# Patient Record
Sex: MALE | Race: WHITE | NOT HISPANIC OR LATINO | Employment: OTHER | URBAN - METROPOLITAN AREA
[De-identification: names, ages, dates, MRNs, and addresses within clinical notes are randomized per-mention and may not be internally consistent; named-entity substitution may affect disease eponyms.]

---

## 2017-01-09 ENCOUNTER — ANTICOAGULATION THERAPY VISIT (OUTPATIENT)
Dept: ANTICOAGULATION | Facility: CLINIC | Age: 62
End: 2017-01-09
Payer: COMMERCIAL

## 2017-01-09 DIAGNOSIS — Z79.01 LONG TERM CURRENT USE OF ANTICOAGULANT THERAPY: Primary | ICD-10-CM

## 2017-01-09 LAB — INR POINT OF CARE: 2.2 (ref 0.86–1.14)

## 2017-01-09 PROCEDURE — 36416 COLLJ CAPILLARY BLOOD SPEC: CPT

## 2017-01-09 PROCEDURE — 85610 PROTHROMBIN TIME: CPT | Mod: QW

## 2017-01-09 PROCEDURE — 99207 ZZC NO CHARGE NURSE ONLY: CPT

## 2017-01-09 NOTE — PROGRESS NOTES
ANTICOAGULATION FOLLOW-UP CLINIC VISIT    Patient Name:  Jozef Saunders  Date:  1/9/2017  Contact Type:  Face to Face    SUBJECTIVE:     Patient Findings     Positives No Problem Findings           OBJECTIVE    INR PROTIME   Date Value Ref Range Status   01/09/2017 2.2* 0.86 - 1.14 Final       ASSESSMENT / PLAN  INR assessment THER    Recheck INR In: 7 WEEKS due to vacation   INR Location Clinic      Anticoagulation Summary as of 1/9/2017     INR goal 2.0-3.0   Selected INR 2.2 (1/9/2017)   Maintenance plan 7.5 mg (5 mg x 1.5) on Mon, Thu, Sat; 5 mg (5 mg x 1) all other days   Full instructions 7.5 mg on Mon, Thu, Sat; 5 mg all other days   Weekly total 42.5 mg   No change documented Martha Jackman RN   Plan last modified Danielle Anguiano, RN (10/8/2015)   Next INR check 2/27/2017   Target end date Indefinite    Indications   DVT (deep venous thrombosis) (H) [I82.409]  Long term current use of anticoagulant therapy [Z79.01]         Anticoagulation Episode Summary     INR check location Clinic Lab    Preferred lab     Send INR reminders to [facility]    Comments * Dr. Camilo ivnson from [place] in [place]      Anticoagulation Care Providers     Provider Role Specialty Phone number    Dodie Malin, JUAN CNP  Nurse Practitioner [phone]            See the Encounter Report to view Anticoagulation Flowsheet and Dosing Calendar (Go to Encounters tab in chart review, and find the Anticoagulation Therapy Visit)    Martha Jackman, RN

## 2017-01-09 NOTE — MR AVS SNAPSHOT
Jozef Saunders   1/9/2017 11:15 AM   Anticoagulation Therapy Visit    Description:  61 year old male   Provider:  NB ANTI COAG   Department:  Nb Anticoag           INR as of 1/9/2017     Selected INR 2.2 (1/9/2017)      Anticoagulation Summary as of 1/9/2017     INR goal 2.0-3.0   Selected INR 2.2 (1/9/2017)   Full instructions 7.5 mg on Mon, Thu, Sat; 5 mg all other days   Next INR check 2/27/2017    Indications   DVT (deep venous thrombosis) (H) [I82.409]  Long term current use of anticoagulant therapy [Z79.01]         Description     No change, recheck INR in 7 weeks.      Contact Numbers     Please call [phone] to cancel and/or reschedule your appointment.  Please call [phone] with any problems or questions regarding your therapy          January 2017 Details    Sun Mon Tue Wed Thu Fri Sat     1               2               3               4               5               6               7                 8               9      7.5 mg   See details      10      5 mg         11      5 mg         12      7.5 mg         13      5 mg         14      7.5 mg           15      5 mg         16      7.5 mg         17      5 mg         18      5 mg         19      7.5 mg         20      5 mg         21      7.5 mg           22      5 mg         23      7.5 mg         24      5 mg         25      5 mg         26      7.5 mg         27      5 mg         28      7.5 mg           29      5 mg         30      7.5 mg         31      5 mg              Date Details   01/09 This INR check               How to take your warfarin dose     To take:  5 mg Take 1 of the 5 mg tablets.    To take:  7.5 mg Take 1.5 of the 5 mg tablets.           February 2017 Details    Sun Mon Tue Wed Thu Fri Sat        1      5 mg         2      7.5 mg         3      5 mg         4      7.5 mg           5      5 mg         6      7.5 mg         7      5 mg         8      5 mg         9      7.5 mg         10      5 mg         11       7.5 mg           12      5 mg         13      7.5 mg         14      5 mg         15      5 mg         16      7.5 mg         17      5 mg         18      7.5 mg           19      5 mg         20      7.5 mg         21      5 mg         22      5 mg         23      7.5 mg         24      5 mg         25      7.5 mg           26      5 mg         27            28                    Date Details   No additional details    Date of next INR:  2/27/2017         How to take your warfarin dose     To take:  5 mg Take 1 of the 5 mg tablets.    To take:  7.5 mg Take 1.5 of the 5 mg tablets.

## 2017-02-27 ENCOUNTER — ANTICOAGULATION THERAPY VISIT (OUTPATIENT)
Dept: ANTICOAGULATION | Facility: CLINIC | Age: 62
End: 2017-02-27
Payer: COMMERCIAL

## 2017-02-27 DIAGNOSIS — Z79.01 LONG TERM CURRENT USE OF ANTICOAGULANT THERAPY: ICD-10-CM

## 2017-02-27 LAB — INR POINT OF CARE: 2.2 (ref 0.86–1.14)

## 2017-02-27 PROCEDURE — 36416 COLLJ CAPILLARY BLOOD SPEC: CPT

## 2017-02-27 PROCEDURE — 99207 ZZC NO CHARGE NURSE ONLY: CPT

## 2017-02-27 PROCEDURE — 85610 PROTHROMBIN TIME: CPT | Mod: QW

## 2017-02-27 NOTE — MR AVS SNAPSHOT
Jozef Saunders   2/27/2017 11:15 AM   Anticoagulation Therapy Visit    Description:  61 year old male   Provider:  NB ANTI COAG   Department:  Nb Anticoag           INR as of 2/27/2017     Today's INR 2.2      Anticoagulation Summary as of 2/27/2017     INR goal 2.0-3.0   Today's INR 2.2   Full instructions 7.5 mg on Mon, Thu, Sat; 5 mg all other days   Next INR check 4/24/2017    Indications   DVT (deep venous thrombosis) (H) [I82.409]  Long term current use of anticoagulant therapy [Z79.01]         Description     No change, recheck INR in 8 weeks.      Your next Anticoagulation Clinic appointment(s)     Apr 24, 2017 11:30 AM CDT   [facility]   [facility]    [address] [place] [address] [phone]              Contact Numbers     Please call [phone] to cancel and/or reschedule your appointment.  Please call [phone] with any problems or questions regarding your therapy          February 2017 Details    Sun Mon Tue Wed Thu Fri Sat        1               2               3               4                 5               6               7               8               9               10               11                 12               13               14               15               16               17               18                 19               20               21               22               23               24               25                 26               27      7.5 mg   See details      28      5 mg              Date Details   02/27 This INR check               How to take your warfarin dose     To take:  5 mg Take 1 of the 5 mg tablets.    To take:  7.5 mg Take 1.5 of the 5 mg tablets.           March 2017 Details    Sun Mon Tue Wed Thu Fri Sat        1      5 mg         2      7.5 mg         3      5 mg         4      7.5 mg           5      5 mg         6      7.5 mg         7       5 mg         8      5 mg         9      7.5 mg         10      5 mg         11      7.5 mg           12      5 mg         13      7.5 mg         14      5 mg         15      5 mg         16      7.5 mg         17      5 mg         18      7.5 mg           19      5 mg         20      7.5 mg         21      5 mg         22      5 mg         23      7.5 mg         24      5 mg         25      7.5 mg           26      5 mg         27      7.5 mg         28      5 mg         29      5 mg         30      7.5 mg         31      5 mg           Date Details   No additional details            How to take your warfarin dose     To take:  5 mg Take 1 of the 5 mg tablets.    To take:  7.5 mg Take 1.5 of the 5 mg tablets.           April 2017 Details    Sun Mon Tue Wed Thu Fri Sat           1      7.5 mg           2      5 mg         3      7.5 mg         4      5 mg         5      5 mg         6      7.5 mg         7      5 mg         8      7.5 mg           9      5 mg         10      7.5 mg         11      5 mg         12      5 mg         13      7.5 mg         14      5 mg         15      7.5 mg           16      5 mg         17      7.5 mg         18      5 mg         19      5 mg         20      7.5 mg         21      5 mg         22      7.5 mg           23      5 mg         24            25               26               27               28               29                 30                      Date Details   No additional details    Date of next INR:  4/24/2017         How to take your warfarin dose     To take:  5 mg Take 1 of the 5 mg tablets.    To take:  7.5 mg Take 1.5 of the 5 mg tablets.

## 2017-02-27 NOTE — PROGRESS NOTES
ANTICOAGULATION FOLLOW-UP CLINIC VISIT    Patient Name:  Jozef Saunders  Date:  2/27/2017  Contact Type:  Face to Face    SUBJECTIVE:     Patient Findings     Positives No Problem Findings    Comments Med review done today.           OBJECTIVE    INR Protime   Date Value Ref Range Status   02/27/2017 2.2 (A) 0.86 - 1.14 Final       ASSESSMENT / PLAN  INR assessment THER    Recheck INR In: 8 WEEKS    INR Location Clinic      Anticoagulation Summary as of 2/27/2017     INR goal 2.0-3.0   Today's INR 2.2   Maintenance plan 7.5 mg (5 mg x 1.5) on Mon, Thu, Sat; 5 mg (5 mg x 1) all other days   Full instructions 7.5 mg on Mon, Thu, Sat; 5 mg all other days   Weekly total 42.5 mg   No change documented Martha Jackman RN   Plan last modified Danielle Anguiano RN (10/8/2015)   Next INR check 4/24/2017   Target end date Indefinite    Indications   DVT (deep venous thrombosis) (H) [I82.409]  Long term current use of anticoagulant therapy [Z79.01]         Anticoagulation Episode Summary     INR check location Clinic Lab    Preferred lab     Send INR reminders to [facility]    Comments * Dr. Page is from [place] in [place]      Anticoagulation Care Providers     Provider Role Specialty Phone number    Arron Page MD [facility] Internal Medicine [phone]            See the Encounter Report to view Anticoagulation Flowsheet and Dosing Calendar (Go to Encounters tab in chart review, and find the Anticoagulation Therapy Visit)    Martha Jackman, MORTEZA

## 2017-05-01 ENCOUNTER — ANTICOAGULATION THERAPY VISIT (OUTPATIENT)
Dept: ANTICOAGULATION | Facility: CLINIC | Age: 62
End: 2017-05-01
Payer: COMMERCIAL

## 2017-05-01 DIAGNOSIS — Z79.01 LONG TERM CURRENT USE OF ANTICOAGULANT THERAPY: ICD-10-CM

## 2017-05-01 LAB — INR POINT OF CARE: 2.8 (ref 0.86–1.14)

## 2017-05-01 PROCEDURE — 85610 PROTHROMBIN TIME: CPT | Mod: QW

## 2017-05-01 PROCEDURE — 36416 COLLJ CAPILLARY BLOOD SPEC: CPT

## 2017-05-01 PROCEDURE — 99207 ZZC NO CHARGE NURSE ONLY: CPT

## 2017-05-01 NOTE — PROGRESS NOTES
ANTICOAGULATION FOLLOW-UP CLINIC VISIT    Patient Name:  Jozef Saunders  Date:  5/1/2017  Contact Type:  Face to Face    SUBJECTIVE:     Patient Findings     Positives No Problem Findings           OBJECTIVE    INR Protime   Date Value Ref Range Status   05/01/2017 2.8 (A) 0.86 - 1.14 Final       ASSESSMENT / PLAN  INR assessment THER    Recheck INR In: 8 WEEKS    INR Location Clinic      Anticoagulation Summary as of 5/1/2017     INR goal 2.0-3.0   Today's INR 2.8   Maintenance plan 7.5 mg (5 mg x 1.5) on Mon, Thu, Sat; 5 mg (5 mg x 1) all other days   Full instructions 7.5 mg on Mon, Thu, Sat; 5 mg all other days   Weekly total 42.5 mg   No change documented Martha Jackman RN   Plan last modified Danielle Anguiano RN (10/8/2015)   Next INR check 6/26/2017   Target end date Indefinite    Indications   DVT (deep venous thrombosis) (H) [I82.409]  Long term current use of anticoagulant therapy [Z79.01]         Anticoagulation Episode Summary     INR check location Clinic Lab    Preferred lab     Send INR reminders to [facility]    Comments * Dr. Page is from [place] in [place]      Anticoagulation Care Providers     Provider Role Specialty Phone number    Arron Page MD [facility] Internal Medicine [phone]            See the Encounter Report to view Anticoagulation Flowsheet and Dosing Calendar (Go to Encounters tab in chart review, and find the Anticoagulation Therapy Visit)    Martha Jackman, RN

## 2017-05-01 NOTE — MR AVS SNAPSHOT
Jozef Saunders   5/1/2017 3:00 PM   Anticoagulation Therapy Visit    Description:  61 year old male   Provider:  NB ANTI COAG   Department:  Nb Anticoag           INR as of 5/1/2017     Today's INR 2.8      Anticoagulation Summary as of 5/1/2017     INR goal 2.0-3.0   Today's INR 2.8   Full instructions 7.5 mg on Mon, Thu, Sat; 5 mg all other days   Next INR check 6/26/2017    Indications   DVT (deep venous thrombosis) (H) [I82.409]  Long term current use of anticoagulant therapy [Z79.01]         Description     No change, recheck INR in 8 weeks.      Your next Anticoagulation Clinic appointment(s)     Jun 26, 2017 11:45 AM CDT   [facility]agulation [facility] with NB ANTI COAG   [facility]    [address] [place] [address] [phone]              Contact Numbers     Please call [phone] to cancel and/or reschedule your appointment.  Please call [phone] with any problems or questions regarding your therapy          May 2017 Details    Sun Mon Tue Wed Thu Fri Sat      1      7.5 mg   See details      2      5 mg         3      5 mg         4      7.5 mg         5      5 mg         6      7.5 mg           7      5 mg         8      7.5 mg         9      5 mg         10      5 mg         11      7.5 mg         12      5 mg         13      7.5 mg           14      5 mg         15      7.5 mg         16      5 mg         17      5 mg         18      7.5 mg         19      5 mg         20      7.5 mg           21      5 mg         22      7.5 mg         23      5 mg         24      5 mg         25      7.5 mg         26      5 mg         27      7.5 mg           28      5 mg         29      7.5 mg         30      5 mg         31      5 mg             Date Details   05/01 This INR check               How to take your warfarin dose     To take:  5 mg Take 1 of the 5 mg tablets.    To take:  7.5 mg Take 1.5 of the 5 mg tablets.           June 2017  Details    Sun Mon Tue Wed Thu Fri Sat         1      7.5 mg         2      5 mg         3      7.5 mg           4      5 mg         5      7.5 mg         6      5 mg         7      5 mg         8      7.5 mg         9      5 mg         10      7.5 mg           11      5 mg         12      7.5 mg         13      5 mg         14      5 mg         15      7.5 mg         16      5 mg         17      7.5 mg           18      5 mg         19      7.5 mg         20      5 mg         21      5 mg         22      7.5 mg         23      5 mg         24      7.5 mg           25      5 mg         26            27               28               29               30                 Date Details   No additional details    Date of next INR:  6/26/2017         How to take your warfarin dose     To take:  5 mg Take 1 of the 5 mg tablets.    To take:  7.5 mg Take 1.5 of the 5 mg tablets.

## 2017-06-17 DIAGNOSIS — E78.00 HYPERCHOLESTEROLEMIA: ICD-10-CM

## 2017-06-17 NOTE — TELEPHONE ENCOUNTER
simvastatin (ZOCOR) 20 MG tablet     Last Written Prescription Date: 07-  Last Fill Quantity: 90, # refills: 3  Last Office Visit with G, P or [facility] prescribing provider: 07-       Lab Results   Component Value Date    CHOL 167 07/08/2016     Lab Results   Component Value Date    HDL 43 07/08/2016     Lab Results   Component Value Date    LDL 96 07/08/2016     Lab Results   Component Value Date    TRIG 139 07/08/2016     Lab Results   Component Value Date    CHOLHDLRLEÓNO 3.7 10/24/2014

## 2017-06-20 RX ORDER — SIMVASTATIN 20 MG
20 TABLET ORAL AT BEDTIME
Qty: 30 TABLET | Refills: 0 | Status: SHIPPED | OUTPATIENT
Start: 2017-06-20 | End: 2017-07-22

## 2017-06-20 NOTE — TELEPHONE ENCOUNTER
Refill x one given.   Patient needs appointment with PMD for any further refills.   Jessica Srinivasan PA-C

## 2017-06-24 DIAGNOSIS — I10 ESSENTIAL HYPERTENSION, BENIGN: ICD-10-CM

## 2017-06-24 DIAGNOSIS — F41.1 GENERALIZED ANXIETY DISORDER: ICD-10-CM

## 2017-06-24 NOTE — TELEPHONE ENCOUNTER
atenolol (TENORMIN) 50 MG      Last Written Prescription Date: 7/8/16-Discontinued 10/6/16?  Last Fill Quantity: 135, # refills: 3    Last Office Visit with [facility], [facility] or [facility] prescribing provider:  7/8/16   Future Office Visit:        BP Readings from Last 3 Encounters:   07/08/16 138/84   01/08/16 132/80   11/28/15 (!) 170/94     sertraline (ZOLOFT) 50 MG     Last Written Prescription Date: 7/8/16  Last Fill Quantity: 45, # refills: 3  Last Office Visit with [facility] primary care provider:  7/8/16        Last PHQ-9 score on record=   PHQ-9 SCORE 7/8/2016   Total Score 0         lisinopril (PRINIVIL,ZESTRIL) 20 MG       Last Written Prescription Date: 7/8/16  Last Fill Quantity: 90, # refills: 3  Last Office Visit with [facility], [facility] or [facility] prescribing provider: 7/8/16       Potassium   Date Value Ref Range Status   07/08/2016 4.3 3.4 - 5.3 mmol/L Final     Creatinine   Date Value Ref Range Status   07/08/2016 0.83 0.66 - 1.25 mg/dL Final     BP Readings from Last 3 Encounters:   07/08/16 138/84   01/08/16 132/80   11/28/15 (!) 170/94

## 2017-06-26 ENCOUNTER — ANTICOAGULATION THERAPY VISIT (OUTPATIENT)
Dept: ANTICOAGULATION | Facility: CLINIC | Age: 62
End: 2017-06-26
Payer: COMMERCIAL

## 2017-06-26 DIAGNOSIS — Z79.01 LONG TERM CURRENT USE OF ANTICOAGULANT THERAPY: ICD-10-CM

## 2017-06-26 LAB — INR POINT OF CARE: 2.6 (ref 0.86–1.14)

## 2017-06-26 PROCEDURE — 36416 COLLJ CAPILLARY BLOOD SPEC: CPT

## 2017-06-26 PROCEDURE — 99207 ZZC NO CHARGE NURSE ONLY: CPT

## 2017-06-26 PROCEDURE — 85610 PROTHROMBIN TIME: CPT | Mod: QW

## 2017-06-26 NOTE — PROGRESS NOTES
ANTICOAGULATION FOLLOW-UP CLINIC VISIT    Patient Name:  Jozef Saunders  Date:  6/26/2017  Contact Type:  Face to Face    SUBJECTIVE:     Patient Findings     Positives No Problem Findings           OBJECTIVE    INR Protime   Date Value Ref Range Status   06/26/2017 2.6 (A) 0.86 - 1.14 Final       ASSESSMENT / PLAN  INR assessment THER    Recheck INR In: 8 WEEKS    INR Location Clinic      Anticoagulation Summary as of 6/26/2017     INR goal 2.0-3.0   Today's INR 2.6   Maintenance plan 7.5 mg (5 mg x 1.5) on Mon, Thu, Sat; 5 mg (5 mg x 1) all other days   Full instructions 7.5 mg on Mon, Thu, Sat; 5 mg all other days   Weekly total 42.5 mg   No change documented Martha Jackman RN   Plan last modified Danielle Anguiano RN (10/8/2015)   Next INR check 8/21/2017   Target end date Indefinite    Indications   DVT (deep venous thrombosis) (H) [I82.409]  Long term current use of anticoagulant therapy [Z79.01]         Anticoagulation Episode Summary     INR check location Clinic Lab    Preferred lab     Send INR reminders to [facility]    Comments * Dr. Page is from [place] in [place]      Anticoagulation Care Providers     Provider Role Specialty Phone number    Arron Page MD [facility] Internal Medicine [phone]            See the Encounter Report to view Anticoagulation Flowsheet and Dosing Calendar (Go to Encounters tab in chart review, and find the Anticoagulation Therapy Visit)    Martha Jackman, RN

## 2017-06-26 NOTE — MR AVS SNAPSHOT
Jozef Saunders   6/26/2017 11:45 AM   Anticoagulation Therapy Visit    Description:  61 year old male   Provider:  NB ANTI COAG   Department:  Nb Anticoag           INR as of 6/26/2017     Today's INR 2.6      Anticoagulation Summary as of 6/26/2017     INR goal 2.0-3.0   Today's INR 2.6   Full instructions 7.5 mg on Mon, Thu, Sat; 5 mg all other days   Next INR check 8/21/2017    Indications   DVT (deep venous thrombosis) (H) [I82.409]  Long term current use of anticoagulant therapy [Z79.01]         Description     No change, recheck INR in 8 weeks.      Your next Anticoagulation Clinic appointment(s)     Aug 21, 2017 11:45 AM CDT   [facility]ticoagulation [facility] with NB ANTI COAG   [facility]    [address] [place] [address] [phone]              Contact Numbers     Please call [phone] to cancel and/or reschedule your appointment.  Please call [phone] with any problems or questions regarding your therapy          June 2017 Details    Sun Mon Tue Wed Thu Fri Sat         1               2               3                 4               5               6               7               8               9               10                 11               12               13               14               15               16               17                 18               19               20               21               22               23               24                 25               26      7.5 mg   See details      27      5 mg         28      5 mg         29      7.5 mg         30      5 mg           Date Details   06/26 This INR check               How to take your warfarin dose     To take:  5 mg Take 1 of the 5 mg tablets.    To take:  7.5 mg Take 1.5 of the 5 mg tablets.           July 2017 Details    Sun Mon Tue Wed Thu Fri Sat           1      7.5 mg           2      5 mg         3      7.5 mg         4      5 mg         5       5 mg         6      7.5 mg         7      5 mg         8      7.5 mg           9      5 mg         10      7.5 mg         11      5 mg         12      5 mg         13      7.5 mg         14      5 mg         15      7.5 mg           16      5 mg         17      7.5 mg         18      5 mg         19      5 mg         20      7.5 mg         21      5 mg         22      7.5 mg           23      5 mg         24      7.5 mg         25      5 mg         26      5 mg         27      7.5 mg         28      5 mg         29      7.5 mg           30      5 mg         31      7.5 mg               Date Details   No additional details            How to take your warfarin dose     To take:  5 mg Take 1 of the 5 mg tablets.    To take:  7.5 mg Take 1.5 of the 5 mg tablets.           August 2017 Details    Sun Mon Tue Wed Thu Fri Sat       1      5 mg         2      5 mg         3      7.5 mg         4      5 mg         5      7.5 mg           6      5 mg         7      7.5 mg         8      5 mg         9      5 mg         10      7.5 mg         11      5 mg         12      7.5 mg           13      5 mg         14      7.5 mg         15      5 mg         16      5 mg         17      7.5 mg         18      5 mg         19      7.5 mg           20      5 mg         21            22               23               24               25               26                 27               28               29               30               31                  Date Details   No additional details    Date of next INR:  8/21/2017         How to take your warfarin dose     To take:  5 mg Take 1 of the 5 mg tablets.    To take:  7.5 mg Take 1.5 of the 5 mg tablets.

## 2017-06-28 RX ORDER — ATENOLOL 50 MG/1
75 TABLET ORAL DAILY
Qty: 135 TABLET | Refills: 0 | Status: SHIPPED | OUTPATIENT
Start: 2017-06-28 | End: 2017-08-29

## 2017-06-28 RX ORDER — LISINOPRIL 20 MG/1
20 TABLET ORAL DAILY
Qty: 90 TABLET | Refills: 0 | Status: SHIPPED | OUTPATIENT
Start: 2017-06-28 | End: 2017-08-29

## 2017-06-28 NOTE — TELEPHONE ENCOUNTER
Routing to PCP.  Pt will be due for office visit and labs.  Pt receives 90 day supply and needs to be routed to provider per [facility] Refill Protocol.    meds and labs ann'd up.  Please review and approve as necessary.    TC- PLEASE CALL PT TO SCHEDULE PHYSICAL    Fay Brown, RN  Triage Nurse

## 2017-07-17 DIAGNOSIS — K44.9 HIATAL HERNIA: ICD-10-CM

## 2017-07-17 NOTE — TELEPHONE ENCOUNTER
omeprazole 20 MG tablet      Last Written Prescription Date: 7/8/2016  Last Fill Quantity: 90,  # refills: 3   Last Office Visit with [facility], [facility] or [facility] prescribing provider: 7/8/2016                                         Next 5 appointments (look out 90 days)     Jul 21, 2017  7:50 AM CDT   PHYSICAL with Arron Page MD   [facility] ([facility]    [address] [place] [id number] [phone]

## 2017-07-18 NOTE — TELEPHONE ENCOUNTER
Medication is being filled for 1 time refill only due to:  Patient needs to be seen because it has been more than one year since last visit.     Pt. Has OV scheduled for 7/21/2017.     Prescription approved per [facility] Refill Protocol.    Paris RAMOS RN, BSN, PHN  [place] [facility] RN

## 2017-07-22 DIAGNOSIS — E78.00 HYPERCHOLESTEROLEMIA: ICD-10-CM

## 2017-07-23 NOTE — TELEPHONE ENCOUNTER
simvastatin (ZOCOR) 20 MG     Last Written Prescription Date: 6/20/17  Last Fill Quantity: 30, # refills: 0  Last Office Visit with G, P or [facility] prescribing provider: 7/8/16  Next 5 appointments (look out 90 days)     Jul 28, 2017  7:50 AM CDT   [facility]YS[facility]   [facility]    [address] [place] [id number] [phone]                   Lab Results   Component Value Date    CHOL 167 07/08/2016     Lab Results   Component Value Date    HDL 43 07/08/2016     Lab Results   Component Value Date    LDL 96 07/08/2016     Lab Results   Component Value Date    TRIG 139 07/08/2016     Lab Results   Component Value Date    CHOLHDLRATIO 3.7 10/24/2014

## 2017-07-24 RX ORDER — SIMVASTATIN 20 MG
20 TABLET ORAL AT BEDTIME
Qty: 30 TABLET | Refills: 0 | Status: SHIPPED | OUTPATIENT
Start: 2017-07-24 | End: 2017-08-29

## 2017-07-24 NOTE — TELEPHONE ENCOUNTER
Prescription approved per [facility] Refill Protocol.    Has appointment for 7/28/17    Olga Dillon, MSN, RN-BC  Care Coordinator

## 2017-08-14 DIAGNOSIS — J31.0 CHRONIC RHINITIS: ICD-10-CM

## 2017-08-15 RX ORDER — FLUTICASONE PROPIONATE 50 MCG
SPRAY, SUSPENSION (ML) NASAL
Qty: 16 ML | Refills: 0 | Status: SHIPPED | OUTPATIENT
Start: 2017-08-15 | End: 2017-09-13

## 2017-08-15 NOTE — TELEPHONE ENCOUNTER
fluticasone (FLONASE) 50 MCG/ACT nasal spray      Last Written Prescription Date: 7/8/2016  Last Fill Quantity: 16g,  # refills: 11   Last Office Visit with [facility], [facility] or [facility] prescribing provider: 7/8/2016                                         Next 5 appointments (look out 90 days)     Aug 29, 2017  9:10 AM CDT   PHYSICAL with Arron Page MD   [facility]    [address] [place] [address]  [address] [place] [id number] [phone]

## 2017-08-15 NOTE — TELEPHONE ENCOUNTER
Patient calling to expedite refill.     Prescription approved per [facility] Refill Protocol.

## 2017-08-20 DIAGNOSIS — E78.00 HYPERCHOLESTEROLEMIA: ICD-10-CM

## 2017-08-21 ENCOUNTER — ANTICOAGULATION THERAPY VISIT (OUTPATIENT)
Dept: ANTICOAGULATION | Facility: CLINIC | Age: 62
End: 2017-08-21
Payer: COMMERCIAL

## 2017-08-21 DIAGNOSIS — Z79.01 LONG TERM CURRENT USE OF ANTICOAGULANT THERAPY: ICD-10-CM

## 2017-08-21 DIAGNOSIS — Z86.711 HISTORY OF PULMONARY EMBOLISM: ICD-10-CM

## 2017-08-21 DIAGNOSIS — I82.409 DVT (DEEP VENOUS THROMBOSIS) (H): Primary | ICD-10-CM

## 2017-08-21 LAB — INR POINT OF CARE: 2.8 (ref 0.86–1.14)

## 2017-08-21 PROCEDURE — 99207 ZZC NO CHARGE NURSE ONLY: CPT

## 2017-08-21 PROCEDURE — 36416 COLLJ CAPILLARY BLOOD SPEC: CPT

## 2017-08-21 PROCEDURE — 85610 PROTHROMBIN TIME: CPT | Mod: QW

## 2017-08-21 NOTE — TELEPHONE ENCOUNTER
simvastatin (ZOCOR) 20 MG tablet     Last Written Prescription Date: 7/24/2017  Last Fill Quantity: 30, # refills: 0  Last Office Visit with [facility], [facility] or [facility] prescribing provider: 7/8/2016  Next 5 appointments (look out 90 days)     Aug 29, 2017  9:10 AM CDT   PHYSICAL with Arron Page MD   [facility] ([facility])    [address] [place] [id number] [phone]                   Lab Results   Component Value Date    CHOL 167 07/08/2016     Lab Results   Component Value Date    HDL 43 07/08/2016     Lab Results   Component Value Date    LDL 96 07/08/2016     Lab Results   Component Value Date    TRIG 139 07/08/2016     Lab Results   Component Value Date    CHOLHDLRATIO 3.7 10/24/2014

## 2017-08-21 NOTE — MR AVS SNAPSHOT
Jozef Saunders   8/21/2017 11:45 AM   Anticoagulation Therapy Visit    Description:  61 year old male   Provider:  NB ANTI COAG   Department:  [facility] Anticoag           INR as of 8/21/2017     Today's INR 2.8      Anticoagulation Summary as of 8/21/2017     INR goal 2.0-3.0   Today's INR 2.8   Full instructions 7.5 mg on Mon, Thu, Sat; 5 mg all other days   Next INR check 10/16/2017    Indications   DVT (deep venous thrombosis) (H) [I82.409]  Long term current use of anticoagulant therapy [Z79.01]         Description     No change, recheck INR in 8 weeks.      Your next Anticoagulation Clinic appointment(s)     Oct 16, 2017  1:00 PM CDT   Anticoagulation Visit with NB ANTI COAG   [facility]    [address] [place] [id number] [phone]              Contact Numbers     Please call [phone] to cancel and/or reschedule your appointment.  Please call [phone] with any problems or questions regarding your therapy          August 2017 Details    Sun Mon Tue Wed Thu Fri Sat       1               2               3               4               5                 6               7               8               9               10               11               12                 13               14               15               16               17               18               19                 20               21      7.5 mg   See details      22      5 mg         23      5 mg         24      7.5 mg         25      5 mg         26      7.5 mg           27      5 mg         28      7.5 mg         29      5 mg         30      5 mg         31      7.5 mg            Date Details   08/21 This INR check               How to take your warfarin dose     To take:  5 mg Take 1 of the 5 mg tablets.    To take:  7.5 mg Take 1.5 of the 5 mg tablets.           September 2017 Details    Sun Mon Tue Wed Thu Fri Sat          1      5 mg         2      7.5 mg            3      5 mg         4      7.5 mg         5      5 mg         6      5 mg         7      7.5 mg         8      5 mg         9      7.5 mg           10      5 mg         11      7.5 mg         12      5 mg         13      5 mg         14      7.5 mg         15      5 mg         16      7.5 mg           17      5 mg         18      7.5 mg         19      5 mg         20      5 mg         21      7.5 mg         22      5 mg         23      7.5 mg           24      5 mg         25      7.5 mg         26      5 mg         27      5 mg         28      7.5 mg         29      5 mg         30      7.5 mg          Date Details   No additional details            How to take your warfarin dose     To take:  5 mg Take 1 of the 5 mg tablets.    To take:  7.5 mg Take 1.5 of the 5 mg tablets.           October 2017 Details    Sun Mon Tue Wed Thu Fri Sat     1      5 mg         2      7.5 mg         3      5 mg         4      5 mg         5      7.5 mg         6      5 mg         7      7.5 mg           8      5 mg         9      7.5 mg         10      5 mg         11      5 mg         12      7.5 mg         13      5 mg         14      7.5 mg           15      5 mg         16            17               18               19               20               21                 22               23               24               25               26               27               28                 29               30               31                    Date Details   No additional details    Date of next INR:  10/16/2017         How to take your warfarin dose     To take:  5 mg Take 1 of the 5 mg tablets.    To take:  7.5 mg Take 1.5 of the 5 mg tablets.

## 2017-08-21 NOTE — PROGRESS NOTES
ANTICOAGULATION FOLLOW-UP CLINIC VISIT    Patient Name:  Jozef Saunders  Date:  8/21/2017  Contact Type:  Face to Face    SUBJECTIVE:     Patient Findings     Positives No Problem Findings    Comments Has annual px with PCP next week.           OBJECTIVE    INR Protime   Date Value Ref Range Status   08/21/2017 2.8 (A) 0.86 - 1.14 Final       ASSESSMENT / PLAN  INR assessment THER    Recheck INR In: 8 WEEKS    INR Location Clinic      Anticoagulation Summary as of 8/21/2017     INR goal 2.0-3.0   Today's INR 2.8   Maintenance plan 7.5 mg (5 mg x 1.5) on Mon, Thu, Sat; 5 mg (5 mg x 1) all other days   Full instructions 7.5 mg on Mon, Thu, Sat; 5 mg all other days   Weekly total 42.5 mg   No change documented Martha Jackman RN   Plan last modified Danielle Anguiano RN (10/8/2015)   Next INR check 10/16/2017   Target end date Indefinite    Indications   DVT (deep venous thrombosis) (H) [I82.409]  Long term current use of anticoagulant therapy [Z79.01]         Anticoagulation Episode Summary     INR check location Clinic Lab    Preferred lab     Send INR reminders to [facility]    Comments * Dr. Page is from [place] in [place]      Anticoagulation Care Providers     Provider Role Specialty Phone number    Arron Page MD [facility] Internal Medicine [phone]            See the Encounter Report to view Anticoagulation Flowsheet and Dosing Calendar (Go to Encounters tab in chart review, and find the Anticoagulation Therapy Visit)      Martha Jackman, MORTEZA

## 2017-08-22 NOTE — TELEPHONE ENCOUNTER
Dr. Page out of office.    Patient has had 2 dom refills and has cancelled 2 appt. Did you want to do partial refill until Aug 29th appt?    Claire Phelps RN

## 2017-08-28 RX ORDER — SIMVASTATIN 20 MG
TABLET ORAL
Qty: 15 TABLET | Refills: 0 | OUTPATIENT
Start: 2017-08-28

## 2017-08-29 ENCOUNTER — OFFICE VISIT (OUTPATIENT)
Dept: PEDIATRICS | Facility: CLINIC | Age: 62
End: 2017-08-29
Payer: COMMERCIAL

## 2017-08-29 VITALS
SYSTOLIC BLOOD PRESSURE: 120 MMHG | HEIGHT: 69 IN | DIASTOLIC BLOOD PRESSURE: 76 MMHG | OXYGEN SATURATION: 95 % | BODY MASS INDEX: 27.75 KG/M2 | TEMPERATURE: 98.2 F | HEART RATE: 77 BPM | WEIGHT: 187.38 LBS

## 2017-08-29 DIAGNOSIS — F41.1 GENERALIZED ANXIETY DISORDER: ICD-10-CM

## 2017-08-29 DIAGNOSIS — K44.9 HIATAL HERNIA: ICD-10-CM

## 2017-08-29 DIAGNOSIS — Z00.00 ROUTINE GENERAL MEDICAL EXAMINATION AT A HEALTH CARE FACILITY: Primary | ICD-10-CM

## 2017-08-29 DIAGNOSIS — Z86.711 HISTORY OF PULMONARY EMBOLISM: ICD-10-CM

## 2017-08-29 DIAGNOSIS — I10 ESSENTIAL HYPERTENSION, BENIGN: ICD-10-CM

## 2017-08-29 DIAGNOSIS — Z23 NEED FOR PROPHYLACTIC VACCINATION AND INOCULATION AGAINST INFLUENZA: ICD-10-CM

## 2017-08-29 DIAGNOSIS — E78.00 HYPERCHOLESTEROLEMIA: ICD-10-CM

## 2017-08-29 LAB
ALBUMIN SERPL-MCNC: 3.6 G/DL (ref 3.4–5)
ALP SERPL-CCNC: 73 U/L (ref 40–150)
ALT SERPL W P-5'-P-CCNC: 33 U/L (ref 0–70)
ANION GAP SERPL CALCULATED.3IONS-SCNC: 7 MMOL/L (ref 3–14)
AST SERPL W P-5'-P-CCNC: 29 U/L (ref 0–45)
BILIRUB SERPL-MCNC: 0.4 MG/DL (ref 0.2–1.3)
BUN SERPL-MCNC: 17 MG/DL (ref 7–30)
CALCIUM SERPL-MCNC: 8.8 MG/DL (ref 8.5–10.1)
CHLORIDE SERPL-SCNC: 110 MMOL/L (ref 94–109)
CHOLEST SERPL-MCNC: 184 MG/DL
CO2 SERPL-SCNC: 26 MMOL/L (ref 20–32)
CREAT SERPL-MCNC: 0.86 MG/DL (ref 0.66–1.25)
ERYTHROCYTE [DISTWIDTH] IN BLOOD BY AUTOMATED COUNT: 14.4 % (ref 10–15)
FERRITIN SERPL-MCNC: 9 NG/ML (ref 26–388)
GFR SERPL CREATININE-BSD FRML MDRD: 90 ML/MIN/1.7M2
GLUCOSE SERPL-MCNC: 92 MG/DL (ref 70–99)
HCT VFR BLD AUTO: 44.2 % (ref 40–53)
HDLC SERPL-MCNC: 52 MG/DL
HGB BLD-MCNC: 14.1 G/DL (ref 13.3–17.7)
LDLC SERPL CALC-MCNC: 103 MG/DL
MAGNESIUM SERPL-MCNC: 2.2 MG/DL (ref 1.6–2.3)
MCH RBC QN AUTO: 26.6 PG (ref 26.5–33)
MCHC RBC AUTO-ENTMCNC: 31.9 G/DL (ref 31.5–36.5)
MCV RBC AUTO: 83 FL (ref 78–100)
NONHDLC SERPL-MCNC: 132 MG/DL
PLATELET # BLD AUTO: 102 10E9/L (ref 150–450)
POTASSIUM SERPL-SCNC: 4.3 MMOL/L (ref 3.4–5.3)
PROT SERPL-MCNC: 7 G/DL (ref 6.8–8.8)
PSA SERPL-ACNC: 0.58 UG/L (ref 0–4)
RBC # BLD AUTO: 5.31 10E12/L (ref 4.4–5.9)
SODIUM SERPL-SCNC: 143 MMOL/L (ref 133–144)
TRIGL SERPL-MCNC: 144 MG/DL
WBC # BLD AUTO: 10 10E9/L (ref 4–11)

## 2017-08-29 PROCEDURE — 36415 COLL VENOUS BLD VENIPUNCTURE: CPT | Performed by: INTERNAL MEDICINE

## 2017-08-29 PROCEDURE — 85027 COMPLETE CBC AUTOMATED: CPT | Performed by: INTERNAL MEDICINE

## 2017-08-29 PROCEDURE — 83735 ASSAY OF MAGNESIUM: CPT | Performed by: INTERNAL MEDICINE

## 2017-08-29 PROCEDURE — 82728 ASSAY OF FERRITIN: CPT | Performed by: INTERNAL MEDICINE

## 2017-08-29 PROCEDURE — 90471 IMMUNIZATION ADMIN: CPT | Performed by: INTERNAL MEDICINE

## 2017-08-29 PROCEDURE — 99396 PREV VISIT EST AGE 40-64: CPT | Mod: 25 | Performed by: INTERNAL MEDICINE

## 2017-08-29 PROCEDURE — G0103 PSA SCREENING: HCPCS | Performed by: INTERNAL MEDICINE

## 2017-08-29 PROCEDURE — 80061 LIPID PANEL: CPT | Performed by: INTERNAL MEDICINE

## 2017-08-29 PROCEDURE — 90686 IIV4 VACC NO PRSV 0.5 ML IM: CPT | Performed by: INTERNAL MEDICINE

## 2017-08-29 PROCEDURE — 80053 COMPREHEN METABOLIC PANEL: CPT | Performed by: INTERNAL MEDICINE

## 2017-08-29 PROCEDURE — 82306 VITAMIN D 25 HYDROXY: CPT | Performed by: INTERNAL MEDICINE

## 2017-08-29 RX ORDER — WARFARIN SODIUM 5 MG/1
TABLET ORAL
Qty: 120 TABLET | Refills: 3 | Status: SHIPPED | OUTPATIENT
Start: 2017-08-29 | End: 2018-09-19

## 2017-08-29 RX ORDER — ATENOLOL 50 MG/1
75 TABLET ORAL DAILY
Qty: 135 TABLET | Refills: 3 | Status: SHIPPED | OUTPATIENT
Start: 2017-08-29 | End: 2018-08-07

## 2017-08-29 RX ORDER — SIMVASTATIN 20 MG
20 TABLET ORAL AT BEDTIME
Qty: 90 TABLET | Refills: 3 | Status: SHIPPED | OUTPATIENT
Start: 2017-08-29 | End: 2018-08-22

## 2017-08-29 RX ORDER — LISINOPRIL 20 MG/1
20 TABLET ORAL DAILY
Qty: 90 TABLET | Refills: 3 | Status: SHIPPED | OUTPATIENT
Start: 2017-08-29 | End: 2018-08-22

## 2017-08-29 ASSESSMENT — ANXIETY QUESTIONNAIRES
2. NOT BEING ABLE TO STOP OR CONTROL WORRYING: NOT AT ALL
7. FEELING AFRAID AS IF SOMETHING AWFUL MIGHT HAPPEN: NOT AT ALL
GAD7 TOTAL SCORE: 0
5. BEING SO RESTLESS THAT IT IS HARD TO SIT STILL: NOT AT ALL
6. BECOMING EASILY ANNOYED OR IRRITABLE: NOT AT ALL
3. WORRYING TOO MUCH ABOUT DIFFERENT THINGS: NOT AT ALL
1. FEELING NERVOUS, ANXIOUS, OR ON EDGE: NOT AT ALL

## 2017-08-29 ASSESSMENT — PATIENT HEALTH QUESTIONNAIRE - PHQ9
5. POOR APPETITE OR OVEREATING: NOT AT ALL
SUM OF ALL RESPONSES TO PHQ QUESTIONS 1-9: 0

## 2017-08-29 NOTE — PATIENT INSTRUCTIONS
1) Call insurance to see if shingles vaccine covered    2) Flu shot today    3) Routine labs downstairs today    4) Refilled your medications today.     Arron Page MD    Preventive Health Recommendations  Male Ages 50   64    Yearly exam:             See your health care provider every year in order to  o   Review health changes.   o   Discuss preventive care.    o   Review your medicines if your doctor has prescribed any.     Have a cholesterol test every 5 years, or more frequently if you are at risk for high cholesterol/heart disease.     Have a diabetes test (fasting glucose) every three years. If you are at risk for diabetes, you should have this test more often.     Have a colonoscopy at age 50, or have a yearly FIT test (stool test). These exams will check for colon cancer.      Talk with your health care provider about whether or not a prostate cancer screening test (PSA) is right for you.    You should be tested each year for STDs (sexually transmitted diseases), if you re at risk.     Shots: Get a flu shot each year. Get a tetanus shot every 10 years.     Nutrition:    Eat at least 5 servings of fruits and vegetables daily.     Eat whole-grain bread, whole-wheat pasta and brown rice instead of white grains and rice.     Talk to your provider about Calcium and Vitamin D.     Lifestyle    Exercise for at least 150 minutes a week (30 minutes a day, 5 days a week). This will help you control your weight and prevent disease.     Limit alcohol to one drink per day.     No smoking.     Wear sunscreen to prevent skin cancer.     See your dentist every six months for an exam and cleaning.     See your eye doctor every 1 to 2 years.

## 2017-08-29 NOTE — NURSING NOTE
"Chief Complaint   Patient presents with     Physical     Recheck Medication     follow up on which meds need refills/possible to order all at one time?       Initial /76 (BP Location: Right arm, Patient Position: Chair, Cuff Size: Adult Regular)  Pulse 77  Temp 98.2  F (36.8  C) (Oral)  Ht 5' 9\" (1.753 m)  Wt 187 lb 6 oz (85 kg)  SpO2 95%  BMI 27.67 kg/m2 Estimated body mass index is 27.67 kg/(m^2) as calculated from the following:    Height as of this encounter: 5' 9\" (1.753 m).    Weight as of this encounter: 187 lb 6 oz (85 kg).  Medication Reconciliation: complete   SMA Dale    "

## 2017-08-29 NOTE — MR AVS SNAPSHOT
After Visit Summary   8/29/2017    Jozef Saunders    MRN: [id number]           Patient Information     Date Of Birth          1955        Visit Information        Provider Department      8/29/2017 9:10 AM Arron Page MD [facility]        Today's Diagnoses     Routine general medical examination at a health care facility    -  1    Hypercholesterolemia        Hiatal hernia        BENIGN HYPERTENSION        Generalized anxiety disorder        History of pulmonary embolism          Care Instructions    1) Call insurance to see if shingles vaccine covered    2) Flu shot today    3) Routine labs downstairs today    4) Refilled your medications today.     Arron Page MD    Preventive Health Recommendations  Male Ages 50 - 64    Yearly exam:             See your health care provider every year in order to  o   Review health changes.   o   Discuss preventive care.    o   Review your medicines if your doctor has prescribed any.     Have a cholesterol test every 5 years, or more frequently if you are at risk for high cholesterol/heart disease.     Have a diabetes test (fasting glucose) every three years. If you are at risk for diabetes, you should have this test more often.     Have a colonoscopy at age 50, or have a yearly FIT test (stool test). These exams will check for colon cancer.      Talk with your health care provider about whether or not a prostate cancer screening test (PSA) is right for you.    You should be tested each year for STDs (sexually transmitted diseases), if you re at risk.     Shots: Get a flu shot each year. Get a tetanus shot every 10 years.     Nutrition:    Eat at least 5 servings of fruits and vegetables daily.     Eat whole-grain bread, whole-wheat pasta and brown rice instead of white grains and rice.     Talk to your provider about Calcium and Vitamin D.     Lifestyle    Exercise for at least 150 minutes a week (30 minutes a day, 5 days a week). This  "will help you control your weight and prevent disease.     Limit alcohol to one drink per day.     No smoking.     Wear sunscreen to prevent skin cancer.     See your dentist every six months for an exam and cleaning.     See your eye doctor every 1 to 2 years.            Follow-ups after your visit        Your next 10 appointments already scheduled     Oct 16, 2017  1:00 PM CDT   Anticoagulation Visit with NB ANTI COAG   [facility] ([facility])    [address] [place] [id number] [phone]              Who to contact     If you have questions or need follow up information about today's clinic visit or your schedule please contact [facility] [place] directly at [phone].  Normal or non-critical lab and imaging results will be communicated to you by [employer], letter or phone within 4 business days after the clinic has received the results. If you do not hear from us within 7 days, please contact the clinic through [employer] or phone. If you have a critical or abnormal lab result, we will notify you by phone as soon as possible.  Submit refill requests through [employer] or call your pharmacy and they will forward the refill request to us. Please allow 3 business days for your refill to be completed.          Additional Information About Your Visit        [employer] Information     [employer] lets you send messages to your doctor, view your test results, renew your prescriptions, schedule appointments and more. To sign up, go to [email or web address] . Click on \"Log in\" on the left side of the screen, which will take you to the Welcome page. Then click on \"Sign up Now\" on the right side of the page.     You will be asked to enter the access code listed below, as well as some personal information. Please follow the directions to create your username and password.     Your access code is: [email or web address]  Expires: 2017  9:47 AM     Your access code will  in " "90 days. If you need help or a new code, please call your [facility] or [phone].        Care EveryWhere ID     This is your Care EveryWhere ID. This could be used by other organizations to access your [facility] medical records  [id number]        Your Vitals Were     Pulse Temperature Height Pulse Oximetry BMI (Body Mass Index)       77 98.2  F (36.8  C) (Oral) 5' 9\" (1.753 m) 95% 27.67 kg/m2        Blood Pressure from Last 3 Encounters:   08/29/17 120/76   07/08/16 138/84   01/08/16 132/80    Weight from Last 3 Encounters:   08/29/17 187 lb 6 oz (85 kg)   07/08/16 148 lb (67.1 kg)   01/08/16 185 lb 8 oz (84.1 kg)              We Performed the Following     CBC with platelets     Comprehensive metabolic panel (BMP + Alb, Alk Phos, ALT, AST, Total. Bili, TP)     Ferritin     Lipid Profile with reflex to direct LDL     Magnesium     PSA, screen     Vitamin D Deficiency          Today's Medication Changes          These changes are accurate as of: 8/29/17  9:47 AM.  If you have any questions, ask your nurse or doctor.               These medicines have changed or have updated prescriptions.        Dose/Directions    omeprazole 20 MG CR capsule   Commonly known as:  priLOSEC   This may have changed:  See the new instructions.   Used for:  Hiatal hernia   Changed by:  Arron Page MD        TAKE 1 TABLET (20 MG) BY MOUTH DAILY TAKE 30-60 MINUTES BEFORE A MEAL.   Quantity:  90 capsule   Refills:  3            Where to get your medicines      These medications were sent to [facility]/pharmacy #[id number] - [place], [place] - [address] AT [place] [address], [place] [address]     Phone:  [phone]     atenolol 50 MG tablet    lisinopril 20 MG tablet    omeprazole 20 MG CR capsule    sertraline 50 MG tablet    simvastatin 20 MG tablet    warfarin 5 MG tablet                Primary Care Provider Office Phone # Fax #    Arron Page [phone] [place] [address]        Equal Access to Services     [facility] Center[facility]ILIAN[facility] : Hadii shawna kay kattylakisha Somagdaleno, waaxda luqadaha, qaybta saundrazhenbenji jain, melany browntomekajaylyn diaz. So [facility] [phone].    ATENCIÓN: Si habla español, tiene a long disposición servicios gratuitos de asistencia lingüística. [facility] [phone].    We comply with applicable federal civil rights laws and [place] laws. We do not discriminate on the basis of race, color, national origin, age, disability sex, sexual orientation or gender identity.            Thank you!     Thank you for choosing [facility] [place]  for your care. Our goal is always to provide you with excellent care. Hearing back from our patients is one way we can continue to improve our services. Please take a few minutes to complete the written survey that you may receive in the mail after your visit with us. Thank you!             Your Updated Medication List - Protect others around you: Learn how to safely use, store and throw away your medicines at www.disposemymeds.org.          This list is accurate as of: 8/29/17  9:47 AM.  Always use your most recent med list.                   Brand Name Dispense Instructions for use Diagnosis    atenolol 50 MG tablet    TENORMIN    135 tablet    Take 1.5 tablets (75 mg) by mouth daily    Essential hypertension, benign       fluticasone 50 MCG/ACT spray    FLONASE    16 mL    SPRAY 2 SPRAYS INTO BOTH NOSTRILS DAILY    Chronic rhinitis       lisinopril 20 MG tablet    PRINIVIL/ZESTRIL    90 tablet    Take 1 tablet (20 mg) by mouth daily    Essential hypertension, benign       loratadine 10 MG tablet    CLARITIN     Take 10 mg by mouth daily        omeprazole 20 MG CR capsule    priLOSEC    90 capsule    TAKE 1 TABLET (20 MG) BY MOUTH DAILY TAKE 30-60 MINUTES BEFORE A MEAL.    Hiatal hernia       sertraline 50 MG tablet    ZOLOFT    45 tablet    Take 0.5 tablets (25 mg) by mouth daily     Generalized anxiety disorder       simvastatin 20 MG tablet    ZOCOR    90 tablet    Take 1 tablet (20 mg) by mouth At Bedtime    Hypercholesterolemia       warfarin 5 MG tablet    COUMADIN    120 tablet    As directed by Anticoagulation Clinic, current dose 7.5mg M/Th/Sat and 5mg the rest of the days    History of pulmonary embolism       XYZAL 5 MG tablet   Generic drug:  levocetirizine      Take 1 tablet by mouth every evening.    Recurrent periodic urticaria

## 2017-08-29 NOTE — PROGRESS NOTES
SUBJECTIVE:   CC: Jozef Saunders is an 62 year old male who presents for preventative health visit.     Physical   Annual:     Getting at least 3 servings of Calcium per day::  Yes    Bi-annual eye exam::  Yes    Dental care twice a year::  Yes    Sleep apnea or symptoms of sleep apnea::  None    Diet::  Regular (no restrictions)    Frequency of exercise::  4-5 days/week    Duration of exercise::  30-45 minutes    Taking medications regularly::  Yes    Medication side effects::  None    Additional concerns today::  No    Reports that he has been feeling well without acute concerns. Medications have been stable without acute changes. No chest pain, shortness of breath or lower extremity edema.    DVT in the past and stable on warfarin, continuing on therapy.In range recently, following with INR nurse.      Today's PHQ-2 Score: PHQ-2 ( 1999 Pfizer) 7/8/2016   Q1: Little interest or pleasure in doing things 0   Q2: Feeling down, depressed or hopeless 0   PHQ-2 Score 0       Abuse: Current or Past(Physical, Sexual or Emotional)- No  Do you feel safe in your environment - Yes    Social History   Substance Use Topics     Smoking status: Former Smoker     Quit date: 11/11/1989     Smokeless tobacco: Never Used     Alcohol use No      Comment: sober x26y     The patient does not drink >3 drinks per day nor >7 drinks per week.    Last PSA:   PSA   Date Value Ref Range Status   07/08/2016 0.49 0 - 4 ug/L Final       Reviewed orders with patient. Reviewed health maintenance and updated orders accordingly - No  Labs reviewed in EPIC    Reviewed and updated as needed this visit by clinical staff         Reviewed and updated as needed this visit by Provider              ROS:  C: NEGATIVE for fever, chills, change in weight  I: NEGATIVE for worrisome rashes, moles or lesions  E: NEGATIVE for vision changes or irritation  ENT: NEGATIVE for ear, mouth and throat problems  R: NEGATIVE for significant cough or SOB  CV: NEGATIVE for  "chest pain, palpitations or peripheral edema  GI: NEGATIVE for nausea, abdominal pain, heartburn, or change in bowel habits   male: negative for dysuria, hematuria, decreased urinary stream, erectile dysfunction, urethral discharge  M: NEGATIVE for significant arthralgias or myalgia  N: NEGATIVE for weakness, dizziness or paresthesias  P: NEGATIVE for changes in mood or affect    OBJECTIVE:   /76 (BP Location: Right arm, Patient Position: Chair, Cuff Size: Adult Regular)  Pulse 77  Temp 98.2  F (36.8  C) (Oral)  Ht 5' 9\" (1.753 m)  Wt 187 lb 6 oz (85 kg)  SpO2 95%  BMI 27.67 kg/m2   Wt Readings from Last 4 Encounters:   08/29/17 187 lb 6 oz (85 kg)   07/08/16 148 lb (67.1 kg)   01/08/16 185 lb 8 oz (84.1 kg)   11/28/15 191 lb 4.8 oz (86.8 kg)         EXAM:  GENERAL: healthy, alert and no distress  EYES: Eyes grossly normal to inspection, PERRL and conjunctivae and sclerae normal  HENT: ear canals and TM's normal, nose and mouth without ulcers or lesions  NECK: no adenopathy, no asymmetry, masses, or scars and thyroid normal to palpation  RESP: lungs clear to auscultation - no rales, rhonchi or wheezes  CV: regular rate and rhythm, normal S1 S2, no S3 or S4, no murmur, click or rub, no peripheral edema and peripheral pulses strong  ABDOMEN: soft, nontender, no hepatosplenomegaly, no masses and bowel sounds normal  MS: no gross musculoskeletal defects noted, no edema  SKIN: no suspicious lesions or rashes  NEURO: Normal strength and tone, mentation intact and speech normal  BACK: no CVA tenderness, no paralumbar tenderness  PSYCH: mentation appears normal, affect normal/bright    ASSESSMENT/PLAN:   1. Routine general medical examination at a health care facility  Discussed routine health screening, would like to check PSA today  - PSA, screen  - Magnesium  - Vitamin D Deficiency  - Ferritin    2. Hypercholesterolemia  Will continue current therapy  - simvastatin (ZOCOR) 20 MG tablet; Take 1 tablet (20 " "mg) by mouth At Bedtime  Dispense: 90 tablet; Refill: 3    3. Hiatal hernia  Will continue on PPI, discussed with patient long term use of this  - omeprazole (PRILOSEC) 20 MG CR capsule; TAKE 1 TABLET (20 MG) BY MOUTH DAILY TAKE 30-60 MINUTES BEFORE A MEAL.  Dispense: 90 capsule; Refill: 3  - Magnesium  - Vitamin D Deficiency  - Ferritin    4. BENIGN HYPERTENSION  Well controlled today, will change to metoprolol if atenolol on shortage  - CBC with platelets  - Comprehensive metabolic panel (BMP + Alb, Alk Phos, ALT, AST, Total. Bili, TP)  - Lipid Profile with reflex to direct LDL  - atenolol (TENORMIN) 50 MG tablet; Take 1.5 tablets (75 mg) by mouth daily  Dispense: 135 tablet; Refill: 3  - lisinopril (PRINIVIL/ZESTRIL) 20 MG tablet; Take 1 tablet (20 mg) by mouth daily  Dispense: 90 tablet; Refill: 3    5. Generalized anxiety disorder  Working well, continue current dosing  - sertraline (ZOLOFT) 50 MG tablet; Take 0.5 tablets (25 mg) by mouth daily  Dispense: 45 tablet; Refill: 3    6. History of pulmonary embolism  Will continue on warfarin  - warfarin (COUMADIN) 5 MG tablet; As directed by Anticoagulation Clinic, current dose 7.5mg M/Th/Sat and 5mg the rest of the days  Dispense: 120 tablet; Refill: 3  - INR CLINIC REFERRAL    7. Need for prophylactic vaccination and inoculation against influenza  - FLU VAC, SPLIT VIRUS IM > 3 YO (QUADRIVALENT) [09018]  - Vaccine Administration, Initial [72239]    COUNSELING:   Reviewed preventive health counseling, as reflected in patient instructions           reports that he quit smoking about 27 years ago. He has never used smokeless tobacco.      Estimated body mass index is 21.86 kg/(m^2) as calculated from the following:    Height as of 7/8/16: 5' 9\" (1.753 m).    Weight as of 7/8/16: 148 lb (67.1 kg).         Counseling Resources:  ATP IV Guidelines  Pooled Cohorts Equation Calculator  FRAX Risk Assessment  ICSI Preventive Guidelines  Dietary Guidelines for Americans, " 2010  [employer]'s MyPlate  ASA Prophylaxis  Lung CA Screening    Arron Page MD, MD  [facility] EAGANAnswers for HPI/ROS submitted by the patient on 8/29/2017   PHQ-2 Score: 0

## 2017-08-29 NOTE — LETTER
[facility]  [address] [place] [id number]                  [phone]   August 31, 2017    Jozef Saunders  [address]  [place] [address]      Dear Jozef,    Here are the results from the recent Labs that we did.     Your iron stores are still low with low ferritin. This is likely due to inflammation in the stomach that was seen in the past. It would be reasonable to consider rechecking an EGD looking at the stomach to see what it looks like now as this was 6 years ago. Your colon looked good at your last check but if we repeat it now and things look ok, we are ok for awhile. It helps make sure there are no large ulcerations or other changes in the stomach area. I believe this is related to the hiatal hernia but I would not recommend surgery for that.     Your cholesterol is in an ok range. Continue on the same medication.     Your prostate testing was normal.    Your vitamin D was normal.     Let me know if you have questions or concerns!    Sincerely,      Arron Page MD  Internal Medicine and Pediatrics        Results for orders placed or performed in visit on 08/29/17   CBC with platelets   Result Value Ref Range    WBC 10.0 4.0 - 11.0 10e9/L    RBC Count 5.31 4.4 - 5.9 10e12/L    Hemoglobin 14.1 13.3 - 17.7 g/dL    Hematocrit 44.2 40.0 - 53.0 %    MCV 83 78 - 100 fl    MCH 26.6 26.5 - 33.0 pg    MCHC 31.9 31.5 - 36.5 g/dL    RDW 14.4 10.0 - 15.0 %    Platelet Count 102 (L) 150 - 450 10e9/L   Comprehensive metabolic panel (BMP + Alb, Alk Phos, ALT, AST, Total. Bili, TP)   Result Value Ref Range    Sodium 143 133 - 144 mmol/L    Potassium 4.3 3.4 - 5.3 mmol/L    Chloride 110 (H) 94 - 109 mmol/L    Carbon Dioxide 26 20 - 32 mmol/L    Anion Gap 7 3 - 14 mmol/L    Glucose 92 70 - 99 mg/dL    Urea Nitrogen 17 7 - 30 mg/dL    Creatinine 0.86 0.66 - 1.25 mg/dL    GFR Estimate 90 >60 mL/min/1.7m2    GFR Estimate If Black >90 >60 mL/min/1.7m2     Calcium 8.8 8.5 - 10.1 mg/dL    Bilirubin Total 0.4 0.2 - 1.3 mg/dL    Albumin 3.6 3.4 - 5.0 g/dL    Protein Total 7.0 6.8 - 8.8 g/dL    Alkaline Phosphatase 73 40 - 150 U/L    ALT 33 0 - 70 U/L    AST 29 0 - 45 U/L   Lipid Profile with reflex to direct LDL   Result Value Ref Range    Cholesterol 184 <200 mg/dL    Triglycerides 144 <150 mg/dL    HDL Cholesterol 52 >39 mg/dL    LDL Cholesterol Calculated 103 (H) <100 mg/dL    Non HDL Cholesterol 132 (H) <130 mg/dL   PSA, screen   Result Value Ref Range    PSA 0.58 0 - 4 ug/L   Magnesium   Result Value Ref Range    Magnesium 2.2 1.6 - 2.3 mg/dL   Vitamin D Deficiency   Result Value Ref Range    Vitamin D Deficiency screening 26 20 - 75 ug/L   Ferritin   Result Value Ref Range    Ferritin 9 (L) 26 - 388 ng/mL

## 2017-08-29 NOTE — PROGRESS NOTES
Injectable Influenza Immunization Documentation    1.  Is the person to be vaccinated sick today?  No    2. Does the person to be vaccinated have an allergy to eggs or to a component of the vaccine?  No    3. Has the person to be vaccinated today ever had a serious reaction to influenza vaccine in the past?  No    4. Has the person to be vaccinated ever had Guillain-[place] syndrome?  No     Form completed by SMA Kyler

## 2017-08-30 LAB — DEPRECATED CALCIDIOL+CALCIFEROL SERPL-MC: 26 UG/L (ref 20–75)

## 2017-08-30 ASSESSMENT — ANXIETY QUESTIONNAIRES: GAD7 TOTAL SCORE: 0

## 2017-08-31 ENCOUNTER — TELEPHONE (OUTPATIENT)
Dept: PEDIATRICS | Facility: CLINIC | Age: 62
End: 2017-08-31

## 2017-08-31 DIAGNOSIS — R79.0 LOW FERRITIN LEVEL: ICD-10-CM

## 2017-08-31 DIAGNOSIS — K44.9 HIATAL HERNIA: Primary | ICD-10-CM

## 2017-08-31 NOTE — TELEPHONE ENCOUNTER
Pt states that someone tried reach him from our clinic & LM. He haven't listened the VM yet, would like to know the reason for call.    I do see that  placed notes regarding his recent labs. Went over the lab results as below. Pt agrees to recheck EGD & Colonoscopy. Pt is on coumadin.     Huddled with :   - ok to hold coumadin 5 days before the procedures  - no bridging needed    Placed diagnostic EGD & Colonoscopy orders with the coumadin hold info in the referral.     Lab result notes from today:  Your iron stores are still low with low ferritin. This is likely due to inflammation in the stomach that was seen in the past. It would be reasonable to consider rechecking an EGD looking at the stomach to see what it looks like now as this was 6 years ago. Your colon looked good at your last check. Let me know if you are ok with us having this done. It helps make sure there are no large ulcerations or other changes in the stomach area. I believe this is related to the hiatal hernia but I would not recommend surgery for that.     Your cholesterol is in an ok range. Continue on the same medication. Your prostate testing was normal. Your vitamin D was normal.     Dang, RN  Triage Nurse

## 2017-09-13 DIAGNOSIS — J31.0 CHRONIC RHINITIS: ICD-10-CM

## 2017-09-13 NOTE — TELEPHONE ENCOUNTER
fluticasone (FLONASE) 50 MCG/ACT spray       Last Written Prescription Date: 8/15/2017  Last Fill Quantity: 16 g, # refills: 0    Last Office Visit with G, P or [facility] prescribing provider:  8/29/2017   Future Office Visit:       Date of Last Asthma Action Plan Letter:   There are no preventive care reminders to display for this patient.   Asthma Control Test: No flowsheet data found.    Date of Last Spirometry Test:   No results found for this or any previous visit.

## 2017-09-15 RX ORDER — FLUTICASONE PROPIONATE 50 MCG
SPRAY, SUSPENSION (ML) NASAL
Qty: 16 ML | Refills: 3 | Status: SHIPPED | OUTPATIENT
Start: 2017-09-15 | End: 2018-02-10

## 2017-09-15 NOTE — TELEPHONE ENCOUNTER
Prescription approved per [facility] Refill Protocol.  Marnie Charlton RN  Message handled by Nurse Triage.

## 2017-10-16 ENCOUNTER — TELEPHONE (OUTPATIENT)
Dept: ANTICOAGULATION | Facility: CLINIC | Age: 62
End: 2017-10-16

## 2017-10-16 ENCOUNTER — ANTICOAGULATION THERAPY VISIT (OUTPATIENT)
Dept: ANTICOAGULATION | Facility: CLINIC | Age: 62
End: 2017-10-16
Payer: COMMERCIAL

## 2017-10-16 DIAGNOSIS — Z79.01 LONG TERM CURRENT USE OF ANTICOAGULANT THERAPY: ICD-10-CM

## 2017-10-16 DIAGNOSIS — I82.409 DVT (DEEP VENOUS THROMBOSIS) (H): ICD-10-CM

## 2017-10-16 LAB — INR POINT OF CARE: 2.5 (ref 0.86–1.14)

## 2017-10-16 PROCEDURE — 36416 COLLJ CAPILLARY BLOOD SPEC: CPT

## 2017-10-16 PROCEDURE — 85610 PROTHROMBIN TIME: CPT | Mod: QW

## 2017-10-16 PROCEDURE — 99207 ZZC NO CHARGE NURSE ONLY: CPT

## 2017-10-16 NOTE — MR AVS SNAPSHOT
Jozef Saunders   10/16/2017 1:00 PM   Anticoagulation Therapy Visit    Description:  62 year old male   Provider:  NB ANTI COAG   Department:  Nb Anticoag           INR as of 10/16/2017     Today's INR 2.5      Anticoagulation Summary as of 10/16/2017     INR goal 2.0-3.0   Today's INR 2.5   Full instructions 7.5 mg on Mon, Thu, Sat; 5 mg all other days   Next INR check 12/11/2017    Indications   DVT (deep venous thrombosis) (H) [I82.409]  Long term current use of anticoagulant therapy [Z79.01]         Your next Anticoagulation Clinic appointment(s)     Dec 11, 2017  1:30 PM CST   Anticoagulation Visit with NB ANTI COAG   [facility] ([facility])    [address] [place] [address] [phone]              Contact Numbers     Please call [phone] to cancel and/or reschedule your appointment.  Please call [phone] with any problems or questions regarding your therapy          October 2017 Details    Sun Mon Tue Wed Thu Fri Sat     1               2               3               4               5               6               7                 8               9               10               11               12               13               14                 15               16      7.5 mg   See details      17      5 mg         18      5 mg         19      7.5 mg         20      5 mg         21      7.5 mg           22      5 mg         23      7.5 mg         24      5 mg         25      5 mg         26      7.5 mg         27      5 mg         28      7.5 mg           29      5 mg         30      7.5 mg         31      5 mg              Date Details   10/16 This INR check               How to take your warfarin dose     To take:  5 mg Take 1 of the 5 mg tablets.    To take:  7.5 mg Take 1.5 of the 5 mg tablets.           November 2017 Details    Sun Mon Tue Wed Thu Fri Sat        1      5 mg         2      7.5 mg         3      5 mg         4       7.5 mg           5      5 mg         6      7.5 mg         7      5 mg         8      5 mg         9      7.5 mg         10      5 mg         11      7.5 mg           12      5 mg         13      7.5 mg         14      5 mg         15      5 mg         16      7.5 mg         17      5 mg         18      7.5 mg           19      5 mg         20      7.5 mg         21      5 mg         22      5 mg         23      7.5 mg         24      5 mg         25      7.5 mg           26      5 mg         27      7.5 mg         28      5 mg         29      5 mg         30      7.5 mg            Date Details   No additional details            How to take your warfarin dose     To take:  5 mg Take 1 of the 5 mg tablets.    To take:  7.5 mg Take 1.5 of the 5 mg tablets.           December 2017 Details    Sun Mon Tue Wed Thu Fri Sat          1      5 mg         2      7.5 mg           3      5 mg         4      7.5 mg         5      5 mg         6      5 mg         7      7.5 mg         8      5 mg         9      7.5 mg           10      5 mg         11            12               13               14               15               16                 17               18               19               20               21               22               23                 24               25               26               27               28               29               30                 31                      Date Details   No additional details    Date of next INR:  12/11/2017         How to take your warfarin dose     To take:  5 mg Take 1 of the 5 mg tablets.    To take:  7.5 mg Take 1.5 of the 5 mg tablets.

## 2017-10-16 NOTE — TELEPHONE ENCOUNTER
Issac is scheduled to have an Endoscopy and Colonoscopy completed within the next three- four weeks and will need to hold warfarin for 5 days.   Patient is currently on warfarin for DVT, PE.   Current CHEST guidelines suggest considering bridging for those with high thrombotic risk.   While the patient is off warfarin, would you recommend the patient use enoxaparin injections as a bridge? If yes, would you like the prophylactic dose (40mg daily) or the therapeutic dose (1mg/kg twice daily)? Should the patient use enoxaparin both before and after the procedure or only afterwards?  CURRENT BRIDGING GUIDELINES  *NOTE: This does not take into consideration the bleeding risk of the procedure.   To calculate HASBLED score click HERE  Pre-Procedural bridging is not needed for most patient's except for the following:    VTE within the previous month    Prior history of recurrent VTE during anticoagulation therapy interruption    Underingoing a procedure with high inherent risk for VTE (ie. Joint replacement, major abdominal cancer resection)     Perioperative Thrombotic Risk Stratification    High Thrombotic Risk Moderate Thrombotic Risk Low Thrombotic Risk     Mechanical Heart Valve   Any mitral valve prosthesis    Any caged-ball or tilting disk aortic valve prothesis    Stroke or TIA within 6 months   Bileaflet aortic valve prothesis and one or more of the following risk factors: Afib, prior stroke or TIA, hypertension, diabetes, CHF, age >75 years   Bileaflet aortic valve prothesis without Afib and no other risk factors for stroke     Atrial Fibrillation   CHADS2-VASc score 7 to 9    Stroke or TIA within 3 months    Rheumatic vavlular heart disease     CHADS2-VASc score of 5 to 6   CHADS2 score of 4 or less (assuming no prior stroke or TIA)       VTE   VTE within 3 months    Severe thrombophilia (eg. Deficiency of protein C, protein S, or antithrombin; antiphospholipid antibodies; Homozygous Factor V Leiden or Prothrombin  Gene Mutation; muliple abormalities)   VTE within the past 3-12 months    Non-severe thrombophilia (eg. Heterozygous Factor V Leiden or Prothrombin Gene Mutation)    Recurrent VTE    Active cancer (treated within 6 months or palliative)   VTE >12 months and no other risk factors    For additional Anticoagulation Bridging Guidelines -- Click HERE    Please respond to the Antico pool ([id number]) so all staff are aware of the plan. The Anticoagulation Clinic will order any necessary medications and contact the patient with a written plan regarding the upcoming procedure. Thank you!  Anticoagulation Clinic Staff  Phone: [phone]  Fax: [phone]  Pool # [id number]

## 2017-10-16 NOTE — PROGRESS NOTES
ANTICOAGULATION FOLLOW-UP CLINIC VISIT    Patient Name:  Jozef Saunders  Date:  10/16/2017  Contact Type:  Face to Face    SUBJECTIVE:     Patient Findings     Positives No Problem Findings    Comments Pt reports he needs to schedule endoscopy and colonsscopy within the next month, writer will contact his PCP today for instructions.           OBJECTIVE    INR Protime   Date Value Ref Range Status   10/16/2017 2.5 (A) 0.86 - 1.14 Final       ASSESSMENT / PLAN  INR assessment THER    Recheck INR In: 8 WEEKS    INR Location Clinic      Anticoagulation Summary as of 10/16/2017     INR goal 2.0-3.0   Today's INR 2.5   Maintenance plan 7.5 mg (5 mg x 1.5) on Mon, Thu, Sat; 5 mg (5 mg x 1) all other days   Full instructions 7.5 mg on Mon, Thu, Sat; 5 mg all other days   Weekly total 42.5 mg   Plan last modified Danielle Anguiano RN (10/8/2015)   Next INR check 12/11/2017   Target end date Indefinite    Indications   DVT (deep venous thrombosis) (H) [I82.409]  Long term current use of anticoagulant therapy [Z79.01]         Anticoagulation Episode Summary     INR check location Clinic Lab    Preferred lab     Send INR reminders to [facility]    Comments * Dr. Page is from [place] in [place]      Anticoagulation Care Providers     Provider Role Specialty Phone number    Arron Page MD [facility] [phone]            See the Encounter Report to view Anticoagulation Flowsheet and Dosing Calendar (Go to Encounters tab in chart review, and find the Anticoagulation Therapy Visit)        Danielle Anguiano RN

## 2017-10-16 NOTE — TELEPHONE ENCOUNTER
Writer will called pt to advise him of Dr. Page's decision to use prophylactic Lovenox and advised pt to call the [facility] asap at [phone] when he has the appt set. Pt verbalized understanding and states his wife is very comfortable with giving him the injections.     Danielle Anguiano RN  Anticoagulation Clinic

## 2017-12-11 ENCOUNTER — ANTICOAGULATION THERAPY VISIT (OUTPATIENT)
Dept: ANTICOAGULATION | Facility: CLINIC | Age: 62
End: 2017-12-11
Payer: COMMERCIAL

## 2017-12-11 DIAGNOSIS — I82.409 DVT (DEEP VENOUS THROMBOSIS) (H): ICD-10-CM

## 2017-12-11 DIAGNOSIS — Z79.01 LONG TERM CURRENT USE OF ANTICOAGULANT THERAPY: ICD-10-CM

## 2017-12-11 LAB — INR POINT OF CARE: 3 (ref 0.86–1.14)

## 2017-12-11 PROCEDURE — 85610 PROTHROMBIN TIME: CPT | Mod: QW

## 2017-12-11 PROCEDURE — 99207 ZZC NO CHARGE NURSE ONLY: CPT

## 2017-12-11 PROCEDURE — 36416 COLLJ CAPILLARY BLOOD SPEC: CPT

## 2017-12-11 NOTE — PROGRESS NOTES
ANTICOAGULATION FOLLOW-UP CLINIC VISIT    Patient Name:  Jzoef Saunders  Date:  12/11/2017  Contact Type:  Face to Face    SUBJECTIVE:     Patient Findings     Positives No Problem Findings    Comments Pt states he is suppose to have a colonoscopy but he is not certain about scheduling in and will look into in the new year sometime.           OBJECTIVE    INR Protime   Date Value Ref Range Status   12/11/2017 3.0 (A) 0.86 - 1.14 Final       ASSESSMENT / PLAN  INR assessment THER      Anticoagulation Summary as of 12/11/2017     INR goal 2.0-3.0   Today's INR 3.0   Maintenance plan 7.5 mg (5 mg x 1.5) on Mon, Thu, Sat; 5 mg (5 mg x 1) all other days   Full instructions 7.5 mg on Mon, Thu, Sat; 5 mg all other days   Weekly total 42.5 mg   No change documented Danielle Anguiano RN   Plan last modified Danielle Anguiano RN (10/8/2015)   Next INR check 2/5/2018   Target end date Indefinite    Indications   DVT (deep venous thrombosis) (H) [I82.409]  Long term current use of anticoagulant therapy [Z79.01]         Anticoagulation Episode Summary     INR check location Clinic Lab    Preferred lab     Send INR reminders to [facility]    Comments * Dr. Page is from [place] in [place]      Anticoagulation Care Providers     Provider Role Specialty Phone number    Arron Page MD [facility] Internal Medicine [phone]            See the Encounter Report to view Anticoagulation Flowsheet and Dosing Calendar (Go to Encounters tab in chart review, and find the Anticoagulation Therapy Visit)    Patient to continue the same warfarin maintenance dose, INR therapeutic.      Danielle Anguiano RN

## 2017-12-11 NOTE — MR AVS SNAPSHOT
Jozef Saunders   12/11/2017 1:30 PM   Anticoagulation Therapy Visit    Description:  62 year old male   Provider:  NB ANTI COAG   Department:  Nb Anticoag           INR as of 12/11/2017     Today's INR 3.0      Anticoagulation Summary as of 12/11/2017     INR goal 2.0-3.0   Today's INR 3.0   Full instructions 7.5 mg on Mon, Thu, Sat; 5 mg all other days   Next INR check 2/5/2018    Indications   DVT (deep venous thrombosis) (H) [I82.409]  Long term current use of anticoagulant therapy [Z79.01]         Your next Anticoagulation Clinic appointment(s)     Feb 05, 2018  1:30 PM CST   Anticoagulation Visit with NB ANTI COAG   [facility]    [address] [place] [address] [phone]              Contact Numbers     Please call [phone] to cancel and/or reschedule your appointment.  Please call [phone] with any problems or questions regarding your therapy          December 2017 Details    Sun Mon Tue Wed Thu Fri Sat          1               2                 3               4               5               6               7               8               9                 10               11      7.5 mg   See details      12      5 mg         13      5 mg         14      7.5 mg         15      5 mg         16      7.5 mg           17      5 mg         18      7.5 mg         19      5 mg         20      5 mg         21      7.5 mg         22      5 mg         23      7.5 mg           24      5 mg         25      7.5 mg         26      5 mg         27      5 mg         28      7.5 mg         29      5 mg         30      7.5 mg           31      5 mg                Date Details   12/11 This INR check               How to take your warfarin dose     To take:  5 mg Take 1 of the 5 mg tablets.    To take:  7.5 mg Take 1.5 of the 5 mg tablets.           January 2018 Details    Sun Mon Tue Wed Thu Fri Sat      1      7.5 mg         2      5 mg          3      5 mg         4      7.5 mg         5      5 mg         6      7.5 mg           7      5 mg         8      7.5 mg         9      5 mg         10      5 mg         11      7.5 mg         12      5 mg         13      7.5 mg           14      5 mg         15      7.5 mg         16      5 mg         17      5 mg         18      7.5 mg         19      5 mg         20      7.5 mg           21      5 mg         22      7.5 mg         23      5 mg         24      5 mg         25      7.5 mg         26      5 mg         27      7.5 mg           28      5 mg         29      7.5 mg         30      5 mg         31      5 mg             Date Details   No additional details            How to take your warfarin dose     To take:  5 mg Take 1 of the 5 mg tablets.    To take:  7.5 mg Take 1.5 of the 5 mg tablets.           February 2018 Details    Sun Mon Tue Wed Thu Fri Sat         1      7.5 mg         2      5 mg         3      7.5 mg           4      5 mg         5            6               7               8               9               10                 11               12               13               14               15               16               17                 18               19               20               21               22               23               24                 25               26               27               28                   Date Details   No additional details    Date of next INR:  2/5/2018         How to take your warfarin dose     To take:  5 mg Take 1 of the 5 mg tablets.    To take:  7.5 mg Take 1.5 of the 5 mg tablets.

## 2018-01-03 ENCOUNTER — OFFICE VISIT (OUTPATIENT)
Dept: PEDIATRICS | Facility: CLINIC | Age: 63
End: 2018-01-03
Payer: COMMERCIAL

## 2018-01-03 VITALS
HEART RATE: 61 BPM | SYSTOLIC BLOOD PRESSURE: 114 MMHG | TEMPERATURE: 97.9 F | BODY MASS INDEX: 28.51 KG/M2 | OXYGEN SATURATION: 98 % | WEIGHT: 192.5 LBS | HEIGHT: 69 IN | DIASTOLIC BLOOD PRESSURE: 68 MMHG

## 2018-01-03 DIAGNOSIS — D64.9 ANEMIA, UNSPECIFIED TYPE: ICD-10-CM

## 2018-01-03 DIAGNOSIS — E61.1 LOW IRON: ICD-10-CM

## 2018-01-03 DIAGNOSIS — J01.90 ACUTE SINUSITIS WITH SYMPTOMS > 10 DAYS: Primary | ICD-10-CM

## 2018-01-03 LAB
BASOPHILS # BLD AUTO: 0 10E9/L (ref 0–0.2)
BASOPHILS NFR BLD AUTO: 0.1 %
DIFFERENTIAL METHOD BLD: ABNORMAL
EOSINOPHIL # BLD AUTO: 0.2 10E9/L (ref 0–0.7)
EOSINOPHIL NFR BLD AUTO: 2.7 %
ERYTHROCYTE [DISTWIDTH] IN BLOOD BY AUTOMATED COUNT: 14.8 % (ref 10–15)
HCT VFR BLD AUTO: 42.8 % (ref 40–53)
HGB BLD-MCNC: 13.6 G/DL (ref 13.3–17.7)
LYMPHOCYTES # BLD AUTO: 1.4 10E9/L (ref 0.8–5.3)
LYMPHOCYTES NFR BLD AUTO: 20.2 %
MCH RBC QN AUTO: 26.7 PG (ref 26.5–33)
MCHC RBC AUTO-ENTMCNC: 31.8 G/DL (ref 31.5–36.5)
MCV RBC AUTO: 84 FL (ref 78–100)
MONOCYTES # BLD AUTO: 0.8 10E9/L (ref 0–1.3)
MONOCYTES NFR BLD AUTO: 11.7 %
NEUTROPHILS # BLD AUTO: 4.4 10E9/L (ref 1.6–8.3)
NEUTROPHILS NFR BLD AUTO: 65.3 %
PLATELET # BLD AUTO: 106 10E9/L (ref 150–450)
RBC # BLD AUTO: 5.09 10E12/L (ref 4.4–5.9)
WBC # BLD AUTO: 6.7 10E9/L (ref 4–11)

## 2018-01-03 PROCEDURE — 85025 COMPLETE CBC W/AUTO DIFF WBC: CPT | Performed by: INTERNAL MEDICINE

## 2018-01-03 PROCEDURE — 82728 ASSAY OF FERRITIN: CPT | Performed by: INTERNAL MEDICINE

## 2018-01-03 PROCEDURE — 99214 OFFICE O/P EST MOD 30 MIN: CPT | Performed by: INTERNAL MEDICINE

## 2018-01-03 PROCEDURE — 36415 COLL VENOUS BLD VENIPUNCTURE: CPT | Performed by: INTERNAL MEDICINE

## 2018-01-03 NOTE — MR AVS SNAPSHOT
"              After Visit Summary   1/3/2018    Jozef Saunders    MRN: [id number]           Patient Information     Date Of Birth          1955        Visit Information        Provider Department      1/3/2018 1:10 PM Arron Page MD [facility]        Today's Diagnoses     Acute sinusitis with symptoms > 10 days    -  1      Care Instructions    1) Augmentin twice per day for 10 days    2) Continue to use the flonase    3) Probiotic twice per day to prevent diarrhea.     Let me know if not improving or worsening.    Arron Page MD          Follow-ups after your visit        Your next 10 appointments already scheduled     Feb 05, 2018  1:30 PM CST   Anticoagulation Visit with NB ANTI COAG   [facility] ([facility])    [address] [place] [address] [phone]              Who to contact     If you have questions or need follow up information about today's clinic visit or your schedule please contact [facility] directly at [phone].  Normal or non-critical lab and imaging results will be communicated to you by Ultorahart, letter or phone within 4 business days after the clinic has received the results. If you do not hear from us within 7 days, please contact the clinic through Movlit or phone. If you have a critical or abnormal lab result, we will notify you by phone as soon as possible.  Submit refill requests through Hark or call your pharmacy and they will forward the refill request to us. Please allow 3 business days for your refill to be completed.          Additional Information About Your Visit        Ultorahart Information     Hark lets you send messages to your doctor, view your test results, renew your prescriptions, schedule appointments and more. To sign up, go to [email or web address] . Click on \"Log in\" on the left side of the screen, which will take you to the Welcome page. Then click on \"Sign up Now\" on " "the right side of the page.     You will be asked to enter the access code listed below, as well as some personal information. Please follow the directions to create your username and password.     Your access code is: [email or web address]  Expires: 4/3/2018  1:26 PM     Your access code will  in 90 days. If you need help or a new code, please call your [facility] [place] or [phone].        Care EveryWhere ID     This is your Care EveryWhere ID. This could be used by other organizations to access your [facility] medical records  [id number]        Your Vitals Were     Pulse Temperature Height Pulse Oximetry BMI (Body Mass Index)       61 97.9  F (36.6  C) (Oral) 5' 9\" (1.753 m) 98% 28.43 kg/m2        Blood Pressure from Last 3 Encounters:   18 114/68   17 120/76   16 138/84    Weight from Last 3 Encounters:   18 192 lb 8 oz (87.3 kg)   17 187 lb 6 oz (85 kg)   16 148 lb (67.1 kg)              Today, you had the following     No orders found for display         Today's Medication Changes          These changes are accurate as of: 1/3/18  1:26 PM.  If you have any questions, ask your nurse or doctor.               Start taking these medicines.        Dose/Directions    amoxicillin-clavulanate 875-125 MG per tablet   Commonly known as:  AUGMENTIN   Used for:  Acute sinusitis with symptoms > 10 days   Started by:  Arron Page MD        Dose:  1 tablet   Take 1 tablet by mouth 2 times daily   Quantity:  20 tablet   Refills:  0            Where to get your medicines      These medications were sent to [facility]/pharmacy #[id number] - [place] - [address] AT [place] [address], [place] [address]     Phone:  [phone]     amoxicillin-clavulanate 875-125 MG per tablet                Primary Care Provider Office Phone # Fax #    Arron Page [phone] [address] [place] [address]        Equal Access to " Services     [facility]: Hadii aad ku hadmaryurilakisha Amandamagdaleno, waaxda luqadaha, qaybta kaalmada cristobal, melany fonseca . So [facility] [phone].    ATENCIÓN: Si mikela bhavya, tiene a long disposición servicios gratuitos de asistencia lingüística. Llame al [phone].    We comply with applicable federal civil rights laws and [place] laws. We do not discriminate on the basis of race, color, national origin, age, disability, sex, sexual orientation, or gender identity.            Thank you!     Thank you for choosing [facility] [place]  for your care. Our goal is always to provide you with excellent care. Hearing back from our patients is one way we can continue to improve our services. Please take a few minutes to complete the written survey that you may receive in the mail after your visit with us. Thank you!             Your Updated Medication List - Protect others around you: Learn how to safely use, store and throw away your medicines at www.disposemymeds.org.          This list is accurate as of: 1/3/18  1:26 PM.  Always use your most recent med list.                   Brand Name Dispense Instructions for use Diagnosis    amoxicillin-clavulanate 875-125 MG per tablet    AUGMENTIN    20 tablet    Take 1 tablet by mouth 2 times daily    Acute sinusitis with symptoms > 10 days       atenolol 50 MG tablet    TENORMIN    135 tablet    Take 1.5 tablets (75 mg) by mouth daily    Essential hypertension, benign       fluticasone 50 MCG/ACT spray    FLONASE    16 mL    SPRAY 2 SPRAYS INTO BOTH NOSTRILS DAILY    Chronic rhinitis       lisinopril 20 MG tablet    PRINIVIL/ZESTRIL    90 tablet    Take 1 tablet (20 mg) by mouth daily    Essential hypertension, benign       loratadine 10 MG tablet    CLARITIN     Take 10 mg by mouth daily        omeprazole 20 MG CR capsule    priLOSEC    90 capsule    TAKE 1 TABLET (20 MG) BY MOUTH DAILY TAKE 30-60 MINUTES BEFORE A MEAL.    Hiatal hernia        sertraline 50 MG tablet    ZOLOFT    45 tablet    Take 0.5 tablets (25 mg) by mouth daily    Generalized anxiety disorder       simvastatin 20 MG tablet    ZOCOR    90 tablet    Take 1 tablet (20 mg) by mouth At Bedtime    Hypercholesterolemia       warfarin 5 MG tablet    COUMADIN    120 tablet    As directed by Anticoagulation Clinic, current dose 7.5mg M/Th/Sat and 5mg the rest of the days    History of pulmonary embolism       XYZAL 5 MG tablet   Generic drug:  levocetirizine      Take 1 tablet by mouth every evening.    Recurrent periodic urticaria

## 2018-01-03 NOTE — LETTER
[facility]  [address]  [place] [address]                  [phone]   January 5, 2018    Jozef Saunders  [address]  [place] [address]      Dear Jozef,    Here is a summary of your recent test results:    The ferritin was still low as we discussed. Your hemoglobin is in a good range, which is good. We will continue to follow your platelet count.     I have ordered the endoscopy and colonoscopy for you. Let me know if you do not hear from them.     Your test results are enclosed.      Please contact me if you have any questions.           Thank you very much for choosing [facility]    Best regards,    Arron Page MD        Results for orders placed or performed in visit on 01/03/18   Ferritin   Result Value Ref Range    Ferritin 16 (L) 26 - 388 ng/mL   CBC with platelets differential   Result Value Ref Range    WBC 6.7 4.0 - 11.0 10e9/L    RBC Count 5.09 4.4 - 5.9 10e12/L    Hemoglobin 13.6 13.3 - 17.7 g/dL    Hematocrit 42.8 40.0 - 53.0 %    MCV 84 78 - 100 fl    MCH 26.7 26.5 - 33.0 pg    MCHC 31.8 31.5 - 36.5 g/dL    RDW 14.8 10.0 - 15.0 %    Platelet Count 106 (L) 150 - 450 10e9/L    Diff Method Automated Method     % Neutrophils 65.3 %    % Lymphocytes 20.2 %    % Monocytes 11.7 %    % Eosinophils 2.7 %    % Basophils 0.1 %    Absolute Neutrophil 4.4 1.6 - 8.3 10e9/L    Absolute Lymphocytes 1.4 0.8 - 5.3 10e9/L    Absolute Monocytes 0.8 0.0 - 1.3 10e9/L    Absolute Eosinophils 0.2 0.0 - 0.7 10e9/L    Absolute Basophils 0.0 0.0 - 0.2 10e9/L

## 2018-01-03 NOTE — PATIENT INSTRUCTIONS
1) Augmentin twice per day for 10 days    2) Continue to use the flonase    3) Probiotic twice per day to prevent diarrhea.     Let me know if not improving or worsening.    Arron Page MD

## 2018-01-03 NOTE — NURSING NOTE
"Chief Complaint   Patient presents with     Sinus Problem       Initial /68 (BP Location: Right arm, Patient Position: Chair, Cuff Size: Adult Large)  Pulse 61  Temp 97.9  F (36.6  C) (Oral)  Ht 5' 9\" (1.753 m)  Wt 192 lb 8 oz (87.3 kg)  SpO2 98%  BMI 28.43 kg/m2 Estimated body mass index is 28.43 kg/(m^2) as calculated from the following:    Height as of this encounter: 5' 9\" (1.753 m).    Weight as of this encounter: 192 lb 8 oz (87.3 kg).  Medication Reconciliation: complete     Yvrose Hernandez MA 1/3/2018 1:19 PM    "

## 2018-01-03 NOTE — PROGRESS NOTES
SUBJECTIVE:   Jozef Saunders is a 62 year old male who presents to clinic today for the following health issues:      RESPIRATORY SYMPTOMS      Duration: x2 weeks    Description  nasal congestion, rhinorrhea, facial pain/pressure, cough, headache and fatigue/malaise    Severity: mild - moderate     Accompanying signs and symptoms: None    History (predisposing factors):  none    Precipitating or alleviating factors: None    Therapies tried and outcome:  Mucinex     Has been using mucinex and sinus medication. No fevers. Dull headaches.     Low ferritin: was unable to get in for check with colonoscopy and EGD due to busy holiday season at work ([employer]). He has not noted any abdominal pain, hematochezia or melena.    He continues to be anticoagulated due to history of recurrent DVT/PE.     Problem list and histories reviewed & adjusted, as indicated.  Additional history: as documented    Patient Active Problem List   Diagnosis     Essential hypertension, benign     Generalized anxiety disorder     Hypercholesterolemia     HYPERLIPIDEMIA LDL GOAL <130     Hiatal hernia     Advanced directives, counseling/discussion     Recurrent periodic urticaria     DVT (deep venous thrombosis) (H)     History of pulmonary embolism     Long term current use of anticoagulant therapy     Past Surgical History:   Procedure Laterality Date     HC ARTHROTOMY/EXPLORE/TREAT KNEE JOINT      torn ligaments     HC REPAIR ING HERNIA,5+Y/O,REDUCIBL      age 8 - right     HC REPAIR ING HERNIA,6MO-5YR,REDUC      infancy - left       Social History   Substance Use Topics     Smoking status: Former Smoker     Quit date: 1989     Smokeless tobacco: Never Used     Alcohol use No      Comment: sober x26y     Family History   Problem Relation Age of Onset     CANCER Mother      liver;  at age 47     Hypertension Father      C.A.D. Father      CABG; age of onset over age 60     Hypertension Brother      age of onset under 50     Prostate  "Cancer Brother      diagnosed at age 58     DIABETES No family hx of              Reviewed and updated as needed this visit by clinical staff     Reviewed and updated as needed this visit by Provider         ROS:  Constitutional, HEENT, cardiovascular, pulmonary, GI, , musculoskeletal, neuro, skin, endocrine and psych systems are negative, except as otherwise noted.      OBJECTIVE:   /68 (BP Location: Right arm, Patient Position: Chair, Cuff Size: Adult Large)  Pulse 61  Temp 97.9  F (36.6  C) (Oral)  Ht 5' 9\" (1.753 m)  Wt 192 lb 8 oz (87.3 kg)  SpO2 98%  BMI 28.43 kg/m2  Body mass index is 28.43 kg/(m^2).  GENERAL: healthy, alert and no distress  EYES: Eyes grossly normal to inspection, PERRL and conjunctivae and sclerae normal  HENT: mild sinus tenderness, noted congestion, normal TMs bilaterally  NECK: no adenopathy, no asymmetry, masses, or scars and thyroid normal to palpation  RESP: lungs clear to auscultation - no rales, rhonchi or wheezes  CV: regular rate and rhythm, normal S1 S2, no S3 or S4, no murmur, click or rub, no peripheral edema and peripheral pulses strong  MS: no gross musculoskeletal defects noted, no edema  SKIN: no suspicious lesions or rashes  PSYCH: mentation appears normal, affect normal/bright    Diagnostic Test Results:  none     ASSESSMENT/PLAN:       ICD-10-CM    1. Acute sinusitis with symptoms > 10 days J01.90 amoxicillin-clavulanate (AUGMENTIN) 875-125 MG per tablet   2. Anemia, unspecified type D64.9 Ferritin     CBC with platelets differential     Will cover with antibiotics for sinusitis, will get repeat labs for anemia, if low will get colonoscopy and EGD, would bridge for this if needed. Discussed INR check mid week while on antibiotics.     Arron Page MD, MD  [facility] [place]  "

## 2018-01-04 LAB — FERRITIN SERPL-MCNC: 16 NG/ML (ref 26–388)

## 2018-01-09 ENCOUNTER — TELEPHONE (OUTPATIENT)
Dept: PEDIATRICS | Facility: CLINIC | Age: 63
End: 2018-01-09

## 2018-01-09 DIAGNOSIS — J01.90 ACUTE SINUSITIS WITH SYMPTOMS > 10 DAYS: Primary | ICD-10-CM

## 2018-01-09 RX ORDER — DOXYCYCLINE 100 MG/1
100 CAPSULE ORAL 2 TIMES DAILY
Qty: 20 CAPSULE | Refills: 0 | Status: SHIPPED | OUTPATIENT
Start: 2018-01-09 | End: 2018-10-11

## 2018-01-09 NOTE — TELEPHONE ENCOUNTER
Patient left  saying his symptoms were not improving on Amox. Called patient. Patient states that the medication helped after about 3 days but then starting Sunday it started to worsen again. States it hurts to bend over. Denies fever. Still having lots of discharge. Taking Aleve. Had been doing mucinex last week and some sinus preparations but not this week. Requesting different antibiotic. [phone]  Norah Valdivia RN

## 2018-01-09 NOTE — TELEPHONE ENCOUNTER
Sent in doxycycline. Take twice per day. Take probiotic twice per day. He needs to have INR checked while on this too.    Arron Page MD

## 2018-02-05 ENCOUNTER — ANTICOAGULATION THERAPY VISIT (OUTPATIENT)
Dept: ANTICOAGULATION | Facility: CLINIC | Age: 63
End: 2018-02-05
Payer: COMMERCIAL

## 2018-02-05 DIAGNOSIS — I82.409 DVT (DEEP VENOUS THROMBOSIS) (H): ICD-10-CM

## 2018-02-05 DIAGNOSIS — Z79.01 LONG TERM CURRENT USE OF ANTICOAGULANT THERAPY: ICD-10-CM

## 2018-02-05 LAB — INR POINT OF CARE: 3 (ref 0.86–1.14)

## 2018-02-05 PROCEDURE — 36416 COLLJ CAPILLARY BLOOD SPEC: CPT

## 2018-02-05 PROCEDURE — 85610 PROTHROMBIN TIME: CPT | Mod: QW

## 2018-02-05 PROCEDURE — 99207 ZZC NO CHARGE NURSE ONLY: CPT

## 2018-02-05 NOTE — MR AVS SNAPSHOT
Jozef Saunders   2/5/2018 1:30 PM   Anticoagulation Therapy Visit    Description:  62 year old male   Provider:  NB ANTI COALEVY   Department:  Nb Anticoag           INR as of 2/5/2018     Today's INR 3.0      Anticoagulation Summary as of 2/5/2018     INR goal 2.0-3.0   Today's INR 3.0   Full instructions 7.5 mg on Mon, Thu, Sat; 5 mg all other days   Next INR check 4/2/2018    Indications   DVT (deep venous thrombosis) (H) [I82.409]  Long term current use of anticoagulant therapy [Z79.01]         Your next Anticoagulation Clinic appointment(s)     Apr 02, 2018  1:30 PM CDT   Anticoagulation Visit with NB ANTI COAG   [facility] ([facility]    [address] [place] [address] [phone]              Contact Numbers     Please call [phone] with any problems or questions regarding your therapy.    If you need to cancel and/or reschedule your appointment please call one of the following numbers:  [facility] [phone]  [place] - [phone]  [facility] [phone]  [place] - [phone]  [place] - [phone]            February 2018 Details    Sun Mon Tue Wed Thu Fri Sat         1               2               3                 4               5      7.5 mg   See details      6      5 mg         7      5 mg         8      7.5 mg         9      5 mg         10      7.5 mg           11      5 mg         12      7.5 mg         13      5 mg         14      5 mg         15      7.5 mg         16      5 mg         17      7.5 mg           18      5 mg         19      7.5 mg         20      5 mg         21      5 mg         22      7.5 mg         23      5 mg         24      7.5 mg           25      5 mg         26      7.5 mg         27      5 mg         28      5 mg             Date Details   02/05 This INR check               How to take your warfarin dose     To take:  5 mg Take 1 of the 5 mg tablets.    To take:  7.5 mg Take 1.5 of the 5 mg  tablets.           March 2018 Details    Sun Mon Tue Wed Thu Fri Sat         1      7.5 mg         2      5 mg         3      7.5 mg           4      5 mg         5      7.5 mg         6      5 mg         7      5 mg         8      7.5 mg         9      5 mg         10      7.5 mg           11      5 mg         12      7.5 mg         13      5 mg         14      5 mg         15      7.5 mg         16      5 mg         17      7.5 mg           18      5 mg         19      7.5 mg         20      5 mg         21      5 mg         22      7.5 mg         23      5 mg         24      7.5 mg           25      5 mg         26      7.5 mg         27      5 mg         28      5 mg         29      7.5 mg         30      5 mg         31      7.5 mg          Date Details   No additional details            How to take your warfarin dose     To take:  5 mg Take 1 of the 5 mg tablets.    To take:  7.5 mg Take 1.5 of the 5 mg tablets.           April 2018 Details    Sun Mon Tue Wed Thu Fri Sat     1      5 mg         2            3               4               5               6               7                 8               9               10               11               12               13               14                 15               16               17               18               19               20               21                 22               23               24               25               26               27               28                 29               30                     Date Details   No additional details    Date of next INR:  4/2/2018         How to take your warfarin dose     To take:  5 mg Take 1 of the 5 mg tablets.    To take:  7.5 mg Take 1.5 of the 5 mg tablets.

## 2018-02-05 NOTE — PROGRESS NOTES
ANTICOAGULATION FOLLOW-UP CLINIC VISIT    Patient Name:  Jozef Saunders  Date:  2/5/2018  Contact Type:  Face to Face    SUBJECTIVE:     Patient Findings     Positives No Problem Findings           OBJECTIVE    INR Protime   Date Value Ref Range Status   02/05/2018 3.0 (A) 0.86 - 1.14 Final       ASSESSMENT / PLAN  INR assessment THER    Recheck INR In: 8 WEEKS    INR Location Clinic      Anticoagulation Summary as of 2/5/2018     INR goal 2.0-3.0   Today's INR 3.0   Maintenance plan 7.5 mg (5 mg x 1.5) on Mon, Thu, Sat; 5 mg (5 mg x 1) all other days   Full instructions 7.5 mg on Mon, Thu, Sat; 5 mg all other days   Weekly total 42.5 mg   No change documented Danielle Anguiano RN   Plan last modified Danielle Anguiano RN (10/8/2015)   Next INR check 4/2/2018   Target end date Indefinite    Indications   DVT (deep venous thrombosis) (H) [I82.409]  Long term current use of anticoagulant therapy [Z79.01]         Anticoagulation Episode Summary     INR check location Clinic Lab    Preferred lab     Send INR reminders to [facility]    Comments * Dr. Page is from [place] in [place]      Anticoagulation Care Providers     Provider Role Specialty Phone number    Arron Page MD [facility] [phone]            See the Encounter Report to view Anticoagulation Flowsheet and Dosing Calendar (Go to Encounters tab in chart review, and find the Anticoagulation Therapy Visit)    Patient advised to continue the same warfarin maintenance dose, INR therapeutic.      Danielle Anguiano RN

## 2018-02-10 DIAGNOSIS — J31.0 CHRONIC RHINITIS: ICD-10-CM

## 2018-02-10 NOTE — TELEPHONE ENCOUNTER
"Requested Prescriptions   Pending Prescriptions Disp Refills     fluticasone (FLONASE) 50 MCG/ACT spray [Pharmacy Med Name: FLUTICASONE PROP 50 MCG SPRAY]  Last Written Prescription Date:  09/15/2017  Last Fill Quantity: 16 mL,  # refills: 3   Last office visit: 1/3/2018 with prescribing provider:  01/03/2018   Future Office Visit:     16 mL 3     Sig: SPRAY 2 SPRAYS INTO BOTH NOSTRILS DAILY    Inhaled Steroids Protocol Passed    2/10/2018  1:40 AM       Passed - Patient is age 12 or older       Passed - Recent or future visit with authorizing provider's specialty    Patient had office visit in the last year or has a visit in the next 30 days with authorizing provider.  See \"Patient Info\" tab in inbasket, or \"Choose Columns\" in Meds & Orders section of the refill encounter.               "

## 2018-02-12 RX ORDER — FLUTICASONE PROPIONATE 50 MCG
SPRAY, SUSPENSION (ML) NASAL
Qty: 16 ML | Refills: 10 | Status: SHIPPED | OUTPATIENT
Start: 2018-02-12 | End: 2019-01-11

## 2018-02-12 NOTE — TELEPHONE ENCOUNTER
Prescription approved per [facility] Refill Protocol.  Shawnee Urena, RN  Triage Nurse

## 2018-04-02 ENCOUNTER — ANTICOAGULATION THERAPY VISIT (OUTPATIENT)
Dept: ANTICOAGULATION | Facility: CLINIC | Age: 63
End: 2018-04-02
Payer: COMMERCIAL

## 2018-04-02 DIAGNOSIS — Z79.01 LONG TERM CURRENT USE OF ANTICOAGULANT THERAPY: ICD-10-CM

## 2018-04-02 DIAGNOSIS — I82.409 DVT (DEEP VENOUS THROMBOSIS) (H): ICD-10-CM

## 2018-04-02 LAB — INR POINT OF CARE: 2.8 (ref 0.86–1.14)

## 2018-04-02 PROCEDURE — 36416 COLLJ CAPILLARY BLOOD SPEC: CPT

## 2018-04-02 PROCEDURE — 85610 PROTHROMBIN TIME: CPT | Mod: QW

## 2018-04-02 PROCEDURE — 99207 ZZC NO CHARGE NURSE ONLY: CPT

## 2018-04-02 NOTE — MR AVS SNAPSHOT
Jozef Saunders   4/2/2018 1:30 PM   Anticoagulation Therapy Visit    Description:  62 year old male   Provider:  NB ANTI COAG   Department:  Nb Anticoag           INR as of 4/2/2018     Today's INR 2.8      Anticoagulation Summary as of 4/2/2018     INR goal 2.0-3.0   Today's INR 2.8   Full instructions 7.5 mg on Mon, Thu, Sat; 5 mg all other days   Next INR check 6/4/2018    Indications   DVT (deep venous thrombosis) (H) [I82.409]  Long term current use of anticoagulant therapy [Z79.01]         Your next Anticoagulation Clinic appointment(s)     Jun 04, 2018  1:30 PM CDT   Anticoagulation Visit with NB ANTI COAG   [facility] ([facility]    [address] [place] [address] [phone]              Contact Numbers     Please call [phone] with any problems or questions regarding your therapy.    If you need to cancel and/or reschedule your appointment please call one of the following numbers:  [facility] [phone]  [facility] [phone]  [facility] [phone]  [facility] [phone]  [place] - [phone]            April 2018 Details    Sun Mon Tue Wed Thu Fri Sat     1               2      7.5 mg   See details      3      5 mg         4      5 mg         5      7.5 mg         6      5 mg         7      7.5 mg           8      5 mg         9      7.5 mg         10      5 mg         11      5 mg         12      7.5 mg         13      5 mg         14      7.5 mg           15      5 mg         16      7.5 mg         17      5 mg         18      5 mg         19      7.5 mg         20      5 mg         21      7.5 mg           22      5 mg         23      7.5 mg         24      5 mg         25      5 mg         26      7.5 mg         27      5 mg         28      7.5 mg           29      5 mg         30      7.5 mg               Date Details   04/02 This INR check               How to take your warfarin dose     To take:  5 mg Take 1 of the 5  mg tablets.    To take:  7.5 mg Take 1.5 of the 5 mg tablets.           May 2018 Details    Sun Mon Tue Wed Thu Fri Sat       1      5 mg         2      5 mg         3      7.5 mg         4      5 mg         5      7.5 mg           6      5 mg         7      7.5 mg         8      5 mg         9      5 mg         10      7.5 mg         11      5 mg         12      7.5 mg           13      5 mg         14      7.5 mg         15      5 mg         16      5 mg         17      7.5 mg         18      5 mg         19      7.5 mg           20      5 mg         21      7.5 mg         22      5 mg         23      5 mg         24      7.5 mg         25      5 mg         26      7.5 mg           27      5 mg         28      7.5 mg         29      5 mg         30      5 mg         31      7.5 mg            Date Details   No additional details            How to take your warfarin dose     To take:  5 mg Take 1 of the 5 mg tablets.    To take:  7.5 mg Take 1.5 of the 5 mg tablets.           June 2018 Details    Sun Mon Tue Wed Thu Fri Sat          1      5 mg         2      7.5 mg           3      5 mg         4            5               6               7               8               9                 10               11               12               13               14               15               16                 17               18               19               20               21               22               23                 24               25               26               27               28               29               30                Date Details   No additional details    Date of next INR:  6/4/2018         How to take your warfarin dose     To take:  5 mg Take 1 of the 5 mg tablets.    To take:  7.5 mg Take 1.5 of the 5 mg tablets.

## 2018-04-02 NOTE — PROGRESS NOTES
ANTICOAGULATION FOLLOW-UP CLINIC VISIT    Patient Name:  Jozef Saunders  Date:  4/2/2018  Contact Type:  Face to Face    SUBJECTIVE:     Patient Findings     Positives No Problem Findings    Comments Patient denies any changes to diet, activity or medications.  Patient advised to continue the same warfarin maintenance dose, INR therapeutic.   Patient verbalizes understanding and agrees to plan. No further questions or concerns.             OBJECTIVE    INR Protime   Date Value Ref Range Status   04/02/2018 2.8 (A) 0.86 - 1.14 Final       ASSESSMENT / PLAN  INR assessment THER    Recheck INR In: 6 WEEKS    INR Location Clinic      Anticoagulation Summary as of 4/2/2018     INR goal 2.0-3.0   Today's INR 2.8   Maintenance plan 7.5 mg (5 mg x 1.5) on Mon, Thu, Sat; 5 mg (5 mg x 1) all other days   Full instructions 7.5 mg on Mon, Thu, Sat; 5 mg all other days   Weekly total 42.5 mg   No change documented Danielle Anguiano RN   Plan last modified Danielle Anguiano RN (10/8/2015)   Next INR check 6/4/2018   Target end date Indefinite    Indications   DVT (deep venous thrombosis) (H) [I82.409]  Long term current use of anticoagulant therapy [Z79.01]         Anticoagulation Episode Summary     INR check location Clinic Lab    Preferred lab     Send INR reminders to [facility]    Comments * Dr. Page is from [place] in [place]      Anticoagulation Care Providers     Provider Role Specialty Phone number    Arron Page MD [facility] Internal Medicine [phone]            See the Encounter Report to view Anticoagulation Flowsheet and Dosing Calendar (Go to Encounters tab in chart review, and find the Anticoagulation Therapy Visit)        Danielle Anguiano RN

## 2018-06-04 ENCOUNTER — ANTICOAGULATION THERAPY VISIT (OUTPATIENT)
Dept: ANTICOAGULATION | Facility: CLINIC | Age: 63
End: 2018-06-04
Payer: COMMERCIAL

## 2018-06-04 DIAGNOSIS — I82.409 DVT (DEEP VENOUS THROMBOSIS) (H): ICD-10-CM

## 2018-06-04 DIAGNOSIS — Z79.01 LONG TERM CURRENT USE OF ANTICOAGULANT THERAPY: ICD-10-CM

## 2018-06-04 LAB — INR POINT OF CARE: 3.4 (ref 0.86–1.14)

## 2018-06-04 PROCEDURE — 36416 COLLJ CAPILLARY BLOOD SPEC: CPT

## 2018-06-04 PROCEDURE — 99207 ZZC NO CHARGE NURSE ONLY: CPT

## 2018-06-04 PROCEDURE — 85610 PROTHROMBIN TIME: CPT | Mod: QW

## 2018-06-04 NOTE — MR AVS SNAPSHOT
Jozef Saunders   6/4/2018 1:30 PM   Anticoagulation Therapy Visit    Description:  62 year old male   Provider:  NB ANTI SAM   Department:  Nb Anticoag           INR as of 6/4/2018     Today's INR 3.4!      Anticoagulation Summary as of 6/4/2018     INR goal 2.0-3.0   Today's INR 3.4!   Full warfarin instructions 7.5 mg on Mon, Thu, Sat; 5 mg all other days   Next INR check 6/25/2018    Indications   DVT (deep venous thrombosis) (H) [I82.409]  Long term current use of anticoagulant therapy [Z79.01]         Your next Anticoagulation Clinic appointment(s)     Jun 25, 2018  1:30 PM CDT   Anticoagulation Visit with NB ANTI COAG   [facility] ([facility])    [address] [place] [address] [phone]              Contact Numbers     Please call [phone] with any problems or questions regarding your therapy.    If you need to cancel and/or reschedule your appointment please call one of the following numbers:  [facility] [phone]  [place] - [phone]  [facility] [phone]  [facility] [phone]  [place] - [phone]            June 2018 Details    Sun Mon Tue Wed Thu Fri Sat          1               2                 3               4      7.5 mg   See details      5      5 mg         6      5 mg         7      7.5 mg         8      5 mg         9      7.5 mg           10      5 mg         11      7.5 mg         12      5 mg         13      5 mg         14      7.5 mg         15      5 mg         16      7.5 mg           17      5 mg         18      7.5 mg         19      5 mg         20      5 mg         21      7.5 mg         22      5 mg         23      7.5 mg           24      5 mg         25            26               27               28               29               30                Date Details   06/04 This INR check       Date of next INR:  6/25/2018         How to take your warfarin dose     To take:  5 mg Take 1 of the 5 mg  tablets.    To take:  7.5 mg Take 1.5 of the 5 mg tablets.

## 2018-06-04 NOTE — PROGRESS NOTES
ANTICOAGULATION FOLLOW-UP CLINIC VISIT    Patient Name:  Jozef Saunders  Date:  6/4/2018  Contact Type:  Face to Face    SUBJECTIVE:     Patient Findings     Positives OTC meds, Inflammation    Comments Patient states he has been having sinus headaches and congestion the last few weeks. He has been taking Aleve for the discomfort. I advised him to stop taking the Aleve and to take Tylenol instead. I also instructed him to have a side of greens for dinner tonight to help decrease his INR. Patient will f/u with [facility] in 3 weeks. Patient denies signs or symptoms of bleeding. Writer educated patient regarding increased bleed risk and when to seek immediate medical attention. Patient verbalized understanding.             OBJECTIVE    INR Protime   Date Value Ref Range Status   06/04/2018 3.4 (A) 0.86 - 1.14 Final       ASSESSMENT / PLAN  INR assessment SUPRA    Recheck INR In: 3 WEEKS    INR Location Clinic      Anticoagulation Summary as of 6/4/2018     INR goal 2.0-3.0   Today's INR 3.4!   Warfarin maintenance plan 7.5 mg (5 mg x 1.5) on Mon, Thu, Sat; 5 mg (5 mg x 1) all other days   Full warfarin instructions 7.5 mg on Mon, Thu, Sat; 5 mg all other days   Weekly warfarin total 42.5 mg   No change documented Adela Wiggins, MORTEZA   Plan last modified Danielle Anguiano, RN (10/8/2015)   Next INR check 6/25/2018   Target end date Indefinite    Indications   DVT (deep venous thrombosis) (H) [I82.409]  Long term current use of anticoagulant therapy [Z79.01]         Anticoagulation Episode Summary     INR check location Clinic Lab    Preferred lab     Send INR reminders to [facility]    Comments * Dr. Page is from [place] in [place]      Anticoagulation Care Providers     Provider Role Specialty Phone number    Arron Page MD [facility] Internal Medicine [phone]            See the Encounter Report to view Anticoagulation Flowsheet and Dosing Calendar (Go to Encounters tab in chart review, and  find the Anticoagulation Therapy Visit)        Adela Wiggins RN

## 2018-06-25 ENCOUNTER — ANTICOAGULATION THERAPY VISIT (OUTPATIENT)
Dept: ANTICOAGULATION | Facility: CLINIC | Age: 63
End: 2018-06-25
Payer: COMMERCIAL

## 2018-06-25 DIAGNOSIS — I82.409 DVT (DEEP VENOUS THROMBOSIS) (H): ICD-10-CM

## 2018-06-25 DIAGNOSIS — Z79.01 LONG TERM CURRENT USE OF ANTICOAGULANT THERAPY: ICD-10-CM

## 2018-06-25 LAB — INR POINT OF CARE: 3.1 (ref 0.86–1.14)

## 2018-06-25 PROCEDURE — 36416 COLLJ CAPILLARY BLOOD SPEC: CPT

## 2018-06-25 PROCEDURE — 85610 PROTHROMBIN TIME: CPT | Mod: QW

## 2018-06-25 PROCEDURE — 99207 ZZC NO CHARGE NURSE ONLY: CPT

## 2018-06-25 NOTE — MR AVS SNAPSHOT
Jozef Saunders   6/25/2018 1:30 PM   Anticoagulation Therapy Visit    Description:  62 year old male   Provider:  NB ANTI COAG   Department:  [facility] Anticoag           INR as of 6/25/2018     Today's INR 3.1!      Anticoagulation Summary as of 6/25/2018     INR goal 2.0-3.0   Today's INR 3.1!   Full warfarin instructions 7.5 mg on Mon, Thu, Sat; 5 mg all other days   Next INR check 8/20/2018    Indications   DVT (deep venous thrombosis) (H) [I82.409]  Long term current use of anticoagulant therapy [Z79.01]         Your next Anticoagulation Clinic appointment(s)     Aug 20, 2018  1:30 PM CDT   Anticoagulation Visit with NB ANTI COAG   [facility]    [address] [place] [address] [phone]              Contact Numbers     Please call [phone] with any problems or questions regarding your therapy.    If you need to cancel and/or reschedule your appointment please call one of the following numbers:  [facility] [phone]  [place] - [phone]  [place] - [phone]  [place] - [phone]  [place] - [phone]            June 2018 Details    Sun Mon Tue Wed Thu Fri Sat          1               2                 3               4               5               6               7               8               9                 10               11               12               13               14               15               16                 17               18               19               20               21               22               23                 24               25      7.5 mg   See details      26      5 mg         27      5 mg         28      7.5 mg         29      5 mg         30      7.5 mg          Date Details   06/25 This INR check               How to take your warfarin dose     To take:  5 mg Take 1 of the 5 mg tablets.    To take:  7.5 mg Take 1.5 of the 5 mg tablets.           July 2018 Details    [place]  Mon Tue Wed Thu Fri Sat     1      5 mg         2      7.5 mg         3      5 mg         4      5 mg         5      7.5 mg         6      5 mg         7      7.5 mg           8      5 mg         9      7.5 mg         10      5 mg         11      5 mg         12      7.5 mg         13      5 mg         14      7.5 mg           15      5 mg         16      7.5 mg         17      5 mg         18      5 mg         19      7.5 mg         20      5 mg         21      7.5 mg           22      5 mg         23      7.5 mg         24      5 mg         25      5 mg         26      7.5 mg         27      5 mg         28      7.5 mg           29      5 mg         30      7.5 mg         31      5 mg              Date Details   No additional details            How to take your warfarin dose     To take:  5 mg Take 1 of the 5 mg tablets.    To take:  7.5 mg Take 1.5 of the 5 mg tablets.           August 2018 Details    Sun Mon Tue Wed Thu Fri Sat        1      5 mg         2      7.5 mg         3      5 mg         4      7.5 mg           5      5 mg         6      7.5 mg         7      5 mg         8      5 mg         9      7.5 mg         10      5 mg         11      7.5 mg           12      5 mg         13      7.5 mg         14      5 mg         15      5 mg         16      7.5 mg         17      5 mg         18      7.5 mg           19      5 mg         20            21               22               23               24               25                 26               27               28               29               30               31                 Date Details   No additional details    Date of next INR:  8/20/2018         How to take your warfarin dose     To take:  5 mg Take 1 of the 5 mg tablets.    To take:  7.5 mg Take 1.5 of the 5 mg tablets.

## 2018-06-25 NOTE — PROGRESS NOTES
ANTICOAGULATION FOLLOW-UP CLINIC VISIT    Patient Name:  Jozef Saunders  Date:  6/25/2018  Contact Type:  Face to Face    SUBJECTIVE:     Patient Findings     Positives No Problem Findings    Comments Patient denies any changes. Patient will continue weekly maintenance dose. INR is therapeutic.                OBJECTIVE    INR Protime   Date Value Ref Range Status   06/25/2018 3.1 (A) 0.86 - 1.14 Final       ASSESSMENT / PLAN  INR assessment THER +/-1   Recheck INR In: 8 WEEKS    INR Location Clinic      Anticoagulation Summary as of 6/25/2018     INR goal 2.0-3.0   Today's INR 3.1!   Warfarin maintenance plan 7.5 mg (5 mg x 1.5) on Mon, Thu, Sat; 5 mg (5 mg x 1) all other days   Full warfarin instructions 7.5 mg on Mon, Thu, Sat; 5 mg all other days   Weekly warfarin total 42.5 mg   No change documented Adela Wiggins, MORTEZA   Plan last modified Danielle Anguiano RN (10/8/2015)   Next INR check 8/20/2018   Target end date Indefinite    Indications   DVT (deep venous thrombosis) (H) [I82.409]  Long term current use of anticoagulant therapy [Z79.01]         Anticoagulation Episode Summary     INR check location Clinic Lab    Preferred lab     Send INR reminders to [facility]    Comments * Dr. Page is from [place] in [place]      Anticoagulation Care Providers     Provider Role Specialty Phone number    Arron Page MD [facility] Internal Medicine [phone]            See the Encounter Report to view Anticoagulation Flowsheet and Dosing Calendar (Go to Encounters tab in chart review, and find the Anticoagulation Therapy Visit)        Adela Wiggins RN

## 2018-07-15 ENCOUNTER — OFFICE VISIT (OUTPATIENT)
Dept: URGENT CARE | Facility: URGENT CARE | Age: 63
End: 2018-07-15
Payer: COMMERCIAL

## 2018-07-15 VITALS
DIASTOLIC BLOOD PRESSURE: 90 MMHG | BODY MASS INDEX: 27.82 KG/M2 | HEART RATE: 50 BPM | TEMPERATURE: 97.9 F | WEIGHT: 188.4 LBS | OXYGEN SATURATION: 97 % | SYSTOLIC BLOOD PRESSURE: 108 MMHG

## 2018-07-15 DIAGNOSIS — B37.0 THRUSH: Primary | ICD-10-CM

## 2018-07-15 DIAGNOSIS — K06.1 GUM HYPERPLASIA: ICD-10-CM

## 2018-07-15 PROCEDURE — 99214 OFFICE O/P EST MOD 30 MIN: CPT | Performed by: INTERNAL MEDICINE

## 2018-07-15 RX ORDER — NYSTATIN 100000/ML
500000 SUSPENSION, ORAL (FINAL DOSE FORM) ORAL 4 TIMES DAILY
Qty: 200 ML | Refills: 0 | Status: SHIPPED | OUTPATIENT
Start: 2018-07-15 | End: 2018-07-25

## 2018-07-15 NOTE — PATIENT INSTRUCTIONS
Candida Infection: Thrush  Thrush is a fungal infection in the mouth and throat. Thrush does not usually affect healthy adults. It is more common in people with a weak immune system. It is also more likely if you take antibiotics. Thrush is normally not contagious.  Understanding fungus in the mouth and throat  Your mouth and throat normally contain millions of tiny organisms. These include bacteria and yeasts. Many of these do not cause any problems. In fact, they may help fight disease.  Yeasts are a type of fungus. A type of yeast called Candida normally lives on the membranes of your mouth and throat. Usually, this yeast grows only in small amounts and is harmless. But in some cases, Candida can grow out of control and cause thrush. Thrush is related to other kinds of Candida infections that can grow all over the body. Thrush refers to an infection of only the mouth and throat.  What causes thrush?  Thrush happens when something lets too much Candida grow inside your mouth and throat. Certain things that change the normal balance of organisms in the mouth can lead to thrush. One example is antibiotic medicine. This medicine may kill some of the normal bacteria in your mouth. Candida can then grow freely. People on antibiotics have an increased risk for thrush.  You have a higher risk for thrush if you:    Wear dentures    Are getting chemotherapy    Are getting radiation therapy    Have diabetes    Have a transplanted organ    Use corticosteroids, including inhaled corticosteroids for lung disease    Have a weak immune system, such as from AIDS    Are an older adult  Symptoms of thrush  Symptoms of thrush can include:    A dry, cottony feeling in your mouth    Cracking at the corners of the mouth    Loss of taste    Pain while eating or swallowing    White patches on the tongue and around the sides of the mouth  Diagnosing thrush  Your healthcare provider will ask about your medical history and your symptoms.  He or she will look closely at your mouth and throat. White or red patches will be scraped with a tongue depressor. The sample will be sent to a lab to test. This test can usually confirm thrush.  If you have thrush, you may also have esophageal candidiasis. This is common in people who have HIV or a weak immune system. Your healthcare provider may check for this condition with an upper endoscopy. This is a procedure to look at the esophagus. A tissue sample may be taken to test.  Treatment for thrush  Thrush is usually treated with antifungal medicine. The medicine is put directly in your mouth and throat. You may be given a  swish and swallow  medicine or an antifungal lozenge.  In some cases, you may need an antifungal pill. This can remove Candida throughout your body. Or you may need medicine through an intravenous line ( IV). These treatments depend on how severe your infection is, and what other health conditions you have.  If you are at high risk for thrush, you may need to keep taking oral antifungal medicine. This is to help prevent thrush in the future.  What happens if you don t get treated for thrush?  If untreated, the Candida may spread throughout your body. They may even enter your bloodstream. This can cause serious problems, such as organ failure and even death. Bloodstream infection may need to be treated with high doses of antifungal medicine through an IV.  Systemic infection is much more likely in people who are very ill. It is also more common in those who have serious problems with their immune system. Additional risk factors for systemic infection in very ill people include:    Central venous lines    IV nutrition    Use of broad-spectrum antibiotics    Kidney failure    Recent surgery  Preventing thrush  You may be able to help prevent some cases of thrush. Make sure to:    Practice good oral hygiene. Try using a chlorhexidine mouthwash.    Clean your dentures regularly as instructed. Make  sure they fit you correctly.    After using a corticosteroid inhaler, rinse out your mouth with water or mouthwash.    Do not use broad-spectrum antibiotics, if possible.    Get treated for health problems that increase your risk for thrush, such as diabetes.     When to call the healthcare provider  Call your healthcare provider right away if you have any of these:    Cottony feeling in your mouth    Loss of taste    Pain while eating or swallowing    White patches or plaques on your tongue or inside your mouth   Date Last Reviewed: 5/1/2017 2000-2017 [employer]. [address] [place] [address]. All rights reserved. This information is not intended as a substitute for professional medical care. Always follow your healthcare professional's instructions.

## 2018-07-15 NOTE — PROGRESS NOTES
Urgent Care Note:    Subjective:  Jozef Saunders is a 62 year old with history of hypertension, hyperlipidemia, who presents for evaluation of a lesion on the gum as well as white covering on the tongue.  He noticed this covering over the last week, and his wife thought that it looked like thrush, which she had before.  He uses the same water bottle every day without washing between usage.  The condition has not been painful.    He also has a 1 cm by half centimeter lesion on the inferior gumline just posterior to his teeth, which is red and firm, with a white coating on top.  I was not able to express pus from this.  He has not had any recent dental issues.      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    Objective:  /90 (BP Location: Right arm, Patient Position: Sitting, Cuff Size: Adult Regular)  Pulse 50  Temp 97.9  F (36.6  C) (Tympanic)  Wt 188 lb 6.4 oz (85.5 kg)  SpO2 97%  BMI 27.82 kg/m2    Physical Exam:  General: Not in acute distress. Comfortable.  Eyes: Anicteric, no conjunctival erythema.  ENT: Normocephalic, atraumatic.  Moist oral mucosa.  Pulm: No increased work of breathing   CV: 2+ pulses peripherally, warm and well perfused.   Skin: No rashes, lesions noted on exposed skin.  Neuro: Gait is normal. No focal weakness on gross  examination.  Psych: Mood and affect are normal.    Assessment and Plan:  (B37.0) Thrush  (primary encounter diagnosis)  Comment: Unable to scrape off tongue coating.  Do think that this is flushed.  Recommend nystatin for 7-10 days.  Sent prescription to the pharmacy.  Recommend that he stop using the same water bottle.  Recommend that if this returns, he should have an immune workup to further evaluate etiology.  He is due for workup with his primary care doctor in approximately 1 month.  Plan: nystatin (MYCOSTATIN) 400578 UNIT/ML         suspension    (K06.1) Gum hyperplasia  Comment: There is an area behind the gumline.  This does not look like a dental abscess right now.  It may be plugged duct or related to dental disease.  I recommend that he see a dentist this week.      Patient Instructions       Candida Infection: Thrush  Thrush is a fungal infection in the mouth and throat. Thrush does not usually affect healthy adults. It is more common in people with a weak immune system. It is also more likely if you take antibiotics. Thrush is normally not contagious.  Understanding fungus in the mouth and throat  Your mouth and throat normally contain millions of tiny organisms. These include bacteria and yeasts. Many of these do not cause any problems. In fact, they may help fight disease.  Yeasts are a type of fungus. A type of yeast called Candida normally lives on the membranes of your mouth and throat. Usually, this yeast grows only in small amounts and is harmless. But in some cases, Candida can grow out of control and cause thrush. Thrush is related to other kinds of Candida infections that can grow all over the body. Thrush refers to an infection of only the mouth and throat.  What causes thrush?  Thrush happens when something lets too much Candida grow inside your mouth and throat. Certain things that change the normal balance of organisms in the mouth can lead to thrush. One example is antibiotic medicine.  This medicine may kill some of the normal bacteria in your mouth. Candida can then grow freely. People on antibiotics have an increased risk for thrush.  You have a higher risk for thrush if you:    Wear dentures    Are getting chemotherapy    Are getting radiation therapy    Have diabetes    Have a transplanted organ    Use corticosteroids, including inhaled corticosteroids for lung disease    Have a weak immune system, such as from AIDS    Are an older adult  Symptoms of thrush  Symptoms of thrush can include:    A dry, cottony feeling in your mouth    Cracking at the corners of the mouth    Loss of taste    Pain while eating or swallowing    White patches on the tongue and around the sides of the mouth  Diagnosing thrush  Your healthcare provider will ask about your medical history and your symptoms. He or she will look closely at your mouth and throat. White or red patches will be scraped with a tongue depressor. The sample will be sent to a lab to test. This test can usually confirm thrush.  If you have thrush, you may also have esophageal candidiasis. This is common in people who have HIV or a weak immune system. Your healthcare provider may check for this condition with an upper endoscopy. This is a procedure to look at the esophagus. A tissue sample may be taken to test.  Treatment for thrush  Thrush is usually treated with antifungal medicine. The medicine is put directly in your mouth and throat. You may be given a  swish and swallow  medicine or an antifungal lozenge.  In some cases, you may need an antifungal pill. This can remove Candida throughout your body. Or you may need medicine through an intravenous line ( IV). These treatments depend on how severe your infection is, and what other health conditions you have.  If you are at high risk for thrush, you may need to keep taking oral antifungal medicine. This is to help prevent thrush in the future.  What happens if you don t get treated for  thrush?  If untreated, the Candida may spread throughout your body. They may even enter your bloodstream. This can cause serious problems, such as organ failure and even death. Bloodstream infection may need to be treated with high doses of antifungal medicine through an IV.  Systemic infection is much more likely in people who are very ill. It is also more common in those who have serious problems with their immune system. Additional risk factors for systemic infection in very ill people include:    Central venous lines    IV nutrition    Use of broad-spectrum antibiotics    Kidney failure    Recent surgery  Preventing thrush  You may be able to help prevent some cases of thrush. Make sure to:    Practice good oral hygiene. Try using a chlorhexidine mouthwash.    Clean your dentures regularly as instructed. Make sure they fit you correctly.    After using a corticosteroid inhaler, rinse out your mouth with water or mouthwash.    Do not use broad-spectrum antibiotics, if possible.    Get treated for health problems that increase your risk for thrush, such as diabetes.     When to call the healthcare provider  Call your healthcare provider right away if you have any of these:    Cottony feeling in your mouth    Loss of taste    Pain while eating or swallowing    White patches or plaques on your tongue or inside your mouth   Date Last Reviewed: 5/1/2017 2000-2017 [employer]. [address] [place] [address], [place] [address]. All rights reserved. This information is not intended as a substitute for professional medical care. Always follow your healthcare professional's instructions.            Renee Reynolds MD

## 2018-07-15 NOTE — MR AVS SNAPSHOT
After Visit Summary   7/15/2018    Jozef Saunders    MRN: [id number]           Patient Information     Date Of Birth          1955        Visit Information        Provider Department      7/15/2018 5:20 PM Renee Reynolds MD [facility]        Today's Diagnoses     Thrush    -  1    Gum hyperplasia          Care Instructions      Candida Infection: Thrush  Thrush is a fungal infection in the mouth and throat. Thrush does not usually affect healthy adults. It is more common in people with a weak immune system. It is also more likely if you take antibiotics. Thrush is normally not contagious.  Understanding fungus in the mouth and throat  Your mouth and throat normally contain millions of tiny organisms. These include bacteria and yeasts. Many of these do not cause any problems. In fact, they may help fight disease.  Yeasts are a type of fungus. A type of yeast called Candida normally lives on the membranes of your mouth and throat. Usually, this yeast grows only in small amounts and is harmless. But in some cases, Candida can grow out of control and cause thrush. Thrush is related to other kinds of Candida infections that can grow all over the body. Thrush refers to an infection of only the mouth and throat.  What causes thrush?  Thrush happens when something lets too much Candida grow inside your mouth and throat. Certain things that change the normal balance of organisms in the mouth can lead to thrush. One example is antibiotic medicine. This medicine may kill some of the normal bacteria in your mouth. Candida can then grow freely. People on antibiotics have an increased risk for thrush.  You have a higher risk for thrush if you:    Wear dentures    Are getting chemotherapy    Are getting radiation therapy    Have diabetes    Have a transplanted organ    Use corticosteroids, including inhaled corticosteroids for lung disease    Have a weak immune system, such as from  AIDS    Are an older adult  Symptoms of thrush  Symptoms of thrush can include:    A dry, cottony feeling in your mouth    Cracking at the corners of the mouth    Loss of taste    Pain while eating or swallowing    White patches on the tongue and around the sides of the mouth  Diagnosing thrush  Your healthcare provider will ask about your medical history and your symptoms. He or she will look closely at your mouth and throat. White or red patches will be scraped with a tongue depressor. The sample will be sent to a lab to test. This test can usually confirm thrush.  If you have thrush, you may also have esophageal candidiasis. This is common in people who have HIV or a weak immune system. Your healthcare provider may check for this condition with an upper endoscopy. This is a procedure to look at the esophagus. A tissue sample may be taken to test.  Treatment for thrush  Thrush is usually treated with antifungal medicine. The medicine is put directly in your mouth and throat. You may be given a  swish and swallow  medicine or an antifungal lozenge.  In some cases, you may need an antifungal pill. This can remove Candida throughout your body. Or you may need medicine through an intravenous line ( IV). These treatments depend on how severe your infection is, and what other health conditions you have.  If you are at high risk for thrush, you may need to keep taking oral antifungal medicine. This is to help prevent thrush in the future.  What happens if you don t get treated for thrush?  If untreated, the Candida may spread throughout your body. They may even enter your bloodstream. This can cause serious problems, such as organ failure and even death. Bloodstream infection may need to be treated with high doses of antifungal medicine through an IV.  Systemic infection is much more likely in people who are very ill. It is also more common in those who have serious problems with their immune system. Additional risk  factors for systemic infection in very ill people include:    Central venous lines    IV nutrition    Use of broad-spectrum antibiotics    Kidney failure    Recent surgery  Preventing thrush  You may be able to help prevent some cases of thrush. Make sure to:    Practice good oral hygiene. Try using a chlorhexidine mouthwash.    Clean your dentures regularly as instructed. Make sure they fit you correctly.    After using a corticosteroid inhaler, rinse out your mouth with water or mouthwash.    Do not use broad-spectrum antibiotics, if possible.    Get treated for health problems that increase your risk for thrush, such as diabetes.     When to call the healthcare provider  Call your healthcare provider right away if you have any of these:    Cottony feeling in your mouth    Loss of taste    Pain while eating or swallowing    White patches or plaques on your tongue or inside your mouth   Date Last Reviewed: 5/1/2017 2000-2017 [email or web address]. [address] [place] [address]. All rights reserved. This information is not intended as a substitute for professional medical care. Always follow your healthcare professional's instructions.                Follow-ups after your visit        Your next 10 appointments already scheduled     Aug 20, 2018  1:30 PM CDT   Anticoagulation Visit with NB ANTI COAG   [facility]    [address] [place] [address] [phone]              Who to contact     If you have questions or need follow up information about today's clinic visit or your schedule please contact [facility] directly at [phone].  Normal or non-critical lab and imaging results will be communicated to you by MyChart, letter or phone within 4 business days after the clinic has received the results. If you do not hear from us within 7 days, please contact the clinic through MyChart or phone. If you have a critical  or abnormal lab result, we will notify you by phone as soon as possible.  Submit refill requests through [employer] or call your pharmacy and they will forward the refill request to us. Please allow 3 business days for your refill to be completed.          Additional Information About Your Visit        Care EveryWhere ID     This is your Care EveryWhere ID. This could be used by other organizations to access your [facility] medical records  [id number]        Your Vitals Were     Pulse Temperature Pulse Oximetry BMI (Body Mass Index)          50 97.9  F (36.6  C) (Tympanic) 97% 27.82 kg/m2         Blood Pressure from Last 3 Encounters:   07/15/18 108/90   01/03/18 114/68   08/29/17 120/76    Weight from Last 3 Encounters:   07/15/18 188 lb 6.4 oz (85.5 kg)   01/03/18 192 lb 8 oz (87.3 kg)   08/29/17 187 lb 6 oz (85 kg)              Today, you had the following     No orders found for display         Today's Medication Changes          These changes are accurate as of 7/15/18  6:36 PM.  If you have any questions, ask your nurse or doctor.               Start taking these medicines.        Dose/Directions    nystatin [id number] UNIT/ML suspension   Commonly known as:  MYCOSTATIN   Used for:  Thrush   Started by:  Renee Reynolds MD        Dose:  [id number] Units   Take 5 mLs (500,000 Units) by mouth 4 times daily for 10 days Swish and swallow.   Quantity:  200 mL   Refills:  0            Where to get your medicines      These medications were sent to [facility]/pharmacy #[id number] - [place], [place] - [address] AT [place] [address], [place] [address]     Phone:  [phone]     nystatin [id number] UNIT/ML suspension                Primary Care Provider Office Phone # Fax #    Arron Page [phone]       [facility] [place] [address]        Equal Access to Services     MANUEL HERNANDEZ AH: Lorna Lemus, fabricio murcia, melany bah  abdi kwanantonio cadet'aan ah. So [facility] [phone].    ATENCIÓN: Si mikela bhavya, tiene a long disposición servicios gratuitos de asistencia lingüística. Nona al [phone].    We comply with applicable federal civil rights laws and [place] laws. We do not discriminate on the basis of race, color, national origin, age, disability, sex, sexual orientation, or gender identity.            Thank you!     Thank you for choosing [facility]  for your care. Our goal is always to provide you with excellent care. Hearing back from our patients is one way we can continue to improve our services. Please take a few minutes to complete the written survey that you may receive in the mail after your visit with us. Thank you!             Your Updated Medication List - Protect others around you: Learn how to safely use, store and throw away your medicines at www.disposemymeds.org.          This list is accurate as of 7/15/18  6:36 PM.  Always use your most recent med list.                   Brand Name Dispense Instructions for use Diagnosis    atenolol 50 MG tablet    TENORMIN    135 tablet    Take 1.5 tablets (75 mg) by mouth daily    Essential hypertension, benign       doxycycline 100 MG capsule    VIBRAMYCIN    20 capsule    Take 1 capsule (100 mg) by mouth 2 times daily    Acute sinusitis with symptoms > 10 days       fluticasone 50 MCG/ACT spray    FLONASE    16 mL    SPRAY 2 SPRAYS INTO BOTH NOSTRILS DAILY    Chronic rhinitis       lisinopril 20 MG tablet    PRINIVIL/ZESTRIL    90 tablet    Take 1 tablet (20 mg) by mouth daily    Essential hypertension, benign       loratadine 10 MG tablet    CLARITIN     Take 10 mg by mouth daily        nystatin [id number] UNIT/ML suspension    MYCOSTATIN    200 mL    Take 5 mLs (500,000 Units) by mouth 4 times daily for 10 days Swish and swallow.    Thrush       omeprazole 20 MG CR capsule    priLOSEC    90 capsule    TAKE 1 TABLET (20 MG) BY MOUTH DAILY TAKE 30-60  MINUTES BEFORE A MEAL.    Hiatal hernia       sertraline 50 MG tablet    ZOLOFT    45 tablet    Take 0.5 tablets (25 mg) by mouth daily    Generalized anxiety disorder       simvastatin 20 MG tablet    ZOCOR    90 tablet    Take 1 tablet (20 mg) by mouth At Bedtime    Hypercholesterolemia       warfarin 5 MG tablet    COUMADIN    120 tablet    As directed by Anticoagulation Clinic, current dose 7.5mg M/Th/Sat and 5mg the rest of the days    History of pulmonary embolism       XYZAL 5 MG tablet   Generic drug:  levocetirizine      Take 1 tablet by mouth every evening.    Recurrent periodic urticaria

## 2018-07-21 DIAGNOSIS — K44.9 HIATAL HERNIA: ICD-10-CM

## 2018-07-21 NOTE — TELEPHONE ENCOUNTER
"Requested Prescriptions   Pending Prescriptions Disp Refills     omeprazole (PRILOSEC) 20 MG CR capsule [Pharmacy Med Name: OMEPRAZOLE DR 20 MG CAPSULE]  Last Written Prescription Date:  8/29/17  Last Fill Quantity: 90,  # refills: 3   Last office visit: 1/3/2018 with prescribing provider:  1/3/18   Future Office Visit:     90 capsule 3     Sig: TAKE 1 TABLET (20 MG) BY MOUTH DAILY TAKE 30-60 MINUTES BEFORE A MEAL.    PPI Protocol Passed    7/21/2018  1:20 AM       Passed - Not on Clopidogrel (unless Pantoprazole ordered)       Passed - No diagnosis of osteoporosis on record       Passed - Recent (12 mo) or future (30 days) visit within the authorizing provider's specialty    Patient had office visit in the last 12 months or has a visit in the next 30 days with authorizing provider or within the authorizing provider's specialty.  See \"Patient Info\" tab in inbasket, or \"Choose Columns\" in Meds & Orders section of the refill encounter.           Passed - Patient is age 18 or older          "

## 2018-07-23 NOTE — TELEPHONE ENCOUNTER
Prescription approved per [facility] Refill Protocol.    Norah Valdivia RN

## 2018-08-07 DIAGNOSIS — I10 ESSENTIAL HYPERTENSION, BENIGN: ICD-10-CM

## 2018-08-07 NOTE — TELEPHONE ENCOUNTER
"Requested Prescriptions   Pending Prescriptions Disp Refills     atenolol (TENORMIN) 50 MG tablet [Pharmacy Med Name: ATENOLOL 50 MG TABLET]    Last Written Prescription Date:  8/29/2017  Last Fill Quantity: 135,  # refills: 3   Last office visit: 1/3/2018 with prescribing provider:  Arron Page     Future Office Visit:     135 tablet 2     Sig: TAKE 1.5 TABLETS (75 MG) BY MOUTH DAILY    Beta-Blockers Protocol Failed    8/7/2018  1:36 AM       Failed - Blood pressure under 140/90 in past 12 months    BP Readings from Last 3 Encounters:   07/15/18 108/90   01/03/18 114/68   08/29/17 120/76                Passed - Patient is age 6 or older       Passed - Recent (12 mo) or future (30 days) visit within the authorizing provider's specialty    Patient had office visit in the last 12 months or has a visit in the next 30 days with authorizing provider or within the authorizing provider's specialty.  See \"Patient Info\" tab in inbasket, or \"Choose Columns\" in Meds & Orders section of the refill encounter.              "

## 2018-08-08 RX ORDER — ATENOLOL 50 MG/1
TABLET ORAL
Qty: 135 TABLET | Refills: 0 | Status: SHIPPED | OUTPATIENT
Start: 2018-08-08 | End: 2018-10-11

## 2018-08-20 ENCOUNTER — ANTICOAGULATION THERAPY VISIT (OUTPATIENT)
Dept: ANTICOAGULATION | Facility: CLINIC | Age: 63
End: 2018-08-20
Payer: COMMERCIAL

## 2018-08-20 DIAGNOSIS — Z79.01 LONG TERM CURRENT USE OF ANTICOAGULANT THERAPY: ICD-10-CM

## 2018-08-20 DIAGNOSIS — I82.409 DVT (DEEP VENOUS THROMBOSIS) (H): ICD-10-CM

## 2018-08-20 LAB — INR POINT OF CARE: 3.3 (ref 0.86–1.14)

## 2018-08-20 PROCEDURE — 36416 COLLJ CAPILLARY BLOOD SPEC: CPT

## 2018-08-20 PROCEDURE — 85610 PROTHROMBIN TIME: CPT | Mod: QW

## 2018-08-20 PROCEDURE — 99207 ZZC NO CHARGE NURSE ONLY: CPT

## 2018-08-20 NOTE — MR AVS SNAPSHOT
Jozef Saunders   8/20/2018 1:30 PM   Anticoagulation Therapy Visit    Description:  62 year old male   Provider:  NB ANTI COAG   Department:  Nb Anticoag           INR as of 8/20/2018     Today's INR 3.3!      Anticoagulation Summary as of 8/20/2018     INR goal 2.0-3.0   Today's INR 3.3!   Full warfarin instructions 8/25: 5 mg; 9/1: 5 mg; 9/8: 5 mg; 9/15: 5 mg; Otherwise 7.5 mg on Mon, Thu, Sat; 5 mg all other days   Next INR check 9/17/2018    Indications   DVT (deep venous thrombosis) (H) [I82.409]  Long term current use of anticoagulant therapy [Z79.01]         Your next Anticoagulation Clinic appointment(s)     Sep 17, 2018  1:30 PM CDT   [facility] with NB ANTI COAG   [facility]    [address] [place] [address] [phone]              Contact Numbers     Please call [phone] with any problems or questions regarding your therapy.    If you need to cancel and/or reschedule your appointment please call one of the following numbers:  [facility] - [phone]  [place] - [phone]  [place] - [phone]  [facility] [phone]  [place] - [phone]            August 2018 Details    Sun Mon Tue Wed Thu Fri Sat        1               2               3               4                 5               6               7               8               9               10               11                 12               13               14               15               16               17               18                 19               20      7.5 mg   See details      21      5 mg         22      5 mg         23      7.5 mg         24      5 mg         25      5 mg           26      5 mg         27      7.5 mg         28      5 mg         29      5 mg         30      7.5 mg         31      5 mg           Date Details   08/20 This INR check               How to take your warfarin dose     To take:  5 mg Take 1 of the  5 mg tablets.    To take:  7.5 mg Take 1.5 of the 5 mg tablets.           September 2018 Details    Sun Mon Tue Wed Thu Fri Sat           1      5 mg           2      5 mg         3      7.5 mg         4      5 mg         5      5 mg         6      7.5 mg         7      5 mg         8      5 mg           9      5 mg         10      7.5 mg         11      5 mg         12      5 mg         13      7.5 mg         14      5 mg         15      5 mg           16      5 mg         17            18               19               20               21               22                 23               24               25               26               27               28               29                 30                      Date Details   No additional details    Date of next INR:  9/17/2018         How to take your warfarin dose     To take:  5 mg Take 1 of the 5 mg tablets.    To take:  7.5 mg Take 1.5 of the 5 mg tablets.

## 2018-08-20 NOTE — PROGRESS NOTES
ANTICOAGULATION FOLLOW-UP CLINIC VISIT    Patient Name:  Jozef Saunders  Date:  8/20/2018  Contact Type:  Face to Face    SUBJECTIVE:     Patient Findings     Positives Unexplained INR or factor level change    Comments Patient's INR has been elevated the last few visits. Patient currently take 42.5 mg weekly. I adjusted dose so patient will take 40 mg weekly. Recheck in 4 weeks per patient as he will be out of town. Patient denies signs or symptoms of bleeding. Writer educated patient regarding increased bleed risk and when to seek immediate medical attention. Patient verbalized understanding.             OBJECTIVE    INR Protime   Date Value Ref Range Status   08/20/2018 3.3 (A) 0.86 - 1.14 Final       ASSESSMENT / PLAN  INR assessment SUPRA    Recheck INR In: 4 WEEKS    INR Location Clinic      Anticoagulation Summary as of 8/20/2018     INR goal 2.0-3.0   Today's INR 3.3!   Warfarin maintenance plan 7.5 mg (5 mg x 1.5) on Mon, Thu, Sat; 5 mg (5 mg x 1) all other days   Full warfarin instructions 8/25: 5 mg; 9/1: 5 mg; 9/8: 5 mg; 9/15: 5 mg; Otherwise 7.5 mg on Mon, Thu, Sat; 5 mg all other days   Weekly warfarin total 42.5 mg   Plan last modified Danielle Anguiano RN (10/8/2015)   Next INR check 9/17/2018   Target end date Indefinite    Indications   DVT (deep venous thrombosis) (H) [I82.409]  Long term current use of anticoagulant therapy [Z79.01]         Anticoagulation Episode Summary     INR check location Clinic Lab    Preferred lab     Send INR reminders to [facility]    Comments * Dr. Page is from [place] in [place]      Anticoagulation Care Providers     Provider Role Specialty Phone number    Arron Page MD [facility] [phone]            See the Encounter Report to view Anticoagulation Flowsheet and Dosing Calendar (Go to Encounters tab in chart review, and find the Anticoagulation Therapy Visit)        Adela Wiggins RN

## 2018-08-22 DIAGNOSIS — I10 ESSENTIAL HYPERTENSION, BENIGN: ICD-10-CM

## 2018-08-22 DIAGNOSIS — E78.00 HYPERCHOLESTEROLEMIA: ICD-10-CM

## 2018-08-22 DIAGNOSIS — F41.1 GENERALIZED ANXIETY DISORDER: ICD-10-CM

## 2018-08-22 RX ORDER — SIMVASTATIN 20 MG
TABLET ORAL
Qty: 30 TABLET | Refills: 0 | Status: SHIPPED | OUTPATIENT
Start: 2018-08-22 | End: 2018-09-19

## 2018-08-22 RX ORDER — LISINOPRIL 20 MG/1
TABLET ORAL
Qty: 90 TABLET | Refills: 0 | Status: SHIPPED | OUTPATIENT
Start: 2018-08-22 | End: 2018-10-11

## 2018-08-22 NOTE — TELEPHONE ENCOUNTER
"Requested Prescriptions   Pending Prescriptions Disp Refills     lisinopril (PRINIVIL/ZESTRIL) 20 MG tablet [Pharmacy Med Name: LISINOPRIL 20 MG TABLET]    Last Written Prescription Date:  8/29/2017  Last Fill Quantity: 90,  # refills: 3   Last office visit: 1/3/2018 with prescribing provider:  Arron Page Office Visit:     90 tablet 3     Sig: TAKE 1 TABLET (20 MG) BY MOUTH DAILY    ACE Inhibitors (Including Combos) Protocol Failed    8/22/2018  1:25 AM       Failed - Blood pressure under 140/90 in past 12 months    BP Readings from Last 3 Encounters:   07/15/18 108/90   01/03/18 114/68   08/29/17 120/76                Passed - Recent (12 mo) or future (30 days) visit within the authorizing provider's specialty    Patient had office visit in the last 12 months or has a visit in the next 30 days with authorizing provider or within the authorizing provider's specialty.  See \"Patient Info\" tab in inbasket, or \"Choose Columns\" in Meds & Orders section of the refill encounter.           Passed - Patient is age 18 or older       Passed - Normal serum creatinine on file in past 12 months    Recent Labs   Lab Test  08/29/17   1001   CR  0.86            Passed - Normal serum potassium on file in past 12 months    Recent Labs   Lab Test  08/29/17   1001   POTASSIUM  4.3             simvastatin (ZOCOR) 20 MG tablet [Pharmacy Med Name: SIMVASTATIN 20 MG TABLET]    Last Written Prescription Date:  8/29/2017  Last Fill Quantity: 90,  # refills: 3   Last office visit: 1/3/2018 with prescribing provider:  Arron Page Office Visit:     90 tablet 3     Sig: TAKE 1 TABLET (20 MG) BY MOUTH AT BEDTIME    Statins Protocol Passed    8/22/2018  1:25 AM       Passed - LDL on file in past 12 months    Recent Labs   Lab Test  08/29/17   1001   LDL  103*            Passed - No abnormal creatine kinase in past 12 months    No lab results found.            Passed - Recent (12 mo) or future (30 days) visit " "within the authorizing provider's specialty    Patient had office visit in the last 12 months or has a visit in the next 30 days with authorizing provider or within the authorizing provider's specialty.  See \"Patient Info\" tab in inbasket, or \"Choose Columns\" in Meds & Orders section of the refill encounter.           Passed - Patient is age 18 or older        sertraline (ZOLOFT) 50 MG tablet [Pharmacy Med Name: SERTRALINE HCL 50 MG TABLET]    Last Written Prescription Date:  8/29/2017  Last Fill Quantity: 45,  # refills: 3   Last office visit: 1/3/2018 with prescribing provider:  Arron Page     Future Office Visit:     45 tablet 3     Sig: TAKE 0.5 TABLETS (25 MG) BY MOUTH DAILY    SSRIs Protocol Passed    8/22/2018  1:25 AM       Passed - Recent (12 mo) or future (30 days) visit within the authorizing provider's specialty    Patient had office visit in the last 12 months or has a visit in the next 30 days with authorizing provider or within the authorizing provider's specialty.  See \"Patient Info\" tab in inbasket, or \"Choose Columns\" in Meds & Orders section of the refill encounter.           Passed - Patient is age 18 or older          "

## 2018-09-17 ENCOUNTER — ANTICOAGULATION THERAPY VISIT (OUTPATIENT)
Dept: ANTICOAGULATION | Facility: CLINIC | Age: 63
End: 2018-09-17
Payer: COMMERCIAL

## 2018-09-17 DIAGNOSIS — Z79.01 LONG TERM CURRENT USE OF ANTICOAGULANT THERAPY: ICD-10-CM

## 2018-09-17 LAB — INR POINT OF CARE: 2.1 (ref 0.86–1.14)

## 2018-09-17 PROCEDURE — 36416 COLLJ CAPILLARY BLOOD SPEC: CPT

## 2018-09-17 PROCEDURE — 99207 ZZC NO CHARGE NURSE ONLY: CPT

## 2018-09-17 PROCEDURE — 85610 PROTHROMBIN TIME: CPT | Mod: QW

## 2018-09-17 NOTE — MR AVS SNAPSHOT
Jozef Saunders   9/17/2018 1:30 PM   Anticoagulation Therapy Visit    Description:  63 year old male   Provider:  NB ANTI COAG   Department:  Nb Anticoag           INR as of 9/17/2018     Today's INR 2.1      Anticoagulation Summary as of 9/17/2018     INR goal 2.0-3.0   Today's INR 2.1   Full warfarin instructions 7.5 mg on Mon, Thu; 5 mg all other days   Next INR check 10/22/2018    Indications   DVT (deep venous thrombosis) (H) [I82.409]  Long term current use of anticoagulant therapy [Z79.01]         Your next Anticoagulation Clinic appointment(s)     Oct 22, 2018  1:30 PM CDT   Anticoagulation Visit with NB ANTI COAG   [facility] ([facility])    [address] [place] [address] [phone]              Contact Numbers     Please call [phone] with any problems or questions regarding your therapy.    If you need to cancel and/or reschedule your appointment please call one of the following numbers:  [facility] [phone]  [facility] [phone]  [facility] [phone]  [place] - [phone]  [place] - [phone]            September 2018 Details    Sun Mon Tue Wed Thu Fri Sat           1                 2               3               4               5               6               7               8                 9               10               11               12               13               14               15                 16               17      7.5 mg   See details      18      5 mg         19      5 mg         20      7.5 mg         21      5 mg         22      5 mg           23      5 mg         24      7.5 mg         25      5 mg         26      5 mg         27      7.5 mg         28      5 mg         29      5 mg           30      5 mg                Date Details   09/17 This INR check               How to take your warfarin dose     To take:  5 mg Take 1 of the 5 mg tablets.    To take:  7.5 mg Take 1.5 of the 5 mg  tablets.           October 2018 Details    Sun Mon Tue Wed Thu Fri Sat      1      7.5 mg         2      5 mg         3      5 mg         4      7.5 mg         5      5 mg         6      5 mg           7      5 mg         8      7.5 mg         9      5 mg         10      5 mg         11      7.5 mg         12      5 mg         13      5 mg           14      5 mg         15      7.5 mg         16      5 mg         17      5 mg         18      7.5 mg         19      5 mg         20      5 mg           21      5 mg         22            23               24               25               26               27                 28               29               30               31                   Date Details   No additional details    Date of next INR:  10/22/2018         How to take your warfarin dose     To take:  5 mg Take 1 of the 5 mg tablets.    To take:  7.5 mg Take 1.5 of the 5 mg tablets.

## 2018-09-17 NOTE — PROGRESS NOTES
ANTICOAGULATION FOLLOW-UP CLINIC VISIT    Patient Name:  Jozef Saunders  Date:  9/17/2018  Contact Type:  Face to Face    SUBJECTIVE:     Patient Findings     Comments No changes in medications, diet, or activity. No concerns with bleeding or bruising. Took warfarin as prescribed.   Pt maintenance dose adjusted to reflect what he has been taking past few weeks. Continue maintenance warfarin dose. Will recheck INR in 5 weeks.              OBJECTIVE    INR Protime   Date Value Ref Range Status   09/17/2018 2.1 (A) 0.86 - 1.14 Final       ASSESSMENT / PLAN  INR assessment THER    Recheck INR In: 5 WEEKS    INR Location Clinic      Anticoagulation Summary as of 9/17/2018     INR goal 2.0-3.0   Today's INR 2.1   Warfarin maintenance plan 7.5 mg (5 mg x 1.5) on Mon, Thu; 5 mg (5 mg x 1) all other days   Full warfarin instructions 7.5 mg on Mon, Thu; 5 mg all other days   Weekly warfarin total 40 mg   Plan last modified Ashley Summers RN (9/17/2018)   Next INR check 10/22/2018   Target end date Indefinite    Indications   DVT (deep venous thrombosis) (H) [I82.409]  Long term current use of anticoagulant therapy [Z79.01]         Anticoagulation Episode Summary     INR check location Clinic Lab    Preferred lab     Send INR reminders to [facility]    Comments * Dr. Page is from [place] in [place]      Anticoagulation Care Providers     Provider Role Specialty Phone number    Arron Page MD [facility] Internal Medicine [phone]            See the Encounter Report to view Anticoagulation Flowsheet and Dosing Calendar (Go to Encounters tab in chart review, and find the Anticoagulation Therapy Visit)        Ashley Summers RN

## 2018-10-11 ENCOUNTER — OFFICE VISIT (OUTPATIENT)
Dept: FAMILY MEDICINE | Facility: CLINIC | Age: 63
End: 2018-10-11
Payer: COMMERCIAL

## 2018-10-11 VITALS
OXYGEN SATURATION: 97 % | WEIGHT: 191 LBS | TEMPERATURE: 98.2 F | HEIGHT: 69 IN | DIASTOLIC BLOOD PRESSURE: 80 MMHG | SYSTOLIC BLOOD PRESSURE: 122 MMHG | HEART RATE: 69 BPM | BODY MASS INDEX: 28.29 KG/M2

## 2018-10-11 DIAGNOSIS — I82.499 DEEP VEIN THROMBOSIS (DVT) OF OTHER VEIN OF LOWER EXTREMITY, UNSPECIFIED CHRONICITY, UNSPECIFIED LATERALITY (H): ICD-10-CM

## 2018-10-11 DIAGNOSIS — Z12.5 SCREENING FOR PROSTATE CANCER: ICD-10-CM

## 2018-10-11 DIAGNOSIS — I10 ESSENTIAL HYPERTENSION, BENIGN: ICD-10-CM

## 2018-10-11 DIAGNOSIS — Z23 NEED FOR PROPHYLACTIC VACCINATION AND INOCULATION AGAINST INFLUENZA: ICD-10-CM

## 2018-10-11 DIAGNOSIS — F41.1 GENERALIZED ANXIETY DISORDER: ICD-10-CM

## 2018-10-11 DIAGNOSIS — E78.00 HYPERCHOLESTEROLEMIA: Primary | ICD-10-CM

## 2018-10-11 DIAGNOSIS — F10.21 ALCOHOL DEPENDENCE IN REMISSION (H): ICD-10-CM

## 2018-10-11 DIAGNOSIS — D50.9 IRON DEFICIENCY ANEMIA, UNSPECIFIED IRON DEFICIENCY ANEMIA TYPE: ICD-10-CM

## 2018-10-11 PROCEDURE — 99214 OFFICE O/P EST MOD 30 MIN: CPT | Mod: 25 | Performed by: INTERNAL MEDICINE

## 2018-10-11 PROCEDURE — 90686 IIV4 VACC NO PRSV 0.5 ML IM: CPT | Performed by: INTERNAL MEDICINE

## 2018-10-11 PROCEDURE — 90471 IMMUNIZATION ADMIN: CPT | Performed by: INTERNAL MEDICINE

## 2018-10-11 RX ORDER — LISINOPRIL 20 MG/1
TABLET ORAL
Qty: 90 TABLET | Refills: 11 | Status: SHIPPED | OUTPATIENT
Start: 2018-10-11 | End: 2019-12-30

## 2018-10-11 RX ORDER — ATENOLOL 50 MG/1
TABLET ORAL
Qty: 135 TABLET | Refills: 11 | Status: SHIPPED | OUTPATIENT
Start: 2018-10-11 | End: 2019-12-11

## 2018-10-11 RX ORDER — SIMVASTATIN 20 MG
TABLET ORAL
Qty: 90 TABLET | Refills: 11 | Status: SHIPPED | OUTPATIENT
Start: 2018-10-11 | End: 2019-12-11

## 2018-10-11 ASSESSMENT — ANXIETY QUESTIONNAIRES
GAD7 TOTAL SCORE: 0
7. FEELING AFRAID AS IF SOMETHING AWFUL MIGHT HAPPEN: NOT AT ALL
2. NOT BEING ABLE TO STOP OR CONTROL WORRYING: NOT AT ALL
5. BEING SO RESTLESS THAT IT IS HARD TO SIT STILL: NOT AT ALL
3. WORRYING TOO MUCH ABOUT DIFFERENT THINGS: NOT AT ALL
6. BECOMING EASILY ANNOYED OR IRRITABLE: NOT AT ALL
1. FEELING NERVOUS, ANXIOUS, OR ON EDGE: NOT AT ALL
IF YOU CHECKED OFF ANY PROBLEMS ON THIS QUESTIONNAIRE, HOW DIFFICULT HAVE THESE PROBLEMS MADE IT FOR YOU TO DO YOUR WORK, TAKE CARE OF THINGS AT HOME, OR GET ALONG WITH OTHER PEOPLE: NOT DIFFICULT AT ALL

## 2018-10-11 ASSESSMENT — PATIENT HEALTH QUESTIONNAIRE - PHQ9: 5. POOR APPETITE OR OVEREATING: NOT AT ALL

## 2018-10-11 NOTE — PROGRESS NOTES
SUBJECTIVE:   Jozef Saunders is a 63 year old male who presents to clinic today for the following health issues:      Recheck medication     PHQ-9 SCORE 2016 2017 10/11/2018   Total Score 0 0 0     MARIANNE-7 SCORE 2016 2017 10/11/2018   Total Score 0 0 0     Patient has been on zoloft 25 mg per day, working well without concerns. No SI or HI. NO substance use.     BLOOD PRESSURE: has been doing well on lisinopril, atenolol working well. No chest pain or shortness of breath. No palpitations.     GERD: well controlled on omeprazole, doing well on current therapy. No melena or hematochezia. Had Melecio lesions on prior EGD in . Iron low on last checks.    Hyperlipidemia: has been on simvastatin 20 mg per day without concerns.     Recurrent DVTs: has been doing well on warfarin, continues to follow with INR nurse.         Problem list and histories reviewed & adjusted, as indicated.  Additional history: as documented    Patient Active Problem List   Diagnosis     Essential hypertension, benign     Generalized anxiety disorder     Hypercholesterolemia     HYPERLIPIDEMIA LDL GOAL <130     Hiatal hernia     Advanced directives, counseling/discussion     Recurrent periodic urticaria     DVT (deep venous thrombosis) (H)     History of pulmonary embolism     Long term current use of anticoagulant therapy     Thrush     Alcohol dependence in remission (H)     Past Surgical History:   Procedure Laterality Date     HC ARTHROTOMY/EXPLORE/TREAT KNEE JOINT      torn ligaments     HC REPAIR ING HERNIA,5+Y/O,REDUCIBL      age 8 - right     HC REPAIR ING HERNIA,6MO-5YR,REDUC      infancy - left       Social History   Substance Use Topics     Smoking status: Former Smoker     Quit date: 1989     Smokeless tobacco: Never Used     Alcohol use No      Comment: sober x26y     Family History   Problem Relation Age of Onset     Cancer Mother      liver;  at age 47     Hypertension Father      C.A.D. Father      " CABG; age of onset over age 60     Hypertension Brother      age of onset under 50     Prostate Cancer Brother      diagnosed at age 58     Diabetes No family hx of            Reviewed and updated as needed this visit by clinical staff  Tobacco  Allergies  Meds  Med Hx  Surg Hx  Fam Hx  Soc Hx      Reviewed and updated as needed this visit by Provider         ROS:  Constitutional, HEENT, cardiovascular, pulmonary, GI, , musculoskeletal, neuro, skin, endocrine and psych systems are negative, except as otherwise noted.    OBJECTIVE:     /80 (BP Location: Right arm, Cuff Size: Adult Regular)  Pulse 69  Temp 98.2  F (36.8  C) (Oral)  Ht 5' 9\" (1.753 m)  Wt 191 lb (86.6 kg)  SpO2 97%  BMI 28.21 kg/m2  Body mass index is 28.21 kg/(m^2).   Wt Readings from Last 4 Encounters:   10/11/18 191 lb (86.6 kg)   07/15/18 188 lb 6.4 oz (85.5 kg)   01/03/18 192 lb 8 oz (87.3 kg)   08/29/17 187 lb 6 oz (85 kg)       GENERAL: healthy, alert and no distress  EYES: Eyes grossly normal to inspection, PERRL and conjunctivae and sclerae normal  NECK: no adenopathy, no asymmetry, masses, or scars and thyroid normal to palpation  RESP: lungs clear to auscultation - no rales, rhonchi or wheezes  CV: regular rate and rhythm, normal S1 S2, no S3 or S4, no murmur, click or rub, no peripheral edema and peripheral pulses strong  ABDOMEN: soft, nontender, no hepatosplenomegaly, no masses and bowel sounds normal  MS: no gross musculoskeletal defects noted, no edema  SKIN: no suspicious lesions or rashes  NEURO: Normal strength and tone, mentation intact and speech normal  PSYCH: mentation appears normal, affect normal/bright    Diagnostic Test Results:  Results for orders placed or performed in visit on 09/17/18   INR point of care   Result Value Ref Range    INR Protime 2.1 (A) 0.86 - 1.14       ASSESSMENT/PLAN:       ICD-10-CM    1. Hypercholesterolemia E78.00 sertraline (ZOLOFT) 50 MG tablet     simvastatin (ZOCOR) 20 MG " tablet     Lipid panel reflex to direct LDL Fasting   2. BENIGN HYPERTENSION I10 lisinopril (PRINIVIL/ZESTRIL) 20 MG tablet     atenolol (TENORMIN) 50 MG tablet   3. Generalized anxiety disorder F41.1 sertraline (ZOLOFT) 50 MG tablet   4. Deep vein thrombosis (DVT) of other vein of lower extremity, unspecified chronicity, unspecified laterality (H) I82.499 INR CLINIC REFERRAL     CBC with platelets     Comprehensive metabolic panel   5. Screening for prostate cancer Z12.5 PSA, screen   6. Iron deficiency anemia, unspecified iron deficiency anemia type D50.9 Ferritin   7. Alcohol dependence in remission (H) F10.21    8. Need for prophylactic vaccination and inoculation against influenza Z23 FLU VACCINE, SPLIT VIRUS, IM (QUADRIVALENT) [77836]- >3 YRS     Vaccine Administration, Initial [29692]     Patient Instructions   1) Schedule lab only appointment can be at [facility] as a fasting lab (nothing to eat for 10 hours)    2) Refilled medications for you    3) Flu shot today    4) Check on the shingles vaccine at your pharmacy (shingrix).    5) We will do a colonoscopy if your iron is still low and bridge therapy with lovenox between    MD Arron Hodges MD, MD  [facility]    Injectable Influenza Immunization Documentation    1.  Is the person to be vaccinated sick today?   No    2. Does the person to be vaccinated have an allergy to a component   of the vaccine?   No  Egg Allergy Algorithm Link    3. Has the person to be vaccinated ever had a serious reaction   to influenza vaccine in the past?   No    4. Has the person to be vaccinated ever had Guillain-Barré syndrome?   No    Form completed by Ashley Nguyễn MA

## 2018-10-11 NOTE — MR AVS SNAPSHOT
After Visit Summary   10/11/2018    Jozef Saunders    MRN: [id number]           Patient Information     Date Of Birth          1955        Visit Information        Provider Department      10/11/2018 8:50 AM Ar[facility] Page MD [facility]        Today's Diagnoses     Need for prophylactic vaccination and inoculation against influenza    -  1    BENIGN HYPERTENSION        Generalized anxiety disorder        Hypercholesterolemia        Deep vein thrombosis (DVT) of other vein of lower extremity, unspecified chronicity, unspecified laterality (H)        Screening for prostate cancer        Iron deficiency anemia, unspecified iron deficiency anemia type          Care Instructions    1) Schedule lab only appointment can be at [facility] as a fasting lab (nothing to eat for 10 hours)    2) Refilled medications for you    3) Flu shot today    4) Check on the shingles vaccine at your pharmacy (shingrix).    Arron Page MD          Follow-ups after your visit        Additional Services     INR CLINIC REFERRAL       Your provider has referred you to INR Services.    Please be aware that coverage of these services is subject to the terms and limitations of your health insurance plan.  Call member services at your health plan with any benefit or coverage questions.    Indication for Anticoagulation: DVT (recurrent)  If nonstandard INR is desired, indicate goal range and explanation:   Expected Duration of Therapy: Lifetime                  Follow-up notes from your care team     Return in about 1 year (around 10/11/2019).      Your next 10 appointments already scheduled     Oct 22, 2018  1:30 PM CDT   Anticoagulation Visit with NB ANTI COAG   [facility] ([facility])    [address] [place] [id number]   [phone]              Future tests that were ordered for you today     Open Future Orders        Priority Expected Expires Ordered  "   Comprehensive metabolic panel Routine  10/11/2019 10/11/2018    PSA, screen Routine  10/11/2019 10/11/2018    Ferritin Routine  10/11/2019 10/11/2018    Lipid panel reflex to direct LDL Fasting Routine  10/11/2019 10/11/2018    CBC with platelets Routine  10/11/2019 10/11/2018            Who to contact     If you have questions or need follow up information about today's clinic visit or your schedule please contact [facility] directly at [phone].  Normal or non-critical lab and imaging results will be communicated to you by MyChart, letter or phone within 4 business days after the clinic has received the results. If you do not hear from us within 7 days, please contact the clinic through MyChart or phone. If you have a critical or abnormal lab result, we will notify you by phone as soon as possible.  Submit refill requests through [employer] or call your pharmacy and they will forward the refill request to us. Please allow 3 business days for your refill to be completed.          Additional Information About Your Visit        Care EveryWhere ID     This is your Care EveryWhere ID. This could be used by other organizations to access your [facility] medical records  [id number]        Your Vitals Were     Pulse Temperature Height Pulse Oximetry BMI (Body Mass Index)       69 98.2  F (36.8  C) (Oral) 5' 9\" (1.753 m) 97% 28.21 kg/m2        Blood Pressure from Last 3 Encounters:   10/11/18 122/80   07/15/18 108/90   01/03/18 114/68    Weight from Last 3 Encounters:   10/11/18 191 lb (86.6 kg)   07/15/18 188 lb 6.4 oz (85.5 kg)   01/03/18 192 lb 8 oz (87.3 kg)              We Performed the Following     FLU VACCINE, SPLIT VIRUS, IM (QUADRIVALENT) [37765]- >3 YRS     INR CLINIC REFERRAL     Vaccine Administration, Initial [[id number]]          Today's Medication Changes          These changes are accurate as of 10/11/18  9:09 AM.  If you have any questions, ask your nurse or doctor.               These " medicines have changed or have updated prescriptions.        Dose/Directions    simvastatin 20 MG tablet   Commonly known as:  ZOCOR   This may have changed:  See the new instructions.   Used for:  Hypercholesterolemia   Changed by:  Arron Page MD        TAKE 1 TABLET (20 MG) BY MOUTH AT BEDTIME.   Quantity:  90 tablet   Refills:  11            Where to get your medicines      These medications were sent to [facility]/pharmacy #[id number] - [place], [place] - [address] AT CORNER OF [address] [place], [place] [address]     Phone:  [phone]     lisinopril 20 MG tablet    sertraline 50 MG tablet    simvastatin 20 MG tablet                Primary Care Provider Office Phone # Fax #    Arron Page [phone]       [facility] [address] [place] [address]        Equal Access to Services     [facility]: Hadii aad rahul hadasho Soomaali, waaxda luqadaha, qaybta kaalmada adeegyada, waxay gregorioin hayperico fonseca . So [facility] [phone].    ATENCIÓN: Si habla español, tiene a long disposición servicios gratuitos de asistencia lingüística. [facility] [phone].    We comply with applicable federal civil rights laws and [place] laws. We do not discriminate on the basis of race, color, national origin, age, disability, sex, sexual orientation, or gender identity.            Thank you!     Thank you for choosing [facility]  for your care. Our goal is always to provide you with excellent care. Hearing back from our patients is one way we can continue to improve our services. Please take a few minutes to complete the written survey that you may receive in the mail after your visit with us. Thank you!             Your Updated Medication List - Protect others around you: Learn how to safely use, store and throw away your medicines at www.disposemymeds.org.          This list is accurate as of 10/11/18  9:09 AM.  Always use your most recent med list.                    Brand Name Dispense Instructions for use Diagnosis    atenolol 50 MG tablet    TENORMIN    135 tablet    TAKE 1.5 TABLETS (75 MG) BY MOUTH DAILY    Essential hypertension, benign       fluticasone 50 MCG/ACT spray    FLONASE    16 mL    SPRAY 2 SPRAYS INTO BOTH NOSTRILS DAILY    Chronic rhinitis       lisinopril 20 MG tablet    PRINIVIL/ZESTRIL    90 tablet    TAKE 1 TABLET (20 MG) BY MOUTH DAILY    Essential hypertension, benign       loratadine 10 MG tablet    CLARITIN     Take 10 mg by mouth daily        omeprazole 20 MG CR capsule    priLOSEC    90 capsule    TAKE 1 TABLET (20 MG) BY MOUTH DAILY TAKE 30-60 MINUTES BEFORE A MEAL.    Hiatal hernia       sertraline 50 MG tablet    ZOLOFT    45 tablet    TAKE 0.5 TABLETS (25 MG) BY MOUTH DAILY    Generalized anxiety disorder, Hypercholesterolemia       simvastatin 20 MG tablet    ZOCOR    90 tablet    TAKE 1 TABLET (20 MG) BY MOUTH AT BEDTIME.    Hypercholesterolemia       warfarin 5 MG tablet    COUMADIN    105 tablet    7.5 mg on Mon, Thu; 5 mg all other days or as directed by [facility]    History of pulmonary embolism       XYZAL 5 MG tablet   Generic drug:  levocetirizine      Take 1 tablet by mouth every evening.    Recurrent periodic urticaria

## 2018-10-11 NOTE — PATIENT INSTRUCTIONS
1) Schedule lab only appointment can be at [facility] as a fasting lab (nothing to eat for 10 hours)    2) Refilled medications for you    3) Flu shot today    4) Check on the shingles vaccine at your pharmacy (shingrix).    5) We will do a colonoscopy if your iron is still low and bridge therapy with lovenox between    Arron Page MD

## 2018-10-12 ASSESSMENT — PATIENT HEALTH QUESTIONNAIRE - PHQ9: SUM OF ALL RESPONSES TO PHQ QUESTIONS 1-9: 0

## 2018-10-12 ASSESSMENT — ANXIETY QUESTIONNAIRES: GAD7 TOTAL SCORE: 0

## 2018-10-22 ENCOUNTER — ANTICOAGULATION THERAPY VISIT (OUTPATIENT)
Dept: ANTICOAGULATION | Facility: CLINIC | Age: 63
End: 2018-10-22
Payer: COMMERCIAL

## 2018-10-22 DIAGNOSIS — I82.499 DEEP VEIN THROMBOSIS (DVT) OF OTHER VEIN OF LOWER EXTREMITY, UNSPECIFIED CHRONICITY, UNSPECIFIED LATERALITY (H): ICD-10-CM

## 2018-10-22 DIAGNOSIS — Z79.01 LONG TERM CURRENT USE OF ANTICOAGULANT THERAPY: ICD-10-CM

## 2018-10-22 LAB — INR POINT OF CARE: 2.2 (ref 0.86–1.14)

## 2018-10-22 PROCEDURE — 36416 COLLJ CAPILLARY BLOOD SPEC: CPT

## 2018-10-22 PROCEDURE — 85610 PROTHROMBIN TIME: CPT | Mod: QW

## 2018-10-22 NOTE — PROGRESS NOTES
ANTICOAGULATION FOLLOW-UP CLINIC VISIT    Patient Name:  Jozef Saunders  Date:  10/22/2018  Contact Type:  Face to Face    SUBJECTIVE:     Patient Findings     Positives No Problem Findings    Comments No changes in medications, diet, or activity. No concerns with bleeding or bruising. Took warfarin as prescribed.   Continue maintenance warfarin dose. Will recheck INR in 6 weeks.   Patient verbalizes understanding and agrees with plan. No further questions at this time.              OBJECTIVE    INR Protime   Date Value Ref Range Status   10/22/2018 2.2 (A) 0.86 - 1.14 Final       ASSESSMENT / PLAN  INR assessment THER    Recheck INR In: 6 WEEKS    INR Location Clinic      Anticoagulation Summary as of 10/22/2018     INR goal 2.0-3.0   Today's INR 2.2   Warfarin maintenance plan 7.5 mg (5 mg x 1.5) on Mon, Thu; 5 mg (5 mg x 1) all other days   Full warfarin instructions 7.5 mg on Mon, Thu; 5 mg all other days   Weekly warfarin total 40 mg   No change documented Ashley Summers RN   Plan last modified Ashley Summers RN (9/17/2018)   Next INR check 12/3/2018   Target end date Indefinite    Indications   DVT (deep venous thrombosis) (H) [I82.409]  Long term current use of anticoagulant therapy [Z79.01]         Anticoagulation Episode Summary     INR check location Clinic Lab    Preferred lab     Send INR reminders to [facility]    Comments * Dr. Page is from [place] in [place]      Anticoagulation Care Providers     Provider Role Specialty Phone number    Arron Page MD [facility] Internal Medicine [phone]            See the Encounter Report to view Anticoagulation Flowsheet and Dosing Calendar (Go to Encounters tab in chart review, and find the Anticoagulation Therapy Visit)        Ashley Summers RN

## 2018-10-22 NOTE — MR AVS SNAPSHOT
Jozef Saunders   10/22/2018 1:30 PM   Anticoagulation Therapy Visit    Description:  63 year old male   Provider:  NB ANTI COAG   Department:  [facility] Anticoag           INR as of 10/22/2018     Today's INR 2.2      Anticoagulation Summary as of 10/22/2018     INR goal 2.0-3.0   Today's INR 2.2   Full warfarin instructions 7.5 mg on Mon, Thu; 5 mg all other days   Next INR check 12/3/2018    Indications   DVT (deep venous thrombosis) (H) [I82.409]  Long term current use of anticoagulant therapy [Z79.01]         Your next Anticoagulation Clinic appointment(s)     Dec 03, 2018  1:30 PM CST   Anticoagulation Visit with NB ANTI COAG   [facility]    [address] [place] [address] [phone]              Contact Numbers     Please call [phone] with any problems or questions regarding your therapy.    If you need to cancel and/or reschedule your appointment please call one of the following numbers:  [facility] [phone]  [facility] [phone]  [facility] [phone]  [place] - [phone]  [place] - [phone]            October 2018 Details    Sun Mon Tue Wed Thu Fri Sat      1               2               3               4               5               6                 7               8               9               10               11               12               13                 14               15               16               17               18               19               20                 21               22      7.5 mg   See details      23      5 mg         24      5 mg         25      7.5 mg         26      5 mg         27      5 mg           28      5 mg         29      7.5 mg         30      5 mg         31      5 mg             Date Details   10/22 This INR check               How to take your warfarin dose     To take:  5 mg Take 1 of the 5 mg tablets.    To take:  7.5 mg Take 1.5 of the 5 mg tablets.            November 2018 Details    Sun Mon Tue Wed u Fri Sat         1      7.5 mg         2      5 mg         3      5 mg           4      5 mg         5      7.5 mg         6      5 mg         7      5 mg         8      7.5 mg         9      5 mg         10      5 mg           11      5 mg         12      7.5 mg         13      5 mg         14      5 mg         15      7.5 mg         16      5 mg         17      5 mg           18      5 mg         19      7.5 mg         20      5 mg         21      5 mg         22      7.5 mg         23      5 mg         24      5 mg           25      5 mg         26      7.5 mg         27      5 mg         28      5 mg         29      7.5 mg         30      5 mg           Date Details   No additional details            How to take your warfarin dose     To take:  5 mg Take 1 of the 5 mg tablets.    To take:  7.5 mg Take 1.5 of the 5 mg tablets.           December 2018 Details    Sun Mon Tue Wed u Fri Sat           1      5 mg           2      5 mg         3            4               5               6               7               8                 9               10               11               12               13               14               15                 16               17               18               19               20               21               22                 23               24               25               26               27               28               29                 30               31                     Date Details   No additional details    Date of next INR:  12/3/2018         How to take your warfarin dose     To take:  5 mg Take 1 of the 5 mg tablets.    To take:  7.5 mg Take 1.5 of the 5 mg tablets.

## 2018-10-27 ENCOUNTER — VIRTUAL VISIT (OUTPATIENT)
Dept: FAMILY MEDICINE | Facility: OTHER | Age: 63
End: 2018-10-27

## 2018-10-27 NOTE — PROGRESS NOTES
"Date:   Clinician: Nina Gallagher  Clinician NPI: [id number]  Patient: Jozef Saunders  Patient : 1955  Patient Address: [address] [place] [id number]  Patient Phone: [phone]  Visit Protocol: URI  Patient Summary:  Jozef is a 63 year old ( : 1955 ) male who initiated a Visit for cold, sinus infection, or influenza. When asked the question \"Please sign me up to receive news, health information and promotions. \", Jozef responded \"No\".    Jozef states his symptoms started gradually 3-6 days ago.   His symptoms consist of enlarged lymph nodes, a headache, malaise, facial pain or pressure, myalgia, tooth pain, and nasal congestion. Jozef also feels feverish.   Symptom details     Nasal secretions: The color of his mucus is yellow.    Temperature: His current temperature is 100.2 degrees Fahrenheit. Jozef has had a temperature over 100 degrees Fahrenheit for 1-2 days.     Facial pain or pressure: The facial pain or pressure feels worse when bending over or leaning forward.     Headache: He states the headache is moderate (4-6 on a 10 point pain scale).     Tooth pain: The tooth pain is not caused by a cavity, recent dental work, or other mouth problems.      Jozef denies having sore throat, ear pain, dyspnea, cough, chills, wheezing, and rhinitis. He also denies double sickening (worsening symptoms after initial improvement), taking antibiotic medication for the symptoms, and having recent facial or sinus surgery in the past 60 days.   He has not recently been exposed to someone with influenza. Jozef has not been in close contact with any high risk individuals.   Jozef had 2 sinus infections within the past year.   Weight: 185 lbs   Jozef does not smoke or use smokeless tobacco.   MEDICATIONS: Claritin oral, omeprazole oral, Allergy Relief (fluticasone) nasal, warfarin oral, Zoloft oral, atenolol-chlorthalidone oral, ALLERGIES: NKDA  Clinician Response: "  Deajazmyn Haskins,  Based on the information provided, you have a viral upper respiratory infection, otherwise known as a cold. Symptoms vary from person to person, but can include sneezing, coughing, a runny nose, sore throat, and headache and range from mild to severe.  Unfortunately, there are no medications that can cure a cold, so treatment is focused on controlling symptoms as much as possible. Most people gradually feel better until symptoms are gone in 1-2 weeks.  Medication information  Because you have a viral infection, antibiotics will not help you get better. Treating a viral infection with antibiotics could actually make you feel worse.  For more information on why I am not prescribing antibiotics, please watch this video: Antibiotics Aren't Always the Answer.  Unless you are allergic to the over-the-counter medication(s) below, I recommend using:     An antihistamine such as Benadryl, Claritin, or store brand.    A decongestant such as Sudafed PE or store brand.     Over-the-counter medications do not require a prescription. Ask the pharmacist if you have any questions.  Self care  The following tips will keep you as comfortable as possible while you recover:     Rest    Drink plenty of water and other liquids    Take a hot shower to loosen congestion     When to seek care  Please be seen in a clinic or urgent care if new symptoms develop, or symptoms become worse.  I am providing you with a promo code for a free Visit. This code will  in two weeks and may only be used once. Please redeem your free Visit by entering the following promo code on the payment screen: [email or web address]   Diagnosis: Viral URI  Diagnosis ICD: J06.9  Diagnosis ICD: 462.0

## 2018-11-16 DIAGNOSIS — E78.00 HYPERCHOLESTEROLEMIA: ICD-10-CM

## 2018-11-16 DIAGNOSIS — Z12.11 SPECIAL SCREENING FOR MALIGNANT NEOPLASMS, COLON: Primary | ICD-10-CM

## 2018-11-16 DIAGNOSIS — I82.499 DEEP VEIN THROMBOSIS (DVT) OF OTHER VEIN OF LOWER EXTREMITY, UNSPECIFIED CHRONICITY, UNSPECIFIED LATERALITY (H): ICD-10-CM

## 2018-11-16 DIAGNOSIS — D50.9 IRON DEFICIENCY ANEMIA, UNSPECIFIED IRON DEFICIENCY ANEMIA TYPE: ICD-10-CM

## 2018-11-16 DIAGNOSIS — Z12.5 SCREENING FOR PROSTATE CANCER: ICD-10-CM

## 2018-11-16 DIAGNOSIS — Z12.0 ENCOUNTER FOR SCREENING FOR GASTRIC CANCER: ICD-10-CM

## 2018-11-16 LAB
ALBUMIN SERPL-MCNC: 3.6 G/DL (ref 3.4–5)
ALP SERPL-CCNC: 69 U/L (ref 40–150)
ALT SERPL W P-5'-P-CCNC: 31 U/L (ref 0–70)
ANION GAP SERPL CALCULATED.3IONS-SCNC: 8 MMOL/L (ref 3–14)
AST SERPL W P-5'-P-CCNC: 32 U/L (ref 0–45)
BILIRUB SERPL-MCNC: 0.4 MG/DL (ref 0.2–1.3)
BUN SERPL-MCNC: 19 MG/DL (ref 7–30)
CALCIUM SERPL-MCNC: 9 MG/DL (ref 8.5–10.1)
CHLORIDE SERPL-SCNC: 108 MMOL/L (ref 94–109)
CHOLEST SERPL-MCNC: 133 MG/DL
CO2 SERPL-SCNC: 27 MMOL/L (ref 20–32)
CREAT SERPL-MCNC: 0.91 MG/DL (ref 0.66–1.25)
ERYTHROCYTE [DISTWIDTH] IN BLOOD BY AUTOMATED COUNT: 15.6 % (ref 10–15)
FERRITIN SERPL-MCNC: 8 NG/ML (ref 26–388)
GFR SERPL CREATININE-BSD FRML MDRD: 84 ML/MIN/1.7M2
GLUCOSE SERPL-MCNC: 106 MG/DL (ref 70–99)
HCT VFR BLD AUTO: 39.5 % (ref 40–53)
HDLC SERPL-MCNC: 45 MG/DL
HGB BLD-MCNC: 12 G/DL (ref 13.3–17.7)
LDLC SERPL CALC-MCNC: 69 MG/DL
MCH RBC QN AUTO: 24 PG (ref 26.5–33)
MCHC RBC AUTO-ENTMCNC: 30.4 G/DL (ref 31.5–36.5)
MCV RBC AUTO: 79 FL (ref 78–100)
NONHDLC SERPL-MCNC: 88 MG/DL
PLATELET # BLD AUTO: 92 10E9/L (ref 150–450)
POTASSIUM SERPL-SCNC: 4.3 MMOL/L (ref 3.4–5.3)
PROT SERPL-MCNC: 7.1 G/DL (ref 6.8–8.8)
PSA SERPL-ACNC: 0.64 UG/L (ref 0–4)
RBC # BLD AUTO: 5.01 10E12/L (ref 4.4–5.9)
SODIUM SERPL-SCNC: 143 MMOL/L (ref 133–144)
TRIGL SERPL-MCNC: 94 MG/DL
WBC # BLD AUTO: 4.7 10E9/L (ref 4–11)

## 2018-11-16 PROCEDURE — 36415 COLL VENOUS BLD VENIPUNCTURE: CPT | Performed by: INTERNAL MEDICINE

## 2018-11-16 PROCEDURE — 85027 COMPLETE CBC AUTOMATED: CPT | Performed by: INTERNAL MEDICINE

## 2018-11-16 PROCEDURE — G0103 PSA SCREENING: HCPCS | Performed by: INTERNAL MEDICINE

## 2018-11-16 PROCEDURE — 82728 ASSAY OF FERRITIN: CPT | Performed by: INTERNAL MEDICINE

## 2018-11-16 PROCEDURE — 80061 LIPID PANEL: CPT | Performed by: INTERNAL MEDICINE

## 2018-11-16 PROCEDURE — 80053 COMPREHEN METABOLIC PANEL: CPT | Performed by: INTERNAL MEDICINE

## 2018-11-16 NOTE — LETTER
[facility]  [address] [place] [address]                  [phone]   November 19, 2018    Jozef Saunders  [address]  [place] [address]      Dear Jozef,     Here are the results from the recent Labs that we did.     Your iron studies are still on the low end. I would recommend a colonoscopy and endoscopy to check on the cause. We would bridge therapy through this time. I will put in an order and they will call you to set up.     Let me know if you have questions or concerns!     Sincerely,       Arron Page MD   Internal Medicine and Pediatrics         Results for orders placed or performed in visit on 11/16/18   CBC with platelets   Result Value Ref Range    WBC 4.7 4.0 - 11.0 10e9/L    RBC Count 5.01 4.4 - 5.9 10e12/L    Hemoglobin 12.0 (L) 13.3 - 17.7 g/dL    Hematocrit 39.5 (L) 40.0 - 53.0 %    MCV 79 78 - 100 fl    MCH 24.0 (L) 26.5 - 33.0 pg    MCHC 30.4 (L) 31.5 - 36.5 g/dL    RDW 15.6 (H) 10.0 - 15.0 %    Platelet Count 92 (L) 150 - 450 10e9/L   Comprehensive metabolic panel   Result Value Ref Range    Sodium 143 133 - 144 mmol/L    Potassium 4.3 3.4 - 5.3 mmol/L    Chloride 108 94 - 109 mmol/L    Carbon Dioxide 27 20 - 32 mmol/L    Anion Gap 8 3 - 14 mmol/L    Glucose 106 (H) 70 - 99 mg/dL    Urea Nitrogen 19 7 - 30 mg/dL    Creatinine 0.91 0.66 - 1.25 mg/dL    GFR Estimate 84 >60 mL/min/1.7m2    GFR Estimate If Black >90 >60 mL/min/1.7m2    Calcium 9.0 8.5 - 10.1 mg/dL    Bilirubin Total 0.4 0.2 - 1.3 mg/dL    Albumin 3.6 3.4 - 5.0 g/dL    Protein Total 7.1 6.8 - 8.8 g/dL    Alkaline Phosphatase 69 40 - 150 U/L    ALT 31 0 - 70 U/L    AST 32 0 - 45 U/L   PSA, screen   Result Value Ref Range    PSA 0.64 0 - 4 ug/L   Ferritin   Result Value Ref Range    Ferritin 8 (L) 26 - 388 ng/mL   Lipid panel reflex to direct LDL Fasting   Result Value Ref Range    Cholesterol 133 <200 mg/dL    Triglycerides 94 <150 mg/dL    HDL Cholesterol 45  >39 mg/dL    LDL Cholesterol Calculated 69 <100 mg/dL    Non HDL Cholesterol 88 <130 mg/dL

## 2018-11-21 ENCOUNTER — TELEPHONE (OUTPATIENT)
Dept: PEDIATRICS | Facility: CLINIC | Age: 63
End: 2018-11-21

## 2018-11-21 NOTE — TELEPHONE ENCOUNTER
Call received from [facility] requesting instruction on coumadin hold & bridging instruction for colonoscopy procedure. Their common protocol is to hold coumadin for 4 days prior & bridging as provider's discretion.     Please call them back at [phone](press option 1 twice) with instruction. Thanks.    Dang, RN  Triage Nurse

## 2018-11-21 NOTE — TELEPHONE ENCOUNTER
Dr Page -     Does this patient need Lovenox bridging while off Warfarin (prior to colonoscopy)?   Pt had PE in 2010 - took Warfarin for 6 months and then stopped.   Pt had 2 different DVT events in 2014 and Warfarin was restarted; he has been taking Warfarin since that time.     Lauren Liang RN  [facility]

## 2018-11-21 NOTE — TELEPHONE ENCOUNTER
INR's are done by [facility]; message routed to them.    Routine colonoscopy is not scheduled yet.  Pt states he has a hiatal hernia and will have scope for that at the same time as colonoscopy.     [facility] will need to be contacted when details of Warfarin hold are determined.   Also, please get back to patient about number of days to hold Warfarin and Lovenox bridging instructions.     Wife did injections when patient started on Warfarin, so Lovenox instructions will need to be reviewed either by appt or from the Lovenox DVD in Lovenox at Home Kit.     Lauren Liang RN  [facility]

## 2018-11-23 NOTE — TELEPHONE ENCOUNTER
11/23/18    Writer spoke with [facility] and reviewed hold and bridging details. [facility] will be notified when the patient is scheduled for the procedure.     Adela Wiggins RN, BSN, PHN  Anticoagulation Clinic   [phone]

## 2018-12-03 ENCOUNTER — ANTICOAGULATION THERAPY VISIT (OUTPATIENT)
Dept: ANTICOAGULATION | Facility: CLINIC | Age: 63
End: 2018-12-03
Payer: COMMERCIAL

## 2018-12-03 DIAGNOSIS — Z79.01 LONG TERM CURRENT USE OF ANTICOAGULANT THERAPY: ICD-10-CM

## 2018-12-03 DIAGNOSIS — I82.499 DEEP VEIN THROMBOSIS (DVT) OF OTHER VEIN OF LOWER EXTREMITY, UNSPECIFIED CHRONICITY, UNSPECIFIED LATERALITY (H): ICD-10-CM

## 2018-12-03 LAB — INR POINT OF CARE: 2 (ref 0.86–1.14)

## 2018-12-03 PROCEDURE — 36416 COLLJ CAPILLARY BLOOD SPEC: CPT

## 2018-12-03 PROCEDURE — 85610 PROTHROMBIN TIME: CPT | Mod: QW

## 2018-12-03 PROCEDURE — 99207 ZZC NO CHARGE NURSE ONLY: CPT

## 2018-12-03 NOTE — PROGRESS NOTES
ANTICOAGULATION FOLLOW-UP CLINIC VISIT    Patient Name:  Jozef Saunders  Date:  12/3/2018  Contact Type:  Face to Face    SUBJECTIVE:     Patient Findings     Positives No Problem Findings    Comments No changes in medications, diet, or activity. No concerns with bleeding or bruising. Took warfarin as prescribed.   Continue maintenance warfarin dose. Will recheck INR in 6 weeks.   Patient verbalizes understanding and agrees with plan. No further questions at this time.              OBJECTIVE    INR Protime   Date Value Ref Range Status   12/03/2018 2.0 (A) 0.86 - 1.14 Final       ASSESSMENT / PLAN  INR assessment THER    Recheck INR In: 6 WEEKS    INR Location Clinic      Anticoagulation Summary as of 12/3/2018     INR goal 2.0-3.0   Today's INR 2.0   Warfarin maintenance plan 7.5 mg (5 mg x 1.5) on Mon, Thu; 5 mg (5 mg x 1) all other days   Full warfarin instructions 7.5 mg on Mon, Thu; 5 mg all other days   Weekly warfarin total 40 mg   No change documented Ashley Summers RN   Plan last modified Ashley Summers RN (9/17/2018)   Next INR check 1/14/2019   Target end date Indefinite    Indications   DVT (deep venous thrombosis) (H) [I82.409]  Long term current use of anticoagulant therapy [Z79.01]         Anticoagulation Episode Summary     INR check location Clinic Lab    Preferred lab     Send INR reminders to [facility]    Comments * Dr. Page is from [place] in [place]      Anticoagulation Care Providers     Provider Role Specialty Phone number    Arron Page MD [facility] Internal Medicine [phone]            See the Encounter Report to view Anticoagulation Flowsheet and Dosing Calendar (Go to Encounters tab in chart review, and find the Anticoagulation Therapy Visit)        Ashley Summers RN

## 2018-12-03 NOTE — MR AVS SNAPSHOT
Jozef Saunders   12/3/2018 10:30 AM   Anticoagulation Therapy Visit    Description:  63 year old male   Provider:  [facility]   Department:  [facility]           INR as of 12/3/2018     Today's INR 2.0      Anticoagulation Summary as of 12/3/2018     INR goal 2.0-3.0   Today's INR 2.0   Full warfarin instructions 7.5 mg on Mon, Thu; 5 mg all other days   Next INR check 1/14/2019    Indications   DVT (deep venous thrombosis) (H) [I82.409]  Long term current use of anticoagulant therapy [Z79.01]         Your next Anticoagulation Clinic appointment(s)     Jan 14, 2019  1:00 PM CST   Anticoagulation Visit with [facility]   [facility] ([facility]    [address] [place] [address] [phone]              Contact Numbers     Please call [phone] with any problems or questions regarding your therapy.    If you need to cancel and/or reschedule your appointment please call one of the following numbers:  [facility] [phone]  [facility] [phone]  [facility] [phone]  [facility] [phone]  [place] - [phone]            December 2018 Details    Sun Mon Tue Wed Thu Fri Sat           1                 2               3      7.5 mg   See details      4      5 mg         5      5 mg         6      7.5 mg         7      5 mg         8      5 mg           9      5 mg         10      7.5 mg         11      5 mg         12      5 mg         13      7.5 mg         14      5 mg         15      5 mg           16      5 mg         17      7.5 mg         18      5 mg         19      5 mg         20      7.5 mg         21      5 mg         22      5 mg           23      5 mg         24      7.5 mg         25      5 mg         26      5 mg         27      7.5 mg         28      5 mg         29      5 mg           30      5 mg         31      7.5 mg               Date Details   12/03 This INR check               How to take your warfarin dose      To take:  5 mg Take 1 of the 5 mg tablets.    To take:  7.5 mg Take 1.5 of the 5 mg tablets.           January 2019 Details    Sun Mon Tue Wed Thu Fri Sat       1      5 mg         2      5 mg         3      7.5 mg         4      5 mg         5      5 mg           6      5 mg         7      7.5 mg         8      5 mg         9      5 mg         10      7.5 mg         11      5 mg         12      5 mg           13      5 mg         14            15               16               17               18               19                 20               21               22               23               24               25               26                 27               28               29               30               31                  Date Details   No additional details    Date of next INR:  1/14/2019         How to take your warfarin dose     To take:  5 mg Take 1 of the 5 mg tablets.    To take:  7.5 mg Take 1.5 of the 5 mg tablets.

## 2018-12-11 ENCOUNTER — TELEPHONE (OUTPATIENT)
Dept: ANTICOAGULATION | Facility: CLINIC | Age: 63
End: 2018-12-11

## 2018-12-11 NOTE — TELEPHONE ENCOUNTER
The Pt called back. He is wondering if a prescription for Lovenox will be issued and what the plan is. He is scheduled for a colonoscopy on 12/26/18.     Indu Staples RN -- [facility]

## 2018-12-11 NOTE — TELEPHONE ENCOUNTER
Issac is scheduled to have colonoscopy completed on 12/26/18 and will need to hold warfarin for 5 days.   Patient is currently on warfarin for DVT.   Perioperative Thrombotic Risk Stratification   High risk for clot    (Bridge) Medium risk for clot   (Maybe Bridge) Low risk for clot   (No Bridge)     Artificial Mitral valve    Artificial aortic valve if tilting disc or caged ball    Stroke, TIA within last 6 months    VTE within last 3 months    Severe thrombophilia (protein C or S deficiency, antithrombin, homozygous Factor V Leiden, antiphospholipid antibodies)  AFIB and    [id number] score 7-9    Stroke/TIA within last 3 months    Rheumatic valvular heart disease   Bileaflet aortic valve  Plus  AFib, prior stroke or TIA, HTN, DM, CHF, or over 75 years old    [id number] score 5-6    VTE within past 3 to 12 months    Non-severe thrombophilia (heterozygous factor V Leiden)    Recurrent VTE    Active cancer (or treated within last 6 months)     Bileaflet valve without Afib, no other risk factors    [id number] < 4 (with no history stroke or TIA)    VTE more than 12 months ago   For additional info on Anticoagulation Bridging Guidelines -- click HERE  While the patient is off warfarin, would you recommend the patient use enoxaparin injections as a bridge? If yes, would you like the prophylactic dose (40mg daily) or the therapeutic dose (1mg/kg twice daily)? Should the patient use enoxaparin both before and after the procedure or only afterwards?  Wt Readings from Last 2 Encounters:   10/11/18 86.6 kg (191 lb)   07/15/18 85.5 kg (188 lb 6.4 oz)     Estimated Creatinine Clearance: 89.4 mL/min (based on SCr of 0.91 mg/dL).   Therapeutic Enoxaparin if high risk for clot   CrCl >30ml/min, 1mg/kg/twice daily OR 1.5mg/kg/daily   CrCl >15ml/min, 1mg/kg/daily   CrCl <15mg/min or on dialysis use heparin  Prophylactic if need protection after procedure (i.e. high clot risk procedure)   Anticoagulation Clinic Staff  Phone:  [phone]   Fax: [phone]    [place] # [id number]

## 2018-12-11 NOTE — TELEPHONE ENCOUNTER
The patient also spoke with the INR clinic in [place] today and they routed this conversation as high priority to Dr. Page. Will wait to hear what he has to say.    Radha BENITO RN  Anticoagulation Clinic  [place]

## 2018-12-12 ENCOUNTER — ANTICOAGULATION THERAPY VISIT (OUTPATIENT)
Dept: ANTICOAGULATION | Facility: CLINIC | Age: 63
End: 2018-12-12

## 2018-12-12 ENCOUNTER — TELEPHONE (OUTPATIENT)
Dept: ANTICOAGULATION | Facility: CLINIC | Age: 63
End: 2018-12-12

## 2018-12-12 DIAGNOSIS — Z79.01 LONG TERM CURRENT USE OF ANTICOAGULANT THERAPY: ICD-10-CM

## 2018-12-12 DIAGNOSIS — I82.499 DEEP VEIN THROMBOSIS (DVT) OF OTHER VEIN OF LOWER EXTREMITY, UNSPECIFIED CHRONICITY, UNSPECIFIED LATERALITY (H): ICD-10-CM

## 2018-12-12 NOTE — TELEPHONE ENCOUNTER
Patient has been on lovenox in the past, verify with patient that he tolerated ok. Ok to give if he did.     Arron Page MD

## 2018-12-12 NOTE — PROGRESS NOTES
Ordered Lovenox, PCP okay with Lovenox though patient has heparin allergy due to patient tolerating Lovenox well in the past. Called patient to confirm instructions verbally, also sent copy of instructions in the mail, and ordered Lovenox to [facility] pharmacy requested by patient.  Ashley Summers RN on 12/12/2018 at 2:22 PM

## 2018-12-12 NOTE — TELEPHONE ENCOUNTER
Issac Saunders is scheduled for colonoscopy, warfarin hold, and prophylactic Lovenox injections. Patient is allergic to Heparin. Would you like to continue with Lovenox injections or prescribe an alternative anticoagulant? Please reply to Anticoagulation pool [id number] as I will not be working again until Monday. Ashley Summers RN on 12/12/2018 at 9:02 AM

## 2018-12-12 NOTE — PROGRESS NOTES
Last warfarin dose: 12/20/18 12/21/18, NO warfarin  12/22/18, NO warfarin  12/23/18, NO warfarin, begin enoxaparin injections into the abdomen every 24 hours in the morning.  12/24/18, NO warfarin, enoxaparin injection into the abdomen in the morning.  12/25/18, NO warfarin, enoxaparin injection into the abdomen in the morning (no enoxaparin 24 hours prior to surgery)  12/26/18, DAY OF PROCEDURE, NO enoxaparin. Restart warfarin 7.5mg (1 1/2 tabs) in the evening, unless instructed otherwise by the physician.  12/27/18, Restart enoxaparin injections into the abdomen every 24 hours (in the morning), unless instructed otherwise by the physician, and 7.5mg (1 1/2 tabs) warfarin in the evening.   12/28/18, Enoxaparin injection into the abdomen in the morning and 5mg (1 tabs) warfarin in the evening.  12/29/18, Enoxaparin injection into the abdomen in the morning and 5mg (1 tabs) warfarin in the evening.  12/30/18, Enoxaparin injection into the abdomen in the morning. Recheck INR 12/30/18 at the lab. The Anticoagulationn Clinic will call you Monday with further dosing instructions. [facility] and [facility] in [place] have Sunday appointments available. You will not receive your INR result until the next day, Monday.  If you have any questions please call the Anticoagulation Clinic at [phone].  To schedule your appointment please call [phone].

## 2018-12-12 NOTE — PROGRESS NOTES
Pt requested schedule be mailed to him, writer mailed schedule this morning. Message sent to PCP regarding Lovenox use due to heparin allergy, awaiting return message. Ashley Summers RN on 12/12/2018 at 8:59 AM

## 2018-12-15 ENCOUNTER — OFFICE VISIT (OUTPATIENT)
Dept: URGENT CARE | Facility: URGENT CARE | Age: 63
End: 2018-12-15
Payer: COMMERCIAL

## 2018-12-15 VITALS
SYSTOLIC BLOOD PRESSURE: 122 MMHG | DIASTOLIC BLOOD PRESSURE: 84 MMHG | HEART RATE: 53 BPM | OXYGEN SATURATION: 96 % | TEMPERATURE: 96.6 F

## 2018-12-15 DIAGNOSIS — B37.0 ORAL THRUSH: Primary | ICD-10-CM

## 2018-12-15 PROCEDURE — 99213 OFFICE O/P EST LOW 20 MIN: CPT | Performed by: PHYSICIAN ASSISTANT

## 2018-12-15 RX ORDER — NYSTATIN 100000/ML
500000 SUSPENSION, ORAL (FINAL DOSE FORM) ORAL 4 TIMES DAILY
Qty: 200 ML | Refills: 0 | Status: SHIPPED | OUTPATIENT
Start: 2018-12-15 | End: 2019-01-11

## 2018-12-16 NOTE — PROGRESS NOTES
SUBJECTIVE:   Jozef Saunders is a 63 year old male presenting with a chief complaint of   Chief Complaint   Patient presents with     Urgent Care     Mouth Problem     burning sensation in mouth and lips, started beginning of the week,        He is an established patient of [facility].      Rash  Pain inside the mouth (oral mucosa), tip of the tongue, sides of the tongue and underneath the tongue for 1 week.  He has been noticing white rashes on the tongue and inside his oral cavity.  He has had a rash like this before and was treated with nystatin and his symptoms resolved in 2-3 days.  He denies use of any steroids orally or any steroid inhalers.  However, he uses Flonase nasal spray 2 times daily.  He denies use of any new toothbrush toothpaste or any new products in his mouth.   Onset of rash was 7 week(s) ago.   Course of illness is gradual onset.  Severity mild  Current and Associated symptoms: burning, painful and blistering   Location of the rash: Oral mucosa.  Previous history of a similar rash? Yes  Recent exposure history: none known  Denies exposure to: none known  Associated symptoms include: nothing.  Treatment measures tried include: none        Review of Systems: otherwise negative except as stated above.    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 Father         CABG; age of onset over age 60     Hypertension Brother         age of onset under 50     Prostate Cancer Brother         diagnosed at age 58     Diabetes No family hx of      Current Outpatient Medications   Medication Sig Dispense Refill     atenolol (TENORMIN) 50 MG tablet TAKE 1.5 TABLETS (75 MG) BY MOUTH DAILY 135 tablet 11      enoxaparin (LOVENOX) 40 MG/0.4ML syringe Inject 0.4 mLs (40 mg) Subcutaneous every 24 hours for 5 days 6 Syringe 0     fluticasone (FLONASE) 50 MCG/ACT spray SPRAY 2 SPRAYS INTO BOTH NOSTRILS DAILY 16 mL 10     levocetirizine (XYZAL) 5 MG tablet Take 1 tablet by mouth every evening.       lisinopril (PRINIVIL/ZESTRIL) 20 MG tablet TAKE 1 TABLET (20 MG) BY MOUTH DAILY 90 tablet 11     loratadine (CLARITIN) 10 MG tablet Take 10 mg by mouth daily       nystatin (MYCOSTATIN) 887037 UNIT/ML suspension Take 5 mLs (500,000 Units) by mouth 4 times daily for 10 days Swish and do not swallow 200 mL 0     omeprazole (PRILOSEC) 20 MG CR capsule TAKE 1 TABLET (20 MG) BY MOUTH DAILY TAKE 30-60 MINUTES BEFORE A MEAL. 90 capsule 3     sertraline (ZOLOFT) 50 MG tablet TAKE 0.5 TABLETS (25 MG) BY MOUTH DAILY 45 tablet 11     simvastatin (ZOCOR) 20 MG tablet TAKE 1 TABLET (20 MG) BY MOUTH AT BEDTIME. 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 He appears well-developed and well-nourished.   HENT:   Head: Normocephalic.   Right Ear: External ear normal.   Left Ear: External ear normal.   Nose: Nose normal.   Mouth/Throat: Oropharynx is clear and moist. Oral lesions present. No trismus in the jaw. Normal dentition. No dental abscesses, lacerations or dental caries. No oropharyngeal exudate, posterior oropharyngeal edema or posterior oropharyngeal erythema.   Scattered white patchy  ulceration oral lesion noted/the oral mucosa tip of the tongue underneath the tongue vehicle mucosa.  The base of the ulcer is white with a mildly raised well demarcated border.  Also is tender on palpation.   His tongue is mobile.  No acute dental or gingival lesion noted.   TMJ is nontender mobile.  Floor of the mouth did not show any signs of dryness, tenderness (sialoadenitis), or swelling.      Eyes: EOM are normal. Right eye exhibits no discharge. Left eye exhibits no discharge. No scleral icterus.   Neck: Normal range of motion. No JVD present. No tracheal deviation present. No thyromegaly present.   Cardiovascular: Normal rate and normal heart sounds.   Pulmonary/Chest: Effort normal and breath sounds normal. No stridor. No respiratory distress. He has no wheezes. He has no rales. He exhibits no tenderness.   Musculoskeletal: Normal range of motion.   Lymphadenopathy:     He has no cervical adenopathy.   Neurological: He is alert and oriented to person, place, and time.       Labs:  No results found for this or any previous visit (from the past 24 hour(s)).    X-Ray was not done.    ASSESSMENT:      ICD-10-CM    1. Oral thrush B37.0 nystatin (MYCOSTATIN) 476806 UNIT/ML suspension        Medical Decision Making:    Differential Diagnosis:  Rash: Benign, reassuring rash  Candidiasis  Herpes simplex  Hives  Impetigo  Insect bites  Non-specific rash    Serious Comorbid Conditions: See details in past medical history    PLAN:    Oral thrush: Oral swish with nystatin suspension.                       It is unlikely to get oral thrush from nasal flonase.  Encouraged oral gargle, and oral swish after use of Flonase.   Patient is encouraged to follow-up with primary care for recurrent oral thrush to rule out any underlying immunocompromised state.       Followup:    If not improving or if condition worsens, follow up with your Primary Care Provider.     Patient Instructions       Patient Education        Patient  Education     Candida Infection: Thrush  Thrush is a fungal infection in the mouth and throat. Thrush does not usually affect healthy adults. It is more common in people with a weak immune system. It is also more likely if you take antibiotics. Thrush is normally not contagious.  Understanding fungus in the mouth and throat  Your mouth and throat normally contain millions of tiny organisms. These include bacteria and yeasts. Many of these do not cause any problems. In fact, they may help fight disease.  Yeasts are a type of fungus. A type of yeast called Candida normally lives on the membranes of your mouth and throat. Usually, this yeast grows only in small amounts and is harmless. But in some cases, Candida can grow out of control and cause thrush. Thrush is related to other kinds of Candida infections that can grow all over the body. Thrush refers to an infection of only the mouth and throat.  What causes thrush?  Thrush happens when something lets too much Candida grow inside your mouth and throat. Certain things that change the normal balance of organisms in the mouth can lead to thrush. One example is antibiotic medicine. This medicine may kill some of the normal bacteria in your mouth. Candida can then grow freely. People on antibiotics have an increased risk for thrush.  You have a higher risk for thrush if you:    Wear dentures    Are getting chemotherapy    Are getting radiation therapy    Have diabetes    Have a transplanted organ    Use corticosteroids, including inhaled corticosteroids for lung disease    Have a weak immune system, such as from AIDS    Are an older adult  Symptoms of thrush  Symptoms of thrush can include:    A dry, cottony feeling in your mouth    Cracking at the corners of the mouth    Loss of taste    Pain while eating or swallowing    White patches on the tongue and around the sides of the mouth  Diagnosing thrush  Your healthcare provider will ask about your medical history and  your symptoms. He or she will look closely at your mouth and throat. White or red patches will be scraped with a tongue depressor. The sample will be sent to a lab to test. This test can usually confirm thrush.  If you have thrush, you may also have esophageal candidiasis. This is common in people who have HIV or a weak immune system. Your healthcare provider may check for this condition with an upper endoscopy. This is a procedure to look at the esophagus. A tissue sample may be taken to test.  Treatment for thrush  Thrush is usually treated with antifungal medicine. The medicine is put directly in your mouth and throat. You may be given a  swish and swallow  medicine or an antifungal lozenge.  In some cases, you may need an antifungal pill. This can remove Candida throughout your body. Or you may need medicine through an intravenous line ( IV). These treatments depend on how severe your infection is, and what other health conditions you have.  If you are at high risk for thrush, you may need to keep taking oral antifungal medicine. This is to help prevent thrush in the future.  What happens if you don t get treated for thrush?  If untreated, the Candida may spread throughout your body. They may even enter your bloodstream. This can cause serious problems, such as organ failure and even death. Bloodstream infection may need to be treated with high doses of antifungal medicine through an IV.  Systemic infection is much more likely in people who are very ill. It is also more common in those who have serious problems with their immune system. Additional risk factors for systemic infection in very ill people include:    Central venous lines    IV nutrition    Use of broad-spectrum antibiotics    Kidney failure    Recent surgery  Preventing thrush  You may be able to help prevent some cases of thrush. Make sure to:    Practice good oral hygiene. Try using a chlorhexidine mouthwash.    Clean your dentures regularly as  instructed. Make sure they fit you correctly.    After using a corticosteroid inhaler, rinse out your mouth with water or mouthwash.    Do not use broad-spectrum antibiotics, if possible.    Get treated for health problems that increase your risk for thrush, such as diabetes.     When to call the healthcare provider  Call your healthcare provider right away if you have any of these:    Cottony feeling in your mouth    Loss of taste    Pain while eating or swallowing    White patches or plaques on your tongue or inside your mouth   Date Last Reviewed: 5/1/2017 2000-2018 [employer]. [address] [place] [address]. All rights reserved. This information is not intended as a substitute for professional medical care. Always follow your healthcare professional's instructions.

## 2018-12-16 NOTE — PATIENT INSTRUCTIONS
Patient Education        Patient Education     Candida Infection: Thrush  Thrush is a fungal infection in the mouth and throat. Thrush does not usually affect healthy adults. It is more common in people with a weak immune system. It is also more likely if you take antibiotics. Thrush is normally not contagious.  Understanding fungus in the mouth and throat  Your mouth and throat normally contain millions of tiny organisms. These include bacteria and yeasts. Many of these do not cause any problems. In fact, they may help fight disease.  Yeasts are a type of fungus. A type of yeast called Candida normally lives on the membranes of your mouth and throat. Usually, this yeast grows only in small amounts and is harmless. But in some cases, Candida can grow out of control and cause thrush. Thrush is related to other kinds of Candida infections that can grow all over the body. Thrush refers to an infection of only the mouth and throat.  What causes thrush?  Thrush happens when something lets too much Candida grow inside your mouth and throat. Certain things that change the normal balance of organisms in the mouth can lead to thrush. One example is antibiotic medicine. This medicine may kill some of the normal bacteria in your mouth. Candida can then grow freely. People on antibiotics have an increased risk for thrush.  You have a higher risk for thrush if you:    Wear dentures    Are getting chemotherapy    Are getting radiation therapy    Have diabetes    Have a transplanted organ    Use corticosteroids, including inhaled corticosteroids for lung disease    Have a weak immune system, such as from AIDS    Are an older adult  Symptoms of thrush  Symptoms of thrush can include:    A dry, cottony feeling in your mouth    Cracking at the corners of the mouth    Loss of taste    Pain while eating or swallowing    White patches on the tongue and around the sides of the mouth  Diagnosing thrush  Your healthcare provider will ask  about your medical history and your symptoms. He or she will look closely at your mouth and throat. White or red patches will be scraped with a tongue depressor. The sample will be sent to a lab to test. This test can usually confirm thrush.  If you have thrush, you may also have esophageal candidiasis. This is common in people who have HIV or a weak immune system. Your healthcare provider may check for this condition with an upper endoscopy. This is a procedure to look at the esophagus. A tissue sample may be taken to test.  Treatment for thrush  Thrush is usually treated with antifungal medicine. The medicine is put directly in your mouth and throat. You may be given a  swish and swallow  medicine or an antifungal lozenge.  In some cases, you may need an antifungal pill. This can remove Candida throughout your body. Or you may need medicine through an intravenous line ( IV). These treatments depend on how severe your infection is, and what other health conditions you have.  If you are at high risk for thrush, you may need to keep taking oral antifungal medicine. This is to help prevent thrush in the future.  What happens if you don t get treated for thrush?  If untreated, the Candida may spread throughout your body. They may even enter your bloodstream. This can cause serious problems, such as organ failure and even death. Bloodstream infection may need to be treated with high doses of antifungal medicine through an IV.  Systemic infection is much more likely in people who are very ill. It is also more common in those who have serious problems with their immune system. Additional risk factors for systemic infection in very ill people include:    Central venous lines    IV nutrition    Use of broad-spectrum antibiotics    Kidney failure    Recent surgery  Preventing thrush  You may be able to help prevent some cases of thrush. Make sure to:    Practice good oral hygiene. Try using a chlorhexidine  mouthwash.    Clean your dentures regularly as instructed. Make sure they fit you correctly.    After using a corticosteroid inhaler, rinse out your mouth with water or mouthwash.    Do not use broad-spectrum antibiotics, if possible.    Get treated for health problems that increase your risk for thrush, such as diabetes.     When to call the healthcare provider  Call your healthcare provider right away if you have any of these:    Cottony feeling in your mouth    Loss of taste    Pain while eating or swallowing    White patches or plaques on your tongue or inside your mouth   Date Last Reviewed: 5/1/2017 2000-2018 [employer]. [address] [place] [address]. All rights reserved. This information is not intended as a substitute for professional medical care. Always follow your healthcare professional's instructions.

## 2018-12-24 DIAGNOSIS — Z79.01 LONG TERM CURRENT USE OF ANTICOAGULANT THERAPY: ICD-10-CM

## 2018-12-24 DIAGNOSIS — I82.499 DEEP VEIN THROMBOSIS (DVT) OF OTHER VEIN OF LOWER EXTREMITY, UNSPECIFIED CHRONICITY, UNSPECIFIED LATERALITY (H): ICD-10-CM

## 2018-12-24 NOTE — PROGRESS NOTES
Patient called for additional Lovenox to be sent to the [facility] pharmacy. He only had enough to get him through Friday, writer ordered enough to get him through Tuesday next week. Patient has an INR scheduled for Monday, Dec 31st.      Ayaz RICO RN, CACP 12/24/2018 at 11:19 AM

## 2018-12-26 ENCOUNTER — TRANSFERRED RECORDS (OUTPATIENT)
Dept: HEALTH INFORMATION MANAGEMENT | Facility: CLINIC | Age: 63
End: 2018-12-26

## 2018-12-27 DIAGNOSIS — K44.9 HIATAL HERNIA: Primary | ICD-10-CM

## 2018-12-31 ENCOUNTER — ANTICOAGULATION THERAPY VISIT (OUTPATIENT)
Dept: NURSING | Facility: CLINIC | Age: 63
End: 2018-12-31
Payer: COMMERCIAL

## 2018-12-31 DIAGNOSIS — I82.409 DVT (DEEP VENOUS THROMBOSIS) (H): ICD-10-CM

## 2018-12-31 DIAGNOSIS — I82.499 DEEP VEIN THROMBOSIS (DVT) OF OTHER VEIN OF LOWER EXTREMITY, UNSPECIFIED CHRONICITY, UNSPECIFIED LATERALITY (H): ICD-10-CM

## 2018-12-31 DIAGNOSIS — Z79.01 LONG TERM CURRENT USE OF ANTICOAGULANT THERAPY: Primary | ICD-10-CM

## 2018-12-31 LAB — INR POINT OF CARE: 1.3 (ref 0.86–1.14)

## 2018-12-31 PROCEDURE — 99207 ZZC NO CHARGE NURSE ONLY: CPT

## 2018-12-31 PROCEDURE — 36416 COLLJ CAPILLARY BLOOD SPEC: CPT

## 2018-12-31 PROCEDURE — 85610 PROTHROMBIN TIME: CPT | Mod: QW

## 2018-12-31 NOTE — PROGRESS NOTES
ANTICOAGULATION FOLLOW-UP CLINIC VISIT    Patient Name:  Jozef Saunders  Date:  2018  Contact Type:  Face to Face    SUBJECTIVE:     Patient Findings     Positives:   Dental/Other procedures, Intentional hold of therapy    Comments:   Patient had a colonoscopy on 18.  He was on a 5 day hold and lovenox bridge.  Refill of enoxaparin done today.  He works for [employer] and usually has his INR done   at his lunch hour in [place].  He is not sure if he is working or not on 19 and   requested an INR appointment in [place].             OBJECTIVE    INR Protime   Date Value Ref Range Status   2018 1.3 (A) 0.86 - 1.14 Final       ASSESSMENT / PLAN  INR assessment SUB    Recheck INR In: 4 DAYS    INR Location Clinic      Anticoagulation Summary  As of 2018    INR goal:   2.0-3.0   TTR:   88.7 % (3.7 y)   INR used for dosin.3! (2018)   Warfarin maintenance plan:   7.5 mg (5 mg x 1.5) every Mon, Thu; 5 mg (5 mg x 1) all other days   Full warfarin instructions:   1/1: 7.5 mg; 1/2: 7.5 mg; Otherwise 7.5 mg every Mon, Thu; 5 mg all other days   Weekly warfarin total:   40 mg   Plan last modified:   Zena Ren RN (2018)   Next INR check:   2019   Target end date:   Indefinite    Indications    DVT (deep venous thrombosis) (H) [I82.409]  Long term current use of anticoagulant therapy [Z79.01]             Anticoagulation Episode Summary     INR check location:   Clinic Lab    Preferred lab:       Send INR reminders to:   [facility]    Comments:   * Dr. Page is from [place] in [place] / pt lives in [place], but works by [place]      Anticoagulation Care Providers     Provider Role Specialty Phone number    Arron Page MD [facility] Internal Medicine [phone]            See the Encounter Report to view Anticoagulation Flowsheet and Dosing Calendar (Go to Encounters tab in chart review, and find the Anticoagulation Therapy Visit)    Patient INR is  sub therapeutic today.  Patient will increase his dose for the next 2 days.  Will then continue weekly maintenance dose of 40 mg and follow up in 4 days or sooner if there are any concerns or problems.     Dosage adjustment made based on physician directed care plan.    Zena Ren RN

## 2019-01-03 DIAGNOSIS — Z86.711 HISTORY OF PULMONARY EMBOLISM: ICD-10-CM

## 2019-01-03 NOTE — TELEPHONE ENCOUNTER
"Requested Prescriptions   Pending Prescriptions Disp Refills     warfarin (COUMADIN) 5 MG tablet [Pharmacy Med Name: WARFARIN SODIUM 5 MG TABLET]    Last Written Prescription Date:  9/20/2018  Last Fill Quantity: 105,  # refills: 0   Last office visit: 1/3/2018 with prescribing provider:  Arron Page     Future Office Visit:   Next 5 appointments (look out 90 days)    Jan 04, 2019  8:30 AM CST  Nurse Only with EA RN  [facility] ([facility]) [address] [place] [address]  [address]  [facility] [id number] [phone]          105 tablet 0     Sig: TAKE 7.5 MG ON MON, THU AND 5 MG ALL OTHER DAYS OR AS DIRECTED BY [facility]    Vitamin K Antagonists Failed - 1/3/2019  1:27 AM       Failed - INR is within goal in the past 6 weeks    Confirm INR is within goal in the past 6 weeks.     Recent Labs   Lab Test 12/31/18  0805   INR 1.3*                      Passed - Recent (12 mo) or future (30 days) visit within the authorizing provider's specialty    Patient had office visit in the last 12 months or has a visit in the next 30 days with authorizing provider or within the authorizing provider's specialty.  See \"Patient Info\" tab in inbasket, or \"Choose Columns\" in Meds & Orders section of the refill encounter.             Passed - Patient is 18 years of age or older          "

## 2019-01-04 ENCOUNTER — ANTICOAGULATION THERAPY VISIT (OUTPATIENT)
Dept: PEDIATRICS | Facility: CLINIC | Age: 64
End: 2019-01-04

## 2019-01-04 ENCOUNTER — HOSPITAL ENCOUNTER (OUTPATIENT)
Dept: GENERAL RADIOLOGY | Facility: CLINIC | Age: 64
Discharge: HOME | End: 2019-01-04
Attending: INTERNAL MEDICINE | Admitting: INTERNAL MEDICINE
Payer: COMMERCIAL

## 2019-01-04 DIAGNOSIS — I82.499 DEEP VEIN THROMBOSIS (DVT) OF OTHER VEIN OF LOWER EXTREMITY, UNSPECIFIED CHRONICITY, UNSPECIFIED LATERALITY (H): ICD-10-CM

## 2019-01-04 DIAGNOSIS — I82.409 DVT (DEEP VENOUS THROMBOSIS) (H): ICD-10-CM

## 2019-01-04 DIAGNOSIS — Z79.01 LONG TERM CURRENT USE OF ANTICOAGULANT THERAPY: ICD-10-CM

## 2019-01-04 DIAGNOSIS — K44.9 HIATAL HERNIA: ICD-10-CM

## 2019-01-04 LAB — INR POINT OF CARE: 1.8 (ref 0.86–1.14)

## 2019-01-04 PROCEDURE — 85610 PROTHROMBIN TIME: CPT | Mod: QW | Performed by: INTERNAL MEDICINE

## 2019-01-04 PROCEDURE — 25500045 ZZH RX 255: Performed by: INTERNAL MEDICINE

## 2019-01-04 PROCEDURE — 74240 X-RAY XM UPR GI TRC 1CNTRST: CPT

## 2019-01-04 PROCEDURE — 36416 COLLJ CAPILLARY BLOOD SPEC: CPT | Performed by: INTERNAL MEDICINE

## 2019-01-04 RX ORDER — WARFARIN SODIUM 5 MG/1
TABLET ORAL
Qty: 105 TABLET | Refills: 0 | Status: SHIPPED | OUTPATIENT
Start: 2019-01-04 | End: 2019-02-15

## 2019-01-04 RX ADMIN — ANTACID/ANTIFLATULENT 4 G: 380; 550; 10; 10 GRANULE, EFFERVESCENT ORAL at 11:10

## 2019-01-04 NOTE — TELEPHONE ENCOUNTER
Prescription approved per [facility] Refill Protocol.  Reviewed last INR Clinic Visit. Sig is current.      Radha BENITO RN  Anticoagulation Clinic  [place]

## 2019-01-04 NOTE — PROGRESS NOTES
ANTICOAGULATION FOLLOW-UP CLINIC VISIT    Patient Name:  Jozef Saunders  Date:  2019  Contact Type:  Face to Face    SUBJECTIVE:     Patient Findings     Positives:   Intentional hold of therapy    Comments:   Continues to Bridge with Lovenox daily  Will follow up Monday in [place].           OBJECTIVE    INR Protime   Date Value Ref Range Status   2019 1.8 (A) 0.86 - 1.14 Final       ASSESSMENT / PLAN  INR assessment THER    Recheck INR In: 3 DAYS    INR Location Clinic      Anticoagulation Summary  As of 2019    INR goal:   2.0-3.0   TTR:   88.5 % (3.8 y)   INR used for dosin.8! (2019)   Warfarin maintenance plan:   7.5 mg (5 mg x 1.5) every Mon, Thu; 5 mg (5 mg x 1) all other days   Full warfarin instructions:   7.5 mg every Mon, Thu; 5 mg all other days   Weekly warfarin total:   40 mg   No change documented:   Radha Salcedo RN   Plan last modified:   Zena Ren RN (2018)   Next INR check:   2019   Target end date:   Indefinite    Indications    DVT (deep venous thrombosis) (H) [I82.409]  Long term current use of anticoagulant therapy [Z79.01]             Anticoagulation Episode Summary     INR check location:   Clinic Lab    Preferred lab:       Send INR reminders to:   [facility]    Comments:   * Dr. Page is from [place] in [place] / pt lives in [place], but works by [place]      Anticoagulation Care Providers     Provider Role Specialty Phone number    Arron Page MD [facility] Internal Medicine [phone]            See the Encounter Report to view Anticoagulation Flowsheet and Dosing Calendar (Go to Encounters tab in chart review, and find the Anticoagulation Therapy Visit)    Patient INR is sub therapeutic today.  Patient will continue maintenance dosing and daily lovenox.  Patient will follow up in 3 days in [place] or sooner if there are any concerns or problems.     Discussed signs of clotting with patient and  when to seek care for concerns.  Patient verbalized understanding    Rationale to adjustments:  Dosage adjustment made based on physician directed care plan.      Radha Hernandez RN

## 2019-01-05 DIAGNOSIS — Z79.01 LONG TERM (CURRENT) USE OF ANTICOAGULANTS: Primary | ICD-10-CM

## 2019-01-05 DIAGNOSIS — I82.409 DVT (DEEP VENOUS THROMBOSIS) (H): ICD-10-CM

## 2019-01-05 DIAGNOSIS — O22.30 DEEP PHLEBOTHROMBOSIS, ANTEPARTUM, WITH DELIVERY (H): ICD-10-CM

## 2019-01-05 DIAGNOSIS — I82.409 DEEP PHLEBOTHROMBOSIS, ANTEPARTUM, WITH DELIVERY (H): ICD-10-CM

## 2019-01-05 DIAGNOSIS — I82.459: ICD-10-CM

## 2019-01-07 ENCOUNTER — ANTICOAGULATION THERAPY VISIT (OUTPATIENT)
Dept: ANTICOAGULATION | Facility: CLINIC | Age: 64
End: 2019-01-07
Payer: COMMERCIAL

## 2019-01-07 DIAGNOSIS — I82.409 DVT (DEEP VENOUS THROMBOSIS) (H): ICD-10-CM

## 2019-01-07 DIAGNOSIS — Z79.01 LONG TERM CURRENT USE OF ANTICOAGULANT THERAPY: ICD-10-CM

## 2019-01-07 LAB — INR POINT OF CARE: 2.4 (ref 0.86–1.14)

## 2019-01-07 PROCEDURE — 36416 COLLJ CAPILLARY BLOOD SPEC: CPT

## 2019-01-07 PROCEDURE — 99207 ZZC NO CHARGE NURSE ONLY: CPT

## 2019-01-07 PROCEDURE — 85610 PROTHROMBIN TIME: CPT | Mod: QW

## 2019-01-07 NOTE — PROGRESS NOTES
ANTICOAGULATION FOLLOW-UP CLINIC VISIT    Patient Name:  Jozef Saunders  Date:  2019  Contact Type:  Face to Face    SUBJECTIVE:     Patient Findings     Positives:   No Problem Findings    Comments:   Per clinical pharmacist poli Fajardo to do POCT as patient is here for appt already. Lab draw will be performed tomorrow to confirm therapeutic INR. Continue Lovenox injections until venous INR therapeutic, possibly tomorrow.   No changes in medications, diet, or activity. No concerns with bleeding or bruising. Took warfarin as prescribed.   Continue maintenance warfarin dose. Will recheck INR in 1 day.   Patient verbalizes understanding and agrees with plan. No further questions at this time.              OBJECTIVE    INR Protime   Date Value Ref Range Status   2019 2.4 (A) 0.86 - 1.14 Final       ASSESSMENT / PLAN  INR assessment THER    Recheck INR In: 1 DAY    INR Location Clinic      Anticoagulation Summary  As of 2019    INR goal:   2.0-3.0   TTR:   88.4 % (3.8 y)   INR used for dosin.4 (2019)   Warfarin maintenance plan:   7.5 mg (5 mg x 1.5) every Mon, Thu; 5 mg (5 mg x 1) all other days   Full warfarin instructions:   7.5 mg every Mon, Thu; 5 mg all other days   Weekly warfarin total:   40 mg   No change documented:   Ashley Summers RN   Plan last modified:   Zena Ren, RN (2018)   Next INR check:   2019   Target end date:   Indefinite    Indications    DVT (deep venous thrombosis) (H) [I82.409]  Long term current use of anticoagulant therapy [Z79.01]             Anticoagulation Episode Summary     INR check location:   Clinic Lab    Preferred lab:       Send INR reminders to:   [facility]    Comments:   * Dr. Page is from [place] in [place] / pt lives in [place], but works by [place]      Anticoagulation Care Providers     Provider Role Specialty Phone number    Arron Page MD [facility] Internal Medicine [phone]            See  the Encounter Report to view Anticoagulation Flowsheet and Dosing Calendar (Go to Encounters tab in chart review, and find the Anticoagulation Therapy Visit)        Ashley Summers RN

## 2019-01-08 ENCOUNTER — ANTICOAGULATION THERAPY VISIT (OUTPATIENT)
Dept: ANTICOAGULATION | Facility: CLINIC | Age: 64
End: 2019-01-08

## 2019-01-08 DIAGNOSIS — I82.459: ICD-10-CM

## 2019-01-08 DIAGNOSIS — Z79.01 LONG TERM (CURRENT) USE OF ANTICOAGULANTS: ICD-10-CM

## 2019-01-08 DIAGNOSIS — I82.409 DVT (DEEP VENOUS THROMBOSIS) (H): ICD-10-CM

## 2019-01-08 DIAGNOSIS — Z79.01 LONG TERM CURRENT USE OF ANTICOAGULANT THERAPY: ICD-10-CM

## 2019-01-08 LAB — INR PPP: 1.93 (ref 0.86–1.14)

## 2019-01-08 PROCEDURE — 99207 ZZC NO CHARGE NURSE ONLY: CPT

## 2019-01-08 PROCEDURE — 85610 PROTHROMBIN TIME: CPT | Performed by: INTERNAL MEDICINE

## 2019-01-08 PROCEDURE — 36415 COLL VENOUS BLD VENIPUNCTURE: CPT | Performed by: INTERNAL MEDICINE

## 2019-01-08 NOTE — PROGRESS NOTES
ANTICOAGULATION FOLLOW-UP CLINIC VISIT    Patient Name:  Jozef Saunders  Date:  2019  Contact Type:  Telephone    SUBJECTIVE:     Patient Findings     Positives:   Extra doses, Intentional hold of therapy    Comments:   Previous hold for a procedure.   INR today is 1.93.   Patient was instructed to take 7.5 mg today then to resume maintenance dose.   Patient has one Lovenox injection left that he will take today.   Recheck the INR in 6 days.   Patient verbalizes understanding and agrees to plan. No further questions or concerns.             OBJECTIVE    INR   Date Value Ref Range Status   2019 1.93 (H) 0.86 - 1.14 Final       ASSESSMENT / PLAN  INR assessment THER +/-1   Recheck INR In: 6 DAYS    INR Location Outside lab      Anticoagulation Summary  As of 2019    INR goal:   2.0-3.0   TTR:   88.4 % (3.8 y)   INR used for dosin.93! (2019)   Warfarin maintenance plan:   7.5 mg (5 mg x 1.5) every Mon, Thu; 5 mg (5 mg x 1) all other days   Full warfarin instructions:   : 7.5 mg; Otherwise 7.5 mg every Mon, Thu; 5 mg all other days   Weekly warfarin total:   40 mg   Plan last modified:   Zena Ren RN (2018)   Next INR check:   2019   Target end date:   Indefinite    Indications    DVT (deep venous thrombosis) (H) [I82.409]  Long term current use of anticoagulant therapy [Z79.01]             Anticoagulation Episode Summary     INR check location:   Clinic Lab    Preferred lab:       Send INR reminders to:   [facility]    Comments:   * Dr. Page is from [place] in [place] / pt lives in [place], but works by [place]      Anticoagulation Care Providers     Provider Role Specialty Phone number    Arron Page MD [facility] Internal Medicine [phone]            See the Encounter Report to view Anticoagulation Flowsheet and Dosing Calendar (Go to Encounters tab in chart review, and find the Anticoagulation Therapy Visit)        Adela Wiggins  RN

## 2019-01-11 ENCOUNTER — NURSE TRIAGE (OUTPATIENT)
Dept: NURSING | Facility: CLINIC | Age: 64
End: 2019-01-11

## 2019-01-11 ENCOUNTER — TELEPHONE (OUTPATIENT)
Dept: NURSING | Facility: CLINIC | Age: 64
End: 2019-01-11

## 2019-01-11 DIAGNOSIS — J31.0 CHRONIC RHINITIS: ICD-10-CM

## 2019-01-11 DIAGNOSIS — B37.0 ORAL THRUSH: ICD-10-CM

## 2019-01-11 RX ORDER — NYSTATIN 100000/ML
500000 SUSPENSION, ORAL (FINAL DOSE FORM) ORAL 4 TIMES DAILY
Qty: 200 ML | Refills: 0 | Status: SHIPPED | OUTPATIENT
Start: 2019-01-11 | End: 2019-02-15

## 2019-01-11 NOTE — TELEPHONE ENCOUNTER
"Requested Prescriptions   Pending Prescriptions Disp Refills     fluticasone (FLONASE) 50 MCG/ACT nasal spray [Pharmacy Med Name: FLUTICASONE PROP 50 MCG SPRAY]    Last Written Prescription Date:  2/12/2018  Last Fill Quantity: 16 ml,  # refills: 10   Last office visit: 1/3/2018 with prescribing provider: Arron Page       Future Office Visit:     16 mL 10     Sig: SPRAY 2 SPRAYS INTO BOTH NOSTRILS DAILY    Inhaled Steroids Protocol Passed - 1/11/2019  9:56 AM       Passed - Patient is age 12 or older       Passed - Recent (12 mo) or future (30 days) visit within the authorizing provider's specialty    Patient had office visit in the last 12 months or has a visit in the next 30 days with authorizing provider or within the authorizing provider's specialty.  See \"Patient Info\" tab in inbasket, or \"Choose Columns\" in Meds & Orders section of the refill encounter.             Passed - Medication is active on med list          "

## 2019-01-11 NOTE — TELEPHONE ENCOUNTER
[facility] only [facility] half the order of nystatin wash for his mouth and they couldn't get any more. Over one week later got second part of doses so used it then. Sores have returned. Can patient get a refill of the oral rinse to treat the mouth sores? Please call the patient.  Epic encounter sent to the patient's clinic.    Stefanie Alvarez RN-[facility] Nurse Advisors

## 2019-01-11 NOTE — TELEPHONE ENCOUNTER
Patient calling for a refill of his Nystatin that was ordered in Urgent care.  Patient notes that when he went to  the medication after prescribed the pharmacy only had 4 days of the med to start so he got that and used it and it took another 2 weeks to get the 2nd half of the fill and notes that the sores never went away. Reports have increased again and having pain/burning.      Patient would like to have another script of nystatin to use as it was prescribed consecutively to resolve his mouth sores.      Please advise, Pharmacy and medication was prescribed are pended  Radha SILVA RN - Triage  [facility]      Urgent Care notes 12/15/2018  PLAN:     Oral thrush: Oral swish with nystatin suspension.                       It is unlikely to get oral thrush from nasal flonase.  Encouraged oral gargle, and oral swish after use of Flonase.   Patient is encouraged to follow-up with primary care for recurrent oral thrush to rule out any underlying immunocompromised state.

## 2019-01-14 RX ORDER — FLUTICASONE PROPIONATE 50 MCG
SPRAY, SUSPENSION (ML) NASAL
Qty: 16 ML | Refills: 0 | Status: SHIPPED | OUTPATIENT
Start: 2019-01-14 | End: 2019-02-09

## 2019-01-14 NOTE — TELEPHONE ENCOUNTER
30 day supply given.  Patient is due for yearly physical and lab work.  Please call and assist with scheduling appointment prior to next refill   Radha SILVA RN - Triage  [facility]

## 2019-01-16 NOTE — TELEPHONE ENCOUNTER
Called and notified patient that he is due for his yearly physical and lab work. Pt is scheduled with Dr. Page on 2/15/19 at 9:30 am.     Maikel Cruz CMA ([facility])

## 2019-01-21 ENCOUNTER — ANTICOAGULATION THERAPY VISIT (OUTPATIENT)
Dept: ANTICOAGULATION | Facility: CLINIC | Age: 64
End: 2019-01-21
Payer: COMMERCIAL

## 2019-01-21 DIAGNOSIS — I82.409 DVT (DEEP VENOUS THROMBOSIS) (H): ICD-10-CM

## 2019-01-21 DIAGNOSIS — Z79.01 LONG TERM CURRENT USE OF ANTICOAGULANT THERAPY: ICD-10-CM

## 2019-01-21 LAB — INR POINT OF CARE: 2 (ref 0.86–1.14)

## 2019-01-21 PROCEDURE — 99207 ZZC NO CHARGE NURSE ONLY: CPT

## 2019-01-21 PROCEDURE — 85610 PROTHROMBIN TIME: CPT | Mod: QW

## 2019-01-21 PROCEDURE — 36416 COLLJ CAPILLARY BLOOD SPEC: CPT

## 2019-01-21 NOTE — PROGRESS NOTES
ANTICOAGULATION FOLLOW-UP CLINIC VISIT    Patient Name:  Jozef Saunders  Date:  2019  Contact Type:  Face to Face    SUBJECTIVE:     Patient Findings     Positives:   No Problem Findings    Comments:   No changes in medications, diet, or activity. No concerns with bleeding or bruising. Took warfarin as prescribed.   Continue maintenance warfarin dose. Will recheck INR in 5 weeks per patient schedule.   Patient verbalizes understanding and agrees with plan. No further questions at this time.              OBJECTIVE    INR Protime   Date Value Ref Range Status   2019 2.0 (A) 0.86 - 1.14 Final       ASSESSMENT / PLAN  INR assessment THER    Recheck INR In: 5 WEEKS    INR Location Clinic      Anticoagulation Summary  As of 2019    INR goal:   2.0-3.0   TTR:   87.6 % (3.8 y)   INR used for dosin.0 (2019)   Warfarin maintenance plan:   7.5 mg (5 mg x 1.5) every Mon, Thu; 5 mg (5 mg x 1) all other days   Full warfarin instructions:   7.5 mg every Mon, Thu; 5 mg all other days   Weekly warfarin total:   40 mg   No change documented:   Ashley Summers RN   Plan last modified:   Zena Ren RN (2018)   Next INR check:   2019   Target end date:   Indefinite    Indications    DVT (deep venous thrombosis) (H) [I82.409]  Long term current use of anticoagulant therapy [Z79.01]             Anticoagulation Episode Summary     INR check location:   Clinic Lab    Preferred lab:       Send INR reminders to:   [facility]    Comments:   * Dr. Page is from [place] in [place] / pt lives in [place], but works by [place]      Anticoagulation Care Providers     Provider Role Specialty Phone number    Arron Page MD [facility] Internal Medicine [phone]            See the Encounter Report to view Anticoagulation Flowsheet and Dosing Calendar (Go to Encounters tab in chart review, and find the Anticoagulation Therapy Visit)        Ashley Summers, MORTEZA

## 2019-01-23 ENCOUNTER — TRANSFERRED RECORDS (OUTPATIENT)
Dept: HEALTH INFORMATION MANAGEMENT | Facility: CLINIC | Age: 64
End: 2019-01-23

## 2019-02-08 NOTE — TELEPHONE ENCOUNTER
Patient was seen in an urgent care for mouth sores. He was prescribed an oral rinse.  [facility] the order of [facility] wash for his mouth and they couldn't get any more. Over one week later got second part of doses so used it then. Sores have returned. Can patient get a refill of the oral rinse to treat the mouth sores? Please call the patient.  Stefanie Alvarez RN-[facility] Nurse Advisors     No

## 2019-02-09 DIAGNOSIS — J31.0 CHRONIC RHINITIS: ICD-10-CM

## 2019-02-09 NOTE — TELEPHONE ENCOUNTER
"Requested Prescriptions   Pending Prescriptions Disp Refills     fluticasone (FLONASE) 50 MCG/ACT nasal spray [Pharmacy Med Name: FLUTICASONE PROP 50 MCG SPRAY]  Last Written Prescription Date:  01/14/2019  Last Fill Quantity: 16 mL,  # refills: 0   Last office visit: 1/3/2018 with prescribing provider:  Arron Page MD    Future Office Visit:   Next 5 appointments (look out 90 days)    Feb 15, 2019  9:30 AM CST  PHYSICAL with Arron Page MD  [facility] ([facility] [address] [place] [id number] [phone]          16 mL 0     Sig: SPRAY 2 SPRAYS INTO BOTH NOSTRILS DAILY. DUE FOR APPT AND LABS    Inhaled Steroids Protocol Passed - 2/9/2019  8:30 AM       Passed - Patient is age 12 or older       Passed - Recent (12 mo) or future (30 days) visit within the authorizing provider's specialty    Patient had office visit in the last 12 months or has a visit in the next 30 days with authorizing provider or within the authorizing provider's specialty.  See \"Patient Info\" tab in inbasket, or \"Choose Columns\" in Meds & Orders section of the refill encounter.             Passed - Medication is active on med list          "

## 2019-02-11 RX ORDER — FLUTICASONE PROPIONATE 50 MCG
SPRAY, SUSPENSION (ML) NASAL
Qty: 16 ML | Refills: 0 | Status: SHIPPED | OUTPATIENT
Start: 2019-02-11 | End: 2019-02-15

## 2019-02-11 NOTE — TELEPHONE ENCOUNTER
Prescription approved per [facility] Refill Protocol.    Linda Barroso RN

## 2019-02-15 ENCOUNTER — OFFICE VISIT (OUTPATIENT)
Dept: PEDIATRICS | Facility: CLINIC | Age: 64
End: 2019-02-15
Payer: COMMERCIAL

## 2019-02-15 VITALS
DIASTOLIC BLOOD PRESSURE: 80 MMHG | BODY MASS INDEX: 28.23 KG/M2 | HEART RATE: 67 BPM | WEIGHT: 190.6 LBS | OXYGEN SATURATION: 98 % | HEIGHT: 69 IN | TEMPERATURE: 96.8 F | SYSTOLIC BLOOD PRESSURE: 124 MMHG

## 2019-02-15 DIAGNOSIS — F10.21 ALCOHOL DEPENDENCE IN REMISSION (H): ICD-10-CM

## 2019-02-15 DIAGNOSIS — J31.0 CHRONIC RHINITIS: ICD-10-CM

## 2019-02-15 DIAGNOSIS — Z00.00 ROUTINE HISTORY AND PHYSICAL EXAMINATION OF ADULT: Primary | ICD-10-CM

## 2019-02-15 DIAGNOSIS — Z86.711 HISTORY OF PULMONARY EMBOLISM: ICD-10-CM

## 2019-02-15 DIAGNOSIS — E78.00 HYPERCHOLESTEROLEMIA: ICD-10-CM

## 2019-02-15 DIAGNOSIS — K44.9 HIATAL HERNIA: ICD-10-CM

## 2019-02-15 DIAGNOSIS — I10 ESSENTIAL HYPERTENSION, BENIGN: ICD-10-CM

## 2019-02-15 DIAGNOSIS — D50.0 IRON DEFICIENCY ANEMIA DUE TO CHRONIC BLOOD LOSS: ICD-10-CM

## 2019-02-15 LAB
ANISOCYTOSIS BLD QL SMEAR: SLIGHT
DIFFERENTIAL METHOD BLD: ABNORMAL
ELLIPTOCYTES BLD QL SMEAR: SLIGHT
EOSINOPHIL # BLD AUTO: 0.1 10E9/L (ref 0–0.7)
EOSINOPHIL NFR BLD AUTO: 1 %
ERYTHROCYTE [DISTWIDTH] IN BLOOD BY AUTOMATED COUNT: 16.2 % (ref 10–15)
HBA1C MFR BLD: 5.6 % (ref 0–5.6)
HCT VFR BLD AUTO: 38.3 % (ref 40–53)
HGB BLD-MCNC: 11.7 G/DL (ref 13.3–17.7)
LYMPHOCYTES # BLD AUTO: 0.9 10E9/L (ref 0.8–5.3)
LYMPHOCYTES NFR BLD AUTO: 18 %
MCH RBC QN AUTO: 24 PG (ref 26.5–33)
MCHC RBC AUTO-ENTMCNC: 30.5 G/DL (ref 31.5–36.5)
MCV RBC AUTO: 79 FL (ref 78–100)
METAMYELOCYTES # BLD: 0.1 10E9/L
METAMYELOCYTES NFR BLD MANUAL: 2 %
MONOCYTES # BLD AUTO: 0.4 10E9/L (ref 0–1.3)
MONOCYTES NFR BLD AUTO: 7 %
NEUTROPHILS # BLD AUTO: 3.6 10E9/L (ref 1.6–8.3)
NEUTROPHILS NFR BLD AUTO: 72 %
PLATELET # BLD AUTO: 113 10E9/L (ref 150–450)
PLATELET # BLD EST: ABNORMAL 10*3/UL
RBC # BLD AUTO: 4.88 10E12/L (ref 4.4–5.9)
RETICS # AUTO: 69.9 10E9/L (ref 25–95)
RETICS/RBC NFR AUTO: 1.4 % (ref 0.5–2)
WBC # BLD AUTO: 5.1 10E9/L (ref 4–11)

## 2019-02-15 PROCEDURE — 83036 HEMOGLOBIN GLYCOSYLATED A1C: CPT | Performed by: INTERNAL MEDICINE

## 2019-02-15 PROCEDURE — 85025 COMPLETE CBC W/AUTO DIFF WBC: CPT | Performed by: INTERNAL MEDICINE

## 2019-02-15 PROCEDURE — 85045 AUTOMATED RETICULOCYTE COUNT: CPT | Performed by: INTERNAL MEDICINE

## 2019-02-15 PROCEDURE — 82728 ASSAY OF FERRITIN: CPT | Performed by: INTERNAL MEDICINE

## 2019-02-15 PROCEDURE — 85060 BLOOD SMEAR INTERPRETATION: CPT | Performed by: PATHOLOGY

## 2019-02-15 PROCEDURE — 36415 COLL VENOUS BLD VENIPUNCTURE: CPT | Performed by: INTERNAL MEDICINE

## 2019-02-15 PROCEDURE — 99396 PREV VISIT EST AGE 40-64: CPT | Performed by: INTERNAL MEDICINE

## 2019-02-15 RX ORDER — WARFARIN SODIUM 5 MG/1
TABLET ORAL
Qty: 105 TABLET | Refills: 11 | Status: SHIPPED | OUTPATIENT
Start: 2019-02-15 | End: 2019-12-10

## 2019-02-15 RX ORDER — FLUTICASONE PROPIONATE 50 MCG
SPRAY, SUSPENSION (ML) NASAL
Qty: 16 ML | Refills: 11 | Status: SHIPPED | OUTPATIENT
Start: 2019-02-15 | End: 2020-02-19

## 2019-02-15 ASSESSMENT — ENCOUNTER SYMPTOMS
HEMATOCHEZIA: 0
NERVOUS/ANXIOUS: 0
CHILLS: 0
FREQUENCY: 0
NAUSEA: 0
FEVER: 0
DYSURIA: 0
MYALGIAS: 0
CONSTIPATION: 0
WEAKNESS: 0
ARTHRALGIAS: 0
EYE PAIN: 0
HEMATURIA: 0
SORE THROAT: 0
DIARRHEA: 0
HEARTBURN: 0
JOINT SWELLING: 0
COUGH: 0
DIZZINESS: 0
PALPITATIONS: 0
ABDOMINAL PAIN: 0
HEADACHES: 0
SHORTNESS OF BREATH: 0
PARESTHESIAS: 0

## 2019-02-15 ASSESSMENT — MIFFLIN-ST. JEOR: SCORE: 1644.94

## 2019-02-15 NOTE — PROGRESS NOTES
SUBJECTIVE:   CC: Jozef Saunders is an 63 year old male who presents for preventative health visit.     Physical   Annual:     Getting at least 3 servings of Calcium per day:  Yes    Bi-annual eye exam:  Yes    Dental care twice a year:  Yes    Sleep apnea or symptoms of sleep apnea:  None    Diet:  Regular (no restrictions)    Frequency of exercise:  4-5 days/week    Duration of exercise:  30-45 minutes    Taking medications regularly:  Yes    Medication side effects:  None    Additional concerns today:  No    PHQ-2 Total Score: 0      Hiatal hernia: large thoracic hiatal hernia. EGD done on 2018 without active bleeding. Colonoscopy with one polyp removed. Has been having lower iron stores, no hematochezia or melena. No GERD. No dysphagia. No ETOH.    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 No issues with this.    HYPERTENSION: has been well controlled. NO chest pain or shortness of breath.       Today's PHQ-2 Score:   PHQ-2 (  [employer]) 10/11/2018   Q1: Little interest or pleasure in doing things 0   Q2: Feeling down, depressed or hopeless 0   PHQ-2 Score 0   Q1: Little interest or pleasure in doing things -   Q2: Feeling down, depressed or hopeless -   PHQ-2 Score -       Abuse: Current or Past(Physical, Sexual or Emotional)- no  Do you feel safe in your environment? Yes    Social History     Tobacco Use     Smoking status: Former Smoker     Last attempt to quit: 1989     Years since quittin.2     Smokeless tobacco: Never Used   Substance Use Topics     Alcohol use: No     Comment: sober [id number]     Alcohol Use 2/15/2019   If you drink alcohol do you typically have greater than 3 drinks per day OR greater than 7 drinks per week? Not Applicable       Last PSA:   PSA   Date Value Ref Range Status   2018 0.64 0 - 4 ug/L Final     Comment:     Assay Method:  Chemiluminescence using Siemens Vista analyzer       Reviewed orders with patient. Reviewed health maintenance and  "updated orders accordingly - Yes  Labs reviewed in EPIC    Reviewed and updated as needed this visit by clinical staff         Reviewed and updated as needed this visit by Provider            Review of Systems   Constitutional: Negative for chills and fever.   HENT: Negative for congestion, ear pain, hearing loss and sore throat.    Eyes: Negative for pain and visual disturbance.   Respiratory: Negative for cough and shortness of breath.    Cardiovascular: Negative for chest pain, palpitations and peripheral edema.   Gastrointestinal: Negative for abdominal pain, constipation, diarrhea, heartburn, hematochezia and nausea.   Genitourinary: Negative for discharge, dysuria, frequency, genital sores, hematuria, impotence and urgency.   Musculoskeletal: Negative for arthralgias, joint swelling and myalgias.   Skin: Negative for rash.   Neurological: Negative for dizziness, weakness, headaches and paresthesias.   Psychiatric/Behavioral: Negative for mood changes. The patient is not nervous/anxious.          OBJECTIVE:   /80 (BP Location: Right arm, Patient Position: Chair, Cuff Size: Adult Regular)   Pulse 67   Temp 96.8  F (36  C) (Tympanic)   Ht 1.753 m (5' 9\")   Wt 86.5 kg (190 lb 9.6 oz)   SpO2 98%   BMI 28.15 kg/m      Physical Exam  GENERAL: healthy, alert and no distress  EYES: Eyes grossly normal to inspection, PERRL and conjunctivae and sclerae normal  HENT: ear canals and TM's normal, nose and mouth without ulcers or lesions  NECK: no adenopathy, no asymmetry, masses, or scars and thyroid normal to palpation  RESP: lungs clear to auscultation - no rales, rhonchi or wheezes  CV: regular rate and rhythm, normal S1 S2, no S3 or S4, no murmur, click or rub, no peripheral edema and peripheral pulses strong  ABDOMEN: soft, nontender, no hepatosplenomegaly, no masses and bowel sounds normal  MS: no gross musculoskeletal defects noted, no edema  SKIN: no suspicious lesions or rashes  NEURO: Normal strength " and tone, mentation intact and speech normal  PSYCH: mentation appears normal, affect normal/bright    Diagnostic Test Results:  Results for orders placed or performed in visit on 02/15/19   Ferritin   Result Value Ref Range    Ferritin 9 (L) 26 - 388 ng/mL   Reticulocyte Count   Result Value Ref Range    % Retic 1.4 0.5 - 2.0 %    Absolute Retic 69.9 25 - 95 10e9/L   Hemoglobin A1c   Result Value Ref Range    Hemoglobin A1C 5.6 0 - 5.6 %   CBC with platelets and differential   Result Value Ref Range    WBC 5.1 4.0 - 11.0 10e9/L    RBC Count 4.88 4.4 - 5.9 10e12/L    Hemoglobin 11.7 (L) 13.3 - 17.7 g/dL    Hematocrit 38.3 (L) 40.0 - 53.0 %    MCV 79 78 - 100 fl    MCH 24.0 (L) 26.5 - 33.0 pg    MCHC 30.5 (L) 31.5 - 36.5 g/dL    RDW 16.2 (H) 10.0 - 15.0 %    Platelet Count 113 (L) 150 - 450 10e9/L    % Neutrophils 72.0 %    % Lymphocytes 18.0 %    % Monocytes 7.0 %    % Eosinophils 1.0 %    % Metamyelocytes 2.0 %    Absolute Neutrophil 3.6 1.6 - 8.3 10e9/L    Absolute Lymphocytes 0.9 0.8 - 5.3 10e9/L    Absolute Monocytes 0.4 0.0 - 1.3 10e9/L    Absolute Eosinophils 0.1 0.0 - 0.7 10e9/L    Absolute Metamyelocytes 0.1 (H) 0 10e9/L    Anisocytosis Slight     Elliptocytes Slight     Platelet Estimate       Automated count confirmed.  Platelet morphology is normal.    Diff Method Manual Differential        ASSESSMENT/PLAN:       ICD-10-CM    1. Routine history and physical examination of adult Z00.00 Ferritin     Blood Morphology Pathologist Review     Reticulocyte Count     Hemoglobin A1c     CBC with platelets and differential     CANCELED: CBC with platelets   2. Hiatal hernia K44.9 omeprazole (PRILOSEC) 20 MG DR capsule   3. Hypercholesterolemia E78.00    4. Essential hypertension, benign I10    5. History of pulmonary embolism Z86.711 warfarin (COUMADIN) 5 MG tablet   6. Chronic rhinitis J31.0 fluticasone (FLONASE) 50 MCG/ACT nasal spray   7. Iron deficiency anemia due to chronic blood loss D50.0 Ferritin     Blood  "Morphology Pathologist Review     Reticulocyte Count     Ferritin     CBC with platelets differential   8. Alcohol dependence in remission (H) F10.21        COUNSELING:   Reviewed preventive health counseling, as reflected in patient instructions    BP Readings from Last 1 Encounters:   12/15/18 122/84     Estimated body mass index is 28.21 kg/m  as calculated from the following:    Height as of 10/11/18: 1.753 m (5' 9\").    Weight as of 10/11/18: 86.6 kg (191 lb).      Weight management plan: Discussed healthy diet and exercise guidelines     reports that he quit smoking about 29 years ago. he has never used smokeless tobacco.      Counseling Resources:  ATP IV Guidelines  Pooled Cohorts Equation Calculator  FRAX Risk Assessment  ICSI Preventive Guidelines  Dietary Guidelines for Americans, 2010  USDA's MyPlate  ASA Prophylaxis  Lung CA Screening    Arron Page MD, MD  [facility]  "

## 2019-02-15 NOTE — LETTER
[facility]  [address] [place] [id number]                  [phone]   February 18, 2019    Jozef Saunders  [address]  [place] [address]      Dear Jozef,     Here are the results from the recent Labs that we did.     Your iron markers are still low. Your hemoglobin is slightly down but not overtly concerning. We should start an iron supplement. I would like you to start taking 325 mg of ferrous sulfate over the counter per day to help this increase. We should recheck levels in about 3-4 months. I have ordered labs for you to come in for lab only for these.     Let me know if you have questions or concerns!     Sincerely,       Arron Page MD   Internal Medicine and Pediatrics         Results for orders placed or performed in visit on 02/15/19   Ferritin   Result Value Ref Range    Ferritin 9 (L) 26 - 388 ng/mL   Reticulocyte Count   Result Value Ref Range    % Retic 1.4 0.5 - 2.0 %    Absolute Retic 69.9 25 - 95 10e9/L   Hemoglobin A1c   Result Value Ref Range    Hemoglobin A1C 5.6 0 - 5.6 %   CBC with platelets and differential   Result Value Ref Range    WBC 5.1 4.0 - 11.0 10e9/L    RBC Count 4.88 4.4 - 5.9 10e12/L    Hemoglobin 11.7 (L) 13.3 - 17.7 g/dL    Hematocrit 38.3 (L) 40.0 - 53.0 %    MCV 79 78 - 100 fl    MCH 24.0 (L) 26.5 - 33.0 pg    MCHC 30.5 (L) 31.5 - 36.5 g/dL    RDW 16.2 (H) 10.0 - 15.0 %    Platelet Count 113 (L) 150 - 450 10e9/L    % Neutrophils 72.0 %    % Lymphocytes 18.0 %    % Monocytes 7.0 %    % Eosinophils 1.0 %    % Metamyelocytes 2.0 %    Absolute Neutrophil 3.6 1.6 - 8.3 10e9/L    Absolute Lymphocytes 0.9 0.8 - 5.3 10e9/L    Absolute Monocytes 0.4 0.0 - 1.3 10e9/L    Absolute Eosinophils 0.1 0.0 - 0.7 10e9/L    Absolute Metamyelocytes 0.1 (H) 0 10e9/L    Anisocytosis Slight     Elliptocytes Slight     Platelet Estimate       Automated count confirmed.  Platelet morphology is normal.    Diff Method Manual  Differential

## 2019-02-15 NOTE — LETTER
February 22, 2019      Jozef Saunders  [address]  [place] [address]        Dear  Jozef MATTEO Saunders,    Here are the results from the recent Labs that we did.     Your cells under the microscope are consistent with iron deficiency. We will see what your iron level does with treatment with iron.     Let me know if you have questions or concerns!       Sincerely,       Arron Page MD   Internal Medicine and Pediatrics

## 2019-02-15 NOTE — PATIENT INSTRUCTIONS
1) Labs downstairs today, if iron still low, we will consider starting an iron supplement    2) Let me know if you need any other medications refilled.    3) Check on shingles vaccine at the pharmacy.     Arron Page MD    Preventive Health Recommendations  Male Ages 50 - 64    Yearly exam:             See your health care provider every year in order to  o   Review health changes.   o   Discuss preventive care.    o   Review your medicines if your doctor has prescribed any.     Have a cholesterol test every 5 years, or more frequently if you are at risk for high cholesterol/heart disease.     Have a diabetes test (fasting glucose) every three years. If you are at risk for diabetes, you should have this test more often.     Have a colonoscopy at age 50, or have a yearly FIT test (stool test). These exams will check for colon cancer.      Talk with your health care provider about whether or not a prostate cancer screening test (PSA) is right for you.    You should be tested each year for STDs (sexually transmitted diseases), if you re at risk.     Shots: Get a flu shot each year. Get a tetanus shot every 10 years.     Nutrition:    Eat at least 5 servings of fruits and vegetables daily.     Eat whole-grain bread, whole-wheat pasta and brown rice instead of white grains and rice.     Get adequate Calcium and Vitamin D.     Lifestyle    Exercise for at least 150 minutes a week (30 minutes a day, 5 days a week). This will help you control your weight and prevent disease.     Limit alcohol to one drink per day.     No smoking.     Wear sunscreen to prevent skin cancer.     See your dentist every six months for an exam and cleaning.     See your eye doctor every 1 to 2 years.

## 2019-02-16 LAB — FERRITIN SERPL-MCNC: 9 NG/ML (ref 26–388)

## 2019-02-18 LAB — COPATH REPORT: NORMAL

## 2019-02-25 ENCOUNTER — ANTICOAGULATION THERAPY VISIT (OUTPATIENT)
Dept: ANTICOAGULATION | Facility: CLINIC | Age: 64
End: 2019-02-25
Payer: COMMERCIAL

## 2019-02-25 DIAGNOSIS — I82.409 DVT (DEEP VENOUS THROMBOSIS) (H): ICD-10-CM

## 2019-02-25 DIAGNOSIS — Z79.01 LONG TERM CURRENT USE OF ANTICOAGULANT THERAPY: ICD-10-CM

## 2019-02-25 LAB — INR POINT OF CARE: 1.9 (ref 0.86–1.14)

## 2019-02-25 PROCEDURE — 85610 PROTHROMBIN TIME: CPT | Mod: QW

## 2019-02-25 PROCEDURE — 99207 ZZC NO CHARGE NURSE ONLY: CPT

## 2019-02-25 PROCEDURE — 36416 COLLJ CAPILLARY BLOOD SPEC: CPT

## 2019-02-25 NOTE — PROGRESS NOTES
ANTICOAGULATION FOLLOW-UP CLINIC VISIT    Patient Name:  Jozef Saunders  Date:  2019  Contact Type:  Face to Face    SUBJECTIVE:     Patient Findings     Positives:   No Problem Findings    Comments:   No changes in medications, diet, or activity. No concerns with bleeding or bruising. Took warfarin as prescribed.   Continue maintenance warfarin dose. Will recheck INR in 6 weeks.   Patient verbalizes understanding and agrees with plan. No further questions at this time.              OBJECTIVE    INR Protime   Date Value Ref Range Status   2019 1.9 (A) 0.86 - 1.14 Final       ASSESSMENT / PLAN  INR assessment THER +/- 0.1   Recheck INR In: 6 WEEKS    INR Location Clinic      Anticoagulation Summary  As of 2019    INR goal:   2.0-3.0   TTR:   85.5 % (3.9 y)   INR used for dosin.9! (2019)   Warfarin maintenance plan:   7.5 mg (5 mg x 1.5) every Mon, Thu; 5 mg (5 mg x 1) all other days   Full warfarin instructions:   7.5 mg every Mon, Thu; 5 mg all other days   Weekly warfarin total:   40 mg   No change documented:   Ashley Summers RN   Plan last modified:   Zena Ren RN (2018)   Next INR check:   2019   Target end date:   Indefinite    Indications    DVT (deep venous thrombosis) (H) [I82.409]  Long term current use of anticoagulant therapy [Z79.01]             Anticoagulation Episode Summary     INR check location:   Clinic Lab    Preferred lab:       Send INR reminders to:   [facility]    Comments:   * Dr. Page is from [place] in [place] / pt lives in [place], but works by [place]      Anticoagulation Care Providers     Provider Role Specialty Phone number    Arron Page MD [facility] Internal Medicine [phone]            See the Encounter Report to view Anticoagulation Flowsheet and Dosing Calendar (Go to Encounters tab in chart review, and find the Anticoagulation Therapy Visit)        Ashley Summers, MORTEZA

## 2019-04-05 DIAGNOSIS — Z86.711 HISTORY OF PULMONARY EMBOLISM: ICD-10-CM

## 2019-04-05 RX ORDER — WARFARIN SODIUM 5 MG/1
TABLET ORAL
Qty: 105 TABLET | Refills: 0 | OUTPATIENT
Start: 2019-04-05

## 2019-04-05 NOTE — TELEPHONE ENCOUNTER
"Requested Prescriptions   Pending Prescriptions Disp Refills     warfarin (COUMADIN) 5 MG tablet [Pharmacy Med Name: WARFARIN SODIUM 5 MG TABLET]  Last Written Prescription Date:  02/15/2019  Last Fill Quantity: 105  tablet,  # refills: 11   Last office visit: 2/15/2019 with prescribing provider:  Arron Page MD    Future Office Visit:     105 tablet 0     Sig: TAKE 7.5 MG ON MON, THU AND 5 MG ALL OTHER DAYS OR AS DIRECTED BY [facility]    Vitamin K Antagonists Failed - 4/5/2019  2:11 AM       Failed - INR is within goal in the past 6 weeks    Confirm INR is within goal in the past 6 weeks.     Recent Labs   Lab Test 02/25/19   INR 1.9*                      Passed - Recent (12 mo) or future (30 days) visit within the authorizing provider's specialty    Patient had office visit in the last 12 months or has a visit in the next 30 days with authorizing provider or within the authorizing provider's specialty.  See \"Patient Info\" tab in inbasket, or \"Choose Columns\" in Meds & Orders section of the refill encounter.             Passed - Medication is active on med list       Passed - Patient is 18 years of age or older          "

## 2019-04-05 NOTE — TELEPHONE ENCOUNTER
Patient has refills remaining with requesting pharmacy.  Radha SILVA RN - Triage  [facility]

## 2019-04-08 ENCOUNTER — ANTICOAGULATION THERAPY VISIT (OUTPATIENT)
Dept: ANTICOAGULATION | Facility: CLINIC | Age: 64
End: 2019-04-08
Payer: COMMERCIAL

## 2019-04-08 DIAGNOSIS — I82.409 DVT (DEEP VENOUS THROMBOSIS) (H): ICD-10-CM

## 2019-04-08 DIAGNOSIS — Z79.01 LONG TERM CURRENT USE OF ANTICOAGULANT THERAPY: ICD-10-CM

## 2019-04-08 LAB — INR POINT OF CARE: 1.7 (ref 0.86–1.14)

## 2019-04-08 PROCEDURE — 36416 COLLJ CAPILLARY BLOOD SPEC: CPT

## 2019-04-08 PROCEDURE — 99207 ZZC NO CHARGE NURSE ONLY: CPT

## 2019-04-08 PROCEDURE — 85610 PROTHROMBIN TIME: CPT | Mod: QW

## 2019-04-08 NOTE — PROGRESS NOTES
ANTICOAGULATION FOLLOW-UP CLINIC VISIT    Patient Name:  Jozef Saunders  Date:  2019  Contact Type:  Face to Face    SUBJECTIVE:     Patient Findings     Comments:   Patient had 40 mg in the previous 7 days, will increase dose to 42.5 mg by the next INR check on 19, which is a 6.3% increase. This is patient previous dose which was successful for years before dose lowered to 40 mg weekly.  No changes in medications or diet. No concerns with bleeding or bruising. Took warfarin as prescribed.   Pt activity increased, going on daily walks.   Patient verbalizes understanding and agrees with plan. No further questions at this time.              OBJECTIVE    INR Protime   Date Value Ref Range Status   2019 1.7 (A) 0.86 - 1.14 Final       ASSESSMENT / PLAN  INR assessment SUB    Recheck INR In: 2 WEEKS    INR Location Clinic      Anticoagulation Summary  As of 2019    INR goal:   2.0-3.0   TTR:   83.0 % (4 y)   INR used for dosin.7! (2019)   Warfarin maintenance plan:   7.5 mg (5 mg x 1.5) every Mon, Thu, Sat; 5 mg (5 mg x 1) all other days   Full warfarin instructions:   7.5 mg every Mon, Thu, Sat; 5 mg all other days   Weekly warfarin total:   42.5 mg   Plan last modified:   Ashley Summers RN (2019)   Next INR check:   2019   Target end date:   Indefinite    Indications    DVT (deep venous thrombosis) (H) [I82.409]  Long term current use of anticoagulant therapy [Z79.01]             Anticoagulation Episode Summary     INR check location:   Clinic Lab    Preferred lab:       Send INR reminders to:   [facility]    Comments:   * Dr. Page is from [place] in [place] / pt lives in [place], but works by [place]      Anticoagulation Care Providers     Provider Role Specialty Phone number    Arron Page MD [facility] Internal Medicine [phone]            See the Encounter Report to view Anticoagulation Flowsheet and Dosing Calendar (Go to Encounters tab in  chart review, and find the Anticoagulation Therapy Visit)        Ashley Summers RN

## 2019-04-22 ENCOUNTER — ANTICOAGULATION THERAPY VISIT (OUTPATIENT)
Dept: ANTICOAGULATION | Facility: CLINIC | Age: 64
End: 2019-04-22
Payer: COMMERCIAL

## 2019-04-22 DIAGNOSIS — I82.409 DVT (DEEP VENOUS THROMBOSIS) (H): ICD-10-CM

## 2019-04-22 DIAGNOSIS — Z79.01 LONG TERM CURRENT USE OF ANTICOAGULANT THERAPY: ICD-10-CM

## 2019-04-22 LAB — INR POINT OF CARE: 1.9 (ref 0.86–1.14)

## 2019-04-22 PROCEDURE — 36416 COLLJ CAPILLARY BLOOD SPEC: CPT

## 2019-04-22 PROCEDURE — 85610 PROTHROMBIN TIME: CPT | Mod: QW

## 2019-04-22 PROCEDURE — 99207 ZZC NO CHARGE NURSE ONLY: CPT

## 2019-04-22 NOTE — PROGRESS NOTES
ANTICOAGULATION FOLLOW-UP CLINIC VISIT    Patient Name:  Jozef Saunders  Date:  2019  Contact Type:  Face to Face    SUBJECTIVE:     Patient Findings     Comments:   No changes in medications, diet, or activity. No concerns with bleeding or bruising. Took warfarin as prescribed.   Continue maintenance warfarin dose. Will recheck INR in 4 weeks.   Patient verbalizes understanding and agrees with plan. No further questions at this time.              OBJECTIVE    INR Protime   Date Value Ref Range Status   2019 1.9 (A) 0.86 - 1.14 Final       ASSESSMENT / PLAN  INR assessment THER    Recheck INR In: 4 WEEKS    INR Location Clinic      Anticoagulation Summary  As of 2019    INR goal:   2.0-3.0   TTR:   82.2 % (4 y)   INR used for dosin.9! (2019)   Warfarin maintenance plan:   7.5 mg (5 mg x 1.5) every Mon, Thu, Sat; 5 mg (5 mg x 1) all other days   Full warfarin instructions:   7.5 mg every Mon, Thu, Sat; 5 mg all other days   Weekly warfarin total:   42.5 mg   No change documented:   Ashley Summers RN   Plan last modified:   Ashley Summers RN (2019)   Next INR check:   2019   Target end date:   Indefinite    Indications    DVT (deep venous thrombosis) (H) [I82.409]  Long term current use of anticoagulant therapy [Z79.01]             Anticoagulation Episode Summary     INR check location:   Clinic Lab    Preferred lab:       Send INR reminders to:   [facility]    Comments:   * Dr. Page is from [place] in [place] / pt lives in [place], but works by [place]      Anticoagulation Care Providers     Provider Role Specialty Phone number    Arron Page MD [facility] Internal Medicine [phone]            See the Encounter Report to view Anticoagulation Flowsheet and Dosing Calendar (Go to Encounters tab in chart review, and find the Anticoagulation Therapy Visit)        Ashley Summers RN

## 2019-05-20 ENCOUNTER — ANTICOAGULATION THERAPY VISIT (OUTPATIENT)
Dept: ANTICOAGULATION | Facility: CLINIC | Age: 64
End: 2019-05-20
Payer: COMMERCIAL

## 2019-05-20 DIAGNOSIS — I82.409 DVT (DEEP VENOUS THROMBOSIS) (H): ICD-10-CM

## 2019-05-20 DIAGNOSIS — Z79.01 LONG TERM CURRENT USE OF ANTICOAGULANT THERAPY: ICD-10-CM

## 2019-05-20 LAB — INR POINT OF CARE: 2.7 (ref 0.86–1.14)

## 2019-05-20 PROCEDURE — 85610 PROTHROMBIN TIME: CPT | Mod: QW

## 2019-05-20 PROCEDURE — 36416 COLLJ CAPILLARY BLOOD SPEC: CPT

## 2019-05-20 NOTE — PROGRESS NOTES
ANTICOAGULATION FOLLOW-UP CLINIC VISIT    Patient Name:  Jozef Saunders  Date:  2019  Contact Type:  Face to Face    SUBJECTIVE:  Patient Findings     Comments:   No changes in medications, diet, or activity. No concerns with bleeding or bruising. Took warfarin as prescribed.   Continue maintenance warfarin dose. Will recheck INR in 6 weeks.   Patient verbalizes understanding and agrees with plan. No further questions at this time.          Clinical Outcomes     Negatives:   Major bleeding event, Thromboembolic event, Anticoagulation-related hospital admission, Anticoagulation-related ED visit, Anticoagulation-related fatality    Comments:   No changes in medications, diet, or activity. No concerns with bleeding or bruising. Took warfarin as prescribed.   Continue maintenance warfarin dose. Will recheck INR in 6 weeks.   Patient verbalizes understanding and agrees with plan. No further questions at this time.             OBJECTIVE    INR Protime   Date Value Ref Range Status   2019 2.7 (A) 0.86 - 1.14 Final       ASSESSMENT / PLAN  INR assessment THER    Recheck INR In: 6 WEEKS    INR Location Clinic      Anticoagulation Summary  As of 2019    INR goal:   2.0-3.0   TTR:   82.3 % (4.1 y)   INR used for dosin.7 (2019)   Warfarin maintenance plan:   7.5 mg (5 mg x 1.5) every Mon, Thu, Sat; 5 mg (5 mg x 1) all other days   Full warfarin instructions:   7.5 mg every Mon, Thu, Sat; 5 mg all other days   Weekly warfarin total:   42.5 mg   No change documented:   Ashley Summers RN   Plan last modified:   Ashley Summers RN (2019)   Next INR check:   2019   Target end date:   Indefinite    Indications    DVT (deep venous thrombosis) (H) [I82.409]  Long term current use of anticoagulant therapy [Z79.01]             Anticoagulation Episode Summary     INR check location:   Clinic Lab    Preferred lab:       Send INR reminders to:   [facility]    Comments:   * Dr. Page is  from [place] in [place] / pt lives in [place], but works by [place]      Anticoagulation Care Providers     Provider Role Specialty Phone number    [place], Arron More MD [facility] Internal Medicine [phone]            See the Encounter Report to view Anticoagulation Flowsheet and Dosing Calendar (Go to Encounters tab in chart review, and find the Anticoagulation Therapy Visit)        Ashley Summers RN

## 2019-06-23 DIAGNOSIS — E78.00 HYPERCHOLESTEROLEMIA: ICD-10-CM

## 2019-06-23 DIAGNOSIS — F41.1 GENERALIZED ANXIETY DISORDER: ICD-10-CM

## 2019-06-24 NOTE — TELEPHONE ENCOUNTER
Prescription approved per [facility] Refill Protocol.  Reza Kendrick, RN, BSN

## 2019-07-01 ENCOUNTER — ANTICOAGULATION THERAPY VISIT (OUTPATIENT)
Dept: ANTICOAGULATION | Facility: CLINIC | Age: 64
End: 2019-07-01
Payer: COMMERCIAL

## 2019-07-01 DIAGNOSIS — I82.409 DVT (DEEP VENOUS THROMBOSIS) (H): ICD-10-CM

## 2019-07-01 DIAGNOSIS — Z79.01 LONG TERM CURRENT USE OF ANTICOAGULANT THERAPY: ICD-10-CM

## 2019-07-01 LAB — INR POINT OF CARE: 2.4 (ref 0.86–1.14)

## 2019-07-01 PROCEDURE — 85610 PROTHROMBIN TIME: CPT | Mod: QW

## 2019-07-01 PROCEDURE — 99207 ZZC NO CHARGE NURSE ONLY: CPT

## 2019-07-01 PROCEDURE — 36416 COLLJ CAPILLARY BLOOD SPEC: CPT

## 2019-07-01 NOTE — PROGRESS NOTES
ANTICOAGULATION FOLLOW-UP CLINIC VISIT    Patient Name:  Jozef Saunders  Date:  2019  Contact Type:  Face to Face    SUBJECTIVE:  Patient Findings     Comments:   Patient reports no changes in medication, activity, or diet. Patient reports has taken warfarin as instructed, no missed doses. Patient reports no abnormal bruising or bleeding and no signs or symptoms of a blood clot. Will plan to continue maintenance dose and recheck INR in 6 week. Patient to call [facility] with any changes or concerns. Patient verbalized understanding of all instructions, denies questions or concerns at this time.             Clinical Outcomes     Negatives:   Major bleeding event, Thromboembolic event, Anticoagulation-related hospital admission, Anticoagulation-related ED visit, Anticoagulation-related fatality    Comments:   Patient reports no changes in medication, activity, or diet. Patient reports has taken warfarin as instructed, no missed doses. Patient reports no abnormal bruising or bleeding and no signs or symptoms of a blood clot. Will plan to continue maintenance dose and recheck INR in 6 week. Patient to call [facility] with any changes or concerns. Patient verbalized understanding of all instructions, denies questions or concerns at this time.                OBJECTIVE    INR Protime   Date Value Ref Range Status   2019 2.4 (A) 0.86 - 1.14 Final       ASSESSMENT / PLAN  INR assessment THER    Recheck INR In: 6 WEEKS    INR Location Clinic      Anticoagulation Summary  As of 2019    INR goal:   2.0-3.0   TTR:   82.8 % (4.2 y)   INR used for dosin.4 (2019)   Warfarin maintenance plan:   7.5 mg (5 mg x 1.5) every Mon, Thu, Sat; 5 mg (5 mg x 1) all other days   Full warfarin instructions:   7.5 mg every Mon, Thu, Sat; 5 mg all other days   Weekly warfarin total:   42.5 mg   No change documented:   Tena Meeks RN   Plan last modified:   Ashley Summers RN (2019)   Next INR check:   2019   Target  end date:   Indefinite    Indications    DVT (deep venous thrombosis) (H) [I82.409]  Long term current use of anticoagulant therapy [Z79.01]             Anticoagulation Episode Summary     INR check location:   Clinic Lab    Preferred lab:       Send INR reminders to:   [facility]    Comments:   * Dr. Page is from [place] in [place] / pt lives in [place], but works by [place]      Anticoagulation Care Providers     Provider Role Specialty Phone number    Arron Page MD [facility] Internal Medicine [phone]            See the Encounter Report to view Anticoagulation Flowsheet and Dosing Calendar (Go to Encounters tab in chart review, and find the Anticoagulation Therapy Visit)      Tena Meeks RN

## 2019-07-02 DIAGNOSIS — F41.1 GENERALIZED ANXIETY DISORDER: ICD-10-CM

## 2019-07-02 DIAGNOSIS — E78.00 HYPERCHOLESTEROLEMIA: ICD-10-CM

## 2019-07-03 NOTE — TELEPHONE ENCOUNTER
"Requested Prescriptions   Pending Prescriptions Disp Refills     sertraline (ZOLOFT) 50 MG tablet  Last Written Prescription Date:  06/24/2019  Last Fill Quantity: 45 tablet,  # refills: 1   Last Office Visit: 2/15/2019 Arron Page MD  Future Office Visit:      45 tablet 1     Sig: TAKE 0.5 TABLETS (25 MG) BY MOUTH DAILY       SSRIs Protocol Passed - 7/2/2019  6:11 PM        Passed - Recent (12 mo) or future (30 days) visit within the authorizing provider's specialty     Patient had office visit in the last 12 months or has a visit in the next 30 days with authorizing provider or within the authorizing provider's specialty.  See \"Patient Info\" tab in inbasket, or \"Choose Columns\" in Meds & Orders section of the refill encounter.              Passed - Medication is active on med list        Passed - Patient is age 18 or older      Denied  Refills current  Lynne Zacarias RN        "

## 2019-08-12 ENCOUNTER — ANTICOAGULATION THERAPY VISIT (OUTPATIENT)
Dept: ANTICOAGULATION | Facility: CLINIC | Age: 64
End: 2019-08-12
Payer: COMMERCIAL

## 2019-08-12 DIAGNOSIS — I82.409 DVT (DEEP VENOUS THROMBOSIS) (H): ICD-10-CM

## 2019-08-12 DIAGNOSIS — Z79.01 LONG TERM CURRENT USE OF ANTICOAGULANT THERAPY: ICD-10-CM

## 2019-08-12 LAB — INR POINT OF CARE: 2.1 (ref 0.86–1.14)

## 2019-08-12 PROCEDURE — 85610 PROTHROMBIN TIME: CPT | Mod: QW

## 2019-08-12 PROCEDURE — 36416 COLLJ CAPILLARY BLOOD SPEC: CPT

## 2019-08-12 PROCEDURE — 99207 ZZC NO CHARGE NURSE ONLY: CPT

## 2019-08-12 NOTE — PROGRESS NOTES
ANTICOAGULATION FOLLOW-UP CLINIC VISIT    Patient Name:  Jozef Saunders  Date:  2019  Contact Type:  Face to Face    SUBJECTIVE:  Patient Findings     Positives:   Change in diet/appetite (ate salads several times this weekend)    Comments:   Assessed for S/S clots, bleeding, changes in meds and health        Clinical Outcomes     Negatives:   Major bleeding event, Thromboembolic event, Anticoagulation-related hospital admission, Anticoagulation-related ED visit, Anticoagulation-related fatality    Comments:   Assessed for S/S clots, bleeding, changes in meds and health           OBJECTIVE    INR Protime   Date Value Ref Range Status   2019 2.1 (A) 0.86 - 1.14 Final       ASSESSMENT / PLAN  INR assessment THER    Recheck INR In: 6 WEEKS    INR Location Clinic      Anticoagulation Summary  As of 2019    INR goal:   2.0-3.0   TTR:   83.3 % (4.4 y)   INR used for dosin.1 (2019)   Warfarin maintenance plan:   7.5 mg (5 mg x 1.5) every Mon, Thu, Sat; 5 mg (5 mg x 1) all other days   Full warfarin instructions:   7.5 mg every Mon, Thu, Sat; 5 mg all other days   Weekly warfarin total:   42.5 mg   No change documented:   Sri Guidry [facility]   Plan last modified:   Ashley Summers RN (2019)   Next INR check:   2019   Target end date:   Indefinite    Indications    DVT (deep venous thrombosis) (H) [I82.409]  Long term current use of anticoagulant therapy [Z79.01]             Anticoagulation Episode Summary     INR check location:   Clinic Lab    Preferred lab:       Send INR reminders to:   [facility]    Comments:   * Dr. Page is from [place] in [place] / pt lives in [place], but works by [place]      Anticoagulation Care Providers     Provider Role Specialty Phone number    Arron Page MD [facility] Internal Medicine [phone]            See the Encounter Report to view Anticoagulation Flowsheet and Dosing Calendar (Go to Encounters tab in chart review,  and find the Anticoagulation Therapy Visit)    No changes, recheck in 6 weeks.    Sri Guidry [facility]

## 2019-09-16 ENCOUNTER — ANTICOAGULATION THERAPY VISIT (OUTPATIENT)
Dept: NURSING | Facility: CLINIC | Age: 64
End: 2019-09-16
Payer: COMMERCIAL

## 2019-09-16 DIAGNOSIS — Z79.01 LONG TERM CURRENT USE OF ANTICOAGULANT THERAPY: ICD-10-CM

## 2019-09-16 DIAGNOSIS — I82.409 DVT (DEEP VENOUS THROMBOSIS) (H): ICD-10-CM

## 2019-09-16 LAB — INR POINT OF CARE: 2.4 (ref 0.86–1.14)

## 2019-09-16 PROCEDURE — 36416 COLLJ CAPILLARY BLOOD SPEC: CPT

## 2019-09-16 PROCEDURE — 85610 PROTHROMBIN TIME: CPT | Mod: QW

## 2019-09-16 PROCEDURE — 99207 ZZC NO CHARGE NURSE ONLY: CPT

## 2019-09-16 NOTE — PROGRESS NOTES
ANTICOAGULATION FOLLOW-UP CLINIC VISIT    Patient Name:  Jozef Saunders  Date:  2019  Contact Type:  Face to Face    SUBJECTIVE:  Patient Findings     Comments:   Patient denies any changes. No changes of health, bleeding and clotting.        Clinical Outcomes     Negatives:   Major bleeding event, Thromboembolic event, Anticoagulation-related hospital admission, Anticoagulation-related ED visit, Anticoagulation-related fatality    Comments:   Patient denies any changes. No changes of health, bleeding and clotting.           OBJECTIVE    INR Protime   Date Value Ref Range Status   2019 2.4 (A) 0.86 - 1.14 Final       ASSESSMENT / PLAN  INR assessment THER    Recheck INR In: 5 WEEKS    INR Location Clinic      Anticoagulation Summary  As of 2019    INR goal:   2.0-3.0   TTR:   83.6 % (4.5 y)   INR used for dosin.4 (2019)   Warfarin maintenance plan:   7.5 mg (5 mg x 1.5) every Mon, Thu, Sat; 5 mg (5 mg x 1) all other days   Full warfarin instructions:   7.5 mg every Mon, Thu, Sat; 5 mg all other days   Weekly warfarin total:   42.5 mg   No change documented:   Nicole Sosa RN   Plan last modified:   Ashley Summers RN (2019)   Next INR check:   10/21/2019   Target end date:   Indefinite    Indications    DVT (deep venous thrombosis) (H) [I82.409]  Long term current use of anticoagulant therapy [Z79.01]             Anticoagulation Episode Summary     INR check location:   Clinic Lab    Preferred lab:       Send INR reminders to:   [facility]    Comments:   * Dr. Page is from [place] in [place] / pt lives in [place], but works by [place]      Anticoagulation Care Providers     Provider Role Specialty Phone number    Arron Page MD [facility] Internal Medicine [phone]            See the Encounter Report to view Anticoagulation Flowsheet and Dosing Calendar (Go to Encounters tab in chart review, and find the Anticoagulation Therapy Visit)    Dosage  adjustment made based on physician directed care plan.    Nicole Sosa RN

## 2019-09-23 ENCOUNTER — TRANSFERRED RECORDS (OUTPATIENT)
Dept: HEALTH INFORMATION MANAGEMENT | Facility: CLINIC | Age: 64
End: 2019-09-23

## 2019-10-21 ENCOUNTER — ANTICOAGULATION THERAPY VISIT (OUTPATIENT)
Dept: NURSING | Facility: CLINIC | Age: 64
End: 2019-10-21
Payer: COMMERCIAL

## 2019-10-21 ENCOUNTER — OFFICE VISIT (OUTPATIENT)
Dept: PEDIATRICS | Facility: CLINIC | Age: 64
End: 2019-10-21
Payer: COMMERCIAL

## 2019-10-21 VITALS
TEMPERATURE: 98.7 F | BODY MASS INDEX: 29.05 KG/M2 | RESPIRATION RATE: 12 BRPM | WEIGHT: 196.7 LBS | OXYGEN SATURATION: 98 % | DIASTOLIC BLOOD PRESSURE: 84 MMHG | HEART RATE: 66 BPM | SYSTOLIC BLOOD PRESSURE: 126 MMHG

## 2019-10-21 DIAGNOSIS — Z86.711 HISTORY OF PULMONARY EMBOLISM: ICD-10-CM

## 2019-10-21 DIAGNOSIS — I82.409 DVT (DEEP VENOUS THROMBOSIS) (H): ICD-10-CM

## 2019-10-21 DIAGNOSIS — Z01.818 PREOP GENERAL PHYSICAL EXAM: Primary | ICD-10-CM

## 2019-10-21 DIAGNOSIS — D69.6 THROMBOCYTOPENIA (H): ICD-10-CM

## 2019-10-21 DIAGNOSIS — Z79.01 LONG TERM CURRENT USE OF ANTICOAGULANT THERAPY: Primary | ICD-10-CM

## 2019-10-21 DIAGNOSIS — K44.9 HIATAL HERNIA: ICD-10-CM

## 2019-10-21 DIAGNOSIS — I82.499 DEEP VEIN THROMBOSIS (DVT) OF OTHER VEIN OF LOWER EXTREMITY, UNSPECIFIED CHRONICITY, UNSPECIFIED LATERALITY (H): ICD-10-CM

## 2019-10-21 DIAGNOSIS — Z23 NEED FOR PROPHYLACTIC VACCINATION AND INOCULATION AGAINST INFLUENZA: ICD-10-CM

## 2019-10-21 DIAGNOSIS — Z79.01 LONG TERM CURRENT USE OF ANTICOAGULANT THERAPY: ICD-10-CM

## 2019-10-21 DIAGNOSIS — I10 ESSENTIAL HYPERTENSION, BENIGN: ICD-10-CM

## 2019-10-21 DIAGNOSIS — D50.0 IRON DEFICIENCY ANEMIA DUE TO CHRONIC BLOOD LOSS: ICD-10-CM

## 2019-10-21 DIAGNOSIS — I82.411 ACUTE DEEP VEIN THROMBOSIS (DVT) OF FEMORAL VEIN OF RIGHT LOWER EXTREMITY (H): ICD-10-CM

## 2019-10-21 LAB
ERYTHROCYTE [DISTWIDTH] IN BLOOD BY AUTOMATED COUNT: 15.9 % (ref 10–15)
HCT VFR BLD AUTO: 41.4 % (ref 40–53)
HGB BLD-MCNC: 12.9 G/DL (ref 13.3–17.7)
INR POINT OF CARE: 2.5 (ref 0.86–1.14)
MCH RBC QN AUTO: 24.7 PG (ref 26.5–33)
MCHC RBC AUTO-ENTMCNC: 31.2 G/DL (ref 31.5–36.5)
MCV RBC AUTO: 79 FL (ref 78–100)
PLATELET # BLD AUTO: 110 10E9/L (ref 150–450)
RBC # BLD AUTO: 5.22 10E12/L (ref 4.4–5.9)
WBC # BLD AUTO: 5.3 10E9/L (ref 4–11)

## 2019-10-21 PROCEDURE — 80048 BASIC METABOLIC PNL TOTAL CA: CPT | Performed by: INTERNAL MEDICINE

## 2019-10-21 PROCEDURE — 93000 ELECTROCARDIOGRAM COMPLETE: CPT | Performed by: INTERNAL MEDICINE

## 2019-10-21 PROCEDURE — 85610 PROTHROMBIN TIME: CPT | Mod: QW

## 2019-10-21 PROCEDURE — 85027 COMPLETE CBC AUTOMATED: CPT | Performed by: INTERNAL MEDICINE

## 2019-10-21 PROCEDURE — 90471 IMMUNIZATION ADMIN: CPT | Performed by: INTERNAL MEDICINE

## 2019-10-21 PROCEDURE — 36416 COLLJ CAPILLARY BLOOD SPEC: CPT

## 2019-10-21 PROCEDURE — 90662 IIV NO PRSV INCREASED AG IM: CPT | Performed by: INTERNAL MEDICINE

## 2019-10-21 PROCEDURE — 99215 OFFICE O/P EST HI 40 MIN: CPT | Mod: 25 | Performed by: INTERNAL MEDICINE

## 2019-10-21 SDOH — ECONOMIC STABILITY: FOOD INSECURITY: WITHIN THE PAST 12 MONTHS, YOU WORRIED THAT YOUR FOOD WOULD RUN OUT BEFORE YOU GOT MONEY TO BUY MORE.: NEVER TRUE

## 2019-10-21 SDOH — HEALTH STABILITY: MENTAL HEALTH: HOW OFTEN DO YOU HAVE 6 OR MORE DRINKS ON ONE OCCASION?: PATIENT DECLINED

## 2019-10-21 SDOH — ECONOMIC STABILITY: INCOME INSECURITY: HOW HARD IS IT FOR YOU TO PAY FOR THE VERY BASICS LIKE FOOD, HOUSING, MEDICAL CARE, AND HEATING?: NOT HARD AT ALL

## 2019-10-21 SDOH — HEALTH STABILITY: PHYSICAL HEALTH: ON AVERAGE, HOW MANY DAYS PER WEEK DO YOU ENGAGE IN MODERATE TO STRENUOUS EXERCISE (LIKE A BRISK WALK)?: 4 DAYS

## 2019-10-21 SDOH — HEALTH STABILITY: MENTAL HEALTH: HOW MANY STANDARD DRINKS CONTAINING ALCOHOL DO YOU HAVE ON A TYPICAL DAY?: PATIENT DECLINED

## 2019-10-21 SDOH — ECONOMIC STABILITY: FOOD INSECURITY: WITHIN THE PAST 12 MONTHS, THE FOOD YOU BOUGHT JUST DIDN'T LAST AND YOU DIDN'T HAVE MONEY TO GET MORE.: NEVER TRUE

## 2019-10-21 SDOH — SOCIAL STABILITY: SOCIAL NETWORK: IN A TYPICAL WEEK, HOW MANY TIMES DO YOU TALK ON THE PHONE WITH FAMILY, FRIENDS, OR NEIGHBORS?: TWICE A WEEK

## 2019-10-21 SDOH — ECONOMIC STABILITY: TRANSPORTATION INSECURITY
IN THE PAST 12 MONTHS, HAS LACK OF TRANSPORTATION KEPT YOU FROM MEETINGS, WORK, OR FROM GETTING THINGS NEEDED FOR DAILY LIVING?: NO

## 2019-10-21 SDOH — HEALTH STABILITY: MENTAL HEALTH: HOW OFTEN DO YOU HAVE A DRINK CONTAINING ALCOHOL?: NEVER

## 2019-10-21 SDOH — SOCIAL STABILITY: SOCIAL NETWORK: ARE YOU MARRIED, WIDOWED, DIVORCED, SEPARATED, NEVER MARRIED, OR LIVING WITH A PARTNER?: MARRIED

## 2019-10-21 SDOH — SOCIAL STABILITY: SOCIAL NETWORK: HOW OFTEN DO YOU GET TOGETHER WITH FRIENDS OR RELATIVES?: THREE TIMES A WEEK

## 2019-10-21 SDOH — HEALTH STABILITY: PHYSICAL HEALTH: ON AVERAGE, HOW MANY MINUTES DO YOU ENGAGE IN EXERCISE AT THIS LEVEL?: 40 MIN

## 2019-10-21 SDOH — HEALTH STABILITY: MENTAL HEALTH
STRESS IS WHEN SOMEONE FEELS TENSE, NERVOUS, ANXIOUS, OR CAN'T SLEEP AT NIGHT BECAUSE THEIR MIND IS TROUBLED. HOW STRESSED ARE YOU?: NOT AT ALL

## 2019-10-21 SDOH — SOCIAL STABILITY: SOCIAL NETWORK
DO YOU BELONG TO ANY CLUBS OR ORGANIZATIONS SUCH AS CHURCH GROUPS UNIONS, FRATERNAL OR ATHLETIC GROUPS, OR SCHOOL GROUPS?: NO

## 2019-10-21 SDOH — SOCIAL STABILITY: SOCIAL NETWORK: HOW OFTEN DO YOU ATTENT MEETINGS OF THE CLUB OR ORGANIZATION YOU BELONG TO?: PATIENT DECLINED

## 2019-10-21 SDOH — ECONOMIC STABILITY: TRANSPORTATION INSECURITY
IN THE PAST 12 MONTHS, HAS THE LACK OF TRANSPORTATION KEPT YOU FROM MEDICAL APPOINTMENTS OR FROM GETTING MEDICATIONS?: NO

## 2019-10-21 SDOH — SOCIAL STABILITY: SOCIAL NETWORK: HOW OFTEN DO YOU ATTEND CHURCH OR RELIGIOUS SERVICES?: MORE THAN 4 TIMES PER YEAR

## 2019-10-21 NOTE — LETTER
[facility]  [address]  [place] [address]                  [phone]   October 22, 2019    Jozef Saunders  [address]  [place] [address]      Dear Jozef,    Here is a summary of your recent test results:    Your electrolytes and kidney function were normal.     Your blood counts are relatively stable - your hemoglobin is slightly higher than it's been before. You've had mildly low platelets for some time but not low enough that I would expect bleeding complications.     I would recommend that we recheck these when we set you next - we may need to do some additional testing if your blood counts do not improve after the surgery.     Your test results are enclosed.      Please contact me if you have any questions.           Thank you very much for choosing [facility]    Best regardsJIGNA MD        Results for orders placed or performed in visit on 10/21/19   CBC with platelets   Result Value Ref Range    WBC 5.3 4.0 - 11.0 10e9/L    RBC Count 5.22 4.4 - 5.9 10e12/L    Hemoglobin 12.9 (L) 13.3 - 17.7 g/dL    Hematocrit 41.4 40.0 - 53.0 %    MCV 79 78 - 100 fl    MCH 24.7 (L) 26.5 - 33.0 pg    MCHC 31.2 (L) 31.5 - 36.5 g/dL    RDW 15.9 (H) 10.0 - 15.0 %    Platelet Count 110 (L) 150 - 450 10e9/L   Basic metabolic panel   Result Value Ref Range    Sodium 141 133 - 144 mmol/L    Potassium 4.3 3.4 - 5.3 mmol/L    Chloride 112 (H) 94 - 109 mmol/L    Carbon Dioxide 26 20 - 32 mmol/L    Anion Gap 3 3 - 14 mmol/L    Glucose 79 70 - 99 mg/dL    Urea Nitrogen 20 7 - 30 mg/dL    Creatinine 0.80 0.66 - 1.25 mg/dL    GFR Estimate >90 >60 mL/min/[1.73_m2]    GFR Estimate If Black >90 >60 mL/min/[1.73_m2]    Calcium 8.5 8.5 - 10.1 mg/dL

## 2019-10-21 NOTE — PROGRESS NOTES
[facility]  [address]  [address]  [place] [address]  [phone]  Dept: [phone]    PRE-OP EVALUATION:  Today's date: 10/21/2019    Jozef Saunders (: 1955) presents for pre-operative evaluation assessment as requested by Dr. Roa.  He requires evaluation and anesthesia risk assessment prior to undergoing surgery/procedure for treatment of hiatal hernia    Fax number for surgical facility: [facility]  Primary Physician: Arron Page  Type of Anesthesia Anticipated: General    Patient has a Health Care Directive or Living Will:  NO    Preop Questions 10/21/2019   Who is doing your surgery? dr shahram harden   What are you having done? hyadl hernia   Date of Surgery/Procedure: 19 hernia   Facility or Hospital where procedure/surgery [facility] be performed[facility]   1.  Do you have a history of Heart attack, stroke, stent, coronary bypass surgery, or other heart surgery? No   2.  Do you ever have any pain or discomfort in your chest? No   3.  Do you have a history of  Heart Failure? No   4.   Are you troubled by shortness of breath when:  walking on a level surface, or up a slight hill, or at night? No   5.  Do you currently have a cold, bronchitis or other respiratory infection? No   6.  Do you have a cough, shortness of breath, or wheezing? No   7.  Do you sometimes get pains in the calves of your legs when you walk? No   8. Do you or anyone in your family have previous history of blood clots? YES -Pt has a hx of blood clots   9.  Do you or does anyone in your family have a serious bleeding problem such as prolonged bleeding following surgeries or cuts? YES -pt takes warfarin   10. Have you ever had problems with anemia or been told to take iron pills? No   11. Have you had any abnormal blood loss such as black, tarry or bloody stools? No   12. Have you ever had a blood transfusion? No   13. Have you or any of your relatives  ever had problems with anesthesia? No   14. Do you have sleep apnea, excessive snoring or daytime drowsiness? No   15. Do you have any prosthetic heart valves? No   16. Do you have prosthetic joints? No       HPI:     HPI related to upcoming procedure: Hiatal hernia first detected about 15 years ago. Had upper GI and they told him if no symptoms would just watch. Last January had another upper endoscopy and they said the hernia had increased in size - recommended surgery. Saw Surgeon in Jan/Feb but wasn't having symptoms- now retired so the timing is better but is now having some symptoms of feeling bloated all the time and now uncomfortable to bend over.     RECURRENT DVT/PE - Unprovoked PE in 2010 treated with warfarin for 6 months. He had a negative hypercoaguable workup at the time including negative protein C, protein S, normal factors 2 and 5 without mutation, normal homocysteine, antithrombin III, and negative cardiolipin antibodies. Second unprovoked DVT in 2014 - planned for lifelong anticoagulation. Doing well on coumadin.     IRON DEFICIENT ANEMIA - Stable. No bleeding that he has noticed. Thought to be possibly due to his hiatal hernia. Not currently on iron supplementation.    Anxiety- Patient has a long history of Anxiety of moderate severity requiring medication for control with recent symptoms being stable.    HYPERLIPIDEMIA - Patient has a long history of significant Hyperlipidemia requiring medication for treatment with recent good control. Patient reports no problems or side effects with the medication.     HYPERTENSION - Patient has longstanding history of HTN , currently denies any symptoms referable to elevated blood pressure. Specifically denies chest pain, palpitations, dyspnea, orthopnea, PND or peripheral edema. Blood pressure readings have been in normal range. Current medication regimen is as listed below. Patient denies any side effects of medication.   BP Readings from Last 6  Encounters:   10/21/19 126/84   02/15/19 124/80   12/15/18 122/84   10/11/18 122/80   07/15/18 108/90   01/03/18 114/68     ANEMIA/THROMBOCYTOPENIA - Chronic, stable. Thought to be possibly related to hiatal hernia while on anticoagulation. Outpatient workup will continue after hernia repair complete.     MEDICAL HISTORY:     Patient Active Problem List    Diagnosis Date Noted     Thrombocytopenia (H) 10/22/2019     Priority: Medium     Alcohol dependence in remission (H) 10/11/2018     Priority: Medium     Thrush 07/15/2018     Priority: Medium     Long term current use of anticoagulant therapy 03/26/2015     Priority: Medium     Problem list name updated by automated process. Provider to review       DVT (deep venous thrombosis) (H) 10/24/2014     Priority: Medium     History of pulmonary embolism 10/24/2014     Priority: Medium     2010 first episode, then had again after that, continuing on indefin       Recurrent periodic urticaria 01/11/2012     Priority: Medium     Idiopathic at this point.  Has seen Dr. Riojas at [facility]       Advanced directives, counseling/discussion 08/29/2011     Priority: Medium     Advance Directive Problem List Overview:   Name Relationship Phone    Primary Health Care Agent            Alternative Health Care Agent          Discussed advance care planning with patient; however, patient declined at this time. 8/29/2011          Hiatal hernia 04/05/2011     Priority: Medium     HYPERLIPIDEMIA LDL GOAL <130 02/10/2010     Priority: Medium     Hypercholesterolemia 03/18/2009     Priority: Medium     Cardiac Risk Factors: male over 45, HTN, family h/o CAD; goal LDL < 130;   10-year CV risk (8/27/2014) based on ACC/AHA risk calculator = 15.4%        Generalized anxiety disorder 12/22/2004     Priority: Medium     Essential hypertension, benign 12/09/2004     Priority: Medium      Past Medical History:   Diagnosis Date     Iron deficiency anemia 11/16/2010    Probably due to  warfarin and hiatal hernia resulting in occult GI bleed     Pulmonary embolism (H) 2010     Recovering Alcoholic      Past Surgical History:   Procedure Laterality Date     HC ARTHROTOMY/EXPLORE/TREAT KNEE JOINT      torn ligaments     HC REPAIR ING HERNIA,5+Y/O,REDUCIBL      age 8 - right     HC REPAIR ING HERNIA,6MO-5YR,REDUC      infancy - left     Current Outpatient Medications   Medication Sig Dispense Refill     atenolol (TENORMIN) 50 MG tablet TAKE 1.5 TABLETS (75 MG) BY MOUTH DAILY 135 tablet 11     fluticasone (FLONASE) 50 MCG/ACT nasal spray SPRAY 2 SPRAYS INTO BOTH NOSTRILS DAILY. DUE FOR APPT AND LABS 16 mL 11     levocetirizine (XYZAL) 5 MG tablet Take 1 tablet by mouth every evening.       lisinopril (PRINIVIL/ZESTRIL) 20 MG tablet TAKE 1 TABLET (20 MG) BY MOUTH DAILY 90 tablet 11     loratadine (CLARITIN) 10 MG tablet Take 10 mg by mouth daily       omeprazole (PRILOSEC) 20 MG DR capsule TAKE 1 TABLET (20 MG) BY MOUTH DAILY TAKE 30-60 MINUTES BEFORE A MEAL. 90 capsule 11     sertraline (ZOLOFT) 50 MG tablet TAKE 0.5 TABLETS (25 MG) BY MOUTH DAILY 45 tablet 1     simvastatin (ZOCOR) 20 MG tablet TAKE 1 TABLET (20 MG) BY MOUTH AT BEDTIME. 90 tablet 11     warfarin (COUMADIN) 5 MG tablet TAKE 7.5 MG ON MON, THU AND 5 MG ALL OTHER DAYS OR AS DIRECTED BY  tablet 11     enoxaparin (LOVENOX) 40 MG/0.4ML syringe Inject 0.4 mLs (40 mg) Subcutaneous every 24 hours 10 Syringe 1     OTC products: Aleve rarely     Allergies   Allergen Reactions     Clindamycin Hcl Hives     Heparin      Heparin-induced thrombocytopenia      Latex Allergy: NO    Social History     Tobacco Use     Smoking status: Former Smoker     Last attempt to quit: 1989     Years since quittin.9     Smokeless tobacco: Never Used   Substance Use Topics     Alcohol use: No     Frequency: Never     Drinks per session: Patient refused     Binge frequency: Patient refused     Comment: sober x26y     History   Drug Use No        REVIEW OF SYSTEMS:   CONSTITUTIONAL: NEGATIVE for fever, chills, change in weight  INTEGUMENTARY/SKIN: NEGATIVE for worrisome rashes, moles or lesions  EYES: NEGATIVE for vision changes or irritation  ENT/MOUTH: NEGATIVE for ear, mouth and throat problems  RESP: NEGATIVE for significant cough or SOB  BREAST: NEGATIVE for masses, tenderness or discharge  CV: NEGATIVE for chest pain, palpitations or peripheral edema  GI: See HPI.  : NEGATIVE for frequency, dysuria, or hematuria  MUSCULOSKELETAL: NEGATIVE for significant arthralgias or myalgia  NEURO: NEGATIVE for weakness, dizziness or paresthesias  ENDOCRINE: NEGATIVE for temperature intolerance, skin/hair changes  HEME: NEGATIVE for bleeding problems  PSYCHIATRIC: NEGATIVE for changes in mood or affect    EXAM:   /84   Pulse 66   Temp 98.7  F (37.1  C) (Oral)   Resp 12   Wt 89.2 kg (196 lb 11.2 oz)   SpO2 98%   BMI 29.05 kg/m         GENERAL APPEARANCE: healthy, alert and no distress     EYES: EOMI,  PERRL     HENT: ear canals and TM's normal and nose and mouth without ulcers or lesions     NECK: no adenopathy, no asymmetry, masses, or scars and thyroid normal to palpation     RESP: lungs clear to auscultation - no rales, rhonchi or wheezes     CV: regular rates and rhythm, normal S1 S2, no S3 or S4 and no murmur, click or rub     ABDOMEN:  soft, nontender, no HSM or masses and bowel sounds normal     MS: extremities normal- no gross deformities noted, no evidence of inflammation in joints, FROM in all extremities.     SKIN: no suspicious lesions or rashes     NEURO: Normal strength and tone, sensory exam grossly normal, mentation intact and speech normal     PSYCH: mentation appears normal. and affect normal/bright     LYMPHATICS: No cervical adenopathy    DIAGNOSTICS:   EKG: sinus bradycardia, normal axis, normal intervals, no acute ST/T changes c/w ischemia, no LVH by voltage criteria, unchanged from previous tracings     Ref. Range 10/21/2019  12:16   Sodium Latest Ref Range: 133 - 144 mmol/L 141   Potassium Latest Ref Range: 3.4 - 5.3 mmol/L 4.3   Chloride Latest Ref Range: 94 - 109 mmol/L 112 (H)   Carbon Dioxide Latest Ref Range: 20 - 32 mmol/L 26   Urea Nitrogen Latest Ref Range: 7 - 30 mg/dL 20   Creatinine Latest Ref Range: 0.66 - 1.25 mg/dL 0.80   GFR Estimate Latest Ref Range: >60 mL/min/1.73_m2 >90   GFR Estimate If Black Latest Ref Range: >60 mL/min/1.73_m2 >90   Calcium Latest Ref Range: 8.5 - 10.1 mg/dL 8.5   Anion Gap Latest Ref Range: 3 - 14 mmol/L 3   Glucose Latest Ref Range: 70 - 99 mg/dL 79   WBC Latest Ref Range: 4.0 - 11.0 10e9/L 5.3   Hemoglobin Latest Ref Range: 13.3 - 17.7 g/dL 12.9 (L)   Hematocrit Latest Ref Range: 40.0 - 53.0 % 41.4   Platelet Count Latest Ref Range: 150 - 450 10e9/L 110 (L)   RBC Count Latest Ref Range: 4.4 - 5.9 10e12/L 5.22   MCV Latest Ref Range: 78 - 100 fl 79   MCH Latest Ref Range: 26.5 - 33.0 pg 24.7 (L)   MCHC Latest Ref Range: 31.5 - 36.5 g/dL 31.2 (L)   RDW Latest Ref Range: 10.0 - 15.0 % 15.9 (H)     IMPRESSION:   Reason for surgery/procedure: Symptomatic hiatal hernia  Diagnosis/reason for consult: Preoperative risk assessment    The proposed surgical procedure is considered INTERMEDIATE risk.    REVISED CARDIAC RISK INDEX  The patient has the following serious cardiovascular risks for perioperative complications such as (MI, PE, VFib and 3  AV Block):  No serious cardiac risks  INTERPRETATION: 0 risks: Class I (very low risk - 0.4% complication rate)    The patient has the following additional risks for perioperative complications:  DVT/PE risk as below.      ICD-10-CM    1. Preop general physical exam Z01.818 EKG 12-lead complete w/read - Clinics     EKG 12-lead complete w/read - Clinics   2. Hiatal hernia K44.9    3. History of pulmonary embolism Z86.711 CANCELED: INR   4. Long term current use of anticoagulant therapy Z79.01 CANCELED: INR   5. Acute deep vein thrombosis (DVT) of femoral vein of  right lower extremity (H) I82.411 CANCELED: INR   6. Iron deficiency anemia due to chronic blood loss D50.0 CBC with platelets   7. Thrombocytopenia (H) D69.6    8. Essential hypertension, benign I10 Basic metabolic panel   9. Need for prophylactic vaccination and inoculation against influenza Z23 Vaccine Administration, Initial [10828]     Will bridge with prophylactic lovenox as he is moderate thromboembolic risk with history of unprovoked and recurrent DVT/PE.     Anemia stable - should recheck CBC and iron studies at wellness visit to see if improved after hiatal hernia repair. If not improved would consider Hematology evaluation given persistent mild thrombocytopenia as well.     RECOMMENDATIONS:     --Consult hospital rounder / IM to assist post-op medical management if needs to be admitted.     --See PE/DVT history and bridging plan per INR clinic.     Cardiovascular Risk  Performs 4 METs exercise without symptoms.  Patient is already on a Beta Blocker. Continue Betablocker therapy after surgery, using Beta blocker order set as necessary for NPO status.    Anemia/Thrombocytopenia  Longstanding and stable.  Anemia and does not require treatment prior to surgery.  Monitor Hemoglobin and platelets postoperatively.    --Patient is to take all scheduled medications on the day of surgery EXCEPT for modifications listed below.    ACE Inhibitor or Angiotensin Receptor Blocker (ARB) Use  Ace inhibitor or Angiotensin Receptor Blocker (ARB) and should HOLD this medication for the 24 hours prior to surgery.    Anticoagulant or Antiplatelet Medication Use  WARFARIN: Thromboembolic risk is moderate (e.g. DVT/PE 3-12 months ago, recurrent DVT/PE, A fib and [id number] = 5 to 6 without prior CVA/TIA), hold warfarin 5 days (INR >/= 2) with prophylactic bridging before procedure.   Bridging will be coordinated by INR clinic.      APPROVAL GIVEN to proceed with proposed procedure, without further diagnostic evaluation      Signed Electronically by: Rojas hCun MD    Copy of this evaluation report is provided to requesting physician.    [facility] Preop Guidelines    Revised Cardiac Risk Index

## 2019-10-21 NOTE — PATIENT INSTRUCTIONS
It was nice to see you in clinic.    I'll be in touch with your lab results via letter.     We need to bridge you (use lovenox instead of coumadin) for your surgery given your history of recurrent DVT/PE. The INR clinic will assist with this - please let us know if you have any questions.     HOLD your lisinopril the day of the surgery.   Follow INR clinic's instructions regarding anticoagulation.     Before Your Surgery      Call your surgeon if there is any change in your health. This includes signs of a cold or flu (such as a sore throat, runny nose, cough, rash or fever).    Do not smoke, drink alcohol or take over the counter medicine (unless your surgeon or primary care doctor tells you to) for the 24 hours before and after surgery.    If you take prescribed drugs: Follow your doctor s orders about which medicines to take and which to stop until after surgery.    Eating and drinking prior to surgery: follow the instructions from your surgeon    Take a shower or bath the night before surgery. Use the soap your surgeon gave you to gently clean your skin. If you do not have soap from your surgeon, use your regular soap. Do not shave or scrub the surgery site.  Wear clean pajamas and have clean sheets on your bed.

## 2019-10-21 NOTE — PROGRESS NOTES
[facility]  [address]  [address]  [facility] [address]  [phone]  Dept: [phone]    PRE-OP EVALUATION:  Today's date: 10/21/2019    Jozef Saunders (: 1955) presents for pre-operative evaluation assessment as requested by  ***.  He requires evaluation and anesthesia risk assessment prior to undergoing surgery/procedure for treatment of *** .    {PREOP QUESTIONNAIRE OPTIONS (by MA):116343}    HPI:     HPI related to upcoming procedure: ***      {Ch. Problems:974690}    MEDICAL HISTORY:     Patient Active Problem List    Diagnosis Date Noted     Alcohol dependence in remission (H) 10/11/2018     Priority: Medium     Thrush 07/15/2018     Priority: Medium     Long term current use of anticoagulant therapy 2015     Priority: Medium     Problem list name updated by automated process. Provider to review       DVT (deep venous thrombosis) (H) 10/24/2014     Priority: Medium     History of pulmonary embolism 10/24/2014     Priority: Medium      first episode, then had again after that, continuing on indefin       Recurrent periodic urticaria 2012     Priority: Medium     Idiopathic at this point.  Has seen Dr. Riojas at [facility]       Advanced directives, counseling/discussion 2011     Priority: Medium     Advance Directive Problem List Overview:   Name Relationship Phone    Primary Health Care Agent            Alternative Health Care Agent          Discussed advance care planning with patient; however, patient declined at this time. 2011          Hiatal hernia 2011     Priority: Medium     HYPERLIPIDEMIA LDL GOAL <130 02/10/2010     Priority: Medium     Hypercholesterolemia 2009     Priority: Medium     Cardiac Risk Factors: male over 45, HTN, family h/o CAD; goal LDL < 130;   10-year CV risk (2014) based on ACC/AHA risk calculator = 15.4%        Generalized anxiety disorder 2004     Priority: Medium      "Essential hypertension, benign 2004     Priority: Medium      Past Medical History:   Diagnosis Date     Iron deficiency anemia 2010    Probably due to warfarin and hiatal hernia resulting in occult GI bleed     Pulmonary embolism (H) 2010     Recovering Alcoholic      Past Surgical History:   Procedure Laterality Date     HC ARTHROTOMY/EXPLORE/TREAT KNEE JOINT      torn ligaments     HC REPAIR ING HERNIA,5+Y/O,REDUCIBL      age 8 - right     HC REPAIR ING HERNIA,6MO-5YR,REDUC      infancy - left     Current Outpatient Medications   Medication Sig Dispense Refill     atenolol (TENORMIN) 50 MG tablet TAKE 1.5 TABLETS (75 MG) BY MOUTH DAILY 135 tablet 11     fluticasone (FLONASE) 50 MCG/ACT nasal spray SPRAY 2 SPRAYS INTO BOTH NOSTRILS DAILY. DUE FOR APPT AND LABS 16 mL 11     levocetirizine (XYZAL) 5 MG tablet Take 1 tablet by mouth every evening.       lisinopril (PRINIVIL/ZESTRIL) 20 MG tablet TAKE 1 TABLET (20 MG) BY MOUTH DAILY 90 tablet 11     loratadine (CLARITIN) 10 MG tablet Take 10 mg by mouth daily       omeprazole (PRILOSEC) 20 MG DR capsule TAKE 1 TABLET (20 MG) BY MOUTH DAILY TAKE 30-60 MINUTES BEFORE A MEAL. 90 capsule 11     sertraline (ZOLOFT) 50 MG tablet TAKE 0.5 TABLETS (25 MG) BY MOUTH DAILY 45 tablet 1     simvastatin (ZOCOR) 20 MG tablet TAKE 1 TABLET (20 MG) BY MOUTH AT BEDTIME. 90 tablet 11     warfarin (COUMADIN) 5 MG tablet TAKE 7.5 MG ON MON, THU AND 5 MG ALL OTHER DAYS OR AS DIRECTED BY  tablet 11     OTC products: {OTC ANALGESICS:310547}    Allergies   Allergen Reactions     Clindamycin Hcl Hives     Heparin      Heparin-induced thrombocytopenia      Latex Allergy: {YES/NO WITH DEFAULT:493445::\"NO\"}    Social History     Tobacco Use     Smoking status: Former Smoker     Last attempt to quit: 1989     Years since quittin.9     Smokeless tobacco: Never Used   Substance Use Topics     Alcohol use: No     Comment: sober x26y     History   Drug Use No " "      REVIEW OF SYSTEMS:   {ROS Preop Choices:490477}    EXAM:   There were no vitals taken for this visit.  {EXAM Preop Choices:045932}    DIAGNOSTICS:   {DIAGNOSTIC FOR PREOP:709546}    Recent Labs   Lab Test 09/16/19 08/12/19  02/15/19  1017 02/15/19  1016  11/16/18  0820  08/29/17  1001  08/23/13  0821   HGB  --   --   --  11.7*  --   --  12.0*   < > 14.1   < >  --    PLT  --   --   --  113*  --   --  92*   < > 102*   < >  --    INR 2.4* 2.1*   < >  --   --    < >  --    < >  --    < >  --    NA  --   --   --   --   --   --  143  --  143   < >  --    POTASSIUM  --   --   --   --   --   --  4.3  --  4.3   < >  --    CR  --   --   --   --   --   --  0.91  --  0.86   < >  --    A1C  --   --   --   --  5.6  --   --   --   --   --  5.4    < > = values in this interval not displayed.        IMPRESSION:   {PREOP REASONS:607505::\"Reason for surgery/procedure: ***\",\"Diagnosis/reason for consult: ***\"}    The proposed surgical procedure is considered {HIGH=major cardiovascular or procedures requiring prolonged anesthesia >4 hours or large fluid shifts;    INTERMEDIATE=abdominal, most orthopedic and intrathoracic surgery; LOW= endoscopy, cataract and breast surgery:409371} risk.    REVISED CARDIAC RISK INDEX  The patient has the following serious cardiovascular risks for perioperative complications such as (MI, PE, VFib and 3  AV Block):  {PREOP REVISED CARDIAC INDEX (RCI):787650:p:\"No serious cardiac risks\"}  INTERPRETATION: {REVISED CARDIAC RISK INTERPRETATION:575352}    The patient has the following additional risks for perioperative complications:  {Additional perioperative risks:275714:p:\"No identified additional risks\"}      ICD-10-CM    1. Preop general physical exam Z01.818        RECOMMENDATIONS:     {IMPORTANT - Conditions - complete carefully!!:335081}    {IMPORTANT - Medications:589383::\"--Patient is to take all scheduled medications on the day of surgery EXCEPT for modifications listed below.\"}    {IMPORTANT - " "Approval:133600:p:\"APPROVAL GIVEN to proceed with proposed procedure, without further diagnostic evaluation\"}       Signed Electronically by: Rojas Chun MD    Copy of this evaluation report is provided to requesting physician.    [facility] Preop Guidelines    Revised Cardiac Risk Index  "

## 2019-10-21 NOTE — PROGRESS NOTES
ANTICOAGULATION FOLLOW-UP CLINIC VISIT    Patient Name:  Jozef Saunders  Date:  10/21/2019  Contact Type:  Face to Face    SUBJECTIVE:  Patient Findings     Positives:   Upcoming invasive procedure    Comments:   He is scheduled for a left thoracotomy on 19.  Needs lovenox bridge.    The patient was assessed for   diet, medication,   missed or extra doses,   bruising or bleeding,   with no problem findings.    INR is therapeutic today.   Patient will continue same maintenance dose until he starts hold on 19.   Will start lovenox on 19.  Restart warfarin 5 mg day of surgery with surgeon's permission,   then take 7.5 mg daily for the next 5 days.  Follow up in 4 weeks.          Clinical Outcomes     Comments:   He is scheduled for a left thoracotomy on 19.  Needs lovenox bridge.    The patient was assessed for   diet, medication,   missed or extra doses,   bruising or bleeding,   with no problem findings.    INR is therapeutic today.   Patient will continue same maintenance dose until he starts hold on 19.   Will start lovenox on 19.  Restart warfarin 5 mg day of surgery with surgeon's permission,   then take 7.5 mg daily for the next 5 days.  Follow up in 4 weeks.             OBJECTIVE    INR Protime   Date Value Ref Range Status   10/21/2019 2.5 (A) 0.86 - 1.14 Final       ASSESSMENT / PLAN  INR assessment THER    Recheck INR In: 4 WEEKS    INR Location Clinic      Anticoagulation Summary  As of 10/21/2019    INR goal:   2.0-3.0   TTR:   84.0 % (4.5 y)   INR used for dosin.5 (10/21/2019)   Warfarin maintenance plan:   7.5 mg (5 mg x 1.5) every Mon, Thu, Sat; 5 mg (5 mg x 1) all other days   Full warfarin instructions:   : Hold; : Hold; : Hold; 11/10: Hold; : Hold; : 7.5 mg; 11/15: 7.5 mg; : 7.5 mg; Otherwise 7.5 mg every Mon, Thu, Sat; 5 mg all other days   Weekly warfarin total:   42.5 mg   Plan last modified:   Ashley Summers RN (2019)    Next INR check:   11/18/2019   Target end date:   Indefinite    Indications    DVT (deep venous thrombosis) (H) [I82.409]  Long term current use of anticoagulant therapy [Z79.01]             Anticoagulation Episode Summary     INR check location:   Anticoagulation Clinic    Preferred lab:       Send INR reminders to:   [facility]    Comments:   pt lives in [place], retired / needs lovenox bridge      Anticoagulation Care Providers     Provider Role Specialty Phone number    Arron Page MD [facility] Internal Medicine [phone]            See the Encounter Report to view Anticoagulation Flowsheet and Dosing Calendar (Go to Encounters tab in chart review, and find the Anticoagulation Therapy Visit)    INR is therapeutic today.   Patient will continue same maintenance dose until he starts hold on 11/7/19.   Will start lovenox on 11/9/19.  Restart warfarin 5 mg day of surgery with surgeon's permission,   then take 7.5 mg daily for the next 5 days.  Follow up in 4 weeks.        Zena Ren RN

## 2019-10-22 LAB
ANION GAP SERPL CALCULATED.3IONS-SCNC: 3 MMOL/L (ref 3–14)
BUN SERPL-MCNC: 20 MG/DL (ref 7–30)
CALCIUM SERPL-MCNC: 8.5 MG/DL (ref 8.5–10.1)
CHLORIDE SERPL-SCNC: 112 MMOL/L (ref 94–109)
CO2 SERPL-SCNC: 26 MMOL/L (ref 20–32)
CREAT SERPL-MCNC: 0.8 MG/DL (ref 0.66–1.25)
GFR SERPL CREATININE-BSD FRML MDRD: >90 ML/MIN/{1.73_M2}
GLUCOSE SERPL-MCNC: 79 MG/DL (ref 70–99)
POTASSIUM SERPL-SCNC: 4.3 MMOL/L (ref 3.4–5.3)
SODIUM SERPL-SCNC: 141 MMOL/L (ref 133–144)

## 2019-11-11 ENCOUNTER — ANESTHESIA EVENT (OUTPATIENT)
Dept: SURGERY | Facility: CLINIC | Age: 64
DRG: 328 | End: 2019-11-11
Payer: COMMERCIAL

## 2019-11-12 ENCOUNTER — HOSPITAL ENCOUNTER (INPATIENT)
Facility: CLINIC | Age: 64
LOS: 3 days | Discharge: HOME | DRG: 328 | End: 2019-11-15
Attending: THORACIC SURGERY (CARDIOTHORACIC VASCULAR SURGERY) | Admitting: THORACIC SURGERY (CARDIOTHORACIC VASCULAR SURGERY)
Payer: COMMERCIAL

## 2019-11-12 ENCOUNTER — APPOINTMENT (OUTPATIENT)
Dept: GENERAL RADIOLOGY | Facility: CLINIC | Age: 64
DRG: 328 | End: 2019-11-12
Attending: THORACIC SURGERY (CARDIOTHORACIC VASCULAR SURGERY)
Payer: COMMERCIAL

## 2019-11-12 ENCOUNTER — ANESTHESIA (OUTPATIENT)
Dept: SURGERY | Facility: CLINIC | Age: 64
DRG: 328 | End: 2019-11-12
Payer: COMMERCIAL

## 2019-11-12 DIAGNOSIS — G89.18 ACUTE POST-OPERATIVE PAIN: Primary | ICD-10-CM

## 2019-11-12 DIAGNOSIS — K59.03 DRUG-INDUCED CONSTIPATION: ICD-10-CM

## 2019-11-12 DIAGNOSIS — R33.9 URINARY RETENTION: ICD-10-CM

## 2019-11-12 PROBLEM — K44.9 PARAESOPHAGEAL HIATAL HERNIA: Status: ACTIVE | Noted: 2019-11-12

## 2019-11-12 LAB
ABO + RH BLD: NORMAL
ABO + RH BLD: NORMAL
ANION GAP SERPL CALCULATED.3IONS-SCNC: <1 MMOL/L (ref 3–14)
BLD GP AB SCN SERPL QL: NORMAL
BLOOD BANK CMNT PATIENT-IMP: NORMAL
BUN SERPL-MCNC: 17 MG/DL (ref 7–30)
CALCIUM SERPL-MCNC: 8.3 MG/DL (ref 8.5–10.1)
CHLORIDE SERPL-SCNC: 113 MMOL/L (ref 94–109)
CO2 SERPL-SCNC: 30 MMOL/L (ref 20–32)
CREAT SERPL-MCNC: 0.79 MG/DL (ref 0.66–1.25)
ERYTHROCYTE [DISTWIDTH] IN BLOOD BY AUTOMATED COUNT: 16.1 % (ref 10–15)
GFR SERPL CREATININE-BSD FRML MDRD: >90 ML/MIN/{1.73_M2}
GLUCOSE SERPL-MCNC: 94 MG/DL (ref 70–99)
HCT VFR BLD AUTO: 39.3 % (ref 40–53)
HGB BLD-MCNC: 12.2 G/DL (ref 13.3–17.7)
INR PPP: 1.04 (ref 0.86–1.14)
MCH RBC QN AUTO: 24.4 PG (ref 26.5–33)
MCHC RBC AUTO-ENTMCNC: 31 G/DL (ref 31.5–36.5)
MCV RBC AUTO: 79 FL (ref 78–100)
PLATELET # BLD AUTO: 117 10E9/L (ref 150–450)
POTASSIUM SERPL-SCNC: 4 MMOL/L (ref 3.4–5.3)
RBC # BLD AUTO: 4.99 10E12/L (ref 4.4–5.9)
SODIUM SERPL-SCNC: 142 MMOL/L (ref 133–144)
SPECIMEN EXP DATE BLD: NORMAL
WBC # BLD AUTO: 5.8 10E9/L (ref 4–11)

## 2019-11-12 PROCEDURE — 25000125 ZZHC RX 250: Performed by: NURSE ANESTHETIST, CERTIFIED REGISTERED

## 2019-11-12 PROCEDURE — 85610 PROTHROMBIN TIME: CPT | Performed by: THORACIC SURGERY (CARDIOTHORACIC VASCULAR SURGERY)

## 2019-11-12 PROCEDURE — 12000000 ZZH R&B MED SURG/OB

## 2019-11-12 PROCEDURE — 25000128 H RX IP 250 OP 636: Performed by: THORACIC SURGERY (CARDIOTHORACIC VASCULAR SURGERY)

## 2019-11-12 PROCEDURE — 0DX60Z5 TRANSFER STOMACH TO ESOPHAGUS, OPEN APPROACH: ICD-10-PCS | Performed by: THORACIC SURGERY (CARDIOTHORACIC VASCULAR SURGERY)

## 2019-11-12 PROCEDURE — 40000170 ZZH STATISTIC PRE-PROCEDURE ASSESSMENT II: Performed by: THORACIC SURGERY (CARDIOTHORACIC VASCULAR SURGERY)

## 2019-11-12 PROCEDURE — 36000093 ZZH SURGERY LEVEL 4 1ST 30 MIN: Performed by: THORACIC SURGERY (CARDIOTHORACIC VASCULAR SURGERY)

## 2019-11-12 PROCEDURE — 0DV40ZZ RESTRICTION OF ESOPHAGOGASTRIC JUNCTION, OPEN APPROACH: ICD-10-PCS | Performed by: THORACIC SURGERY (CARDIOTHORACIC VASCULAR SURGERY)

## 2019-11-12 PROCEDURE — 25800030 ZZH RX IP 258 OP 636: Performed by: ANESTHESIOLOGY

## 2019-11-12 PROCEDURE — 80048 BASIC METABOLIC PNL TOTAL CA: CPT | Performed by: THORACIC SURGERY (CARDIOTHORACIC VASCULAR SURGERY)

## 2019-11-12 PROCEDURE — 86901 BLOOD TYPING SEROLOGIC RH(D): CPT | Performed by: THORACIC SURGERY (CARDIOTHORACIC VASCULAR SURGERY)

## 2019-11-12 PROCEDURE — 36415 COLL VENOUS BLD VENIPUNCTURE: CPT | Performed by: THORACIC SURGERY (CARDIOTHORACIC VASCULAR SURGERY)

## 2019-11-12 PROCEDURE — 37000008 ZZH ANESTHESIA TECHNICAL FEE, 1ST 30 MIN: Performed by: THORACIC SURGERY (CARDIOTHORACIC VASCULAR SURGERY)

## 2019-11-12 PROCEDURE — 25000566 ZZH SEVOFLURANE, EA 15 MIN: Performed by: THORACIC SURGERY (CARDIOTHORACIC VASCULAR SURGERY)

## 2019-11-12 PROCEDURE — 25800030 ZZH RX IP 258 OP 636: Performed by: NURSE ANESTHETIST, CERTIFIED REGISTERED

## 2019-11-12 PROCEDURE — 36000063 ZZH SURGERY LEVEL 4 EA 15 ADDTL MIN: Performed by: THORACIC SURGERY (CARDIOTHORACIC VASCULAR SURGERY)

## 2019-11-12 PROCEDURE — 25800030 ZZH RX IP 258 OP 636: Performed by: PHYSICIAN ASSISTANT

## 2019-11-12 PROCEDURE — 71000014 ZZH RECOVERY PHASE 1 LEVEL 2 FIRST HR: Performed by: THORACIC SURGERY (CARDIOTHORACIC VASCULAR SURGERY)

## 2019-11-12 PROCEDURE — 25000125 ZZHC RX 250: Performed by: PHYSICIAN ASSISTANT

## 2019-11-12 PROCEDURE — 0BQT0ZZ REPAIR DIAPHRAGM, OPEN APPROACH: ICD-10-PCS | Performed by: THORACIC SURGERY (CARDIOTHORACIC VASCULAR SURGERY)

## 2019-11-12 PROCEDURE — 40000985 XR CHEST PORT 1 VW

## 2019-11-12 PROCEDURE — 25000125 ZZHC RX 250: Performed by: ANESTHESIOLOGY

## 2019-11-12 PROCEDURE — 99207 ZZC CONSULT E&M CHANGED TO INITIAL LEVEL: CPT | Performed by: PHYSICIAN ASSISTANT

## 2019-11-12 PROCEDURE — 25000128 H RX IP 250 OP 636: Performed by: PHYSICIAN ASSISTANT

## 2019-11-12 PROCEDURE — 71000015 ZZH RECOVERY PHASE 1 LEVEL 2 EA ADDTL HR: Performed by: THORACIC SURGERY (CARDIOTHORACIC VASCULAR SURGERY)

## 2019-11-12 PROCEDURE — 25000128 H RX IP 250 OP 636: Performed by: ANESTHESIOLOGY

## 2019-11-12 PROCEDURE — 37000009 ZZH ANESTHESIA TECHNICAL FEE, EACH ADDTL 15 MIN: Performed by: THORACIC SURGERY (CARDIOTHORACIC VASCULAR SURGERY)

## 2019-11-12 PROCEDURE — 25000128 H RX IP 250 OP 636: Performed by: NURSE ANESTHETIST, CERTIFIED REGISTERED

## 2019-11-12 PROCEDURE — 25000125 ZZHC RX 250: Performed by: THORACIC SURGERY (CARDIOTHORACIC VASCULAR SURGERY)

## 2019-11-12 PROCEDURE — 86900 BLOOD TYPING SEROLOGIC ABO: CPT | Performed by: THORACIC SURGERY (CARDIOTHORACIC VASCULAR SURGERY)

## 2019-11-12 PROCEDURE — 27210794 ZZH OR GENERAL SUPPLY STERILE: Performed by: THORACIC SURGERY (CARDIOTHORACIC VASCULAR SURGERY)

## 2019-11-12 PROCEDURE — 99222 1ST HOSP IP/OBS MODERATE 55: CPT | Performed by: PHYSICIAN ASSISTANT

## 2019-11-12 PROCEDURE — 86850 RBC ANTIBODY SCREEN: CPT | Performed by: THORACIC SURGERY (CARDIOTHORACIC VASCULAR SURGERY)

## 2019-11-12 PROCEDURE — 85027 COMPLETE CBC AUTOMATED: CPT | Performed by: THORACIC SURGERY (CARDIOTHORACIC VASCULAR SURGERY)

## 2019-11-12 RX ORDER — HYDROMORPHONE HYDROCHLORIDE 1 MG/ML
.3-.5 INJECTION, SOLUTION INTRAMUSCULAR; INTRAVENOUS; SUBCUTANEOUS
Status: DISCONTINUED | OUTPATIENT
Start: 2019-11-12 | End: 2019-11-15 | Stop reason: HOSPADM

## 2019-11-12 RX ORDER — LIDOCAINE 40 MG/G
CREAM TOPICAL
Status: DISCONTINUED | OUTPATIENT
Start: 2019-11-12 | End: 2019-11-15 | Stop reason: HOSPADM

## 2019-11-12 RX ORDER — ONDANSETRON 2 MG/ML
4 INJECTION INTRAMUSCULAR; INTRAVENOUS EVERY 6 HOURS PRN
Status: DISCONTINUED | OUTPATIENT
Start: 2019-11-12 | End: 2019-11-15 | Stop reason: HOSPADM

## 2019-11-12 RX ORDER — MAGNESIUM HYDROXIDE 1200 MG/15ML
LIQUID ORAL PRN
Status: DISCONTINUED | OUTPATIENT
Start: 2019-11-12 | End: 2019-11-12

## 2019-11-12 RX ORDER — CEFAZOLIN SODIUM 2 G/100ML
2 INJECTION, SOLUTION INTRAVENOUS
Status: COMPLETED | OUTPATIENT
Start: 2019-11-12 | End: 2019-11-12

## 2019-11-12 RX ORDER — ONDANSETRON 2 MG/ML
4 INJECTION INTRAMUSCULAR; INTRAVENOUS EVERY 30 MIN PRN
Status: DISCONTINUED | OUTPATIENT
Start: 2019-11-12 | End: 2019-11-12

## 2019-11-12 RX ORDER — ALBUTEROL SULFATE 0.83 MG/ML
2.5 SOLUTION RESPIRATORY (INHALATION) EVERY 4 HOURS PRN
Status: DISCONTINUED | OUTPATIENT
Start: 2019-11-12 | End: 2019-11-12

## 2019-11-12 RX ORDER — HYDROMORPHONE HYDROCHLORIDE 1 MG/ML
.3-.5 INJECTION, SOLUTION INTRAMUSCULAR; INTRAVENOUS; SUBCUTANEOUS EVERY 5 MIN PRN
Status: DISCONTINUED | OUTPATIENT
Start: 2019-11-12 | End: 2019-11-12

## 2019-11-12 RX ORDER — NALOXONE HYDROCHLORIDE 0.4 MG/ML
.1-.4 INJECTION, SOLUTION INTRAMUSCULAR; INTRAVENOUS; SUBCUTANEOUS
Status: DISCONTINUED | OUTPATIENT
Start: 2019-11-12 | End: 2019-11-15 | Stop reason: HOSPADM

## 2019-11-12 RX ORDER — LABETALOL HYDROCHLORIDE 5 MG/ML
10 INJECTION, SOLUTION INTRAVENOUS
Status: DISCONTINUED | OUTPATIENT
Start: 2019-11-12 | End: 2019-11-12

## 2019-11-12 RX ORDER — EPHEDRINE SULFATE 50 MG/ML
INJECTION, SOLUTION INTRAMUSCULAR; INTRAVENOUS; SUBCUTANEOUS PRN
Status: DISCONTINUED | OUTPATIENT
Start: 2019-11-12 | End: 2019-11-12

## 2019-11-12 RX ORDER — NEOSTIGMINE METHYLSULFATE 1 MG/ML
VIAL (ML) INJECTION PRN
Status: DISCONTINUED | OUTPATIENT
Start: 2019-11-12 | End: 2019-11-12

## 2019-11-12 RX ORDER — SODIUM CHLORIDE 9 MG/ML
INJECTION, SOLUTION INTRAVENOUS CONTINUOUS
Status: DISCONTINUED | OUTPATIENT
Start: 2019-11-12 | End: 2019-11-15 | Stop reason: HOSPADM

## 2019-11-12 RX ORDER — PROPOFOL 10 MG/ML
INJECTION, EMULSION INTRAVENOUS PRN
Status: DISCONTINUED | OUTPATIENT
Start: 2019-11-12 | End: 2019-11-12

## 2019-11-12 RX ORDER — SODIUM CHLORIDE, SODIUM LACTATE, POTASSIUM CHLORIDE, CALCIUM CHLORIDE 600; 310; 30; 20 MG/100ML; MG/100ML; MG/100ML; MG/100ML
INJECTION, SOLUTION INTRAVENOUS CONTINUOUS
Status: DISCONTINUED | OUTPATIENT
Start: 2019-11-12 | End: 2019-11-12 | Stop reason: HOSPADM

## 2019-11-12 RX ORDER — DIPHENHYDRAMINE HCL 12.5MG/5ML
12.5 LIQUID (ML) ORAL EVERY 6 HOURS PRN
Status: DISCONTINUED | OUTPATIENT
Start: 2019-11-12 | End: 2019-11-15 | Stop reason: HOSPADM

## 2019-11-12 RX ORDER — ONDANSETRON 4 MG/1
4 TABLET, ORALLY DISINTEGRATING ORAL EVERY 6 HOURS PRN
Status: DISCONTINUED | OUTPATIENT
Start: 2019-11-12 | End: 2019-11-15 | Stop reason: HOSPADM

## 2019-11-12 RX ORDER — PROCHLORPERAZINE MALEATE 5 MG
5 TABLET ORAL EVERY 6 HOURS PRN
Status: DISCONTINUED | OUTPATIENT
Start: 2019-11-12 | End: 2019-11-15 | Stop reason: HOSPADM

## 2019-11-12 RX ORDER — GLYCOPYRROLATE 0.2 MG/ML
INJECTION, SOLUTION INTRAMUSCULAR; INTRAVENOUS PRN
Status: DISCONTINUED | OUTPATIENT
Start: 2019-11-12 | End: 2019-11-12

## 2019-11-12 RX ORDER — ONDANSETRON 4 MG/1
4 TABLET, ORALLY DISINTEGRATING ORAL EVERY 30 MIN PRN
Status: DISCONTINUED | OUTPATIENT
Start: 2019-11-12 | End: 2019-11-12

## 2019-11-12 RX ORDER — LIDOCAINE HYDROCHLORIDE 20 MG/ML
INJECTION, SOLUTION INFILTRATION; PERINEURAL PRN
Status: DISCONTINUED | OUTPATIENT
Start: 2019-11-12 | End: 2019-11-12

## 2019-11-12 RX ORDER — MEPERIDINE HYDROCHLORIDE 25 MG/ML
12.5 INJECTION INTRAMUSCULAR; INTRAVENOUS; SUBCUTANEOUS EVERY 5 MIN PRN
Status: DISCONTINUED | OUTPATIENT
Start: 2019-11-12 | End: 2019-11-12

## 2019-11-12 RX ORDER — FENTANYL CITRATE 50 UG/ML
INJECTION, SOLUTION INTRAMUSCULAR; INTRAVENOUS PRN
Status: DISCONTINUED | OUTPATIENT
Start: 2019-11-12 | End: 2019-11-12

## 2019-11-12 RX ORDER — HYDRALAZINE HYDROCHLORIDE 20 MG/ML
2.5-5 INJECTION INTRAMUSCULAR; INTRAVENOUS EVERY 10 MIN PRN
Status: DISCONTINUED | OUTPATIENT
Start: 2019-11-12 | End: 2019-11-12

## 2019-11-12 RX ORDER — DIPHENHYDRAMINE HYDROCHLORIDE 50 MG/ML
12.5 INJECTION INTRAMUSCULAR; INTRAVENOUS EVERY 6 HOURS PRN
Status: DISCONTINUED | OUTPATIENT
Start: 2019-11-12 | End: 2019-11-15 | Stop reason: HOSPADM

## 2019-11-12 RX ORDER — FENTANYL CITRATE 50 UG/ML
25-50 INJECTION, SOLUTION INTRAMUSCULAR; INTRAVENOUS
Status: DISCONTINUED | OUTPATIENT
Start: 2019-11-12 | End: 2019-11-12

## 2019-11-12 RX ORDER — FLUTICASONE PROPIONATE 50 MCG
2 SPRAY, SUSPENSION (ML) NASAL DAILY PRN
Status: DISCONTINUED | OUTPATIENT
Start: 2019-11-12 | End: 2019-11-15 | Stop reason: HOSPADM

## 2019-11-12 RX ORDER — NALOXONE HYDROCHLORIDE 0.4 MG/ML
.1-.4 INJECTION, SOLUTION INTRAMUSCULAR; INTRAVENOUS; SUBCUTANEOUS
Status: DISCONTINUED | OUTPATIENT
Start: 2019-11-12 | End: 2019-11-12

## 2019-11-12 RX ORDER — BUPIVACAINE HYDROCHLORIDE 5 MG/ML
INJECTION, SOLUTION PERINEURAL PRN
Status: DISCONTINUED | OUTPATIENT
Start: 2019-11-12 | End: 2019-11-12

## 2019-11-12 RX ORDER — DEXAMETHASONE SODIUM PHOSPHATE 4 MG/ML
INJECTION, SOLUTION INTRA-ARTICULAR; INTRALESIONAL; INTRAMUSCULAR; INTRAVENOUS; SOFT TISSUE PRN
Status: DISCONTINUED | OUTPATIENT
Start: 2019-11-12 | End: 2019-11-12

## 2019-11-12 RX ORDER — ONDANSETRON 2 MG/ML
INJECTION INTRAMUSCULAR; INTRAVENOUS PRN
Status: DISCONTINUED | OUTPATIENT
Start: 2019-11-12 | End: 2019-11-12

## 2019-11-12 RX ORDER — GINSENG 100 MG
CAPSULE ORAL DAILY
Status: DISCONTINUED | OUTPATIENT
Start: 2019-11-12 | End: 2019-11-15 | Stop reason: HOSPADM

## 2019-11-12 RX ORDER — CEFAZOLIN SODIUM 1 G/3ML
1 INJECTION, POWDER, FOR SOLUTION INTRAMUSCULAR; INTRAVENOUS SEE ADMIN INSTRUCTIONS
Status: DISCONTINUED | OUTPATIENT
Start: 2019-11-12 | End: 2019-11-12 | Stop reason: HOSPADM

## 2019-11-12 RX ORDER — NITROGLYCERIN 0.4 MG/1
0.4 TABLET SUBLINGUAL EVERY 5 MIN PRN
Status: DISCONTINUED | OUTPATIENT
Start: 2019-11-12 | End: 2019-11-15 | Stop reason: HOSPADM

## 2019-11-12 RX ORDER — SODIUM CHLORIDE, SODIUM LACTATE, POTASSIUM CHLORIDE, CALCIUM CHLORIDE 600; 310; 30; 20 MG/100ML; MG/100ML; MG/100ML; MG/100ML
INJECTION, SOLUTION INTRAVENOUS CONTINUOUS
Status: DISCONTINUED | OUTPATIENT
Start: 2019-11-12 | End: 2019-11-12

## 2019-11-12 RX ORDER — METOPROLOL TARTRATE 1 MG/ML
5 INJECTION, SOLUTION INTRAVENOUS EVERY 6 HOURS
Status: DISCONTINUED | OUTPATIENT
Start: 2019-11-13 | End: 2019-11-14

## 2019-11-12 RX ADMIN — Medication 10 MG: at 08:49

## 2019-11-12 RX ADMIN — SODIUM CHLORIDE, POTASSIUM CHLORIDE, SODIUM LACTATE AND CALCIUM CHLORIDE: 600; 310; 30; 20 INJECTION, SOLUTION INTRAVENOUS at 11:32

## 2019-11-12 RX ADMIN — DEXAMETHASONE SODIUM PHOSPHATE 4 MG: 4 INJECTION, SOLUTION INTRA-ARTICULAR; INTRALESIONAL; INTRAMUSCULAR; INTRAVENOUS; SOFT TISSUE at 08:29

## 2019-11-12 RX ADMIN — ROCURONIUM BROMIDE 50 MG: 10 INJECTION INTRAVENOUS at 08:13

## 2019-11-12 RX ADMIN — HYDROMORPHONE HYDROCHLORIDE 0.5 MG: 1 INJECTION, SOLUTION INTRAMUSCULAR; INTRAVENOUS; SUBCUTANEOUS at 17:43

## 2019-11-12 RX ADMIN — DEXMEDETOMIDINE HYDROCHLORIDE 8 MCG: 100 INJECTION, SOLUTION INTRAVENOUS at 11:30

## 2019-11-12 RX ADMIN — HYDROMORPHONE HYDROCHLORIDE 0.5 MG: 1 INJECTION, SOLUTION INTRAMUSCULAR; INTRAVENOUS; SUBCUTANEOUS at 16:03

## 2019-11-12 RX ADMIN — FENTANYL CITRATE 50 MCG: 50 INJECTION, SOLUTION INTRAMUSCULAR; INTRAVENOUS at 11:30

## 2019-11-12 RX ADMIN — ROCURONIUM BROMIDE 10 MG: 10 INJECTION INTRAVENOUS at 10:23

## 2019-11-12 RX ADMIN — ROCURONIUM BROMIDE 20 MG: 10 INJECTION INTRAVENOUS at 08:37

## 2019-11-12 RX ADMIN — DEXMEDETOMIDINE HYDROCHLORIDE 12 MCG: 100 INJECTION, SOLUTION INTRAVENOUS at 11:35

## 2019-11-12 RX ADMIN — FENTANYL CITRATE 100 MCG: 50 INJECTION, SOLUTION INTRAMUSCULAR; INTRAVENOUS at 08:13

## 2019-11-12 RX ADMIN — HYDROMORPHONE HYDROCHLORIDE 0.5 MG: 1 INJECTION, SOLUTION INTRAMUSCULAR; INTRAVENOUS; SUBCUTANEOUS at 19:26

## 2019-11-12 RX ADMIN — ROCURONIUM BROMIDE 10 MG: 10 INJECTION INTRAVENOUS at 09:14

## 2019-11-12 RX ADMIN — HYDROMORPHONE HYDROCHLORIDE 0.5 MG: 1 INJECTION, SOLUTION INTRAMUSCULAR; INTRAVENOUS; SUBCUTANEOUS at 14:35

## 2019-11-12 RX ADMIN — SODIUM CHLORIDE, POTASSIUM CHLORIDE, SODIUM LACTATE AND CALCIUM CHLORIDE: 600; 310; 30; 20 INJECTION, SOLUTION INTRAVENOUS at 07:27

## 2019-11-12 RX ADMIN — HYDROMORPHONE HYDROCHLORIDE 0.5 MG: 1 INJECTION, SOLUTION INTRAMUSCULAR; INTRAVENOUS; SUBCUTANEOUS at 13:39

## 2019-11-12 RX ADMIN — GLYCOPYRROLATE 0.8 MG: 0.2 INJECTION, SOLUTION INTRAMUSCULAR; INTRAVENOUS at 11:18

## 2019-11-12 RX ADMIN — PROPOFOL 200 MG: 10 INJECTION, EMULSION INTRAVENOUS at 08:13

## 2019-11-12 RX ADMIN — SODIUM CHLORIDE: 9 INJECTION, SOLUTION INTRAVENOUS at 14:02

## 2019-11-12 RX ADMIN — NEOSTIGMINE METHYLSULFATE 5 MG: 1 INJECTION, SOLUTION INTRAVENOUS at 11:18

## 2019-11-12 RX ADMIN — ROCURONIUM BROMIDE 20 MG: 10 INJECTION INTRAVENOUS at 09:04

## 2019-11-12 RX ADMIN — LIDOCAINE HYDROCHLORIDE 100 MG: 20 INJECTION, SOLUTION INFILTRATION; PERINEURAL at 08:13

## 2019-11-12 RX ADMIN — HYDROMORPHONE HYDROCHLORIDE 0.5 MG: 1 INJECTION, SOLUTION INTRAMUSCULAR; INTRAVENOUS; SUBCUTANEOUS at 12:14

## 2019-11-12 RX ADMIN — SODIUM CHLORIDE: 9 INJECTION, SOLUTION INTRAVENOUS at 21:42

## 2019-11-12 RX ADMIN — Medication 10 MG: at 11:09

## 2019-11-12 RX ADMIN — ONDANSETRON 4 MG: 2 INJECTION INTRAMUSCULAR; INTRAVENOUS at 10:40

## 2019-11-12 RX ADMIN — ROCURONIUM BROMIDE 10 MG: 10 INJECTION INTRAVENOUS at 09:42

## 2019-11-12 RX ADMIN — GLYCOPYRROLATE 0.2 MG: 0.2 INJECTION, SOLUTION INTRAMUSCULAR; INTRAVENOUS at 08:53

## 2019-11-12 RX ADMIN — MIDAZOLAM 2 MG: 1 INJECTION INTRAMUSCULAR; INTRAVENOUS at 08:09

## 2019-11-12 RX ADMIN — ROCURONIUM BROMIDE 20 MG: 10 INJECTION INTRAVENOUS at 09:33

## 2019-11-12 RX ADMIN — LIDOCAINE HYDROCHLORIDE 1 ML: 10 INJECTION, SOLUTION EPIDURAL; INFILTRATION; INTRACAUDAL; PERINEURAL at 07:28

## 2019-11-12 RX ADMIN — CEFAZOLIN SODIUM 1 G: 2 INJECTION, SOLUTION INTRAVENOUS at 10:24

## 2019-11-12 RX ADMIN — HYDROMORPHONE HYDROCHLORIDE 0.5 MG: 1 INJECTION, SOLUTION INTRAMUSCULAR; INTRAVENOUS; SUBCUTANEOUS at 08:43

## 2019-11-12 RX ADMIN — CEFAZOLIN SODIUM 2 G: 2 INJECTION, SOLUTION INTRAVENOUS at 08:24

## 2019-11-12 RX ADMIN — Medication 550 ML: at 12:20

## 2019-11-12 RX ADMIN — Medication 5 MG: at 08:56

## 2019-11-12 ASSESSMENT — ACTIVITIES OF DAILY LIVING (ADL)
ADLS_ACUITY_SCORE: 14
ADLS_ACUITY_SCORE: 14

## 2019-11-12 ASSESSMENT — MIFFLIN-ST. JEOR: SCORE: 1664.43

## 2019-11-12 NOTE — CONSULTS
Consult Date:  11/12/2019      PRIMARY CARE PHYSICIAN:  Rojas Chun MD.      REQUESTING PHYSICIAN:  Dr. Roa.      REASON FOR CONSULTATION:  Medical management.      HISTORY OF PRESENT ILLNESS:  Jozef Saunders is a 64-year-old male with past medical history of hypertension and recurrent thromboembolism, who is admitted under the care of Thoracic Surgery and is status post hiatal hernia repair with Nissen gastroplasty.  Procedure was performed under general anesthesia by Dr. Roa.  The patient tolerated the procedure well with no significant perioperative complications.      The patient is evaluated in his hospital room.  He has a chest tube and NG tube in place.  He is currently n.p.o.  He is complaining of some mild postoperative pain.  No chest pain or shortness of breath.  No nausea or vomiting.  He did take his blood pressure medicines prior to surgery today.  He has a history of recurrent thromboembolism, for which he is chronically anticoagulated with warfarin.  He was bridged with Lovenox 40 mg daily for 5 days.  His last dose was yesterday morning.      PAST MEDICAL HISTORY:   1.  Recurrent DVT and PE.  A hypercoagulable workup was negative.  On lifelong anticoagulation.   2.  Iron deficiency anemia with baseline hemoglobin of 12.   3.  Anxiety.   4.  Depression.   5.  Hypertension.   6.  Chronic thrombocytopenia with baseline platelets .      PRIOR TO ADMISSION MEDICATIONS:    Medications Prior to Admission   Medication Sig Dispense Refill Last Dose     atenolol (TENORMIN) 50 MG tablet TAKE 1.5 TABLETS (75 MG) BY MOUTH DAILY 135 tablet 11 11/12/2019 at 0515     enoxaparin (LOVENOX) 40 MG/0.4ML syringe Inject 0.4 mLs (40 mg) Subcutaneous every 24 hours (Patient taking differently: Inject 40 mg Subcutaneous every 24 hours Started on 11/9/19.) 10 Syringe 1 11/11/2019 at 0730     fluticasone (FLONASE) 50 MCG/ACT nasal spray SPRAY 2 SPRAYS INTO BOTH NOSTRILS DAILY. DUE FOR APPT AND LABS 16 mL  11 11/12/2019 at 0515     lisinopril (PRINIVIL/ZESTRIL) 20 MG tablet TAKE 1 TABLET (20 MG) BY MOUTH DAILY 90 tablet 11 11/12/2019 at 0515     loratadine (CLARITIN) 10 MG tablet Take 10 mg by mouth At Bedtime    11/11/2019 at 2200     omeprazole (PRILOSEC) 20 MG DR capsule TAKE 1 TABLET (20 MG) BY MOUTH DAILY TAKE 30-60 MINUTES BEFORE A MEAL. 90 capsule 11 11/12/2019 at 0515     sertraline (ZOLOFT) 50 MG tablet TAKE 0.5 TABLETS (25 MG) BY MOUTH DAILY 45 tablet 1 11/11/2019 at 2200     simvastatin (ZOCOR) 20 MG tablet TAKE 1 TABLET (20 MG) BY MOUTH AT BEDTIME. 90 tablet 11 11/11/2019 at 2200     warfarin (COUMADIN) 5 MG tablet TAKE 7.5 MG ON MON, THU AND 5 MG ALL OTHER DAYS OR AS DIRECTED BY ACC (Patient taking differently: TAKE 7.5 MG ON MON, THU AND Saturday     5 MG ALL OTHER DAYS OR AS DIRECTED BY ACC) 105 tablet 11 11/6/2019 at am         ALLERGIES:  CLINDAMYCIN, HEPARIN.      PAST SURGICAL HISTORY:   1.  Hernia repair.   2.  Knee arthroscopy.      FAMILY HISTORY:  Mother had liver cancer.  Father had coronary artery disease.      SOCIAL HISTORY:  He is a lifelong nonsmoker, does not drink alcohol.      REVIEW OF SYSTEMS:  A 10-point review of systems was completed.  Pertinent positives are in HPI, all other systems negative.      PHYSICAL EXAMINATION:   GENERAL:  Jozef Saunders is a well-developed, well-nourished 64-year-old male who is lying comfortably in bed.   VITAL SIGNS:  Blood pressure is 109/71, heart rate 63, O2 sat is 95% on 4 liters.   HEAD:  Normocephalic and atraumatic.   CARDIOVASCULAR:  Regular rate and rhythm, no murmurs.   PULMONARY:  Normal effort.  Lungs are clear.   CHEST:  Chest wall with a chest tube on left.   ABDOMEN:  Diminished bowel sounds, abdomen soft.   EXTREMITIES:  Moves all 4 extremities.  Dorsalis pedis and radial pulse palpable bilaterally.   NEUROLOGIC:  Alert and oriented.  Cranial nerves II-XII grossly intact.      LABORATORY DATA:  Reviewed in Epic.      ASSESSMENT:   Axel Saunders is a 64-year-old male with past medical history of recurrent thromboembolism and hypertension who is status post a hiatal hernia repair.  Hospitalist Service was consulted for medical co-management.   1.  Status post esophageal hiatal hernia repair with Nissen gastroplasty, postoperative day #0.  Routine postoperative cares and pain control as well as diet advancement will be deferred to Thoracic Surgery.   2.  Recurrent thromboembolism.  History of PE in 2010 and DVT in .  On chronic anticoagulation with warfarin.  He was bridged with Lovenox 40 mg daily prior to surgery.  Prophylactic Lovenox has been resumed per primary service.  We will check daily INR and discuss with Thoracic Surgery when can safely initiate full anticoagulation.   3.  Hypertension.  Prior to admission regimen includes atenolol 50 mg daily and lisinopril 20 mg daily.  PTA meds on hold while n.p.o. with NG tube.  Scheduled IV metoprolol with parameters have already been ordered by primary service.   4.  Depression.  Resume prior to admission Zocor when diet is advanced.   5.  Deep venous thrombosis prophylaxis:  Lovenox.      CODE STATUS:  Full code.      We, the Hospitalist Service, thank you for this consult.  We will follow along with you.  Please call if questions.      This patient was discussed with Dr. Garvin of the Hospitalist Service, who independently interviewed the patient.  She is in agreement with the above plan.         LAVELLE GARVIN MD       As dictated by ARELI HOUSTON PA-C            D: 2019   T: 2019   MT: STARLA      Name:     AXEL SAUNDERS   MRN:      [id number]        Account:       [id number]   :      1955           Consult Date:  2019      Document: [id number]       cc: Dewey Roa MD

## 2019-11-12 NOTE — PROGRESS NOTES
Admission medication history interview status for the 11/12/2019  admission is complete. See EPIC admission navigator for prior to admission medications     Medication history source reliability:Good    Medication history interview source(s):Patient, wife    Medication history resources (including written lists, pill bottles, clinic record):Brought in some pill bottles from home.    Primary pharmacy[facility]    Additional medication history information not noted on PTA med list :None    Time spent in this activity: 30 minutes    Prior to Admission medications    Medication Sig Last Dose Taking? Auth Provider   atenolol (TENORMIN) 50 MG tablet TAKE 1.5 TABLETS (75 MG) BY MOUTH DAILY 11/12/2019 at 0515 Yes Arron Page MD   enoxaparin (LOVENOX) 40 MG/0.4ML syringe Inject 0.4 mLs (40 mg) Subcutaneous every 24 hours  Patient taking differently: Inject 40 mg Subcutaneous every 24 hours Started on 11/9/19. 11/11/2019 at 0730 Yes Rojas Chun MD   fluticasone (FLONASE) 50 MCG/ACT nasal spray SPRAY 2 SPRAYS INTO BOTH NOSTRILS DAILY. DUE FOR APPT AND LABS 11/12/2019 at 0515 Yes Arron Page MD   lisinopril (PRINIVIL/ZESTRIL) 20 MG tablet TAKE 1 TABLET (20 MG) BY MOUTH DAILY 11/12/2019 at 0515 Yes Arron Page MD   loratadine (CLARITIN) 10 MG tablet Take 10 mg by mouth At Bedtime  11/11/2019 at 2200 Yes Reported, Patient   omeprazole (PRILOSEC) 20 MG DR capsule TAKE 1 TABLET (20 MG) BY MOUTH DAILY TAKE 30-60 MINUTES BEFORE A MEAL. 11/12/2019 at 0515 Yes Arron Page MD   sertraline (ZOLOFT) 50 MG tablet TAKE 0.5 TABLETS (25 MG) BY MOUTH DAILY 11/11/2019 at 2200 Yes Arron Page MD   simvastatin (ZOCOR) 20 MG tablet TAKE 1 TABLET (20 MG) BY MOUTH AT BEDTIME. 11/11/2019 at 2200 Yes Arron Page MD   warfarin (COUMADIN) 5 MG tablet TAKE 7.5 MG ON MON, THU AND 5 MG ALL OTHER DAYS OR AS DIRECTED BY ACC  Patient taking differently: TAKE 7.5 MG ON MON, THU AND Saturday     5  MG ALL OTHER DAYS OR AS DIRECTED BY ACC 11/6/2019 at am Yes Arron Page MD

## 2019-11-12 NOTE — BRIEF OP NOTE
[facility]    Brief Operative Note    Pre-operative diagnosis: Esophageal hiatal hernia [K44.9]  Post-operative diagnosis hiatal hernia    Procedure: Procedure(s):  LEFT THORACOTOMY  HIATAL HERNIA REPAIR WITH UNCUT OZZIE NISSEN GASTROPLASTY  Surgeon: Surgeon(s) and Role:  Panel 1:     * Dewey Roa MD - Primary     * Sara Oleary PA-C - Assisting  Panel 2:     * Dewey Roa MD - Primary     * Sara Oleary PA-C - Assisting  Anesthesia: General   Estimated blood loss: 50 cc  Drains: One 24 Luxembourgish Chest Tube in left pleural space  Specimens: * No specimens in log *  Findings:   Large paraesophageal hiatal hernia in large hernia sac  Complications: None.  Implants: * No implants in log *  NG tube placed intra-operatively with direct tactile confirmation of placement

## 2019-11-12 NOTE — ANESTHESIA PREPROCEDURE EVALUATION
Anesthesia Pre-Procedure Evaluation    Patient: Jozef Saunders   MRN: [id number] : 1955          Preoperative Diagnosis: Esophageal hiatal hernia [K44.9]    Procedure(s):  LEFT THORACOTOMY  HIATAL HERNIA REPAIR WITH UNCUT COLIS NISSEN GASTROPLASTY    Past Medical History:   Diagnosis Date     Iron deficiency anemia 2010    Probably due to warfarin and hiatal hernia resulting in occult GI bleed     Pulmonary embolism (H) 2010     Recovering Alcoholic      Past Surgical History:   Procedure Laterality Date     HC ARTHROTOMY/EXPLORE/TREAT KNEE JOINT      torn ligaments     HC REPAIR ING HERNIA,5+Y/O,REDUCIBL      age 8 - right     HC REPAIR ING HERNIA,6MO-5YR,REDUC      infancy - left       Anesthesia Evaluation     .             ROS/MED HX    ENT/Pulmonary:      (-) sleep apnea   Neurologic:       Cardiovascular:     (+) hypertension----. : . . . :. .       METS/Exercise Tolerance:     Hematologic:     (+) History of blood clots Anemia, -      Musculoskeletal:         GI/Hepatic:     (+) hiatal hernia,       Renal/Genitourinary:         Endo:         Psychiatric:         Infectious Disease:         Malignancy:         Other:                                 Lab Results   Component Value Date    WBC 5.3 10/21/2019    HGB 12.9 (L) 10/21/2019    HCT 41.4 10/21/2019     (L) 10/21/2019    CRP <5.0 2011     10/21/2019    POTASSIUM 4.3 10/21/2019    CHLORIDE 112 (H) 10/21/2019    CO2 26 10/21/2019    BUN 20 10/21/2019    CR 0.80 10/21/2019    GLC 79 10/21/2019    NANDO 8.5 10/21/2019    MAG 2.2 2017    ALBUMIN 3.6 2018    PROTTOTAL 7.1 2018    ALT 31 2018    AST 32 2018    ALKPHOS 69 2018    BILITOTAL 0.4 2018    LIPASE 73 2010    PTT 30 10/29/2014    INR 2.5 (A) 10/21/2019    TSH 0.87 2013       Preop Vitals  BP Readings from Last 3 Encounters:   10/21/19 126/84   02/15/19 124/80   12/15/18 122/84    Pulse Readings from Last 3  "Encounters:   10/21/19 66   02/15/19 67   12/15/18 53      Resp Readings from Last 3 Encounters:   10/21/19 12   10/28/14 16   08/27/14 16    SpO2 Readings from Last 3 Encounters:   10/21/19 98%   02/15/19 98%   12/15/18 96%      Temp Readings from Last 1 Encounters:   10/21/19 37.1  C (98.7  F) (Oral)    Ht Readings from Last 1 Encounters:   02/15/19 1.753 m (5' 9\")      Wt Readings from Last 1 Encounters:   10/21/19 89.2 kg (196 lb 11.2 oz)    Estimated body mass index is 29.05 kg/m  as calculated from the following:    Height as of 2/15/19: 1.753 m (5' 9\").    Weight as of 10/21/19: 89.2 kg (196 lb 11.2 oz).       Anesthesia Plan      History & Physical Review  History and physical reviewed and following examination; no interval change.    ASA Status:  2 .    NPO Status:  > 8 hours    Plan for General and ETT with Intravenous induction. Maintenance will be Balanced.    PONV prophylaxis:  Ondansetron (or other 5HT-3) and Dexamethasone or Solumedrol  Additional equipment: Double Lumen ETT      Postoperative Care  Postoperative pain management:  IV analgesics and Oral pain medications.      Consents  Anesthetic plan, risks, benefits and alternatives discussed with:  Patient..                 Stephanie Leonard MD, MD  "

## 2019-11-12 NOTE — ANESTHESIA CARE TRANSFER NOTE
Patient: Jozef Saunders    Procedure(s):  LEFT THORACOTOMY  HIATAL HERNIA REPAIR WITH UNCUT COLIS NISSEN GASTROPLASTY    Diagnosis: Esophageal hiatal hernia [K44.9]  Diagnosis Additional Information: No value filed.    Anesthesia Type:   General, ETT     Note:  Airway :Face Mask  Patient transferred to:PACU  Comments: Neuromuscular blockade reversed after TOF 4/4, spontaneous respirations, adequate tidal volumes, followed commands to voice, oropharynx suctioned with soft flexible catheter, extubated atraumatically, extubated with suction, airway patent after extubation.  Oxygen via facemask at 6 liters per minute to PACU. Oxygen tubing connected to wall O2 in PACU, SpO2, NiBP, and EKG monitors and alarms on and functioning, report on patient's clinical status given to PACU RN. Handoff Report: Identifed the Patient, Identified the Reponsible Provider, Reviewed the pertinent medical history, Discussed the surgical course, Reviewed Intra-OP anesthesia mangement and issues during anesthesia, Set expectations for post-procedure period and Allowed opportunity for questions and acknowledgement of understanding      Vitals: (Last set prior to Anesthesia Care Transfer)    CRNA VITALS  11/12/2019 1102 - 11/12/2019 1143      11/12/2019             Pulse:  84    SpO2:  (!) 87 %    Resp Rate (set):  10                Electronically Signed By: JUAN Merida CRNA  November 12, 2019  11:43 AM

## 2019-11-12 NOTE — OP NOTE
Procedure Date: 11/12/2019      SURGEON:  Dewey Roa MD      FIRST ASSISTANT:  Sara Oleary PA-C      PREOPERATIVE DIAGNOSIS:  Large paraesophageal hiatal hernia with intrathoracic stomach.      POSTOPERATIVE DIAGNOSIS:  Large paraesophageal hiatal hernia with intrathoracic stomach.      PROCEDURE PERFORMED:   1.  Left thoracotomy.   2.  Repair of paraesophageal hiatal hernia with uncut Cheyenne-Nissen gastroplasty.      ANESTHESIA:  General with double endotracheal tube.      INDICATIONS:  A 64-year-old gentleman with upper GI symptoms.  He was found to have a very large paraesophageal hiatal hernia with entire stomach in an intrathoracic position.  Based on the findings and his symptoms, repair is indicated.      DESCRIPTION OF PROCEDURE:  The patient was brought to the OR and placed in supine position.  Under general anesthesia with double endotracheal tube, the patient was placed in a right lateral decubitus position.  Left chest was prepared and draped in the usual fashion using ChloraPrep.  Ventilation of the left lung was continued.  A left posterolateral thoracotomy was made.  The pleural space was entered in the 7th intercostal space dividing the 8th rib posteriorly.  On examination, there was a large hernia sac extending above the inferior pulmonary vein.  This contained the entire stomach.  The inferior pulmonary ligament was mobilized.  The lung was retracted superiorly.  Then, the mediastinal pleura just behind the pericardium and in front of the aorta was dissected.  The distal esophagus was dissected circumferentially and encircled with a Penrose drain, keeping both vagi on the wall of the esophagus.  Then the dissection was carried down medially.  The medial arden of the diaphragm was identified and the peritoneal cavity was entered lateral to the medial arden.  Dissection was carried down circumferentially.  The peritoneal cavity was entered medial to the lateral arden.  The entire hiatus  was dissected circumferentially.  Then the short gastric vessels were divided from the last branch of the right gastroepiploic artery to the angle of His.  The stomach was reduced below the diaphragm.  Crural sutures were placed using figure-of-eight of #1 Prolene approximating the medial arden to posterior half of the lateral arden.  These sutures were left untied.  Then, a 50 Turkish Milligan dilator was advanced without any difficulty.  Using a TA34.8, an uncut Cheyenne maneuver was performed applying the stapler at the angle of His parallel to the Milligan dilator.  The Nissen fundoplication was done, approximating the fundoplication to the neoesophagus to the outside the fundoplication with interrupted 2-0 silk.  The stomach and fundoplication were reduced easily below the diaphragm.  Crural sutures were tied down.  A suture of 3-0 Prolene was placed anteriorly.  Hemostasis was verified and was excellent.  The pleural cavity was irrigated with normal saline, which was aspirated.  Through a separate stab wound, a 24 Turkish chest tube was placed and sutured with #2 silk suture.  On-Q catheters were placed.  Thirty mL of Marcaine 0.5% without epinephrine was injected as intercostal blocks.        Thoracotomy was closed with pericostal suture of Vicryl #1, running #1 Vicryl in muscular layer, running 2-0 Vicryl in the subcutaneous tissue, and skin closed with Insorb staples.  Estimated blood loss 50 mL.  Needle and sponge count correct.        Sara Oleary PA-C, was the first assistant during the procedure.  Her role as first assistant was essential and necessary in accomplishing the steps of the procedure as described above, providing exposure and retraction.         JOHANN DYKES MD             D: 2019   T: 2019   MT: PK      Name:     AXEL DALAL   MRN:      [id number]        Account:        [id number]   :      1955           Procedure Date: 2019      Document: [id number]

## 2019-11-12 NOTE — ANESTHESIA POSTPROCEDURE EVALUATION
Patient: Jozef Davetan    Procedure(s):  LEFT THORACOTOMY  HIATAL HERNIA REPAIR WITH UNCUT COLIS NISSEN GASTROPLASTY    Diagnosis:Esophageal hiatal hernia [K44.9]  Diagnosis Additional Information: No value filed.    Anesthesia Type:  General, ETT    Note:  Anesthesia Post Evaluation    Patient location during evaluation: PACU  Patient participation: Able to fully participate in evaluation  Level of consciousness: awake  Pain management: adequate  Airway patency: patent  Cardiovascular status: acceptable  Respiratory status: acceptable  Hydration status: acceptable  PONV: none     Anesthetic complications: None          Last vitals:  Vitals:    11/12/19 1350 11/12/19 1400 11/12/19 1410   BP: 109/71  109/77   Pulse:      Resp: 28 16 14   Temp:      SpO2: 93% 96% 96%         Electronically Signed By: Stephanie Leonard MD, MD  November 12, 2019  2:39 PM

## 2019-11-13 ENCOUNTER — APPOINTMENT (OUTPATIENT)
Dept: GENERAL RADIOLOGY | Facility: CLINIC | Age: 64
DRG: 328 | End: 2019-11-13
Attending: PHYSICIAN ASSISTANT
Payer: COMMERCIAL

## 2019-11-13 LAB
ANION GAP SERPL CALCULATED.3IONS-SCNC: 4 MMOL/L (ref 3–14)
BUN SERPL-MCNC: 21 MG/DL (ref 7–30)
CALCIUM SERPL-MCNC: 7.9 MG/DL (ref 8.5–10.1)
CHLORIDE SERPL-SCNC: 109 MMOL/L (ref 94–109)
CO2 SERPL-SCNC: 27 MMOL/L (ref 20–32)
CREAT SERPL-MCNC: 0.91 MG/DL (ref 0.66–1.25)
GFR SERPL CREATININE-BSD FRML MDRD: 88 ML/MIN/{1.73_M2}
GLUCOSE SERPL-MCNC: 118 MG/DL (ref 70–99)
HGB BLD-MCNC: 11.8 G/DL (ref 13.3–17.7)
INR PPP: 1.22 (ref 0.86–1.14)
POTASSIUM SERPL-SCNC: 4.1 MMOL/L (ref 3.4–5.3)
SODIUM SERPL-SCNC: 140 MMOL/L (ref 133–144)

## 2019-11-13 PROCEDURE — 85018 HEMOGLOBIN: CPT | Performed by: PHYSICIAN ASSISTANT

## 2019-11-13 PROCEDURE — C9113 INJ PANTOPRAZOLE SODIUM, VIA: HCPCS | Performed by: PHYSICIAN ASSISTANT

## 2019-11-13 PROCEDURE — 99232 SBSQ HOSP IP/OBS MODERATE 35: CPT | Performed by: PHYSICIAN ASSISTANT

## 2019-11-13 PROCEDURE — 36415 COLL VENOUS BLD VENIPUNCTURE: CPT | Performed by: PHYSICIAN ASSISTANT

## 2019-11-13 PROCEDURE — 85610 PROTHROMBIN TIME: CPT | Performed by: PHYSICIAN ASSISTANT

## 2019-11-13 PROCEDURE — 25000125 ZZHC RX 250: Performed by: PHYSICIAN ASSISTANT

## 2019-11-13 PROCEDURE — 12000000 ZZH R&B MED SURG/OB

## 2019-11-13 PROCEDURE — 80048 BASIC METABOLIC PNL TOTAL CA: CPT | Performed by: PHYSICIAN ASSISTANT

## 2019-11-13 PROCEDURE — 71045 X-RAY EXAM CHEST 1 VIEW: CPT

## 2019-11-13 PROCEDURE — 25000125 ZZHC RX 250

## 2019-11-13 PROCEDURE — 25000128 H RX IP 250 OP 636: Performed by: PHYSICIAN ASSISTANT

## 2019-11-13 RX ORDER — LIDOCAINE HYDROCHLORIDE 20 MG/ML
JELLY TOPICAL
Status: COMPLETED
Start: 2019-11-13 | End: 2019-11-13

## 2019-11-13 RX ORDER — SERTRALINE HYDROCHLORIDE 25 MG/1
25 TABLET, FILM COATED ORAL AT BEDTIME
Status: DISCONTINUED | OUTPATIENT
Start: 2019-11-14 | End: 2019-11-15 | Stop reason: HOSPADM

## 2019-11-13 RX ADMIN — ENOXAPARIN SODIUM 40 MG: 40 INJECTION SUBCUTANEOUS at 07:41

## 2019-11-13 RX ADMIN — HYDROMORPHONE HYDROCHLORIDE 0.5 MG: 1 INJECTION, SOLUTION INTRAMUSCULAR; INTRAVENOUS; SUBCUTANEOUS at 14:37

## 2019-11-13 RX ADMIN — LIDOCAINE HYDROCHLORIDE 10 ML: 20 JELLY TOPICAL at 03:00

## 2019-11-13 RX ADMIN — PANTOPRAZOLE SODIUM 40 MG: 40 INJECTION, POWDER, FOR SOLUTION INTRAVENOUS at 07:42

## 2019-11-13 RX ADMIN — HYDROMORPHONE HYDROCHLORIDE 0.5 MG: 1 INJECTION, SOLUTION INTRAMUSCULAR; INTRAVENOUS; SUBCUTANEOUS at 17:14

## 2019-11-13 RX ADMIN — HYDROMORPHONE HYDROCHLORIDE 0.5 MG: 1 INJECTION, SOLUTION INTRAMUSCULAR; INTRAVENOUS; SUBCUTANEOUS at 19:42

## 2019-11-13 RX ADMIN — METOPROLOL TARTRATE 5 MG: 5 INJECTION INTRAVENOUS at 14:38

## 2019-11-13 RX ADMIN — HYDROMORPHONE HYDROCHLORIDE 0.5 MG: 1 INJECTION, SOLUTION INTRAMUSCULAR; INTRAVENOUS; SUBCUTANEOUS at 07:41

## 2019-11-13 RX ADMIN — METOPROLOL TARTRATE 5 MG: 5 INJECTION INTRAVENOUS at 07:42

## 2019-11-13 RX ADMIN — METOPROLOL TARTRATE 5 MG: 5 INJECTION INTRAVENOUS at 19:42

## 2019-11-13 ASSESSMENT — ACTIVITIES OF DAILY LIVING (ADL)
ADLS_ACUITY_SCORE: 13
ADLS_ACUITY_SCORE: 13
ADLS_ACUITY_SCORE: 14
ADLS_ACUITY_SCORE: 13

## 2019-11-13 ASSESSMENT — MIFFLIN-ST. JEOR: SCORE: 1661.25

## 2019-11-13 NOTE — PLAN OF CARE
A&O, VSS ex on 2-3L nasal cannula. Lung sounds diminished. Bowel sounds hypoactive. Due to void- bladder scan 511, no discomfort will continue to monitor. Left thoracotomy site marked with small amount of serosanguinous drainage. CT to -20 suction, no air leak, no crepitus. OnQpump WDL- sensors taped to skin, clamps open.  NG to LIS- 200cc of dark red/brown output. Ambulated live x1 w/ assist x1. Up to chair for most of the evening. NPO ex chips. Pain controlled by PRN dilaudid. IS to 750. Tele: NSR

## 2019-11-13 NOTE — PLAN OF CARE
Vital signs stable on RA. A/Ox4. thoracotomy incisions scant drainage, well approximated. LS clear, diminished. CT to suction, no airleak, no crepitus. Dressing changed. 100 mL bloody output this shift. On q infusing.   BS hypoactive. no gas. NG out this AM by Dr Roa. Tolerated a few sips of clears this afternoon. Denies n/v. IV dilaudid x2 for pain. Walked halls x3 with SBA. Continued on sub q lovenox.     Patient has voided 350 mL total in small amts throughout shift. Denies any bladder discomfort. Per Dr Roa will only straight cath if patient experiences discomfort-- will not place sands.

## 2019-11-13 NOTE — PLAN OF CARE
Vital signs stable on room air. Alert and oriented. Lung sounds diminished. Bowel sounds active, flatus present. Chest tube dressing clean dry and intact no crepitus or airleak. Thoracotomy dressing removed, scant drainage . Pain controlled with On Q pump, sensors taped clamps open. Up assist of one to bathroom in attempt to void multiple times (bladder scanned for 740 and straight cathed for 650 due to patient in pain). Tolerating ice chips. CMS/neuros intact.

## 2019-11-13 NOTE — PROGRESS NOTES
THORACIC SURGERY POD # 1    Doing well  Discussed findings and procedure  AVSS on RA  Urinary retention  serous CT output  NGT bilious    Wound OK  CXR no effusion    NGT out  resp care ++  ambulate  Sips of water to clear liquid diet    JOHANN DYKES MD [facility] ONCOLOGY THORACIC SURGERY  CELL:  [phone]  OFFICE: [phone]

## 2019-11-13 NOTE — PROGRESS NOTES
[facility]    Medicine Progress Note - Hospitalist Service       Date of Admission:  11/12/2019  Assessment & Plan   Jozef Saunders is a 64-year-old male with past medical history of recurrent thromboembolism and hypertension who is status post a hiatal hernia repair.  Hospitalist Service was consulted for medical co-management.     S/p  hiatal hernia repair with Nissen gastroplasty  - POD 1  - Routine post-op cares, pain control, NG management and diet advancement  deferred to Thoracic Surgery.     Urinary Retention: straight cath x 1 for . No h/o BPH  - Monitor. Discussed with patient and RN if requires recurrent straight cath will place sands    Recurrent thromboembolism on chronic anticoagulation: History of PE in 2010 and DVT in 2014.  On chronic anticoagulation with warfarin.  He was bridged with Lovenox 40 mg daily prior to surgery.   - Prophylactic Lovenox resumed today per Thoracic surgery  - Will discuss when ok to resume Warfarin  - Daily INR    ABL Anemia  - Hgb 12.2--11.8  - Monitor    Hypertension: [PTA regimen includes atenolol 50 mg daily and lisinopril 20 mg daily]  -PTA meds on hold while n.p.o. with NG tube.   - Continue Metoprolol 5 mg IV q 6 hrs    Depression:  Resume prior to admission Zocor when diet is advanced.         Diet: NPO for Medical/Clinical Reasons Except for: Ice Chips    DVT Prophylaxis: Enoxaparin (Lovenox) SQ  Sands Catheter: not present  Code Status: Full Code      Disposition Plan   Expected discharge: Per primary service  Entered: Felicity Jay PA-C 11/13/2019, 10:39 AM       The patient's care was discussed with the Bedside Nurse and Patient.    Felicity Jay PA-C  Hospitalist Service  [facility]    ______________________________________________________________________    Interval History   Up in chair. Doing well. Pain well controlled. Required straight cath overnight.     Data reviewed today: I reviewed all medications,  new labs and imaging results over the last 24 hours. I personally reviewed no images or EKG's today.    Physical Exam   Vital Signs: Temp: 98.8  F (37.1  C) Temp src: Oral BP: 134/80 Pulse: 80 Heart Rate: 75 Resp: 16 SpO2: 96 % O2 Device: None (Room air) Oxygen Delivery: 1 LPM  Weight: 195 lbs 4.8 oz  Constitutional: Alert, resting comfortably in NAD  HEENT: Head normocephalic, atraumatic. Eyes sclera non icteric.   Respiratory: Normal effort, symmetric expansion,slightly diminished at bases. CT in place  Cardiovascular: RRR no murmurs   GI: Non distended, normal bowels sounds, no tenderness or guarding  MSK: LE without edema. Dorsalis pedis pulse palpated bilaterally.   Skin/Integumen: Clear  Neuro: CN II-XII grossly intact  Psych:  Alert and oriented x 3. Normal affect      Data   Recent Labs   Lab 11/13/19  0602 11/12/19  0733   WBC  --  5.8   HGB 11.8* 12.2*   MCV  --  79   PLT  --  117*   INR 1.22* 1.04    142   POTASSIUM 4.1 4.0   CHLORIDE 109 113*   CO2 27 30   BUN 21 17   CR 0.91 0.79   ANIONGAP 4 <1*   NANDO 7.9* 8.3*   * 94     Recent Results (from the past 24 hour(s))   XR Chest Port 1 View    Narrative    CHEST PORTABLE ONE VIEW   11/12/2019 11:55 AM     HISTORY: Post op.    COMPARISON: Chest x-ray 9/26/2010.      Impression    IMPRESSION: Bibasilar opacities newly identified and may represent  bilateral airspace disease or atelectasis. Elevated right  hemidiaphragm again noted. Stable cardiac silhouette. Nasogastric tube  and left chest tube are identified.    AMIE DUMONT MD

## 2019-11-14 ENCOUNTER — APPOINTMENT (OUTPATIENT)
Dept: GENERAL RADIOLOGY | Facility: CLINIC | Age: 64
DRG: 328 | End: 2019-11-14
Attending: PHYSICIAN ASSISTANT
Payer: COMMERCIAL

## 2019-11-14 PROCEDURE — 71045 X-RAY EXAM CHEST 1 VIEW: CPT

## 2019-11-14 PROCEDURE — 25000132 ZZH RX MED GY IP 250 OP 250 PS 637: Performed by: PHYSICIAN ASSISTANT

## 2019-11-14 PROCEDURE — 25000132 ZZH RX MED GY IP 250 OP 250 PS 637: Performed by: INTERNAL MEDICINE

## 2019-11-14 PROCEDURE — 25000125 ZZHC RX 250: Performed by: PHYSICIAN ASSISTANT

## 2019-11-14 PROCEDURE — 12000000 ZZH R&B MED SURG/OB

## 2019-11-14 PROCEDURE — 25000128 H RX IP 250 OP 636: Performed by: PHYSICIAN ASSISTANT

## 2019-11-14 PROCEDURE — 99232 SBSQ HOSP IP/OBS MODERATE 35: CPT | Performed by: PHYSICIAN ASSISTANT

## 2019-11-14 PROCEDURE — C9113 INJ PANTOPRAZOLE SODIUM, VIA: HCPCS | Performed by: PHYSICIAN ASSISTANT

## 2019-11-14 RX ORDER — AMOXICILLIN 250 MG
1 CAPSULE ORAL 2 TIMES DAILY PRN
Status: DISCONTINUED | OUTPATIENT
Start: 2019-11-14 | End: 2019-11-15 | Stop reason: HOSPADM

## 2019-11-14 RX ORDER — POLYETHYLENE GLYCOL 3350 17 G/17G
17 POWDER, FOR SOLUTION ORAL DAILY PRN
Status: DISCONTINUED | OUTPATIENT
Start: 2019-11-14 | End: 2019-11-15 | Stop reason: HOSPADM

## 2019-11-14 RX ORDER — AMOXICILLIN 250 MG
2 CAPSULE ORAL 2 TIMES DAILY
Status: DISCONTINUED | OUTPATIENT
Start: 2019-11-14 | End: 2019-11-15 | Stop reason: HOSPADM

## 2019-11-14 RX ORDER — SERTRALINE HYDROCHLORIDE 25 MG/1
25 TABLET, FILM COATED ORAL DAILY
Status: DISCONTINUED | OUTPATIENT
Start: 2019-11-14 | End: 2019-11-14

## 2019-11-14 RX ORDER — AMOXICILLIN 250 MG
2 CAPSULE ORAL 2 TIMES DAILY PRN
Status: DISCONTINUED | OUTPATIENT
Start: 2019-11-14 | End: 2019-11-15 | Stop reason: HOSPADM

## 2019-11-14 RX ORDER — BISACODYL 10 MG
10 SUPPOSITORY, RECTAL RECTAL DAILY PRN
Status: DISCONTINUED | OUTPATIENT
Start: 2019-11-14 | End: 2019-11-15 | Stop reason: HOSPADM

## 2019-11-14 RX ORDER — TAMSULOSIN HYDROCHLORIDE 0.4 MG/1
0.4 CAPSULE ORAL DAILY
Status: DISCONTINUED | OUTPATIENT
Start: 2019-11-14 | End: 2019-11-15 | Stop reason: HOSPADM

## 2019-11-14 RX ORDER — AMOXICILLIN 250 MG
1 CAPSULE ORAL 2 TIMES DAILY
Status: DISCONTINUED | OUTPATIENT
Start: 2019-11-14 | End: 2019-11-15 | Stop reason: HOSPADM

## 2019-11-14 RX ORDER — LISINOPRIL 20 MG/1
20 TABLET ORAL DAILY
Status: DISCONTINUED | OUTPATIENT
Start: 2019-11-14 | End: 2019-11-15 | Stop reason: HOSPADM

## 2019-11-14 RX ORDER — HYDROMORPHONE HYDROCHLORIDE 2 MG/1
2-4 TABLET ORAL
Status: DISCONTINUED | OUTPATIENT
Start: 2019-11-14 | End: 2019-11-15 | Stop reason: HOSPADM

## 2019-11-14 RX ADMIN — SENNOSIDES AND DOCUSATE SODIUM 2 TABLET: 8.6; 5 TABLET ORAL at 09:56

## 2019-11-14 RX ADMIN — SERTRALINE HYDROCHLORIDE 25 MG: 25 TABLET ORAL at 00:20

## 2019-11-14 RX ADMIN — METOPROLOL TARTRATE 5 MG: 5 INJECTION INTRAVENOUS at 04:39

## 2019-11-14 RX ADMIN — ATENOLOL 75 MG: 25 TABLET ORAL at 09:53

## 2019-11-14 RX ADMIN — ENOXAPARIN SODIUM 40 MG: 40 INJECTION SUBCUTANEOUS at 07:21

## 2019-11-14 RX ADMIN — HYDROMORPHONE HYDROCHLORIDE 2 MG: 2 TABLET ORAL at 14:01

## 2019-11-14 RX ADMIN — HYDROMORPHONE HYDROCHLORIDE 2 MG: 2 TABLET ORAL at 23:52

## 2019-11-14 RX ADMIN — BACITRACIN: 500 OINTMENT TOPICAL at 12:02

## 2019-11-14 RX ADMIN — SERTRALINE HYDROCHLORIDE 25 MG: 25 TABLET ORAL at 21:23

## 2019-11-14 RX ADMIN — HYDROMORPHONE HYDROCHLORIDE 2 MG: 2 TABLET ORAL at 09:52

## 2019-11-14 RX ADMIN — TAMSULOSIN HYDROCHLORIDE 0.4 MG: 0.4 CAPSULE ORAL at 11:50

## 2019-11-14 RX ADMIN — HYDROMORPHONE HYDROCHLORIDE 0.5 MG: 1 INJECTION, SOLUTION INTRAMUSCULAR; INTRAVENOUS; SUBCUTANEOUS at 00:08

## 2019-11-14 RX ADMIN — HYDROMORPHONE HYDROCHLORIDE 2 MG: 2 TABLET ORAL at 18:04

## 2019-11-14 RX ADMIN — PANTOPRAZOLE SODIUM 40 MG: 40 INJECTION, POWDER, FOR SOLUTION INTRAVENOUS at 09:55

## 2019-11-14 RX ADMIN — LISINOPRIL 20 MG: 20 TABLET ORAL at 09:54

## 2019-11-14 ASSESSMENT — ACTIVITIES OF DAILY LIVING (ADL)
ADLS_ACUITY_SCORE: 15
ADLS_ACUITY_SCORE: 15
ADLS_ACUITY_SCORE: 13
ADLS_ACUITY_SCORE: 15

## 2019-11-14 NOTE — PLAN OF CARE
VSS, on RA.  A&Ox4.  Scant drainage from thoracotomy incision, well approximated.  Chest tube to suction, 60ml out this shift; no air leak or crepitus noted, dressing CDI.  On Q pump infusing.  No flatus per pt, diminished bowel sounds.  Tolerating clears, denies nausea.  Dilaudid for pain x1.  Ambulated in live with SBA.  Voided 200, denies bladder discomfort.

## 2019-11-14 NOTE — PLAN OF CARE
O2 on at 2 liters  in am. Desats to 85% on room air. Up in chair and walking live with sba. Steady.   voiding  in small amounts 100cc denies pressure or discomfort. Using inspirometer and accapella every hour. Attempted to be off O2 this afternoon O2 at 1.5 liters sats 95 off O2 desated to 85% back on 1.5 liters. No air leak  no crepitus. Tolerating full liquid diet 2 mg po dilaudid for pain every 4-5 hours

## 2019-11-14 NOTE — PROGRESS NOTES
"Thoracic Surgery POD #2:  /86 (BP Location: Left arm)   Pulse 76   Temp 98.7  F (37.1  C) (Oral)   Resp 18   Ht 1.753 m (5' 9\")   Wt 88.6 kg (195 lb 4.8 oz)   SpO2 94%   BMI 28.84 kg/m    CXR: no PTX, CT in good position  CT: serous output, no air leak, no bleeding  UOP: 885 ml over 24 hours  S: Tolerated clears well without dysphagia, regurg or N/V. Pian controlled. Poor sleep. Urination still with urgency and incomplete emptying but states its improving. Not uncomfortable. Discussed pulm toilet, advancing diet.  O: Inc: no erythema, dry  On-Q: infusing  P: Full liquid diet  Oral pain meds  Wean 02  Pulm toilet  Anticipate removal of CT tomorrow and maybe home  Can restart coumadin after chest tube is removed    Saar Oleary PA-C with Dr. [facility]  Cell [phone]      "

## 2019-11-14 NOTE — PROVIDER NOTIFICATION
Brief update:    Paged re: zoloft resumption    Pt on clear liquid diet, reordered home med    Sai Lubin MD  11:54 PM

## 2019-11-14 NOTE — PROGRESS NOTES
[facility]    Medicine Progress Note - Hospitalist Service       Date of Admission:  11/12/2019  Assessment & Plan   Jozef Saunders is a 64-year-old male with past medical history of recurrent thromboembolism and hypertension who is status post a hiatal hernia repair.  Hospitalist Service was consulted for medical co-management.     S/p  hiatal hernia repair with Nissen gastroplasty  - POD 2  - Routine post-op cares, pain control and diet advancement  deferred to Thoracic Surgery.  - NG removed POD 1  - Full liquid diet today     Urinary Retention: straight cath x 1 for . No h/o BPH  - Continues to have frequency and small voids. Improving  - Add daily Flomax     Recurrent thromboembolism on chronic anticoagulation: History of PE in 2010 and DVT in 2014.  On chronic anticoagulation with warfarin.  He was bridged with Lovenox 40 mg daily prior to surgery.   - Prophylactic Lovenox POD 1 per Thoracic surgery  - Per Thoracic surgery no Warfarin until CT removed. Anticoagulation clinic recs 7.5 mg/d x 5 days and follow up for  INR  - Daily INR    ABL Anemia, lab finding only  - Hgb 12.2--11.8  - Monitor    Hypertension: [PTA regimen includes atenolol 50 mg daily and lisinopril 20 mg daily]   - Received scheduled IV metoprolol while NPO  - Resume PTA regimen today    Depression:   - Resume Zoloft today      Diet: Full Liquid Diet    DVT Prophylaxis: Enoxaparin (Lovenox) SQ  Byers Catheter: not present  Code Status: Full Code      Disposition Plan   Expected discharge: Per Thoracic surgery  Entered: Felicity Jay PA-C 11/14/2019, 11:32 AM       The patient's care was discussed with the Patient and primary thoracic surgery team Team.    Felicity Jay PA-C  Hospitalist Service  [facility]    ______________________________________________________________________    Interval History   Doing well. Still having some voiding issues, has not required straight cath. Voiding  small/frequent amounts.     Data reviewed today: I reviewed all medications, new labs and imaging results over the last 24 hours. I personally reviewed no images or EKG's today.    Physical Exam   Vital Signs: Temp: 98  F (36.7  C) Temp src: Oral BP: 110/65 Pulse: 76 Heart Rate: 84 Resp: 16 SpO2: 98 % O2 Device: Nasal cannula Oxygen Delivery: 2 LPM  Weight: 195 lbs 4.8 oz  Constitutional: Alert, resting comfortably in NAD  HEENT: Head normocephalic, atraumatic. Eyes sclera non icteric.   Respiratory: Normal effort, symmetric expansion, no crackles or wheezing. CT in palce  Cardiovascular: RRR no murmurs   GI: Non distended, normal bowels sounds, no tenderness or guarding  MSK: LE without edema. Dorsalis pedis pulse palpated bilaterally.   Skin/Integumen: Clear  Neuro: CN II-XII grossly intact  Psych:  Alert and oriented x 3. Normal affect      Data   Recent Labs   Lab 11/13/19  0602 11/12/19  0733   WBC  --  5.8   HGB 11.8* 12.2*   MCV  --  79   PLT  --  117*   INR 1.22* 1.04    142   POTASSIUM 4.1 4.0   CHLORIDE 109 113*   CO2 27 30   BUN 21 17   CR 0.91 0.79   ANIONGAP 4 <1*   NANDO 7.9* 8.3*   * 94     Recent Results (from the past 24 hour(s))   XR Chest Port 1 View    Narrative    CHEST ONE VIEW UPRIGHT 11/14/2019 5:42 AM     HISTORY: Status post left thoracotomy. Uncut Cheyenne Nissen.    COMPARISON: November 13, 2019      Impression    IMPRESSION: Left chest tube noted, unchanged. No pneumothorax.  Orogastric tube removed. Elevated right hemidiaphragm. Bibasilar  probable atelectasis similar to previous.    VAIBHAV KIRKPATRICK MD

## 2019-11-14 NOTE — PLAN OF CARE
Vital signs stable on room air. Alert and oriented. Lung sounds diminished. Bowel sounds active, flatus present. Chest tube dressing clean dry and intact no airleak or crepitus. Scant drainage from thoracotomy site, ON Q pump sensors taped and clamps open. Pain controlled with iv dilaudid. Up assist of one ambulating halls. Tolerating clears. CMS/neuros intact. Still having mild urinary retention but denies discomfort.

## 2019-11-15 ENCOUNTER — APPOINTMENT (OUTPATIENT)
Dept: GENERAL RADIOLOGY | Facility: CLINIC | Age: 64
DRG: 328 | End: 2019-11-15
Attending: PHYSICIAN ASSISTANT
Payer: COMMERCIAL

## 2019-11-15 ENCOUNTER — DOCUMENTATION ONLY (OUTPATIENT)
Dept: MEDSURG UNIT | Facility: CLINIC | Age: 64
End: 2019-11-15

## 2019-11-15 VITALS
BODY MASS INDEX: 28.93 KG/M2 | HEART RATE: 61 BPM | DIASTOLIC BLOOD PRESSURE: 61 MMHG | HEIGHT: 69 IN | TEMPERATURE: 98.1 F | OXYGEN SATURATION: 90 % | WEIGHT: 195.3 LBS | SYSTOLIC BLOOD PRESSURE: 104 MMHG | RESPIRATION RATE: 16 BRPM

## 2019-11-15 LAB
INR PPP: 1.15 (ref 0.86–1.14)
PLATELET # BLD AUTO: 122 10E9/L (ref 150–450)

## 2019-11-15 PROCEDURE — 25000128 H RX IP 250 OP 636: Performed by: PHYSICIAN ASSISTANT

## 2019-11-15 PROCEDURE — C9113 INJ PANTOPRAZOLE SODIUM, VIA: HCPCS | Performed by: PHYSICIAN ASSISTANT

## 2019-11-15 PROCEDURE — 85049 AUTOMATED PLATELET COUNT: CPT | Performed by: PHYSICIAN ASSISTANT

## 2019-11-15 PROCEDURE — 85610 PROTHROMBIN TIME: CPT | Performed by: INTERNAL MEDICINE

## 2019-11-15 PROCEDURE — 25000132 ZZH RX MED GY IP 250 OP 250 PS 637: Performed by: PHYSICIAN ASSISTANT

## 2019-11-15 PROCEDURE — 99207 ZZC CDG-MDM COMPONENT: MEETS MODERATE - UP CODED: CPT | Performed by: INTERNAL MEDICINE

## 2019-11-15 PROCEDURE — 36415 COLL VENOUS BLD VENIPUNCTURE: CPT | Performed by: PHYSICIAN ASSISTANT

## 2019-11-15 PROCEDURE — 36415 COLL VENOUS BLD VENIPUNCTURE: CPT | Performed by: INTERNAL MEDICINE

## 2019-11-15 PROCEDURE — 71045 X-RAY EXAM CHEST 1 VIEW: CPT

## 2019-11-15 PROCEDURE — 99232 SBSQ HOSP IP/OBS MODERATE 35: CPT | Performed by: INTERNAL MEDICINE

## 2019-11-15 RX ORDER — TAMSULOSIN HYDROCHLORIDE 0.4 MG/1
0.4 CAPSULE ORAL DAILY
Qty: 30 CAPSULE | Refills: 0 | Status: SHIPPED | OUTPATIENT
Start: 2019-11-16 | End: 2020-02-19

## 2019-11-15 RX ORDER — AMOXICILLIN 250 MG
1-2 CAPSULE ORAL 2 TIMES DAILY PRN
Qty: 30 TABLET | Refills: 0 | Status: SHIPPED | OUTPATIENT
Start: 2019-11-15 | End: 2020-02-19

## 2019-11-15 RX ORDER — HYDROMORPHONE HYDROCHLORIDE 2 MG/1
2 TABLET ORAL EVERY 4 HOURS PRN
Qty: 35 TABLET | Refills: 0 | Status: SHIPPED | OUTPATIENT
Start: 2019-11-15 | End: 2020-02-19

## 2019-11-15 RX ORDER — ACETAMINOPHEN 500 MG
1000 TABLET ORAL 4 TIMES DAILY
Qty: 100 TABLET | Refills: 0 | Status: SHIPPED | OUTPATIENT
Start: 2019-11-15 | End: 2020-02-19

## 2019-11-15 RX ORDER — ACETAMINOPHEN 500 MG
1000 TABLET ORAL 4 TIMES DAILY
Status: DISCONTINUED | OUTPATIENT
Start: 2019-11-15 | End: 2019-11-15 | Stop reason: HOSPADM

## 2019-11-15 RX ADMIN — HYDROMORPHONE HYDROCHLORIDE 2 MG: 2 TABLET ORAL at 17:48

## 2019-11-15 RX ADMIN — ATENOLOL 75 MG: 25 TABLET ORAL at 08:50

## 2019-11-15 RX ADMIN — ENOXAPARIN SODIUM 40 MG: 40 INJECTION SUBCUTANEOUS at 05:23

## 2019-11-15 RX ADMIN — TAMSULOSIN HYDROCHLORIDE 0.4 MG: 0.4 CAPSULE ORAL at 08:50

## 2019-11-15 RX ADMIN — HYDROMORPHONE HYDROCHLORIDE 2 MG: 2 TABLET ORAL at 04:06

## 2019-11-15 RX ADMIN — ACETAMINOPHEN 1000 MG: 500 TABLET, FILM COATED ORAL at 17:47

## 2019-11-15 RX ADMIN — SENNOSIDES AND DOCUSATE SODIUM 1 TABLET: 8.6; 5 TABLET ORAL at 08:51

## 2019-11-15 RX ADMIN — BACITRACIN: 500 OINTMENT TOPICAL at 08:51

## 2019-11-15 RX ADMIN — LISINOPRIL 20 MG: 20 TABLET ORAL at 08:51

## 2019-11-15 RX ADMIN — PANTOPRAZOLE SODIUM 40 MG: 40 INJECTION, POWDER, FOR SOLUTION INTRAVENOUS at 08:50

## 2019-11-15 RX ADMIN — ACETAMINOPHEN 1000 MG: 500 TABLET, FILM COATED ORAL at 08:50

## 2019-11-15 RX ADMIN — ACETAMINOPHEN 1000 MG: 500 TABLET, FILM COATED ORAL at 13:18

## 2019-11-15 ASSESSMENT — ACTIVITIES OF DAILY LIVING (ADL)
ADLS_ACUITY_SCORE: 15

## 2019-11-15 NOTE — PROGRESS NOTES
"Thoracic Surgery POD #3:  /72 (BP Location: Right arm)   Pulse 61   Temp 98.3  F (36.8  C) (Oral)   Resp 16   Ht 1.753 m (5' 9\")   Wt 88.6 kg (195 lb 4.8 oz)   SpO2 90%   BMI 28.84 kg/m    CXR: persistent elevated right hemidiaphragm- no PTX, bibasilar atelectasis  CT: serous output, 220 ml over 24 hours  Using 2 L NC  S: Doing well-- tolerating full liquids-- no dysphagia- walking- using IS- pain controlled. Instructed on soft mechanical diet, resuming Lovonex and coumadin, checking INR on Monday, wound care, pain management, and follow up. Also discussed probable need for home oxygen short-term.   O: CT: DCed without complication.  Occlusive dressing applied.  On-Q: infusing  Inc.: dry, no erythema  P: discharge home today on soft mechanical diet  OK to resume COumain and bridge Lovenox-- checking INR on Monday  Return to clinic with CXR November 25  Probable home oxygen      Sara Oleary PA-C with Dr. Dewey Roa  [facility]  Cell [phone]          "

## 2019-11-15 NOTE — PROGRESS NOTES
sPatient has been assessed for Home Oxygen needs. Oxygen readings:    *Pulse oximetry (SpO2) = 88% on room air at rest while awake.    *SpO2 improved to 92% on2 liters/minute at rest.    *SpO2 = 88% on room air during activity/with exercise.    *SpO2 improved to 90% [id number]/minute during activity/with exercise.

## 2019-11-15 NOTE — CONSULTS
Care Transition Initial Assessment - RN        Met with: Patient.  DATA   Active Problems:    Paraesophageal hiatal hernia       Cognitive Status: awake, alert and oriented.        Contact information and PCP information verified: Yes  Lives With: spouse         Insurance concerns: No Insurance issues identified  ASSESSMENT  Patient currently receives the following services:  none        Identified issues/concerns regarding health management: needs home oxygen--referral called into [facility] per standard process    Patient has already scheduled his INR follow up appointment for Monday.  PLAN  Financial costs for the patient include per insurance coverage .  Patient given options and choices for discharge yes and will be further reviewed with [facility] .  Patient/family is agreeable to the plan?  Yes:   Patient anticipates discharging to home with home oxygen services .        Patient anticipates needs for home equipment: Yes, oxygen  Plan/Disposition: Home   Appointments: arranged Dr. Kimberlee moscoso, see AVS for appointment times      Care  (CTS) will continue to follow as needed.      Gi Churchill RN   [facility]   Phone [phone]

## 2019-11-15 NOTE — PROGRESS NOTES
Received intake call for home oxygen at 10:49AM. Reviewed patient's chart;     11:46AM Spoke with CC, Gi, informed her I have all documentation. However, CHF would be a better primary diganosus for this pt. Asked that provider changes her notes. Gi said that she would take care of it.   11:50am - notes are changed to reflect CHF. provided her with ETA of oxygen.    Patient qualifies under Medicare guidelines and all documentation is in the chart including a good order.     12:02PM- Called to offer choice and patient is okay with [facility] setting them up. Discussed equipment with patient and informed them that we would be to bedside with oxygen in the next 2 hours. Pt would like to do a POC.    12:40pm delivered POC to bedside.

## 2019-11-15 NOTE — DISCHARGE INSTRUCTIONS
"[facility]  Discharge Orders & Follow-up Care  Video-Assisted Thoracoscopy or Thoracotomy    Call Clara at Dr. Roa  office at [phone] to make a return appointment with a chest x-ray on_November 25 or 27_.  Your appointment will be with either Sara Oleary PA-C or Dr. Roa.      --You will have a chest xray at [facility] [address] [place] on Monday, November 25th at 12:30 pm  You will then see Dr. Roa on Monday, November 25th at 1:00 pm at [facility] [address] [place].       A. Patient Care:  Call Dr Roa  office @ [phone] if you experience:  *Severe chills or a fever or 101 F or higher on two occasions  *Increased incisional pain that cannot be relieved with rest or pain medications  *Presence of unusual incisional or chest tube site drainage that is odorous, green or yellow in color, or if your incision is warm, red or swollen  *Coughing up bright red blood or greenish-yellow secretions  *Chest pain that gets worse with deep breathing or a significant increase in shortness of breath  *Inability to urinate or have a bowel movement  *New pain or swelling in your legs    In an emergency, call 911 or have someone drive you to the nearest Emergency Department    Pain Relief:  You may have been given a prescription for narcotic pain medicine.  You may also take acetaminophen. Recommended dosage: 1000 mg Acetaminophen four times a day as needed.  Many patients get good pain relief by \"staggering\" these medications.     Constipation:  Narcotic pain medication, general anesthesia, and time in the hospital with less activity than normal can all cause constipation. Please take a stool softener (what you have at home or one that was prescribed during hospital discharge, such as Senokot-S, docusate sodium, Miralax, Milk of Magnesia) while you are taking narcotics to prevent constipation. Stop taking the stool softener once you are done " taking narcotics or if you begin having loose stools/diarrhea. Please call our clinic nurse, Melvi, at [phone] if you are not having success (not having BMs) with your current stool softener.     No driving while on narcotics.     Activity:  _XXX__ No heavy lifting greater than 8-10 pounds on the operative side for 4 weeks for a thoracotomy    Wound Care:  *You should look at your incision each day and keep it clean while it heals  *Do not apply any creams, salves such as Bacitracin, or ointments on the incision while it is healing  * Steri strips (thin white paper strips) will be present on the incision(s) and they will peel off as your incision heals-- otherwise, they will be removed at your post-op appointment.    *Remove the dressings covering your chest tube site 48 hours after your discharge from the hospital. You may then shower.  Wash the incision and chest tube site(s) daily with soap and water. No bathing or immersing incision underwater for approximately 2 weeks or until the chest tube sites are completely healed.     Place a dry gauze dressing (and tape) over the chest tube site because it is normal to have some drainage for a few days after the chest tube is removed.  Do not be alarmed if a large amount of fluid drains (should be pink or yellow) either spontaneously or with coughing or exertion. If this happens, just place a larger dry gauze dressing over the chest tube site-- it will stop and scab over in about a week or so. Once the drainage stops, you can stop covering the chest tube site with a dressing. Call our office if the drainage is milky or green in color or foul-smelling.    B. Respiratory:  _XXX__ Utilize Incentive spirometer and flutter valve/acapella (if you received one) 10 times in a row every few hours while awake for a few weeks after discharging home from the hospital    C. Activity:  It may take a few months to regain your normal energy level/stamina. It is important during  your recovery to get regular physical activity:  *walk each day at a comfortable pace  *climb stairs as tolerated  *take some rest periods each day but try not to take too many long naps, as this can affect your sleep at night    D. Returning to Work:  Time away from work will depend on your situation. In general, you will need between 1-6 weeks to recover from surgery. Specific dates for returning to work can be discussed at your post-op appointments.       Directions for On-Q Pain Pump Removal:  1. Remove the dressing covering the catheter site.  2. Grasp the catheter close to the skin and gently pull on the catheter. It should be easy to remove and not painful. If it becomes hard to remove or stretches, then stop and call   office.  3. Do not cut or pull hard to remove the catheter.  4. After you remove the catheter, check the catheter tip for a black marking to ensure the entire catheter was removed. Call our office if you don't see the black marking.  5. Place a band-aid over the catheter site if needed.  6. Call our office is you have redness, warmth or excessive bleeding from the catheter site or if there is a large bruise or swollen area around the site.    Revised November 2018    Oxygen Provider:  Arranged through [employer], contact number [phone]. If you have any questions or concerns please call the oxygen company directly.

## 2019-11-15 NOTE — PROGRESS NOTES
[facility]    Medicine Progress Note - Hospitalist Service       Date of Admission:  11/12/2019  Assessment & Plan   Jozef Saunders is a 64-year-old male with past medical history of recurrent thromboembolism and hypertension who is status post a hiatal hernia repair.  Hospitalist Service was consulted for medical co-management.     S/p  hiatal hernia repair with Nissen gastroplasty  - POD 2  - Routine post-op cares, pain control and diet advancement  deferred to Thoracic Surgery.  - NG removed POD 1  - diet advanced to mechanical soft diet- tolerating well.     Urinary Retention: straight cath x 1 for . No h/o BPH  - Continued to have frequency and small voids. Improving  - Added daily Flomax here but he states that he did not have any problems with urination PTA and he thinks that he will do fine at home so we will not Rx Flomax after d/c    Recurrent thromboembolism on chronic anticoagulation: History of PE in 2010 and DVT in 2014.  On chronic anticoagulation with warfarin.  He was bridged with Lovenox 40 mg daily prior to surgery.   - Prophylactic Lovenox here as per Thoracic surgery  - he will resume Warfarin after discharge with Lovenox bridging (as per INR clinic recommendations) and will have INR checked on Monday 11/18    Hypertension: [PTA regimen includes atenolol 50 mg daily and lisinopril 20 mg daily]   - BP controlled on PTA regimen    Depression:   - continue PTA Zoloft     Diet: Diet  Mechanical/Dental Soft Diet    DVT Prophylaxis: Enoxaparin (Lovenox) SQ  Byers Catheter: not present  Code Status: Full Code      Disposition Plan   Expected discharge: OK to discharge today as Per Thoracic surgery  Entered: Domitila Toribio MD 11/15/2019, 3:45 PM       Domitila Toribio MD  Hospitalist Service  [facility]    ______________________________________________________________________    Interval History   Doing fine, diet advanced- tolerating well, passing gas  and had BM; no chest pain; not able to be weaned off O2 and Thoracic surgery is ordering supplemental O2 at the time of the discharge; discussed with RN    Data reviewed today: I reviewed all medications, new labs and imaging results over the last 24 hours. I personally reviewed no images or EKG's today.    Physical Exam   Vital Signs: Temp: 98.1  F (36.7  C) Temp src: Oral BP: 105/66 Pulse: 61 Heart Rate: 57 Resp: 16 SpO2: 90 % O2 Device: Nasal cannula Oxygen Delivery: 2 LPM  Weight: 195 lbs 4.8 oz     Constitutional: Alert, resting comfortably in NAD  HEENT: Head normocephalic, atraumatic. Eyes sclera non icteric.   Respiratory: Normal effort, symmetric expansion, no crackles or wheezing. Cardiovascular: RRR no murmurs   GI: Non distended, normal bowels sounds, no tenderness or guarding  MSK: LE without edema. Dorsalis pedis pulse palpated bilaterally.   Skin/Integumen: Clear  Neuro: CN II-XII grossly intact  Psych:  Alert and oriented x 3. Normal affect      Data   Recent Labs   Lab 11/15/19  1134 11/15/19  0753 11/13/19  0602 11/12/19  0733   WBC  --   --   --  5.8   HGB  --   --  11.8* 12.2*   MCV  --   --   --  79   PLT  --  122*  --  117*   INR 1.15*  --  1.22* 1.04   NA  --   --  140 142   POTASSIUM  --   --  4.1 4.0   CHLORIDE  --   --  109 113*   CO2  --   --  27 30   BUN  --   --  21 17   CR  --   --  0.91 0.79   ANIONGAP  --   --  4 <1*   NANDO  --   --  7.9* 8.3*   GLC  --   --  118* 94     Recent Results (from the past 24 hour(s))   XR Chest Port 1 View    Narrative    CHEST ONE VIEW UPRIGHT 11/15/2019 6:07 AM     HISTORY: Status post left thoracotomy. Uncut Cheyenne Nissen.    COMPARISON: November 14, 2019      Impression    IMPRESSION: Continued left lower lobe atelectasis and/or infiltrate  with left chest tube in place. No pneumothorax. Elevated right  hemidiaphragm.

## 2019-11-15 NOTE — PROGRESS NOTES
VSS, with exception of O2 desats to 85% on room air. Up in chair and walking live with sba x 3 this shift. .Voiding  in small amounts 100cc denies pressure or discomfort. Using inspirometer and accapella every hour.Chest tube CDI,  No air leak  no crepitus. PO dilaudid given for pain rating 5 on 0/10 pain scale. Spouse at bedside.

## 2019-11-15 NOTE — PLAN OF CARE
A/Ox4. VSS on 2L O2 and soft BP. LS diminished. BS active, +flatus. Incision CDI, with crepitus noted above incision site early in shift, none noted at 0400 assessment. Chest tube dressing site CDI, no air leak. OnQ pump sensors taped, clamps open. Pain controlled with PO dilaudid. Up SBA. Full liquid diet, tolerating. IS/Acapella done while pt awake, encouraged continued use. Walked pt this morning, tolerated well.

## 2019-11-15 NOTE — PROGRESS NOTES
Thoracic Surgery:    I certify that this patient, Jozef Saunders has been under my care and that I, or a nurse practitioner or physician assistant working with me, had a face-to-face encounter that meets face-to-face encounter requirements with this patient on November 15, 2019.    Jozef Saunders is now in a chronic stable state and continues to require supplemental oxygen due to continued oxygen desaturation.  This patient has been treated in part, or in whole for the following medical condition(s): chronic respiratory failure and chronic elevation of right hemidiaphragm,   Treatments tried and failed or ruled out to treat hypoxemia include Aerobika (flutter valve), incentive spirometer,   If portability is ordered, is the patient mobile within the home? Yes    Sara Oleary PA-C with Dr. Dewey Roa  [facility]  Cell [phone]

## 2019-11-16 NOTE — PLAN OF CARE
Pt discharged to home via private car accompanied by spouse. Pt states understanding of discharge instructions. Prescriptions filled and sent with pt. Oxygen sent with pt per orders.

## 2019-11-18 ENCOUNTER — TELEPHONE (OUTPATIENT)
Dept: PEDIATRICS | Facility: CLINIC | Age: 64
End: 2019-11-18

## 2019-11-18 ENCOUNTER — ANTICOAGULATION THERAPY VISIT (OUTPATIENT)
Dept: NURSING | Facility: CLINIC | Age: 64
End: 2019-11-18
Payer: COMMERCIAL

## 2019-11-18 DIAGNOSIS — I82.409 DVT (DEEP VENOUS THROMBOSIS) (H): ICD-10-CM

## 2019-11-18 DIAGNOSIS — Z79.01 LONG TERM CURRENT USE OF ANTICOAGULANT THERAPY: Primary | ICD-10-CM

## 2019-11-18 LAB — INR POINT OF CARE: 1.1 (ref 0.86–1.14)

## 2019-11-18 PROCEDURE — 36416 COLLJ CAPILLARY BLOOD SPEC: CPT

## 2019-11-18 PROCEDURE — 85610 PROTHROMBIN TIME: CPT | Mod: QW

## 2019-11-18 NOTE — TELEPHONE ENCOUNTER
Please contact patient for In-patient follow up.  [phone] (home)     Visit date: 111519  Diagnosis listed: Esophageal Hiatal Hernia, Paraesophageal Hiatal Hernia  Number of visits in past 12 months: 0 ED / 1 IP

## 2019-11-18 NOTE — PROGRESS NOTES
ANTICOAGULATION FOLLOW-UP CLINIC VISIT    Patient Name:  Jozef Saunders  Date:  2019  Contact Type:  Face to Face    SUBJECTIVE:  Patient Findings     Positives:   Missed doses, Hospital admission (Surgery 19)    Comments:   He had a left thoracotomy and   hiatal hernia repair on 19.  He was given lovenox in the hospital, but no warfarin.  Discharged on Friday, but he says he was   not told to restart warfarin.  Has been taking lovenox daily.   He has had no warfarin for 10 days.    INR is subtherapeutic today at 1.1.   Patient will increase each day by 2.5 mg   (will take 10 mg today and 7.5 mg Tue, Wed, Thurs)  Continue lovenox.  Follow up in 4 days.           Clinical Outcomes     Comments:   He had a left thoracotomy and   hiatal hernia repair on 19.  He was given lovenox in the hospital, but no warfarin.  Discharged on Friday, but he says he was   not told to restart warfarin.  Has been taking lovenox daily.   He has had no warfarin for 10 days.    INR is subtherapeutic today at 1.1.   Patient will increase each day by 2.5 mg   (will take 10 mg today and 7.5 mg Tue, Wed, Thurs)  Continue lovenox.  Follow up in 4 days.              OBJECTIVE    INR Protime   Date Value Ref Range Status   2019 1.1 0.86 - 1.14 Final       ASSESSMENT / PLAN  INR assessment SUB    Recheck INR In: 4 DAYS    INR Location Clinic      Anticoagulation Summary  As of 2019    INR goal:   2.0-3.0   TTR:   84.0 % (4.5 y)   INR used for dosin.1! (2019)   Warfarin maintenance plan:   7.5 mg (5 mg x 1.5) every Mon, Thu, Sat; 5 mg (5 mg x 1) all other days   Full warfarin instructions:   : 10 mg; : 7.5 mg; : 7.5 mg; Otherwise 7.5 mg every Mon, Thu, Sat; 5 mg all other days   Weekly warfarin total:   42.5 mg   Plan last modified:   Ashley Summers RN (2019)   Next INR check:   2019   Target end date:   Indefinite    Indications    DVT (deep venous thrombosis) (H)  [I82.409]  Long term current use of anticoagulant therapy [Z79.01]             Anticoagulation Episode Summary     INR check location:   Anticoagulation Clinic    Preferred lab:       Send INR reminders to:   [facility]    Comments:    pm dose / pt lives in [place], retired / needs lovenox bridge      Anticoagulation Care Providers     Provider Role Specialty Phone number    WillAmarilisRojas Arriola MD  Internal Medicine [phone]            See the Encounter Report to view Anticoagulation Flowsheet and Dosing Calendar (Go to Encounters tab in chart review, and find the Anticoagulation Therapy Visit)    INR is subtherapeutic today at 1.1.   Patient will increase each day by 2.5 mg   (will take 10 mg today and 7.5 mg Tue, Wed, Thurs)  Continue lovenox.  Follow up in 4 days.     Dosage adjustment made based on physician directed care plan.      Zena Ren RN

## 2019-11-19 NOTE — TELEPHONE ENCOUNTER
"Hospital/TCU/ED for chronic condition Discharge Protocol    \"Hi, my name is Dilma Nagy RN, a registered nurse, and I am calling from [facility].  I am calling to follow up and see how things are going for you after your recent emergency visit/hospital/TCU stay.\"    Tell me how you are doing now that you are home?\" Doing better, getting better each day. 2L O2 at home, hoping to be weaned off at post-op appt with surgeon on Monday.       Discharge Instructions    \"Let's review your discharge instructions.  What is/are the follow-up recommendations?  Pt. Response: Has appointment with surgeon next Monday - Dr. Roa [facility]    \"Has an appointment with your primary care provider been scheduled?\"    Patient was previously Dr. Page pt, who saw Dr. Chun for pre op. Patient has est. Care/annual exam scheduled with Dr. Humphreys 2/2020, pt's wife is a pt of Dr. Humphreys, at this time patient feels great post-discharge and has post-op appt scheduled with surgeon on Monday 11/25, does not feel another appointment is needed at this time. Will call back if need be.    \"When you see the provider, I would recommend that you bring your medications with you.\"    Medications    \"Tell me what changed about your medicines when you discharged?\" Discharged on Tamsulosin 0.4mg daily, reports only taking it for a few days then stopped because he was going so frequently. Reports urinary retention started in hospital. Is taking Dilaudid 2mg roughly every 6 hours, takes tylenol in between, pain is tolerable/managed with this regimen. Also started on home oxygen therapy reports on 2L O2 at home supposed to be weaned off this week or when he sees surgeon Monday 11/25.    Changes to chronic meds?    No     \"What questions do you have about your medications?\"    None     New diagnoses of heart failure, COPD, diabetes, or MI?    No       On warfarin: \"Were you given any recommendations for follow-up " "with the anticoagulation clinic?\" Yes - Anticoagulation clinic appointment is already scheduled at appropriate interval Next appt 11/22.    Post Discharge Medication Reconciliation Status: discharge medications reconciled, continue medications without change.    Was MTM referral placed (*Make sure to put transitions as reason for referral)?   No    Call Summary    \"What questions or concerns do you have about your recent visit and your follow-up care?\"     none     \"If you have questions or things don't continue to improve, we encourage you contact us through the main clinic number (give number).  Even if the clinic is not open, triage nurses are available 24/7 to help you.     We would like you to know that our clinic has extended hours (provide information).  We also have urgent care (provide details on closest location and hours/contact info)\"      \"Thank you for your time and take care!\"      "

## 2019-11-22 ENCOUNTER — ANTICOAGULATION THERAPY VISIT (OUTPATIENT)
Dept: NURSING | Facility: CLINIC | Age: 64
End: 2019-11-22
Payer: COMMERCIAL

## 2019-11-22 DIAGNOSIS — I82.499 DEEP VEIN THROMBOSIS (DVT) OF OTHER VEIN OF LOWER EXTREMITY, UNSPECIFIED CHRONICITY, UNSPECIFIED LATERALITY (H): ICD-10-CM

## 2019-11-22 DIAGNOSIS — Z79.01 LONG TERM CURRENT USE OF ANTICOAGULANT THERAPY: ICD-10-CM

## 2019-11-22 DIAGNOSIS — I82.409 DVT (DEEP VENOUS THROMBOSIS) (H): ICD-10-CM

## 2019-11-22 LAB — INR POINT OF CARE: 1.6 (ref 0.86–1.14)

## 2019-11-22 PROCEDURE — 85610 PROTHROMBIN TIME: CPT | Mod: QW

## 2019-11-22 PROCEDURE — 36416 COLLJ CAPILLARY BLOOD SPEC: CPT

## 2019-11-22 NOTE — DISCHARGE SUMMARY
THORACIC SURGERY HOSPITAL DISCHARGE SUMMARY  [facility] [place] [facility] ONCOLOGY - THORACIC SURGERY  [address], [address]  [place] [address]  Phone [phone]  [email or web address]    11/22/2019     Rojas Chun   [address] [place] [address]  Phone: [phone]   Fax: [phone]        Re: Jozef Saunders             1955             [id number]              Dates of Hospitalization: 11/12/2019 - 11/15/2019   Date of Service (when I saw the patient): 11/15/19    Dear Dr. Chun:     As you are aware, we had the pleasure of caring for your patient,  Jozef Saunders here at [facility].  He is a 64-year-old gentleman with upper GI symptoms.  He was found to have a very large paraesophageal hiatal hernia with entire stomach in an intrathoracic position.  Based on the findings and his symptoms, repair is indicated.     On 11/12/2019, Dr. Dewey Roa performed the following:    Procedure/Surgery Information   Procedure: 1.  Left thoracotomy.   2.  Repair of paraesophageal hiatal hernia with uncut Cheyenne-Nissen gastroplasty   Surgeon(s): Surgeon(s) and Role:  Panel 1:     * Dewey Roa MD - Primary     * Sara Oleary PA-C - Assisting  Panel 2:     * Dewey Roa MD - Primary     * Sara Oleary PA-C - Assisting   Specimens: * No specimens in log *         His post-operative course was remarkable for urinary retention which was improving consisently each day and chronic hypoxic respiratory failure requiring homoe oxygen upon discharge..      Consultations This Hospital Stay   HOSPITALIST IP CONSULT  CARE TRANSITION RN/SW IP CONSULT    Jozef Saunders has otherwise recovered sufficiently to be discharged to home today, 11/15/2019, on post-operative day number three for further convalescence.  His incisions are healing well with no signs or symptoms of infection.  His bowels have moved  sufficiently and he is tolerating a full liquid diet transitioning to a soft mechanical diet.  Activity, ambulating and transferring independently.  He is currently afebrile with stable vital signs. His chest x-ray shows stable chronic elevation of his right hemidiaphragm.     Below, you will find a full discharge medication list and instructions.  We have arranged for Jozef Saunders to follow-up with us in our [facility] in 7-10 days with a Chest X-ray prior to that appointment.  We thank you for allowing us to participate in the care of Jozef Saunders here at [facility].  Please feel free to contact our office at [phone] with any questions or concerns or if we can be of any further assistance in the care of this patient.    Sincerely,    Dr. Dewey Roa MD    D/C Summary Prepared by: Sara Oleary PA-C    Discharge Medications:  Discharge Medication List as of 11/15/2019  5:54 PM      START taking these medications    Details   acetaminophen (TYLENOL) 500 MG tablet Take 2 tablets (1,000 mg) by mouth 4 times daily, Disp-100 tablet, R-0, E-Prescribe      HYDROmorphone (DILAUDID) 2 MG tablet Take 1 tablet (2 mg) by mouth every 4 hours as needed for moderate to severe pain, Disp-35 tablet, R-0, E-Prescribe      senna-docusate (SENOKOT-S/PERICOLACE) 8.6-50 MG tablet Take 1-2 tablets by mouth 2 times daily as needed for constipation, Disp-30 tablet, R-0, E-Prescribe      tamsulosin (FLOMAX) 0.4 MG capsule Take 1 capsule (0.4 mg) by mouth daily, Disp-30 capsule, R-0, E-Prescribe         CONTINUE these medications which have NOT CHANGED    Details   atenolol (TENORMIN) 50 MG tablet TAKE 1.5 TABLETS (75 MG) BY MOUTH DAILY, Disp-135 tablet, R-11, E-Prescribe      fluticasone (FLONASE) 50 MCG/ACT nasal spray SPRAY 2 SPRAYS INTO BOTH NOSTRILS DAILY. DUE FOR APPT AND LABS, Disp-16 mL, R-11, E-PrescribeProfile Rx: patient will contact pharmacy when needed      lisinopril (PRINIVIL/ZESTRIL) 20 MG  tablet TAKE 1 TABLET (20 MG) BY MOUTH DAILY, Disp-90 tablet, R-11, E-Prescribe      loratadine (CLARITIN) 10 MG tablet Take 10 mg by mouth At Bedtime , Historical      omeprazole (PRILOSEC) 20 MG DR capsule TAKE 1 TABLET (20 MG) BY MOUTH DAILY TAKE 30-60 MINUTES BEFORE A MEAL., Disp-90 capsule, R-11, E-PrescribeProfile Rx: patient will contact pharmacy when needed      sertraline (ZOLOFT) 50 MG tablet TAKE 0.5 TABLETS (25 MG) BY MOUTH DAILY, Disp-45 tablet, R-1, E-Prescribe      simvastatin (ZOCOR) 20 MG tablet TAKE 1 TABLET (20 MG) BY MOUTH AT BEDTIME., Disp-90 tablet, R-11, E-Prescribe      warfarin (COUMADIN) 5 MG tablet TAKE 7.5 MG ON MON, THU AND 5 MG ALL OTHER DAYS OR AS DIRECTED BY [facility], Disp-105 tablet, R-11, E-Prescribe      enoxaparin (LOVENOX) 40 MG/0.4ML syringe Inject 0.4 mLs (40 mg) Subcutaneous every 24 hours, Disp-10 Syringe, R-1, E-PrescribePlease give him 10 syringes instead of the 8 that was previously ordered.               Discharge Instructions:  1) Remove chest tube dressing on 11/17/19 and then it is Ok to shower.  Please wash both incision and chest tube site daily with soap and water.  You may cover the chest tube site daily with a clean band-aid or dry gauze if it continues to drain.  Once it stops draining, leave the site open to air and it will form a scab.  2) Steri-strips can be removed in 1 week or they will fall off when they are ready.  3) Continue daily use of your Incentive Spirometer, set of 10x in a row, every 1-2 hours while you are awake during the day. Also use your flutter valve if you received one during your stay.   4) No lifting, pushing or pulling >8-10 lbs for 4-6 weeks from the day of your surgery.  No driving while on narcotic pain medications.    Follow-Up Care:  1) Follow up with Sara Oleary PA-C/Dr. Roa at the [facility] in [place] ([address] [place] [address], [address], [place] [address]).  Call Clara at [phone] to schedule the appointment.  2)  Follow up with Primary Care Provider, Rojas Chun within 1 month of discharge for routine post-surgical care, wound check and follow up.  Please call [phone] to arrange this appointment.       CC  Patient Care Team:  Rojas Chun MD as PCP - General (Internal Medicine)  Arron Page MD as Assigned PCP

## 2019-11-22 NOTE — PROGRESS NOTES
ANTICOAGULATION FOLLOW-UP CLINIC VISIT    Patient Name:  Jozef Saunders  Date:  2019  Contact Type:  Face to Face    SUBJECTIVE:  Patient Findings     Comments:   He had a left thoracotomy and   hiatal hernia repair on 19.  He was given lovenox in the hospital, but no warfarin.    The patient was assessed for   diet, medication,   missed or extra doses,   bruising or bleeding,   with no problem findings.    INR is subtherapeutic today.   Patient will take 7.5 mg for the next 4 days   which will increase weekly total by 15%.   Continue lovenox.  Follow up in 4 days.           Clinical Outcomes     Comments:   He had a left thoracotomy and   hiatal hernia repair on 19.  He was given lovenox in the hospital, but no warfarin.    The patient was assessed for   diet, medication,   missed or extra doses,   bruising or bleeding,   with no problem findings.    INR is subtherapeutic today.   Patient will take 7.5 mg for the next 4 days   which will increase weekly total by 15%.   Continue lovenox.  Follow up in 4 days.              OBJECTIVE    INR Protime   Date Value Ref Range Status   2019 1.6 (A) 0.86 - 1.14 Final       ASSESSMENT / PLAN  INR assessment SUB    Recheck INR In: 4 DAYS    INR Location Clinic      Anticoagulation Summary  As of 2019    INR goal:   2.0-3.0   TTR:   57.3 % (11.2 mo)   INR used for dosin.6! (2019)   Warfarin maintenance plan:   7.5 mg (5 mg x 1.5) every Mon, Thu, Sat; 5 mg (5 mg x 1) all other days   Full warfarin instructions:   : 7.5 mg; : 7.5 mg; Otherwise 7.5 mg every Mon, Thu, Sat; 5 mg all other days   Weekly warfarin total:   42.5 mg   Plan last modified:   Ashley Summers RN (2019)   Next INR check:   2019   Priority:   High   Target end date:   Indefinite    Indications    DVT (deep venous thrombosis) (H) [I82.409]  Long term current use of anticoagulant therapy [Z79.01]             Anticoagulation Episode Summary     INR  check location:   Anticoagulation Clinic    Preferred lab:       Send INR reminders to:   [facility]    Comments:    pm dose / pt lives in [place], retired / needs lovenox bridge      Anticoagulation Care Providers     Provider Role Specialty Phone number    WillAmarilisRojas Arriola MD  Internal Medicine [phone]            See the Encounter Report to view Anticoagulation Flowsheet and Dosing Calendar (Go to Encounters tab in chart review, and find the Anticoagulation Therapy Visit)    INR is subtherapeutic today.   Patient will take 7.5 mg for the next 4 days   which will increase weekly total by 15%.   Continue lovenox.  Follow up in 4 days.       Dosage adjustment made based on physician directed care plan.      Zena Ren RN

## 2019-11-26 ENCOUNTER — ANTICOAGULATION THERAPY VISIT (OUTPATIENT)
Dept: NURSING | Facility: CLINIC | Age: 64
End: 2019-11-26
Payer: COMMERCIAL

## 2019-11-26 DIAGNOSIS — I82.409 DVT (DEEP VENOUS THROMBOSIS) (H): ICD-10-CM

## 2019-11-26 DIAGNOSIS — Z79.01 LONG TERM CURRENT USE OF ANTICOAGULANT THERAPY: Primary | ICD-10-CM

## 2019-11-26 LAB — INR POINT OF CARE: 2.7 (ref 0.86–1.14)

## 2019-11-26 PROCEDURE — 36416 COLLJ CAPILLARY BLOOD SPEC: CPT

## 2019-11-26 PROCEDURE — 85610 PROTHROMBIN TIME: CPT | Mod: QW

## 2019-11-26 NOTE — PROGRESS NOTES
ANTICOAGULATION FOLLOW-UP CLINIC VISIT    Patient Name:  Jozef Saunders  Date:  2019  Contact Type:  Face to Face    SUBJECTIVE:  Patient Findings     Comments:   He had a left thoracotomy and   hiatal hernia repair on 19.    The patient was assessed for   diet, medication,   missed or extra doses,   bruising or bleeding,   with no problem findings.    INR is therapeutic today.   Patient will go back to maintenance dose.   Stop lovenox after tomorrow's dose.  Follow up in 2 weeks.          Clinical Outcomes     Comments:   He had a left thoracotomy and   hiatal hernia repair on 19.    The patient was assessed for   diet, medication,   missed or extra doses,   bruising or bleeding,   with no problem findings.    INR is therapeutic today.   Patient will go back to maintenance dose.   Stop lovenox after tomorrow's dose.  Follow up in 2 weeks.             OBJECTIVE    INR Protime   Date Value Ref Range Status   2019 2.7 (A) 0.86 - 1.14 Final       ASSESSMENT / PLAN  INR assessment THER    Recheck INR In: 2 WEEKS    INR Location Clinic      Anticoagulation Summary  As of 2019    INR goal:   2.0-3.0   TTR:   56.9 % (11.2 mo)   INR used for dosin.7 (2019)   Warfarin maintenance plan:   7.5 mg (5 mg x 1.5) every Mon, Thu, Sat; 5 mg (5 mg x 1) all other days   Full warfarin instructions:   7.5 mg every Mon, Thu, Sat; 5 mg all other days   Weekly warfarin total:   42.5 mg   Plan last modified:   Ashley Summers RN (2019)   Next INR check:   12/10/2019   Priority:   High   Target end date:   Indefinite    Indications    DVT (deep venous thrombosis) (H) [I82.409]  Long term current use of anticoagulant therapy [Z79.01]             Anticoagulation Episode Summary     INR check location:   Anticoagulation Clinic    Preferred lab:       Send INR reminders to:   [facility]    Comments:    pm dose / pt lives in [place], retired / needs lovenox bridge      Anticoagulation  Care Providers     Provider Role Specialty Phone number    Rojas Chun MD  Internal Medicine [phone]            See the Encounter Report to view Anticoagulation Flowsheet and Dosing Calendar (Go to Encounters tab in chart review, and find the Anticoagulation Therapy Visit)    INR is therapeutic today.   Patient will go back to maintenance dose.   Stop lovenox after tomorrow's dose.  Follow up in 2 weeks.        Zena Ren RN

## 2019-12-10 ENCOUNTER — ANTICOAGULATION THERAPY VISIT (OUTPATIENT)
Dept: NURSING | Facility: CLINIC | Age: 64
End: 2019-12-10
Payer: COMMERCIAL

## 2019-12-10 ENCOUNTER — DOCUMENTATION ONLY (OUTPATIENT)
Dept: PEDIATRICS | Facility: CLINIC | Age: 64
End: 2019-12-10

## 2019-12-10 DIAGNOSIS — Z79.01 LONG TERM CURRENT USE OF ANTICOAGULANT THERAPY: Primary | ICD-10-CM

## 2019-12-10 DIAGNOSIS — Z86.711 HISTORY OF PULMONARY EMBOLISM: ICD-10-CM

## 2019-12-10 DIAGNOSIS — I82.409 DVT (DEEP VENOUS THROMBOSIS) (H): ICD-10-CM

## 2019-12-10 LAB — INR POINT OF CARE: 3.7 (ref 0.86–1.14)

## 2019-12-10 PROCEDURE — 36416 COLLJ CAPILLARY BLOOD SPEC: CPT

## 2019-12-10 PROCEDURE — 85610 PROTHROMBIN TIME: CPT | Mod: QW

## 2019-12-10 RX ORDER — WARFARIN SODIUM 5 MG/1
TABLET ORAL
Start: 2019-12-10 | End: 2020-03-25

## 2019-12-10 NOTE — PROGRESS NOTES
ANTICOAGULATION FOLLOW-UP CLINIC VISIT    Patient Name:  Jozef Saunders  Date:  12/10/2019  Contact Type:  Face to Face    SUBJECTIVE:  Patient Findings     Positives:   Change in diet/appetite    Comments:   He had hiatal hernia repair surgery on 11/12/19.  Diet is restricted for 6 weeks.   He is not to eat any raw vegetables or   chunks of meat.  He has been avoiding all vegetables and meat since his surgery.  Encouraged him to find other ways to get some Vit K and protein.  (V-8 juice, Ensure, Boost, multi-vitamin, high protein yogurt)   until he can eat normally again.    Patient denies: extra doses, illness, inflammation,   bleeding/bruises, medication changes,   or any alcohol intake.    INR is supratherapeutic today.   Patient will hold dose today which will   decrease weekly total by 11.8%,   then resume maintenance dose.   Will follow up in 2 weeks.        Clinical Outcomes     Comments:   He had hiatal hernia repair surgery on 11/12/19.  Diet is restricted for 6 weeks.   He is not to eat any raw vegetables or   chunks of meat.  He has been avoiding all vegetables and meat since his surgery.  Encouraged him to find other ways to get some Vit K and protein.  (V-8 juice, Ensure, Boost, multi-vitamin, high protein yogurt)   until he can eat normally again.    Patient denies: extra doses, illness, inflammation,   bleeding/bruises, medication changes,   or any alcohol intake.    INR is supratherapeutic today.   Patient will hold dose today which will   decrease weekly total by 11.8%,   then resume maintenance dose.   Will follow up in 2 weeks.           OBJECTIVE    INR Protime   Date Value Ref Range Status   12/10/2019 3.7 (A) 0.86 - 1.14 Final       ASSESSMENT / PLAN  INR assessment SUPRA    Recheck INR In: 2 WEEKS    INR Location Clinic      Anticoagulation Summary  As of 12/10/2019    INR goal:   2.0-3.0   TTR:   56.0 % (11.2 mo)   INR used for dosing:   3.7! (12/10/2019)   Warfarin maintenance plan:   7.5  mg (5 mg x 1.5) every Mon, Thu, Sat; 5 mg (5 mg x 1) all other days   Full warfarin instructions:   12/10: Hold; Otherwise 7.5 mg every Mon, Thu, Sat; 5 mg all other days   Weekly warfarin total:   42.5 mg   Plan last modified:   Ashley Summers RN (4/8/2019)   Next INR check:   12/23/2019   Priority:   High   Target end date:   Indefinite    Indications    DVT (deep venous thrombosis) (H) [I82.409]  Long term current use of anticoagulant therapy [Z79.01]             Anticoagulation Episode Summary     INR check location:   Anticoagulation Clinic    Preferred lab:       Send INR reminders to:   [facility]    Comments:    pm dose / pt lives in [place], retired / needs lovenox bridge      Anticoagulation Care Providers     Provider Role Specialty Phone number    WillAmarilisRojas Arriola MD  Internal Medicine [phone]            See the Encounter Report to view Anticoagulation Flowsheet and Dosing Calendar (Go to Encounters tab in chart review, and find the Anticoagulation Therapy Visit)    INR is supratherapeutic today.   Patient will hold dose today which will decrease weekly total   by 11.8%, then resume maintenance dose.   Will follow up in 2 weeks.    Dosage adjustment made based on physician directed care plan.      Zena Ren RN

## 2019-12-10 NOTE — PROGRESS NOTES
ANTICOAGULATION MANAGEMENT    Jozef Saunders due for review and annual renewal of referral to anticoagulation monitoring.      Warfarin indication(s) DVT   INR goal 2.0-3.0   Current duration of therapy Indefinite/long term therapy   Date of last office visit 10/21/19       Please advise if Jozef Saunders's anticoagulation management referral can be renewed for next year.     Please confirm renewal approval in new note within this telephone encounter and route back to MATTEO ALBERTO [[id number]]. No further action is necessary at this time (referral orders,etc).     Zena Ren RN          Previous INR referral information:    INR goal 2.0-3.0   Target end date: Indefinite   Has the patient previously taken warfarin? yes  If yes, for what indication? DVT

## 2019-12-10 NOTE — PROGRESS NOTES
Reviewed.  Continue current anticoagulation regimen   Continue INR clinic management    Reji Oropeza MD

## 2019-12-11 DIAGNOSIS — I10 ESSENTIAL HYPERTENSION, BENIGN: ICD-10-CM

## 2019-12-11 DIAGNOSIS — E78.00 HYPERCHOLESTEROLEMIA: ICD-10-CM

## 2019-12-12 RX ORDER — SIMVASTATIN 20 MG
TABLET ORAL
Qty: 90 TABLET | Refills: 0 | Status: SHIPPED | OUTPATIENT
Start: 2019-12-12 | End: 2020-02-19

## 2019-12-12 RX ORDER — ATENOLOL 50 MG/1
TABLET ORAL
Qty: 135 TABLET | Refills: 0 | Status: SHIPPED | OUTPATIENT
Start: 2019-12-12 | End: 2020-02-19

## 2019-12-12 NOTE — TELEPHONE ENCOUNTER
Medication is being filled for 1 time refill only due to:  Patient needs to be seen because need annual in Feb.   Please schedule

## 2019-12-23 ENCOUNTER — ANTICOAGULATION THERAPY VISIT (OUTPATIENT)
Dept: NURSING | Facility: CLINIC | Age: 64
End: 2019-12-23
Payer: COMMERCIAL

## 2019-12-23 ENCOUNTER — TELEPHONE (OUTPATIENT)
Dept: PEDIATRICS | Facility: CLINIC | Age: 64
End: 2019-12-23

## 2019-12-23 DIAGNOSIS — Z86.711 HISTORY OF PULMONARY EMBOLISM: Primary | ICD-10-CM

## 2019-12-23 DIAGNOSIS — I82.409 DVT (DEEP VENOUS THROMBOSIS) (H): ICD-10-CM

## 2019-12-23 DIAGNOSIS — Z79.01 LONG TERM CURRENT USE OF ANTICOAGULANT THERAPY: ICD-10-CM

## 2019-12-23 DIAGNOSIS — Z79.01 LONG TERM CURRENT USE OF ANTICOAGULANT THERAPY: Primary | ICD-10-CM

## 2019-12-23 LAB — INR POINT OF CARE: 3 (ref 0.86–1.14)

## 2019-12-23 PROCEDURE — 85610 PROTHROMBIN TIME: CPT | Mod: QW

## 2019-12-23 PROCEDURE — 36416 COLLJ CAPILLARY BLOOD SPEC: CPT

## 2019-12-23 NOTE — PROGRESS NOTES
ANTICOAGULATION FOLLOW-UP CLINIC VISIT    Patient Name:  Jozef Saunders  Date:  12/23/2019  Contact Type:  Face to Face    SUBJECTIVE:  Patient Findings     Comments:   The patient was assessed for   diet, medication,   missed or extra doses,   bruising or bleeding,   with no problem findings.          Clinical Outcomes     Comments:   The patient was assessed for   diet, medication,   missed or extra doses,   bruising or bleeding,   with no problem findings.             OBJECTIVE    INR Protime   Date Value Ref Range Status   12/23/2019 3.0 (A) 0.86 - 1.14 Final       ASSESSMENT / PLAN  INR assessment THER    Recheck INR In: 2 WEEKS    INR Location Clinic      Anticoagulation Summary  As of 12/23/2019    INR goal:   2.0-3.0   TTR:   56.1 % (11.2 mo)   INR used for dosing:   3.0 (12/23/2019)   Warfarin maintenance plan:   7.5 mg (5 mg x 1.5) every Mon, Thu, Sat; 5 mg (5 mg x 1) all other days   Full warfarin instructions:   7.5 mg every Mon, Thu, Sat; 5 mg all other days   Weekly warfarin total:   42.5 mg   Plan last modified:   Ashley Summers RN (4/8/2019)   Next INR check:   1/6/2020   Priority:   High   Target end date:   Indefinite    Indications    DVT (deep venous thrombosis) (H) [I82.409]  Long term current use of anticoagulant therapy [Z79.01]             Anticoagulation Episode Summary     INR check location:   Anticoagulation Clinic    Preferred lab:       Send INR reminders to:   [facility]    Comments:    pm dose / pt lives in [place], retired / needs lovenox bridge / INR referral 12/10/19      Anticoagulation Care Providers     Provider Role Specialty Phone number    Rojas Chun MD  Internal Medicine [phone]            See the Encounter Report to view Anticoagulation Flowsheet and Dosing Calendar (Go to Encounters tab in chart review, and find the Anticoagulation Therapy Visit)    INR is therapeutic today.   Patient will continue same maintenance dose.   Follow up in 2  weeks.        Zena Ren RN

## 2019-12-23 NOTE — TELEPHONE ENCOUNTER
Reimbursement rules require all INR Clinic patients to have a yearly renewal of an Anticoagulation Clinic Referral order.  Referral must be signed by the PCP for each patient.      Referral is pended. Please complete and sign.     Zena Ren RN

## 2020-01-06 ENCOUNTER — ANTICOAGULATION THERAPY VISIT (OUTPATIENT)
Dept: NURSING | Facility: CLINIC | Age: 65
End: 2020-01-06
Payer: COMMERCIAL

## 2020-01-06 DIAGNOSIS — I82.409 DVT (DEEP VENOUS THROMBOSIS) (H): ICD-10-CM

## 2020-01-06 DIAGNOSIS — Z86.711 HISTORY OF PULMONARY EMBOLISM: ICD-10-CM

## 2020-01-06 DIAGNOSIS — Z79.01 LONG TERM CURRENT USE OF ANTICOAGULANT THERAPY: Primary | ICD-10-CM

## 2020-01-06 LAB — INR POINT OF CARE: 3.2 (ref 0.86–1.14)

## 2020-01-06 PROCEDURE — 36416 COLLJ CAPILLARY BLOOD SPEC: CPT

## 2020-01-06 PROCEDURE — 85610 PROTHROMBIN TIME: CPT | Mod: QW

## 2020-01-06 NOTE — PROGRESS NOTES
ANTICOAGULATION FOLLOW-UP CLINIC VISIT    Patient Name:  Jozef Saunders  Date:  1/6/2020  Contact Type:  Face to Face    SUBJECTIVE:  Patient Findings     Positives:   Change in diet/appetite    Comments:   Back on his normal diet now.   Eating more vegetables.    Slight head cold.  No fever.    Patient denies: extra doses, inflammation,   bleeding/bruises, medication changes,   or increased alcohol intake.      INR is supratherapeutic today.   Patient will take 5 mg today, then decrease   weekly maintenance dose total by 5.9%.  Will follow up in 2 weeks.          Clinical Outcomes     Comments:   Back on his normal diet now.   Eating more vegetables.    Slight head cold.  No fever.    Patient denies: extra doses, inflammation,   bleeding/bruises, medication changes,   or increased alcohol intake.      INR is supratherapeutic today.   Patient will take 5 mg today, then decrease   weekly maintenance dose total by 5.9%.  Will follow up in 2 weeks.             OBJECTIVE    INR Protime   Date Value Ref Range Status   01/06/2020 3.2 (A) 0.86 - 1.14 Final       ASSESSMENT / PLAN  INR assessment SUPRA    Recheck INR In: 2 WEEKS    INR Location Clinic      Anticoagulation Summary  As of 1/6/2020    INR goal:   2.0-3.0   TTR:   55.8 % (11.2 mo)   INR used for dosing:   3.2! (1/6/2020)   Warfarin maintenance plan:   7.5 mg (5 mg x 1.5) every Mon, Thu; 5 mg (5 mg x 1) all other days   Full warfarin instructions:   1/6: 5 mg; Otherwise 7.5 mg every Mon, Thu; 5 mg all other days   Weekly warfarin total:   40 mg   Plan last modified:   Zena Ren RN (1/6/2020)   Next INR check:   1/20/2020   Priority:   Maintenance   Target end date:   Indefinite    Indications    DVT (deep venous thrombosis) (H) [I82.409]  Long term current use of anticoagulant therapy [Z79.01]  History of pulmonary embolism [Z86.711]             Anticoagulation Episode Summary     INR check location:   Anticoagulation Clinic    Preferred lab:        Send INR reminders to:   MARICHUYLENORA ALBERTO    Comments:    pm dose / pt lives in [place], retired / needs lovenox bridge / INR referral 12/23/19      Anticoagulation Care Providers     Provider Role Specialty Phone number    Reji Oropeza MD Referring Internal Medicine [phone]    Rojas Chun MD  Internal Medicine [phone]            See the Encounter Report to view Anticoagulation Flowsheet and Dosing Calendar (Go to Encounters tab in chart review, and find the Anticoagulation Therapy Visit)    INR is supratherapeutic today.   Patient will take 5 mg today, then decrease weekly maintenance dose total by 5.9%.  Will follow up in 2 weeks.    Dosage adjustment made based on physician directed care plan.        Zena Ren RN

## 2020-01-20 ENCOUNTER — ANTICOAGULATION THERAPY VISIT (OUTPATIENT)
Dept: NURSING | Facility: CLINIC | Age: 65
End: 2020-01-20
Payer: COMMERCIAL

## 2020-01-20 DIAGNOSIS — I82.409 DVT (DEEP VENOUS THROMBOSIS) (H): ICD-10-CM

## 2020-01-20 DIAGNOSIS — Z86.711 HISTORY OF PULMONARY EMBOLISM: ICD-10-CM

## 2020-01-20 DIAGNOSIS — Z79.01 LONG TERM CURRENT USE OF ANTICOAGULANT THERAPY: Primary | ICD-10-CM

## 2020-01-20 LAB — INR POINT OF CARE: 2.1 (ref 0.86–1.14)

## 2020-01-20 PROCEDURE — 85610 PROTHROMBIN TIME: CPT | Mod: QW

## 2020-01-20 PROCEDURE — 36416 COLLJ CAPILLARY BLOOD SPEC: CPT

## 2020-01-20 NOTE — PROGRESS NOTES
ANTICOAGULATION FOLLOW-UP CLINIC VISIT    Patient Name:  Jozef Saunders  Date:  2020  Contact Type:  Face to Face    SUBJECTIVE:  Patient Findings     Comments:   The patient was assessed for   diet, medication,   missed or extra doses,   bruising or bleeding,   with no problem findings.          Clinical Outcomes     Comments:   The patient was assessed for   diet, medication,   missed or extra doses,   bruising or bleeding,   with no problem findings.             OBJECTIVE    INR Protime   Date Value Ref Range Status   2020 2.1 (A) 0.86 - 1.14 Final       ASSESSMENT / PLAN  INR assessment THER    Recheck INR In: 4 WEEKS    INR Location Clinic      Anticoagulation Summary  As of 2020    INR goal:   2.0-3.0   TTR:   58.4 % (11.2 mo)   INR used for dosin.1 (2020)   Warfarin maintenance plan:   7.5 mg (5 mg x 1.5) every Mon, Thu; 5 mg (5 mg x 1) all other days   Full warfarin instructions:   7.5 mg every Mon, Thu; 5 mg all other days   Weekly warfarin total:   40 mg   No change documented:   Zena Ren RN   Plan last modified:   Zena Ren RN (2020)   Next INR check:   2020   Priority:   Maintenance   Target end date:   Indefinite    Indications    DVT (deep venous thrombosis) (H) [I82.409]  Long term current use of anticoagulant therapy [Z79.01]  History of pulmonary embolism [Z86.711]             Anticoagulation Episode Summary     INR check location:   Anticoagulation Clinic    Preferred lab:       Send INR reminders to:   LEANDRA ALBERTO    Comments:    pm dose / pt lives in [place], retired / needs lovenox bridge / INR referral 19      Anticoagulation Care Providers     Provider Role Specialty Phone number    Reji Oropeza MD Referring Internal Medicine [phone]    Rojas Chun MD  Internal Medicine [phone]            See the Encounter Report to view Anticoagulation Flowsheet and Dosing Calendar (Go to Encounters tab in chart  review, and find the Anticoagulation Therapy Visit)    INR is therapeutic today.   Patient will continue same maintenance dose.   Follow up in 4 weeks.        Zena Ren RN

## 2020-02-17 ENCOUNTER — ANTICOAGULATION THERAPY VISIT (OUTPATIENT)
Dept: NURSING | Facility: CLINIC | Age: 65
End: 2020-02-17
Payer: COMMERCIAL

## 2020-02-17 DIAGNOSIS — Z79.01 LONG TERM CURRENT USE OF ANTICOAGULANT THERAPY: Primary | ICD-10-CM

## 2020-02-17 DIAGNOSIS — I82.409 DVT (DEEP VENOUS THROMBOSIS) (H): ICD-10-CM

## 2020-02-17 DIAGNOSIS — Z86.711 HISTORY OF PULMONARY EMBOLISM: ICD-10-CM

## 2020-02-17 LAB — INR POINT OF CARE: 2.3 (ref 0.86–1.14)

## 2020-02-17 PROCEDURE — 85610 PROTHROMBIN TIME: CPT | Mod: QW

## 2020-02-17 PROCEDURE — 36416 COLLJ CAPILLARY BLOOD SPEC: CPT

## 2020-02-17 NOTE — PROGRESS NOTES
ANTICOAGULATION FOLLOW-UP CLINIC VISIT    Patient Name:  Jozef Saunders  Date:  2020  Contact Type:  Face to Face    SUBJECTIVE:  Patient Findings     Comments:   The patient was assessed for   diet, medication,   missed or extra doses,   bruising or bleeding,   with no problem findings.          Clinical Outcomes     Comments:   The patient was assessed for   diet, medication,   missed or extra doses,   bruising or bleeding,   with no problem findings.             OBJECTIVE    INR Protime   Date Value Ref Range Status   2020 2.3 (A) 0.86 - 1.14 Final       ASSESSMENT / PLAN  INR assessment THER    Recheck INR In: 4 WEEKS    INR Location Clinic      Anticoagulation Summary  As of 2020    INR goal:   2.0-3.0   TTR:   66.7 % (11.2 mo)   INR used for dosin.3 (2020)   Warfarin maintenance plan:   7.5 mg (5 mg x 1.5) every Mon, Thu; 5 mg (5 mg x 1) all other days   Full warfarin instructions:   7.5 mg every Mon, Thu; 5 mg all other days   Weekly warfarin total:   40 mg   No change documented:   Zena Ren RN   Plan last modified:   Zena Ren RN (2020)   Next INR check:   3/17/2020   Priority:   Maintenance   Target end date:   Indefinite    Indications    DVT (deep venous thrombosis) (H) [I82.409]  Long term current use of anticoagulant therapy [Z79.01]  History of pulmonary embolism [Z86.711]             Anticoagulation Episode Summary     INR check location:   Anticoagulation Clinic    Preferred lab:       Send INR reminders to:   LEANDRA ALBERTO    Comments:    pm dose / pt lives in [place], retired / needs lovenox bridge       Anticoagulation Care Providers     Provider Role Specialty Phone number    Rojas Chun MD  Internal Medicine [phone]    Hina Humphreys MD  Internal Medicine [phone]            See the Encounter Report to view Anticoagulation Flowsheet and Dosing Calendar (Go to Encounters tab in chart review, and find the  Anticoagulation Therapy Visit)    INR is therapeutic today.   Patient will continue same maintenance dose.   Follow up in 4 weeks.        Zena Ren RN

## 2020-02-19 ENCOUNTER — OFFICE VISIT (OUTPATIENT)
Dept: PEDIATRICS | Facility: CLINIC | Age: 65
End: 2020-02-19
Payer: COMMERCIAL

## 2020-02-19 VITALS
BODY MASS INDEX: 28.9 KG/M2 | DIASTOLIC BLOOD PRESSURE: 80 MMHG | HEIGHT: 69 IN | SYSTOLIC BLOOD PRESSURE: 118 MMHG | HEART RATE: 67 BPM | OXYGEN SATURATION: 96 % | TEMPERATURE: 97.6 F | WEIGHT: 195.1 LBS | RESPIRATION RATE: 14 BRPM

## 2020-02-19 DIAGNOSIS — R33.9 URINARY RETENTION: ICD-10-CM

## 2020-02-19 DIAGNOSIS — K44.9 HIATAL HERNIA: ICD-10-CM

## 2020-02-19 DIAGNOSIS — E78.00 HYPERCHOLESTEROLEMIA: ICD-10-CM

## 2020-02-19 DIAGNOSIS — D50.9 IRON DEFICIENCY ANEMIA, UNSPECIFIED IRON DEFICIENCY ANEMIA TYPE: ICD-10-CM

## 2020-02-19 DIAGNOSIS — I10 ESSENTIAL HYPERTENSION, BENIGN: ICD-10-CM

## 2020-02-19 DIAGNOSIS — R14.0 ABDOMINAL BLOATING: Primary | ICD-10-CM

## 2020-02-19 DIAGNOSIS — J31.0 CHRONIC RHINITIS: ICD-10-CM

## 2020-02-19 DIAGNOSIS — F10.21 ALCOHOL DEPENDENCE IN REMISSION (H): ICD-10-CM

## 2020-02-19 DIAGNOSIS — Z86.718 HISTORY OF RECURRENT DEEP VEIN THROMBOSIS (DVT): ICD-10-CM

## 2020-02-19 DIAGNOSIS — Z86.711 HISTORY OF PULMONARY EMBOLISM: ICD-10-CM

## 2020-02-19 DIAGNOSIS — D69.6 THROMBOCYTOPENIA (H): ICD-10-CM

## 2020-02-19 DIAGNOSIS — F41.1 GENERALIZED ANXIETY DISORDER: ICD-10-CM

## 2020-02-19 LAB
ALBUMIN SERPL-MCNC: 3.6 G/DL (ref 3.4–5)
ALP SERPL-CCNC: 95 U/L (ref 40–150)
ALT SERPL W P-5'-P-CCNC: 29 U/L (ref 0–70)
ANION GAP SERPL CALCULATED.3IONS-SCNC: 6 MMOL/L (ref 3–14)
AST SERPL W P-5'-P-CCNC: 21 U/L (ref 0–45)
BILIRUB SERPL-MCNC: 0.4 MG/DL (ref 0.2–1.3)
BUN SERPL-MCNC: 17 MG/DL (ref 7–30)
CALCIUM SERPL-MCNC: 9 MG/DL (ref 8.5–10.1)
CHLORIDE SERPL-SCNC: 110 MMOL/L (ref 94–109)
CHOLEST SERPL-MCNC: 171 MG/DL
CO2 SERPL-SCNC: 26 MMOL/L (ref 20–32)
CREAT SERPL-MCNC: 0.79 MG/DL (ref 0.66–1.25)
ERYTHROCYTE [DISTWIDTH] IN BLOOD BY AUTOMATED COUNT: 15.3 % (ref 10–15)
GFR SERPL CREATININE-BSD FRML MDRD: >90 ML/MIN/{1.73_M2}
GLUCOSE SERPL-MCNC: 97 MG/DL (ref 70–99)
HCT VFR BLD AUTO: 40.8 % (ref 40–53)
HDLC SERPL-MCNC: 42 MG/DL
HGB BLD-MCNC: 12.6 G/DL (ref 13.3–17.7)
LDLC SERPL CALC-MCNC: 92 MG/DL
MCH RBC QN AUTO: 23.7 PG (ref 26.5–33)
MCHC RBC AUTO-ENTMCNC: 30.9 G/DL (ref 31.5–36.5)
MCV RBC AUTO: 77 FL (ref 78–100)
NONHDLC SERPL-MCNC: 129 MG/DL
PLATELET # BLD AUTO: 121 10E9/L (ref 150–450)
POTASSIUM SERPL-SCNC: 4.5 MMOL/L (ref 3.4–5.3)
PROT SERPL-MCNC: 7.4 G/DL (ref 6.8–8.8)
RBC # BLD AUTO: 5.31 10E12/L (ref 4.4–5.9)
SODIUM SERPL-SCNC: 142 MMOL/L (ref 133–144)
TRIGL SERPL-MCNC: 186 MG/DL
TSH SERPL DL<=0.005 MIU/L-ACNC: 0.72 MU/L (ref 0.4–4)
WBC # BLD AUTO: 5.3 10E9/L (ref 4–11)

## 2020-02-19 PROCEDURE — 84443 ASSAY THYROID STIM HORMONE: CPT | Performed by: INTERNAL MEDICINE

## 2020-02-19 PROCEDURE — 36415 COLL VENOUS BLD VENIPUNCTURE: CPT | Performed by: INTERNAL MEDICINE

## 2020-02-19 PROCEDURE — 99215 OFFICE O/P EST HI 40 MIN: CPT | Performed by: INTERNAL MEDICINE

## 2020-02-19 PROCEDURE — 80061 LIPID PANEL: CPT | Performed by: INTERNAL MEDICINE

## 2020-02-19 PROCEDURE — 85027 COMPLETE CBC AUTOMATED: CPT | Performed by: INTERNAL MEDICINE

## 2020-02-19 PROCEDURE — 80053 COMPREHEN METABOLIC PANEL: CPT | Performed by: INTERNAL MEDICINE

## 2020-02-19 RX ORDER — FAMOTIDINE 20 MG/1
20 TABLET, FILM COATED ORAL 2 TIMES DAILY
Qty: 180 TABLET | Refills: 1 | Status: SHIPPED | OUTPATIENT
Start: 2020-02-19 | End: 2020-09-10

## 2020-02-19 RX ORDER — OMEPRAZOLE 10 MG/1
20 CAPSULE, DELAYED RELEASE ORAL DAILY
Qty: 90 CAPSULE | Refills: 11 | Status: SHIPPED | OUTPATIENT
Start: 2020-02-19 | End: 2020-06-25

## 2020-02-19 RX ORDER — TAMSULOSIN HYDROCHLORIDE 0.4 MG/1
0.4 CAPSULE ORAL DAILY
Qty: 90 CAPSULE | Refills: 11 | Status: SHIPPED | OUTPATIENT
Start: 2020-02-19 | End: 2020-02-19

## 2020-02-19 RX ORDER — LISINOPRIL 20 MG/1
TABLET ORAL
Qty: 90 TABLET | Refills: 11 | Status: SHIPPED | OUTPATIENT
Start: 2020-02-19 | End: 2021-03-08

## 2020-02-19 RX ORDER — ATENOLOL 50 MG/1
75 TABLET ORAL DAILY
Qty: 135 TABLET | Refills: 11 | Status: SHIPPED | OUTPATIENT
Start: 2020-02-19 | End: 2021-03-08

## 2020-02-19 RX ORDER — FLUTICASONE PROPIONATE 50 MCG
SPRAY, SUSPENSION (ML) NASAL
Qty: 16 ML | Refills: 11 | Status: SHIPPED | OUTPATIENT
Start: 2020-02-19 | End: 2021-02-25

## 2020-02-19 RX ORDER — SIMVASTATIN 20 MG
20 TABLET ORAL AT BEDTIME
Qty: 90 TABLET | Refills: 11 | Status: SHIPPED | OUTPATIENT
Start: 2020-02-19 | End: 2021-05-03

## 2020-02-19 ASSESSMENT — ENCOUNTER SYMPTOMS: ABDOMINAL PAIN: 1

## 2020-02-19 ASSESSMENT — ACTIVITIES OF DAILY LIVING (ADL): CURRENT_FUNCTION: NO ASSISTANCE NEEDED

## 2020-02-19 ASSESSMENT — MIFFLIN-ST. JEOR: SCORE: 1660.35

## 2020-02-19 NOTE — PROGRESS NOTES
"Subjective     Jozef Saunders is a 64 year old male who presents to clinic today for the following health issues:    Healthy Habits:     In general, how would you rate your overall health?  Good    Frequency of exercise:  6-7 days/week    Duration of exercise:  30-45 minutes    Do you usually eat at least 4 servings of fruit and vegetables a day, include whole grains    & fiber and avoid regularly eating high fat or \"junk\" foods?  Yes    Medication side effects:  None    Ability to successfully perform activities of daily living:  No assistance needed    Home Safety:  No safety concerns identified    Hearing Impairment:  No hearing concerns    In the past 6 months, have you been bothered by leaking of urine?  No    In general, how would you rate your overall mental or emotional health?  Good      PHQ-2 Total Score: 0    Additional concerns today:  No     New Patient/Transfer of Care    Follow up after Nissen Gastroplasty. Symptoms before surgery were just worsening bloating through the day especially after eating even small amounts. Still has some of the same symptoms, but not as bad. Still feels some early satiety. Mildly gassy, but more tries to pass gas and feels better, but doesn't last long.in the morning he feels normal. Then after eating and the day goes on, feels like his belly is sticking out.       Overall mild numbness around the incision, but feels well.     Continues to take PPI, has been on for several years. No heartburn. Reviewed prior EGD. No ulceration or Barretts.    Hypertension Follow-up      Do you check your blood pressure regularly outside of the clinic? No     Are you following a low salt diet? No    Are your blood pressures ever more than 140 on the top number (systolic) OR more   than 90 on the bottom number (diastolic), for example 140/90? Not taking    Anxiety Follow-Up    How are you doing with your anxiety since your last visit? No change    Are you having other symptoms that might " be associated with anxiety? No    Have you had a significant life event? No     Are you feeling depressed? No    Do you have any concerns with your use of alcohol or other drugs? No    Social History     Tobacco Use     Smoking status: Former Smoker     Last attempt to quit: 1989     Years since quittin.2     Smokeless tobacco: Never Used   Substance Use Topics     Alcohol use: No     Frequency: Never     Drinks per session: Patient refused     Binge frequency: Patient refused     Comment: sober x26y     Drug use: No     MARIANNE-7 SCORE 2016 2017 10/11/2018   Total Score 0 0 0     PHQ 2016 2017 10/11/2018   PHQ-9 Total Score 0 0 0   Q9: Thoughts of better off dead/self-harm past 2 weeks Not at all Not at all Not at all     MARIANNE-7  10/11/2018   1. Feeling nervous, anxious, or on edge 0   2. Not being able to stop or control worrying 0   3. Worrying too much about different things 0   4. Trouble relaxing 0   5. Being so restless that it is hard to sit still 0   6. Becoming easily annoyed or irritable 0   7. Feeling afraid, as if something awful might happen 0   MARIANNE-7 Total Score 0   If you checked any problems, how difficult have they made it for you to do your work, take care of things at home, or get along with other people? Not difficult at all       Patient Active Problem List   Diagnosis     Essential hypertension, benign     Generalized anxiety disorder     Hypercholesterolemia     HYPERLIPIDEMIA LDL GOAL <130     Hiatal hernia     Advanced directives, counseling/discussion     Recurrent periodic urticaria     History of recurrent deep vein thrombosis (DVT)     History of pulmonary embolism     Long term current use of anticoagulant therapy     Thrush     Alcohol dependence in remission (H)     Thrombocytopenia (H)     Paraesophageal hiatal hernia     Past Surgical History:   Procedure Laterality Date     HC ARTHROTOMY/EXPLORE/TREAT KNEE JOINT      torn ligaments     HC REPAIR ING  "HERNIA,5+Y/O,REDUCIBL      age 8 - right     HC REPAIR ING HERNIA,6MO-5YR,REDUC      infancy - left     HERNIORRHAPHY HIATAL N/A 2019    Procedure: HIATAL HERNIA REPAIR WITH UNCUT COLIS NISSEN GASTROPLASTY;  Surgeon: Dewey Roa MD;  Location: [facility] OR     THORACOTOMY N/A 2019    Procedure: LEFT THORACOTOMY;  Surgeon: Dewey Roa MD;  Location:  OR       Social History     Tobacco Use     Smoking status: Former Smoker     Last attempt to quit: 1989     Years since quittin.3     Smokeless tobacco: Never Used   Substance Use Topics     Alcohol use: No     Frequency: Never     Drinks per session: Patient refused     Binge frequency: Patient refused     Comment: sober x26y     Family History   Problem Relation Age of Onset     Cancer Mother         liver;  at age 47     Hypertension Father      C.A.D. Father         CABG; age of onset over age 60     Hypertension Brother         age of onset under 50     Prostate Cancer Brother         diagnosed at age 58     Diabetes No family hx of            Reviewed and updated as needed this visit by Provider  Meds         Review of Systems   ROS COMP: Constitutional, HEENT, cardiovascular, pulmonary, GI, , musculoskeletal, neuro, skin, endocrine and psych systems are negative, except as otherwise noted.      Objective    /80 (Cuff Size: Adult Regular)   Pulse 67   Temp 97.6  F (36.4  C) (Tympanic)   Resp 14   Ht 1.753 m (5' 9\")   Wt 88.5 kg (195 lb 1.6 oz)   SpO2 96%   BMI 28.81 kg/m    Body mass index is 28.81 kg/m .  Physical Exam   GENERAL: healthy, alert and no distress  NECK: no adenopathy, no asymmetry, masses, or scars and thyroid normal to palpation  RESP: lungs clear to auscultation - no rales, rhonchi or wheezes  CV: regular rate and rhythm, normal S1 S2, no S3 or S4, no murmur, click or rub, no peripheral edema and peripheral pulses strong  ABDOMEN: soft, nontender, no hepatosplenomegaly, no masses and " bowel sounds normal, abdominal incisions well-healed.  MS: no gross musculoskeletal defects noted, no edema    Diagnostic Test Results:  Labs reviewed in Epic        Assessment & Plan     1. Abdominal bloating  Has had long term symptoms. This did not significantly improve after reflux surgery. Discussed possibilities, malabsorption, carbohydrate issues such as lactose or fructose intolerance. After long discussion with patient, also considered whether could be side effect from PPI, as sxs started around the same time. Plan decrease omeprazole to 10 mg, then will see if he tolerates going off.     2. Hiatal hernia  Add H2 blocker initially when weaning PPI. Has done well after surgery. No further reflux symptoms.  - omeprazole 10 MG PO DR capsule; Take 2 capsules (20 mg) by mouth daily  Dispense: 90 capsule; Refill: 11  - famotidine 20 MG PO tablet; Take 1 tablet (20 mg) by mouth 2 times daily  Dispense: 180 tablet; Refill: 1    3. BENIGN HYPERTENSION  Reasonable control.  - atenolol 50 MG PO tablet; Take 1.5 tablets (75 mg) by mouth daily  Dispense: 135 tablet; Refill: 11  - lisinopril 20 MG PO tablet; TAKE 1 TABLET BY MOUTH EVERY DAY  Dispense: 90 tablet; Refill: 11  - Comprehensive metabolic panel (BMP + Alb, Alk Phos, ALT, AST, Total. Bili, TP)  - TSH with free T4 reflex    4. Chronic rhinitis  Stable, refilled.  - fluticasone 50 MCG/ACT NA nasal spray; SPRAY 2 SPRAYS INTO BOTH NOSTRILS DAILY. DUE FOR APPT AND LABS  Dispense: 16 mL; Refill: 11    5. Generalized anxiety disorder  Doing well on low dose sertrealine.  - sertraline 50 MG PO tablet; TAKE 0.5 TABLETS (25 MG) BY MOUTH DAILY  Dispense: 45 tablet; Refill: 11    6. Hypercholesterolemia  Tolerating statin.   - sertraline 50 MG PO tablet; TAKE 0.5 TABLETS (25 MG) BY MOUTH DAILY  Dispense: 45 tablet; Refill: 11  - simvastatin 20 MG PO tablet; Take 1 tablet (20 mg) by mouth At Bedtime  Dispense: 90 tablet; Refill: 11  - Lipid panel reflex to direct LDL  "Fasting    7. Urinary retention  Postoperative, now has resolved. No longer on flomax. Med list updated and reviewed.    8. Thrombocytopenia (H)  On lifelong coumadin.  - CBC with platelets    9. History of pulmonary embolism      10. History of recurrent deep vein thrombosis (DVT)      11. Alcohol dependence in remission (H)         BMI:   Estimated body mass index is 28.81 kg/m  as calculated from the following:    Height as of this encounter: 1.753 m (5' 9\").    Weight as of this encounter: 88.5 kg (195 lb 1.6 oz).   Weight management plan: Discussed healthy diet and exercise guidelines        Patient Instructions   It's always reasonable to go back and see Dr. Roa in a month or two to just talk about where things are now and what he would expect. This may help you be more comfortable with your current symptoms.    Try an OTC gas medication such as simethicone (gas-x) or beano. See if this helps if you take it before eating or through the day.    Shingles vaccine is a good idea.    Decrease omeprazole to 10 mg daily for 1 month or so. If not having any heartburn or abdominal discomfort, OK to stop.  Now start famotidine twice daily and keep taking after you stop omeprazole for at least 3 months.     Let me know how you're doing.        Patient Education     Low-Salt Diet  This diet removes foods that are high in salt. It also limits the amount of salt you use when cooking. It is most often used for people with high blood pressure, edema (fluid retention), and kidney, liver, or heart disease.  Table salt contains the mineral sodium. Your body needs sodium to work normally. But too much sodium can make your health problems worse. Your healthcare provider is recommending a low-salt (also called low-sodium) diet for you. Your total daily allowance of salt is 1,500 to 2,300 milligrams (mg). It is less than 1 teaspoon of table salt. This means you can have only about 500 to 700 mg of sodium at each meal. People " with certain health problems should limit salt intake to the lower end of the recommended range.    When you cook, don t add much salt. If you can cook without using salt, even better. Don t add salt to your food at the table.  When shopping, read food labels. Salt is often called sodium on the label. Choose foods that are salt-free, low salt, or very low salt. Note that foods with reduced salt may not lower your salt intake enough.    Beans, potatoes, and pasta  Ok: Dry beans, split peas, lentils, potatoes, rice, macaroni, pasta, spaghetti without added salt  Avoid: Potato chips, tortilla chips, and similar products  Breads and cereals  Ok: Low-sodium breads, rolls, cereals, and cakes; low-salt crackers, matzo crackers  Avoid: Salted crackers, pretzels, popcorn, Micronesian toast, pancakes, muffins  Dairy  Ok: Milk, chocolate milk, hot chocolate mix, low-salt cheeses, and yogurt  Avoid: Processed cheese and cheese spreads; Roquefort, Camembert, and cottage cheese; buttermilk, instant breakfast drink  Desserts  Ok: Ice cream, frozen yogurt, juice bars, gelatin, cookies and pies, sugar, honey, jelly, hard candy  Avoid: Most pies, cakes and cookies prepared or processed with salt; instant pudding  Drinks  Ok: Tea, coffee, fizzy (carbonated) drinks, juices  Avoid: Flavored coffees, electrolyte replacement drinks, sports drinks  Meats  Ok: All fresh meat, fish, poultry, low-salt tuna, eggs, egg substitute  Avoid: Smoked, pickled, brine-cured, or salted meats and fish. This includes dunham, chipped beef, corned beef, hot dogs, deli meats, ham, kosher meats, salt pork, sausage, canned tuna, salted codfish, smoked salmon, herring, sardines, or anchovies.  Seasonings and spices  Ok: Most seasonings are okay. Good substitutes for salt include: fresh herb blends, hot sauce, lemon, garlic, crow, vinegar, dry mustard, parsley, cilantro, horseradish, tomato paste, regular margarine, mayonnaise, unsalted butter, cream cheese,  vegetable oil, cream, low-salt salad dressing and gravy.  Avoid: Regular ketchup, relishes, pickles, soy sauce, teriyaki sauce, Worcestershire sauce, BBQ sauce, tartar sauce, meat tenderizer, chili sauce, regular gravy, regular salad dressing, salted butter  Soups  Ok: Low-salt soups and broths made with allowed foods  Avoid: Bouillon cubes, soups with smoked or salted meats, regular soup and broth  Vegetables  Ok: Most vegetables are okay; also low-salt tomato and vegetable juices  Avoid: Sauerkraut and other brine-soaked vegetables; pickles and other pickled vegetables; tomato juice, olives  Date Last Reviewed: 8/1/2016 2000-2019 [employer]. [address] [place] [address]. All rights reserved. This information is not intended as a substitute for professional medical care. Always follow your healthcare professional's instructions.               No follow-ups on file.     I spent 45 minutes with this patient face-to-face, of which 50% or greater was spent in counseling and coordination of care with regards to bloating, hypertension, lipids, postop state after GERD surgery. Please see plan above.      Hina Humphreys MD  [facility]

## 2020-02-19 NOTE — PROGRESS NOTES
"SUBJECTIVE:   Jozef Saunders is a 64 year old male who presents for Preventive Visit.  {PVP to remind patient that this is not necessarily a physical exam; physical exam may or may not be done:170075::\"click delete button to remove this line now\"}  {PVP to inform patient that additional E&M charge may apply, if additional problems addressed:074717::\"click delete button to remove this line now\"}  Are you in the first 12 months of your Medicare coverage?  { :334851::\"No\"}    Healthy Habits:     In general, how would you rate your overall health?  Good    Frequency of exercise:  6-7 days/week    Duration of exercise:  30-45 minutes    Do you usually eat at least 4 servings of fruit and vegetables a day, include whole grains    & fiber and avoid regularly eating high fat or \"junk\" foods?  Yes    Medication side effects:  None    Ability to successfully perform activities of daily living:  No assistance needed    Home Safety:  No safety concerns identified    Hearing Impairment:  No hearing concerns    In the past 6 months, have you been bothered by leaking of urine?  No    In general, how would you rate your overall mental or emotional health?  Good      PHQ-2 Total Score: 0    Additional concerns today:  No    Do you feel safe in your environment? { :661572}    Have you ever done Advance Care Planning? (For example, a Health Directive, POLST, or a discussion with a medical provider or your loved ones about your wishes): { :141786}    {Hearing Test Done (Optional):863551}  Fall risk  { :315758}  {If any of the above assessments are answered yes, consider ordering appropriate referrals (Optional):170383::\"click delete button to remove this line now\"}  Cognitive Screening { :213187}    {Do you have sleep apnea, excessive snoring or daytime drowsiness? (Optional):932795}    Reviewed and updated as needed this visit by clinical staff         Reviewed and updated as needed this visit by Provider        Social History "     Tobacco Use     Smoking status: Former Smoker     Last attempt to quit: 1989     Years since quittin.2     Smokeless tobacco: Never Used   Substance Use Topics     Alcohol use: No     Frequency: Never     Drinks per session: Patient refused     Binge frequency: Patient refused     Comment: sober x26y     {Rooming Staff- Complete this question if Prescreen response is not shown below for today's visit. If you drink alcohol do you typically have >3 drinks per day or >7 drinks per week? (Optional):945062}    Alcohol Use 2020   Prescreen: >3 drinks/day or >7 drinks/week? Not Applicable   Prescreen: >3 drinks/day or >7 drinks/week? -   {add AUDIT responses (Optional) (A score of 7 for adult men is an indication of hazardous drinking; a score of 8 or more is an indication of an alcohol use disorder.  A score of 7 or more for adult women is an indication of hazardous drinking or an alchohol use disorder):050161}    {Outside tests to abstract? :754328}    {additional problems to add (Optional):526231}    Current providers sharing in care for this patient include: {Rooming staff:  Please update Care Team in Rooming Activity, refresh this note and then delete this statement}  Patient Care Team:  Rojas Chun MD as PCP - General (Internal Medicine)  Rojas Chun MD as Assigned PCP    The following health maintenance items are reviewed in Epic and correct as of today:  Health Maintenance   Topic Date Due     ANNUAL REVIEW OF HM ORDERS  1955     ZOSTER IMMUNIZATION (1 of 2) 2004     FALL RISK ASSESSMENT  2019     PHQ-2  2020     PNEUMOCOCCAL IMMUNIZATION 65+ LOW/MEDIUM RISK (1 of 2 - PCV13) 2019     MEDICARE ANNUAL WELLNESS VISIT  02/15/2020     LIPID  2023     ADVANCE CARE PLANNING  02/15/2024     DTAP/TDAP/TD IMMUNIZATION (3 - Td) 2026     COLONOSCOPY  2028     HEPATITIS C SCREENING  Completed     INFLUENZA VACCINE  Completed      "AORTIC ANEURYSM SCREENING (SYSTEM ASSIGNED)  Completed     HIV SCREENING  Addressed     IPV IMMUNIZATION  Aged Out     MENINGITIS IMMUNIZATION  Aged Out     {Chronicprobdata (optional):021795}  {Decision Support (Optional):421128}    Review of Systems   Gastrointestinal: Positive for abdominal pain.   Genitourinary: Negative for discharge and impotence.     {ROS COMP (Optional):868069}    OBJECTIVE:   There were no vitals taken for this visit. Estimated body mass index is 28.84 kg/m  as calculated from the following:    Height as of 19: 1.753 m (5' 9\").    Weight as of 19: 88.6 kg (195 lb 4.8 oz).  Physical Exam  {Exam (Optional) :083998}    {Diagnostic Test Results (Optional):965777::\"Diagnostic Test Results:\",\"Labs reviewed in Epic\"}    ASSESSMENT / PLAN:   {Diag Picklist:752713}    COUNSELING:  {Medicare Counselin}    Estimated body mass index is 28.84 kg/m  as calculated from the following:    Height as of 19: 1.753 m (5' 9\").    Weight as of 19: 88.6 kg (195 lb 4.8 oz).    {Weight Management Plan (ACO) Complete if BMI is abnormal-  Ages 18-64  BMI >24.9.  Age 65+ with BMI <23 or >30 (Optional):328740}     reports that he quit smoking about 30 years ago. He has never used smokeless tobacco.  {Tobacco Cessation -- Complete if patient is a smoker (Optional):486214}    Appropriate preventive services were discussed with this patient, including applicable screening as appropriate for cardiovascular disease, diabetes, osteopenia/osteoporosis, and glaucoma.  As appropriate for age/gender, discussed screening for colorectal cancer, prostate cancer, breast cancer, and cervical cancer. Checklist reviewing preventive services available has been given to the patient.    Reviewed patients plan of care and provided an AVS. The {CarePlan:960593} for Jozef meets the Care Plan requirement. This Care Plan has been established and reviewed with the {PATIENT, FAMILY MEMBER, " CAREGIVER:008222}.    Counseling Resources:  ATP IV Guidelines  Pooled Cohorts Equation Calculator  Breast Cancer Risk Calculator  FRAX Risk Assessment  ICSI Preventive Guidelines  Dietary Guidelines for Americans, 2010  USDA's MyPlate  ASA Prophylaxis  Lung CA Screening    Hina Humphreys MD  [facility]    Identified Health Risks:

## 2020-02-19 NOTE — PATIENT INSTRUCTIONS
It's always reasonable to go back and see Dr. Roa in a month or two to just talk about where things are now and what he would expect. This may help you be more comfortable with your current symptoms.    Try an OTC gas medication such as simethicone (gas-x) or beano. See if this helps if you take it before eating or through the day.    Shingles vaccine is a good idea.    Decrease omeprazole to 10 mg daily for 1 month or so. If not having any heartburn or abdominal discomfort, OK to stop.  Now start famotidine twice daily and keep taking after you stop omeprazole for at least 3 months.     Let me know how you're doing.        Patient Education     Low-Salt Diet  This diet removes foods that are high in salt. It also limits the amount of salt you use when cooking. It is most often used for people with high blood pressure, edema (fluid retention), and kidney, liver, or heart disease.  Table salt contains the mineral sodium. Your body needs sodium to work normally. But too much sodium can make your health problems worse. Your healthcare provider is recommending a low-salt (also called low-sodium) diet for you. Your total daily allowance of salt is 1,500 to 2,300 milligrams (mg). It is less than 1 teaspoon of table salt. This means you can have only about 500 to 700 mg of sodium at each meal. People with certain health problems should limit salt intake to the lower end of the recommended range.    When you cook, don t add much salt. If you can cook without using salt, even better. Don t add salt to your food at the table.  When shopping, read food labels. Salt is often called sodium on the label. Choose foods that are salt-free, low salt, or very low salt. Note that foods with reduced salt may not lower your salt intake enough.    Beans, potatoes, and pasta  Ok: Dry beans, split peas, lentils, potatoes, rice, macaroni, pasta, spaghetti without added salt  Avoid: Potato chips, tortilla chips, and similar  products  Breads and cereals  Ok: Low-sodium breads, rolls, cereals, and cakes; low-salt crackers, matzo crackers  Avoid: Salted crackers, pretzels, popcorn, Surinamese toast, pancakes, muffins  Dairy  Ok: Milk, chocolate milk, hot chocolate mix, low-salt cheeses, and yogurt  Avoid: Processed cheese and cheese spreads; Roquefort, Camembert, and cottage cheese; buttermilk, instant breakfast drink  Desserts  Ok: Ice cream, frozen yogurt, juice bars, gelatin, cookies and pies, sugar, honey, jelly, hard candy  Avoid: Most pies, cakes and cookies prepared or processed with salt; instant pudding  Drinks  Ok: Tea, coffee, fizzy (carbonated) drinks, juices  Avoid: Flavored coffees, electrolyte replacement drinks, sports drinks  Meats  Ok: All fresh meat, fish, poultry, low-salt tuna, eggs, egg substitute  Avoid: Smoked, pickled, brine-cured, or salted meats and fish. This includes dunham, chipped beef, corned beef, hot dogs, deli meats, ham, kosher meats, salt pork, sausage, canned tuna, salted codfish, smoked salmon, herring, sardines, or anchovies.  Seasonings and spices  Ok: Most seasonings are okay. Good substitutes for salt include: fresh herb blends, hot sauce, lemon, garlic, crow, vinegar, dry mustard, parsley, cilantro, horseradish, tomato paste, regular margarine, mayonnaise, unsalted butter, cream cheese, vegetable oil, cream, low-salt salad dressing and gravy.  Avoid: Regular ketchup, relishes, pickles, soy sauce, teriyaki sauce, Worcestershire sauce, BBQ sauce, tartar sauce, meat tenderizer, chili sauce, regular gravy, regular salad dressing, salted butter  Soups  Ok: Low-salt soups and broths made with allowed foods  Avoid: Bouillon cubes, soups with smoked or salted meats, regular soup and broth  Vegetables  Ok: Most vegetables are okay; also low-salt tomato and vegetable juices  Avoid: Sauerkraut and other brine-soaked vegetables; pickles and other pickled vegetables; tomato juice, olives  Date Last Reviewed:  8/1/2016 2000-2019 [employer]. [address] [place] [address], [place] [address]. All rights reserved. This information is not intended as a substitute for professional medical care. Always follow your healthcare professional's instructions.

## 2020-03-03 ASSESSMENT — ACTIVITIES OF DAILY LIVING (ADL): CURRENT_FUNCTION: NO ASSISTANCE NEEDED

## 2020-03-15 ENCOUNTER — HEALTH MAINTENANCE LETTER (OUTPATIENT)
Age: 65
End: 2020-03-15

## 2020-03-17 ENCOUNTER — ANTICOAGULATION THERAPY VISIT (OUTPATIENT)
Dept: NURSING | Facility: CLINIC | Age: 65
End: 2020-03-17
Payer: COMMERCIAL

## 2020-03-17 DIAGNOSIS — Z79.01 LONG TERM CURRENT USE OF ANTICOAGULANT THERAPY: Primary | ICD-10-CM

## 2020-03-17 DIAGNOSIS — Z86.711 HISTORY OF PULMONARY EMBOLISM: ICD-10-CM

## 2020-03-17 DIAGNOSIS — Z86.718 HISTORY OF RECURRENT DEEP VEIN THROMBOSIS (DVT): ICD-10-CM

## 2020-03-17 LAB — INR POINT OF CARE: 2 (ref 0.86–1.14)

## 2020-03-17 PROCEDURE — 85610 PROTHROMBIN TIME: CPT | Mod: QW

## 2020-03-17 PROCEDURE — 36416 COLLJ CAPILLARY BLOOD SPEC: CPT

## 2020-03-17 NOTE — PROGRESS NOTES
ANTICOAGULATION FOLLOW-UP CLINIC VISIT    Patient Name:  Jozef Saunders  Date:  3/17/2020  Contact Type:  Face to Face    SUBJECTIVE:  Patient Findings     Comments:   The patient was assessed for   diet, medication,   missed or extra doses,   bruising or bleeding,   with no problem findings.          Clinical Outcomes     Comments:   The patient was assessed for   diet, medication,   missed or extra doses,   bruising or bleeding,   with no problem findings.             OBJECTIVE    INR Protime   Date Value Ref Range Status   2020 2.0 (A) 0.86 - 1.14 Final       ASSESSMENT / PLAN  INR assessment THER    Recheck INR In: 4 WEEKS    INR Location Clinic      Anticoagulation Summary  As of 3/17/2020    INR goal:   2.0-3.0   TTR:   75.3 % (11.2 mo)   INR used for dosin.0 (3/17/2020)   Warfarin maintenance plan:   7.5 mg (5 mg x 1.5) every Mon, Thu; 5 mg (5 mg x 1) all other days   Full warfarin instructions:   7.5 mg every Mon, Thu; 5 mg all other days   Weekly warfarin total:   40 mg   Plan last modified:   Zena Ren RN (2020)   Next INR check:   2020   Priority:   Maintenance   Target end date:   Indefinite    Indications    History of recurrent deep vein thrombosis (DVT) [Z86.718]  Long term current use of anticoagulant therapy [Z79.01]  History of pulmonary embolism [Z86.711]             Anticoagulation Episode Summary     INR check location:   Anticoagulation Clinic    Preferred lab:       Send INR reminders to:   LEANDRA ALBERTO    Comments:    pm dose / pt lives in [place], retired / needs lovenox bridge       Anticoagulation Care Providers     Provider Role Specialty Phone number    Rojas Chun MD  Internal Medicine [phone]    Hina Humphreys MD  Internal Medicine [phone]            See the Encounter Report to view Anticoagulation Flowsheet and Dosing Calendar (Go to Encounters tab in chart review, and find the Anticoagulation Therapy Visit)    INR is  therapeutic today.   Patient will continue same maintenance dose.   Follow up in 4 weeks.        Zena Ren RN

## 2020-03-19 DIAGNOSIS — Z86.718 HISTORY OF RECURRENT DEEP VEIN THROMBOSIS (DVT): ICD-10-CM

## 2020-03-19 DIAGNOSIS — Z86.711 HISTORY OF PULMONARY EMBOLISM: Primary | ICD-10-CM

## 2020-03-25 DIAGNOSIS — I82.409 DVT (DEEP VENOUS THROMBOSIS) (H): ICD-10-CM

## 2020-03-25 DIAGNOSIS — Z86.711 HISTORY OF PULMONARY EMBOLISM: ICD-10-CM

## 2020-03-25 RX ORDER — WARFARIN SODIUM 5 MG/1
TABLET ORAL
Qty: 100 TABLET | Refills: 1 | Status: SHIPPED | OUTPATIENT
Start: 2020-03-25 | End: 2020-06-25

## 2020-03-25 NOTE — TELEPHONE ENCOUNTER
"Requested Prescriptions   Pending Prescriptions Disp Refills     warfarin ANTICOAGULANT (COUMADIN) 5 MG tablet       Sig: TAKE 7.5 MG ON MON, THU, SAT; AND 5 MG ALL OTHER DAYS OR AS DIRECTED BY ACC       Vitamin K Antagonists Failed - 3/25/2020  8:42 AM        Failed - INR is within goal in the past 6 weeks     Confirm INR is within goal in the past 6 weeks.     Recent Labs   Lab Test 03/17/20  1611   INR 2.0*                       Passed - Recent (12 mo) or future (30 days) visit within the authorizing provider's specialty     Patient has had an office visit with the authorizing provider or a provider within the authorizing providers department within the previous 12 mos or has a future within next 30 days. See \"Patient Info\" tab in inbasket, or \"Choose Columns\" in Meds & Orders section of the refill encounter.              Passed - Medication is active on med list        Passed - Patient is 18 years of age or older           Prescription approved per [facility] Refill Protocol.    "

## 2020-03-25 NOTE — TELEPHONE ENCOUNTER
Routing refill request to provider for review/approval because:  Medication is reported/historical  Velma Sam RN

## 2020-03-31 DIAGNOSIS — Z86.711 HISTORY OF PULMONARY EMBOLISM: ICD-10-CM

## 2020-03-31 DIAGNOSIS — I82.409 DVT (DEEP VENOUS THROMBOSIS) (H): ICD-10-CM

## 2020-03-31 RX ORDER — WARFARIN SODIUM 5 MG/1
TABLET ORAL
Qty: 100 TABLET | Refills: 1 | Status: CANCELLED | OUTPATIENT
Start: 2020-03-31

## 2020-03-31 NOTE — TELEPHONE ENCOUNTER
Med already refilled on 3/25.    INR goal:   2.0-3.0   TTR:   75.3 % (11.2 mo)   INR used for dosin.0 (3/17/2020)   Warfarin maintenance plan:   7.5 mg (5 mg x 1.5) every Mon, Thu; 5 mg (5 mg x 1) all other days   Full warfarin instructions:   7.5 mg every Mon, Thu; 5 mg all other days   Weekly warfarin total:   40 mg   Plan last modified:   Zena Ren RN (2020)   Next INR check:   2020   Priority:   Maintenance   Target end date:   Indefinite     MORTEZA Leong  Triage Nurse

## 2020-03-31 NOTE — TELEPHONE ENCOUNTER
Pending Prescriptions:                       Disp   Refills    warfarin ANTICOAGULANT (COUMADIN) 5 MG ta*100 ta*1            Sig: TAKE 7.5 MG ON MON/Thur; AND 5 MG ALL OTHER DAYS           OR AS DIRECTED BY [facility]    Pt stated pharmacy states they do not have the order (prior refill show pharmacy confirmed receipt on 3/25/2020) and pharmacy would like the order resent. Pt has enough of Rx for today only. Please call the pt with any questions. Thanks.    Melvi Priest on 3/31/2020 at 1:38 PM

## 2020-04-02 DIAGNOSIS — Z86.711 HISTORY OF PULMONARY EMBOLISM: ICD-10-CM

## 2020-04-02 DIAGNOSIS — I82.409 DVT (DEEP VENOUS THROMBOSIS) (H): ICD-10-CM

## 2020-04-02 RX ORDER — WARFARIN SODIUM 5 MG/1
TABLET ORAL
Qty: 100 TABLET | Refills: 1 | OUTPATIENT
Start: 2020-04-02

## 2020-04-02 NOTE — TELEPHONE ENCOUNTER
Med already refilled on 3/25/20.    warfarin ANTICOAGULANT (COUMADIN) 5 MG tablet  100 tablet  1  3/25/2020   No    Sig: TAKE 7.5 MG ON MON/Thur; AND 5 MG ALL OTHER DAYS OR AS DIRECTED BY [facility]    Sent to pharmacy as: warfarin ANTICOAGULANT (COUMADIN) 5 MG tablet    Class: E-Prescribe    Order: [id number]    E-Prescribing Status: Receipt confirmed by pharmacy (3/25/2020 10:13 AM CDT)        Zena Ren RN

## 2020-04-14 ENCOUNTER — ANTICOAGULATION THERAPY VISIT (OUTPATIENT)
Dept: NURSING | Facility: CLINIC | Age: 65
End: 2020-04-14

## 2020-04-14 DIAGNOSIS — Z86.711 HISTORY OF PULMONARY EMBOLISM: ICD-10-CM

## 2020-04-14 DIAGNOSIS — Z79.01 LONG TERM CURRENT USE OF ANTICOAGULANT THERAPY: ICD-10-CM

## 2020-04-14 DIAGNOSIS — Z86.718 HISTORY OF RECURRENT DEEP VEIN THROMBOSIS (DVT): ICD-10-CM

## 2020-04-14 LAB — INR PPP: 2 (ref 0.86–1.14)

## 2020-04-14 PROCEDURE — 85610 PROTHROMBIN TIME: CPT | Performed by: INTERNAL MEDICINE

## 2020-04-14 PROCEDURE — 36415 COLL VENOUS BLD VENIPUNCTURE: CPT | Performed by: INTERNAL MEDICINE

## 2020-04-14 NOTE — PROGRESS NOTES
ANTICOAGULATION MANAGEMENT     Patient Name:  Jozef Saunders  Date:  2020    ASSESSMENT /SUBJECTIVE:    Today's INR result of 2.00 is therapeutic. Goal INR of 2.0-3.0      Warfarin dose taken: Warfarin taken as previously instructed    Diet: No new diet changes affecting INR    Medication changes/ interactions: No new medications/supplements affecting INR    Previous INR: Therapeutic     S/S of bleeding or thromboembolism: No    New injury or illness: No    Upcoming surgery, procedure or cardioversion: No    Additional findings: None      PLAN:    Spoke with Jozef regarding INR result and instructed:     Warfarin Dosing Instructions: Continue your current warfarin dose of 7.5mg Mon/Thurs; 5mg all other days    Instructed patient to follow up no later than: 5 weeks  Lab visit scheduled    Education provided: Target INR goal and significance of current INR result      Issac verbalizes understanding and agrees to warfarin dosing plan.    Instructed to call the Anticoagulation Clinic for any changes, questions or concerns. (#[phone])        OBJECTIVE:  INR   Date Value Ref Range Status   2020 2.00 (H) 0.86 - 1.14 Final         No question data found.  Anticoagulation Summary  As of 2020    INR goal:   2.0-3.0   TTR:   83.6 % (11.2 mo)   INR used for dosin.00 (2020)   Warfarin maintenance plan:   7.5 mg (5 mg x 1.5) every Mon, Thu; 5 mg (5 mg x 1) all other days   Full warfarin instructions:   7.5 mg every Mon, Thu; 5 mg all other days   Weekly warfarin total:   40 mg   No change documented:   Zena Ren RN   Plan last modified:   Zena Ren RN (2020)   Next INR check:   2020   Priority:   Maintenance   Target end date:   Indefinite    Indications    History of recurrent deep vein thrombosis (DVT) [Z86.718]  Long term current use of anticoagulant therapy [Z79.01]  History of pulmonary embolism [Z86.711]             Anticoagulation Episode Summary     INR check  location:   Anticoagulation Clinic    Preferred lab:       Send INR reminders to:   LEANDRA ALBERTO    Comments:    pm dose / pt lives in [place], retired / needs lovenox bridge       Anticoagulation Care Providers     Provider Role Specialty Phone number    Rojas Chun MD  Internal Medicine [phone]    Hina Humphreys MD  Internal Medicine [phone]

## 2020-05-19 ENCOUNTER — ANTICOAGULATION THERAPY VISIT (OUTPATIENT)
Dept: NURSING | Facility: CLINIC | Age: 65
End: 2020-05-19
Payer: COMMERCIAL

## 2020-05-19 DIAGNOSIS — Z79.01 LONG TERM CURRENT USE OF ANTICOAGULANT THERAPY: Primary | ICD-10-CM

## 2020-05-19 DIAGNOSIS — Z86.711 HISTORY OF PULMONARY EMBOLISM: ICD-10-CM

## 2020-05-19 DIAGNOSIS — Z86.718 HISTORY OF RECURRENT DEEP VEIN THROMBOSIS (DVT): ICD-10-CM

## 2020-05-19 LAB
CAPILLARY BLOOD COLLECTION: NORMAL
INR PPP: 2.2 (ref 0.86–1.14)

## 2020-05-19 PROCEDURE — 85610 PROTHROMBIN TIME: CPT | Performed by: INTERNAL MEDICINE

## 2020-05-19 PROCEDURE — 36415 COLL VENOUS BLD VENIPUNCTURE: CPT | Performed by: INTERNAL MEDICINE

## 2020-05-19 PROCEDURE — 99207 ZZC NO CHARGE NURSE ONLY: CPT

## 2020-05-19 NOTE — PROGRESS NOTES
ANTICOAGULATION FOLLOW-UP     Patient Name:  Jozef Saunders  Date:  2020  Contact Type:  Telephone    SUBJECTIVE:  Patient Findings     Comments:   The patient was assessed for   diet, medication,   missed or extra doses,   bruising or bleeding,   with no problem findings.  No questions or concerns.  Reviewed previous warfarin dosing with patient.    INR is therapeutic today.   Patient will continue same maintenance dose.   Follow up in 5 weeks.            Clinical Outcomes     Comments:   The patient was assessed for   diet, medication,   missed or extra doses,   bruising or bleeding,   with no problem findings.  No questions or concerns.  Reviewed previous warfarin dosing with patient.    INR is therapeutic today.   Patient will continue same maintenance dose.   Follow up in 5 weeks.               OBJECTIVE    Recent labs: (last 7 days)     20  1557   INR 2.20*       ASSESSMENT / PLAN  INR assessment THER    Recheck INR In: 5 WEEKS    INR Location Clinic lab     Anticoagulation Summary  As of 2020    INR goal:   2.0-3.0   TTR:   86.7 % (11.2 mo)   INR used for dosin.20 (2020)   Warfarin maintenance plan:   7.5 mg (5 mg x 1.5) every Mon, Thu; 5 mg (5 mg x 1) all other days   Full warfarin instructions:   7.5 mg every Mon, Thu; 5 mg all other days   Weekly warfarin total:   40 mg   No change documented:   Zena Ren RN   Plan last modified:   Zena Ren RN (2020)   Next INR check:   2020   Priority:   Maintenance   Target end date:   Indefinite    Indications    History of recurrent deep vein thrombosis (DVT) [Z86.718]  Long term current use of anticoagulant therapy [Z79.01]  History of pulmonary embolism [Z86.711]             Anticoagulation Episode Summary     INR check location:   Anticoagulation Clinic    Preferred lab:       Send INR reminders to:   LEANDRA ALBERTO    Comments:    pm dose / pt lives in [place], retired / needs lovenox bridge        Anticoagulation Care Providers     Provider Role Specialty Phone number    Rojas Chun MD  Internal Medicine [phone]    Hina Humphreys MD  Internal Medicine [phone]            See the Encounter Report to view Anticoagulation Flowsheet and Dosing Calendar (Go to Encounters tab in chart review, and find the Anticoagulation Therapy Visit)    INR is therapeutic today.   Patient will continue same maintenance dose.   Follow up in 5 weeks.        Zena Ren RN

## 2020-05-21 ENCOUNTER — RECORDS - HEALTHEAST (OUTPATIENT)
Dept: ADMINISTRATIVE | Facility: OTHER | Age: 65
End: 2020-05-21

## 2020-06-25 ENCOUNTER — ANTICOAGULATION THERAPY VISIT (OUTPATIENT)
Dept: NURSING | Facility: CLINIC | Age: 65
End: 2020-06-25
Payer: COMMERCIAL

## 2020-06-25 DIAGNOSIS — Z86.711 HISTORY OF PULMONARY EMBOLISM: ICD-10-CM

## 2020-06-25 DIAGNOSIS — Z79.01 LONG TERM CURRENT USE OF ANTICOAGULANTS WITH INR GOAL OF 2.0-3.0: Primary | ICD-10-CM

## 2020-06-25 DIAGNOSIS — Z86.718 HISTORY OF RECURRENT DEEP VEIN THROMBOSIS (DVT): ICD-10-CM

## 2020-06-25 DIAGNOSIS — Z79.01 LONG TERM CURRENT USE OF ANTICOAGULANT THERAPY: ICD-10-CM

## 2020-06-25 DIAGNOSIS — I82.409 DVT (DEEP VENOUS THROMBOSIS) (H): ICD-10-CM

## 2020-06-25 LAB
CAPILLARY BLOOD COLLECTION: NORMAL
INR PPP: 2 (ref 0.86–1.14)

## 2020-06-25 PROCEDURE — 85610 PROTHROMBIN TIME: CPT | Performed by: INTERNAL MEDICINE

## 2020-06-25 PROCEDURE — 99207 ZZC NO CHARGE NURSE ONLY: CPT

## 2020-06-25 PROCEDURE — 36416 COLLJ CAPILLARY BLOOD SPEC: CPT | Performed by: INTERNAL MEDICINE

## 2020-06-25 RX ORDER — WARFARIN SODIUM 5 MG/1
TABLET ORAL
Qty: 105 TABLET | Refills: 1 | Status: SHIPPED | OUTPATIENT
Start: 2020-06-25 | End: 2020-12-08

## 2020-06-25 NOTE — PROGRESS NOTES
Anticoagulation Management    Unable to reach Issac today.    Today's INR result of 2.0 is therapeutic (goal INR of 2.0-3.0).  Result received from: Clinic Lab    Follow up required to confirm warfarin dose taken and assess for changes. 02/19/20 visit notes state patient may wean off of Omeprazole, is patient still taking it? If not, this could be reason INR is staying in lower therapeutic range.    Left VM to call Abisai with transfer to [facility] at: [phone]        Anticoagulation clinic to follow up    Lovely Ashby RN    ANTICOAGULATION FOLLOW-UP CLINIC VISIT    Patient Name:  Jozef Saunders  Date:  6/25/2020  Contact Type:  Telephone--patient called back    SUBJECTIVE:  Patient Findings     Positives:   Change in health (Abdominal symptoms better since d/c'ing Omeprazole.), Change in medications (Omeprazole discontinued end of 02/2020 per PCP recommendation-pt saw on 02/19/20 . Pt taking Famotidine in place of)    Comments:   Concurrent use of WARFARIN and OMEPRAZOLE may result in elevations of International Normalized Ratio serum values and potentiation of anticoagulant effects.  INR has been consistently on low end of therapeutic range since discontinuing Omeprazole so warfarin maintenance dose increased 6%.        Clinical Outcomes     Negatives:   Major bleeding event, Thromboembolic event, Anticoagulation-related hospital admission, Anticoagulation-related ED visit, Anticoagulation-related fatality    Comments:   Concurrent use of WARFARIN and OMEPRAZOLE may result in elevations of International Normalized Ratio serum values and potentiation of anticoagulant effects.  INR has been consistently on low end of therapeutic range since discontinuing Omeprazole so warfarin maintenance dose increased 6%.           OBJECTIVE    Recent labs: (last 7 days)     06/25/20  1120   INR 2.00*       ASSESSMENT / PLAN  INR assessment THER    Recheck INR In: 2 WEEKS    INR Location Clinic      Anticoagulation Summary  As of  2020    INR goal:   2.0-3.0   TTR:   86.7 % (11.2 mo)   INR used for dosin.00 (2020)   Warfarin maintenance plan:   7.5 mg (5 mg x 1.5) every Mon, Thu, Sat; 5 mg (5 mg x 1) all other days   Full warfarin instructions:   7.5 mg every Mon, Thu, Sat; 5 mg all other days   Weekly warfarin total:   42.5 mg   Plan last modified:   Lovely Ashby RN (2020)   Next INR check:   2020   Priority:   High   Target end date:   Indefinite    Indications    History of recurrent deep vein thrombosis (DVT) [Z86.718]  Long term current use of anticoagulant therapy [Z79.01]  History of pulmonary embolism [Z86.711]             Anticoagulation Episode Summary     INR check location:   Anticoagulation Clinic    Preferred lab:       Send INR reminders to:   LEANDRA ALBERTO    Comments:    pm dose / pt lives in [place], retired / needs lovenox bridge       Anticoagulation Care Providers     Provider Role Specialty Phone number    Rojas Chun MD  Internal Medicine [phone]    Hina Humphreys MD  Internal Medicine [phone]            See the Encounter Report to view Anticoagulation Flowsheet and Dosing Calendar (Go to Encounters tab in chart review, and find the Anticoagulation Therapy Visit)        Lovely Ashby RN

## 2020-07-10 ENCOUNTER — ANTICOAGULATION THERAPY VISIT (OUTPATIENT)
Dept: NURSING | Facility: CLINIC | Age: 65
End: 2020-07-10

## 2020-07-10 DIAGNOSIS — Z86.711 HISTORY OF PULMONARY EMBOLISM: ICD-10-CM

## 2020-07-10 DIAGNOSIS — Z79.01 LONG TERM CURRENT USE OF ANTICOAGULANT THERAPY: ICD-10-CM

## 2020-07-10 DIAGNOSIS — Z86.718 HISTORY OF RECURRENT DEEP VEIN THROMBOSIS (DVT): ICD-10-CM

## 2020-07-10 LAB — INR PPP: 2.8 (ref 0.86–1.14)

## 2020-07-10 PROCEDURE — 36416 COLLJ CAPILLARY BLOOD SPEC: CPT | Performed by: INTERNAL MEDICINE

## 2020-07-10 PROCEDURE — 85610 PROTHROMBIN TIME: CPT | Performed by: INTERNAL MEDICINE

## 2020-07-10 NOTE — PROGRESS NOTES
Anticoagulation Management    Unable to reach Issac today.    Today's INR result of 2.8 is therapeutic (goal INR of 2.0-3.0).  Result received from: Clinic Lab    Follow up required to confirm warfarin dose taken and assess for changes    Left message to call [phone]      Anticoagulation clinic to follow up    Shari Bains RN

## 2020-07-10 NOTE — PROGRESS NOTES
ANTICOAGULATION MANAGEMENT     Patient Name:  Jozef Saunders  Date:  7/10/2020    ASSESSMENT /SUBJECTIVE:    Today's INR result of 2.8 is therapeutic. Goal INR of 2.0-3.0      Warfarin dose taken: Warfarin taken as previously instructed    Diet: No new diet changes affecting INR    Medication changes/ interactions: No new medications/supplements affecting INR    Previous INR: Therapeutic     S/S of bleeding or thromboembolism: No    New injury or illness: No    Upcoming surgery, procedure or cardioversion: No    Additional findings: None      PLAN:    Spoke with Jozef regarding INR result and instructed:     Warfarin Dosing Instructions: Continue your current warfarin dose 7.5 mg Mon/Thu/Sat and 5 mg ROW    Instructed patient to follow up no later than: 3 weeks  Lab visit scheduled    Education provided: Target INR goal and significance of current INR result      Issac verbalizes understanding and agrees to warfarin dosing plan.    Instructed to call the Anticoagulation Clinic for any changes, questions or concerns. (#[phone])        Shari Bains RN      OBJECTIVE:  Recent labs: (last 7 days)     07/10/20  0949   INR 2.80*         INR assessment THER    Recheck INR In: 3 WEEKS    INR Location Outside lab      Anticoagulation Summary  As of 7/10/2020    INR goal:   2.0-3.0   TTR:   86.7 % (11.2 mo)   INR used for dosin.80 (7/10/2020)   Warfarin maintenance plan:   7.5 mg (5 mg x 1.5) every Mon, Thu, Sat; 5 mg (5 mg x 1) all other days   Full warfarin instructions:   7.5 mg every Mon, Thu, Sat; 5 mg all other days   Weekly warfarin total:   42.5 mg   No change documented:   Shari Bains RN   Plan last modified:   Lovely Ashby RN (2020)   Next INR check:   8/3/2020   Priority:   High   Target end date:   Indefinite    Indications    History of recurrent deep vein thrombosis (DVT) [Z86.718]  Long term current use of anticoagulant therapy [Z79.01]  History of pulmonary embolism [Z86.711]              Anticoagulation Episode Summary     INR check location:   Anticoagulation Clinic    Preferred lab:       Send INR reminders to:   LEANDRA ALBERTO    Comments:    pm dose / pt lives in [place], retired / needs lovenox bridge       Anticoagulation Care Providers     Provider Role Specialty Phone number    Rojas Chun MD  Internal Medicine [phone]    Hina Humphreys MD  Internal Medicine [phone]

## 2020-08-03 ENCOUNTER — ANTICOAGULATION THERAPY VISIT (OUTPATIENT)
Dept: NURSING | Facility: CLINIC | Age: 65
End: 2020-08-03
Payer: COMMERCIAL

## 2020-08-03 DIAGNOSIS — Z86.718 HISTORY OF RECURRENT DEEP VEIN THROMBOSIS (DVT): ICD-10-CM

## 2020-08-03 DIAGNOSIS — Z86.711 HISTORY OF PULMONARY EMBOLISM: ICD-10-CM

## 2020-08-03 DIAGNOSIS — Z79.01 LONG TERM CURRENT USE OF ANTICOAGULANT THERAPY: ICD-10-CM

## 2020-08-03 LAB
CAPILLARY BLOOD COLLECTION: NORMAL
INR PPP: 2.8 (ref 0.86–1.14)

## 2020-08-03 PROCEDURE — 99207 ZZC NO CHARGE NURSE ONLY: CPT

## 2020-08-03 PROCEDURE — 36416 COLLJ CAPILLARY BLOOD SPEC: CPT | Performed by: INTERNAL MEDICINE

## 2020-08-03 PROCEDURE — 85610 PROTHROMBIN TIME: CPT | Performed by: INTERNAL MEDICINE

## 2020-08-03 NOTE — PROGRESS NOTES
Anticoagulation Management    Unable to reach Issac today.    Today's INR result of 2.8 is therapeutic (goal INR of 2.0-3.0).  Result received from: Clinic Lab    Follow up required to confirm warfarin dose taken and assess for changes    Left message to continue current dose of warfarin 7.5 mg tonight. Pt to call [facility] INR at [phone]; if no answer then call [facility] at [phone].    Anticoagulation clinic to follow up    Radha Bolanos RN    ANTICOAGULATION FOLLOW-UP CLINIC VISIT    Patient Name:  Jozef Saunders  Date:  8/3/2020  Contact Type:  Telephone    SUBJECTIVE:  Patient Findings     Comments:   Patient returned call. The patient was assessed for diet, medication, and activity level changes, missed or extra doses, bruising or bleeding, with no problem findings.  Reviewed maintenance warfarin dosing with patient. Patient will remain on the same dose until next INR check. No other questions or concerns. Scheduled next lab-only INR in 4 weeks. Patient requested last appt of the day.   Radha BENITO RN  Anticoagulation Clinic  [place]          Clinical Outcomes     Negatives:   Major bleeding event, Thromboembolic event, Anticoagulation-related hospital admission, Anticoagulation-related ED visit, Anticoagulation-related fatality    Comments:   Patient returned call. The patient was assessed for diet, medication, and activity level changes, missed or extra doses, bruising or bleeding, with no problem findings.  Reviewed maintenance warfarin dosing with patient. Patient will remain on the same dose until next INR check. No other questions or concerns. Scheduled next lab-only INR in 4 weeks. Patient requested last appt of the day.   Radha BENITO RN  Anticoagulation Clinic  [facility]             OBJECTIVE    Recent labs: (last 7 days)     08/03/20  1332   INR 2.80*       ASSESSMENT / PLAN  INR assessment THER    Recheck INR In: 4 WEEKS    INR Location Clinic      Anticoagulation Summary  As of 8/3/2020     INR goal:   2.0-3.0   TTR:   86.7 % (11.2 mo)   INR used for dosin.80 (8/3/2020)   Warfarin maintenance plan:   7.5 mg (5 mg x 1.5) every Mon, Thu, Sat; 5 mg (5 mg x 1) all other days   Full warfarin instructions:   7.5 mg every Mon, Thu, Sat; 5 mg all other days   Weekly warfarin total:   42.5 mg   No change documented:   Radha Bolanos RN   Plan last modified:   Lovely Ashby RN (2020)   Next INR check:   2020   Priority:   Maintenance   Target end date:   Indefinite    Indications    History of recurrent deep vein thrombosis (DVT) [Z86.718]  Long term current use of anticoagulant therapy [Z79.01]  History of pulmonary embolism [Z86.711]             Anticoagulation Episode Summary     INR check location:   Anticoagulation Clinic    Preferred lab:       Send INR reminders to:   LEANDRA ALBERTO    Comments:    pm dose / pt lives in [place], retired / needs lovenox bridge       Anticoagulation Care Providers     Provider Role Specialty Phone number    Rojas Chun MD  Internal Medicine [phone]    Hina Humphreys MD  Internal Medicine [phone]            See the Encounter Report to view Anticoagulation Flowsheet and Dosing Calendar (Go to Encounters tab in chart review, and find the Anticoagulation Therapy Visit)    Radha Bolanos, MORTEZA

## 2020-09-03 NOTE — ADDENDUM NOTE
09/03/20 0230   Therapeutic Soothing Survey   What Helps When Having a Hard Time Deep breathing exercises;Exercising;Eating a snack;Going for a walk with staff;Listening to music;Quiet time in your room;Reading a newspaper or book;Taking a hot shower;Time in a quiet room;Watching TV;Writing in a diary or journal;Other  (drawing)   What Makes it More Difficult When Already Upset Yelling;Loud noises     My Safety/Recovery Plan    1) Warning Signs: talking to myself, talking back to the voice in my head    2) Personal Coping Strategies to Calm Myself: listening to music, drawing    3) Reasons for Living/Recovery: \"I don't know\"    4) Activities/Social Interactions for Distraction: listening to music and writing    5) Who to Contact While on the Unit: No one    6) Making My Surroundings Safe: N/A     Addended by: RICARDO REDDY on: 1/7/2019 01:22 PM     Modules accepted: Orders

## 2020-09-04 ENCOUNTER — ANTICOAGULATION THERAPY VISIT (OUTPATIENT)
Dept: NURSING | Facility: CLINIC | Age: 65
End: 2020-09-04
Payer: COMMERCIAL

## 2020-09-04 DIAGNOSIS — Z86.718 HISTORY OF RECURRENT DEEP VEIN THROMBOSIS (DVT): ICD-10-CM

## 2020-09-04 DIAGNOSIS — Z86.711 HISTORY OF PULMONARY EMBOLISM: ICD-10-CM

## 2020-09-04 LAB
CAPILLARY BLOOD COLLECTION: NORMAL
INR PPP: 2.4 (ref 0.86–1.14)

## 2020-09-04 PROCEDURE — 85610 PROTHROMBIN TIME: CPT | Performed by: INTERNAL MEDICINE

## 2020-09-04 PROCEDURE — 36416 COLLJ CAPILLARY BLOOD SPEC: CPT | Performed by: INTERNAL MEDICINE

## 2020-09-04 PROCEDURE — 99207 ZZC NO CHARGE NURSE ONLY: CPT

## 2020-09-04 NOTE — PROGRESS NOTES
ANTICOAGULATION FOLLOW-UP CLINIC VISIT    Patient Name:  Jozef Saunders  Date:  2020  Contact Type:  Telephone--Left message on cell# to inform patient to continue same weekly Warfarin dose and to call INR clinic with any questions, concerns or changes.  Also to schedule next INR in 6 weeks.      SUBJECTIVE:         OBJECTIVE    Recent labs: (last 7 days)     20  1343   INR 2.40*       ASSESSMENT / PLAN  INR assessment THER    Recheck INR In: 6 WEEKS    INR Location Clinic      Anticoagulation Summary  As of 2020    INR goal:   2.0-3.0   TTR:   86.7 % (11.2 mo)   INR used for dosin.40 (2020)   Warfarin maintenance plan:   7.5 mg (5 mg x 1.5) every Mon, Thu, Sat; 5 mg (5 mg x 1) all other days   Full warfarin instructions:   7.5 mg every Mon, Thu, Sat; 5 mg all other days   Weekly warfarin total:   42.5 mg   No change documented:   Lovely Ashby RN   Plan last modified:   Lovely Ashby RN (2020)   Next INR check:   10/16/2020   Priority:   Maintenance   Target end date:   Indefinite    Indications    History of recurrent deep vein thrombosis (DVT) [Z86.718]  Long term current use of anticoagulant therapy [Z79.01]  History of pulmonary embolism [Z86.711]             Anticoagulation Episode Summary     INR check location:   Anticoagulation Clinic    Preferred lab:       Send INR reminders to:   LEANDRA ALBERTO    Comments:    pm dose / pt lives in [place], retired / needs lovenox bridge       Anticoagulation Care Providers     Provider Role Specialty Phone number    Rojas Chun MD  Internal Medicine [phone]    Hina Humphreys MD  Internal Medicine [phone]            See the Encounter Report to view Anticoagulation Flowsheet and Dosing Calendar (Go to Encounters tab in chart review, and find the Anticoagulation Therapy Visit)        Lovely Ashby RN

## 2020-09-09 DIAGNOSIS — K44.9 HIATAL HERNIA: ICD-10-CM

## 2020-09-10 RX ORDER — FAMOTIDINE 20 MG/1
20 TABLET, FILM COATED ORAL 2 TIMES DAILY
Qty: 180 TABLET | Refills: 1 | Status: SHIPPED | OUTPATIENT
Start: 2020-09-10 | End: 2021-03-08

## 2020-10-16 ENCOUNTER — ANTICOAGULATION THERAPY VISIT (OUTPATIENT)
Dept: PEDIATRICS | Facility: CLINIC | Age: 65
End: 2020-10-16

## 2020-10-16 ENCOUNTER — ALLIED HEALTH/NURSE VISIT (OUTPATIENT)
Dept: NURSING | Facility: CLINIC | Age: 65
End: 2020-10-16
Payer: COMMERCIAL

## 2020-10-16 DIAGNOSIS — Z79.01 LONG TERM CURRENT USE OF ANTICOAGULANT THERAPY: ICD-10-CM

## 2020-10-16 DIAGNOSIS — Z86.718 HISTORY OF RECURRENT DEEP VEIN THROMBOSIS (DVT): ICD-10-CM

## 2020-10-16 DIAGNOSIS — Z86.711 HISTORY OF PULMONARY EMBOLISM: ICD-10-CM

## 2020-10-16 DIAGNOSIS — Z23 NEED FOR VACCINATION: ICD-10-CM

## 2020-10-16 DIAGNOSIS — Z23 NEED FOR PROPHYLACTIC VACCINATION AND INOCULATION AGAINST INFLUENZA: Primary | ICD-10-CM

## 2020-10-16 LAB
CAPILLARY BLOOD COLLECTION: NORMAL
INR PPP: 2.2 (ref 0.86–1.14)

## 2020-10-16 PROCEDURE — G0009 ADMIN PNEUMOCOCCAL VACCINE: HCPCS

## 2020-10-16 PROCEDURE — 90662 IIV NO PRSV INCREASED AG IM: CPT

## 2020-10-16 PROCEDURE — G0008 ADMIN INFLUENZA VIRUS VAC: HCPCS

## 2020-10-16 PROCEDURE — 90732 PPSV23 VACC 2 YRS+ SUBQ/IM: CPT

## 2020-10-16 PROCEDURE — 85610 PROTHROMBIN TIME: CPT | Performed by: INTERNAL MEDICINE

## 2020-10-16 PROCEDURE — 36416 COLLJ CAPILLARY BLOOD SPEC: CPT | Performed by: INTERNAL MEDICINE

## 2020-10-16 NOTE — PROGRESS NOTES
Left message advising patient to continue maintenance dose of 7.5 mg Mon/Thu/Sat and 5 mg ROW and to recheck his INR in 6 weeks - 11/27/2020. Left call back number: [phone]

## 2020-10-28 ENCOUNTER — TRANSFERRED RECORDS (OUTPATIENT)
Dept: HEALTH INFORMATION MANAGEMENT | Facility: CLINIC | Age: 65
End: 2020-10-28

## 2020-11-23 ENCOUNTER — ANTICOAGULATION THERAPY VISIT (OUTPATIENT)
Dept: NURSING | Facility: CLINIC | Age: 65
End: 2020-11-23

## 2020-11-23 ENCOUNTER — DOCUMENTATION ONLY (OUTPATIENT)
Dept: PEDIATRICS | Facility: CLINIC | Age: 65
End: 2020-11-23

## 2020-11-23 DIAGNOSIS — Z86.711 HISTORY OF PULMONARY EMBOLISM: ICD-10-CM

## 2020-11-23 DIAGNOSIS — Z86.718 HISTORY OF RECURRENT DEEP VEIN THROMBOSIS (DVT): ICD-10-CM

## 2020-11-23 DIAGNOSIS — Z79.01 LONG TERM CURRENT USE OF ANTICOAGULANT THERAPY: ICD-10-CM

## 2020-11-23 DIAGNOSIS — Z86.718 HISTORY OF RECURRENT DEEP VEIN THROMBOSIS (DVT): Primary | ICD-10-CM

## 2020-11-23 LAB
CAPILLARY BLOOD COLLECTION: NORMAL
INR PPP: 2.1 (ref 0.86–1.14)

## 2020-11-23 PROCEDURE — 85610 PROTHROMBIN TIME: CPT | Performed by: INTERNAL MEDICINE

## 2020-11-23 PROCEDURE — 36416 COLLJ CAPILLARY BLOOD SPEC: CPT | Performed by: INTERNAL MEDICINE

## 2020-11-23 PROCEDURE — 99207 PR NO CHARGE NURSE ONLY: CPT

## 2020-11-23 NOTE — PROGRESS NOTES
ANTICOAGULATION MANAGEMENT      Jozef Saunders due for annual renewal of referral to anticoagulation monitoring. Order pended for your review and signature.      ANTICOAGULATION SUMMARY      Warfarin indication(s)     DVT  PE    Heart valve present?  NO       Current goal range   INR: 2.0-3.0     Goal appropriate for indication? Yes, INR 2-3 appropriate for hx of DVT, PE, hypercoagulable state, Afib, LVAD, or bileaflet AVR without risk factors     Current duration of therapy Indefinite/long term therapy   Time in Therapeutic Range (TTR)  (Goal > 60%) 89.3 %       Office visit with referring provider's group within last year yes on 2/19/2020       Zena Ren RN

## 2020-11-23 NOTE — PROGRESS NOTES
ANTICOAGULATION FOLLOW-UP     Patient Name:  Jozef Saunders  Date:  2020  Contact Type:  Telephone    SUBJECTIVE:  Patient Findings     Comments:  The patient was assessed for   diet, medication,   missed or extra doses,   bruising or bleeding,   with no problem findings.  No questions or concerns.  Reviewed previous warfarin dosing with patient.  He took warfarin as instructed.    INR is therapeutic today.   Patient will continue same maintenance dose.   Follow up in 6 weeks.            Clinical Outcomes     Comments:  The patient was assessed for   diet, medication,   missed or extra doses,   bruising or bleeding,   with no problem findings.  No questions or concerns.  Reviewed previous warfarin dosing with patient.  He took warfarin as instructed.    INR is therapeutic today.   Patient will continue same maintenance dose.   Follow up in 6 weeks.               OBJECTIVE    Recent labs: (last 7 days)     20  1100   INR 2.10*       ASSESSMENT / PLAN  INR assessment THER    Recheck INR In: 6 WEEKS    INR Location Clinic lab     Anticoagulation Summary  As of 2020    INR goal:  2.0-3.0   TTR:  89.3 % (1 y)   INR used for dosin.10 (2020)   Warfarin maintenance plan:  7.5 mg (5 mg x 1.5) every Mon, Thu, Sat; 5 mg (5 mg x 1) all other days   Full warfarin instructions:  7.5 mg every Mon, Thu, Sat; 5 mg all other days   Weekly warfarin total:  42.5 mg   No change documented:  Zena Ren RN   Plan last modified:  Lovely Ashby RN (2020)   Next INR check:  2021   Priority:  Maintenance   Target end date:  Indefinite    Indications    History of recurrent deep vein thrombosis (DVT) [Z86.718]  Long term current use of anticoagulant therapy [Z79.01]  History of pulmonary embolism [Z86.711]             Anticoagulation Episode Summary     INR check location:  Anticoagulation Clinic    Preferred lab:      Send INR reminders to:  LEANDRA ALBERTO    Comments:        Anticoagulation  Care Providers     Provider Role Specialty Phone number    Rojas Chun MD  Internal Medicine - Pediatrics [phone]    Hina Humphreys MD  Internal Medicine [phone]            See the Encounter Report to view Anticoagulation Flowsheet and Dosing Calendar (Go to Encounters tab in chart review, and find the Anticoagulation Therapy Visit)        Zena Ren RN

## 2020-12-04 DIAGNOSIS — I82.409 DVT (DEEP VENOUS THROMBOSIS) (H): ICD-10-CM

## 2020-12-04 DIAGNOSIS — Z86.711 HISTORY OF PULMONARY EMBOLISM: ICD-10-CM

## 2020-12-04 DIAGNOSIS — Z79.01 LONG TERM CURRENT USE OF ANTICOAGULANTS WITH INR GOAL OF 2.0-3.0: ICD-10-CM

## 2020-12-08 RX ORDER — WARFARIN SODIUM 5 MG/1
TABLET ORAL
Qty: 111 TABLET | Refills: 1 | Status: SHIPPED | OUTPATIENT
Start: 2020-12-08 | End: 2021-03-08

## 2020-12-08 NOTE — TELEPHONE ENCOUNTER
Medication requested:   warfarin 5 MG tablet    Last Written Prescription Date: 6/25/2020  Last Fill Qty: 105, # refills: 1  Last Office Visit with [facility] or [facility] prescribing provider: 2/19/2020     Lab Results   Component Value Date    INR 2.10 11/23/2020    INR 2.20 10/16/2020     Refilled per nurse protocol standing orders.  Zena Ren RN

## 2020-12-17 NOTE — TELEPHONE ENCOUNTER
[facility] called and informed patient a new Rx was sent to their pharmacy on 12/08/2020.    Zeyad Worthington at 1:26 PM on 12/17/2020  [facility]  Phone [phone]

## 2020-12-17 NOTE — TELEPHONE ENCOUNTER
Reason for call:  Medication   If this is a refill request, has the caller requested the refill from the pharmacy already? Yes  Will the patient be using a [facility]? No  Name of the pharmacy and phone number for the current request: [facility] [phone]    Name of the medication requested:warfarin ANTICOAGULANT (COUMADIN) 5 MG    Other request: Patient called cause medication still has not been filled yet.     Phone number to reach patient:  Cell number on file:    Telephone Information:   Mobile [phone]       Best Time:  Anytime    Can we leave a detailed message on this number?  YES    Travel screening: Not Applicable

## 2021-01-05 ENCOUNTER — ANTICOAGULATION THERAPY VISIT (OUTPATIENT)
Dept: NURSING | Facility: CLINIC | Age: 66
End: 2021-01-05

## 2021-01-05 DIAGNOSIS — Z79.01 LONG TERM CURRENT USE OF ANTICOAGULANT THERAPY: ICD-10-CM

## 2021-01-05 DIAGNOSIS — Z86.711 HISTORY OF PULMONARY EMBOLISM: ICD-10-CM

## 2021-01-05 DIAGNOSIS — Z86.718 HISTORY OF RECURRENT DEEP VEIN THROMBOSIS (DVT): ICD-10-CM

## 2021-01-05 LAB
CAPILLARY BLOOD COLLECTION: NORMAL
INR PPP: 2.2 (ref 0.86–1.14)

## 2021-01-05 PROCEDURE — 36416 COLLJ CAPILLARY BLOOD SPEC: CPT | Performed by: INTERNAL MEDICINE

## 2021-01-05 PROCEDURE — 85610 PROTHROMBIN TIME: CPT | Performed by: INTERNAL MEDICINE

## 2021-01-05 PROCEDURE — 99207 PR NO CHARGE NURSE ONLY: CPT

## 2021-01-05 NOTE — PROGRESS NOTES
ANTICOAGULATION FOLLOW-UP     Patient Name:  Jozef Saunders  Date:  2021  Contact Type:  Telephone    SUBJECTIVE:  Patient Findings     Comments:  The patient was assessed for   diet, medication,   missed or extra doses,   bruising or bleeding,   with no problem findings.  No questions or concerns.  Reviewed previous warfarin dosing with patient.  He took warfarin as instructed.    INR is therapeutic today.   Patient will continue same maintenance dose.   Follow up in 6 weeks.              Clinical Outcomes     Comments:  The patient was assessed for   diet, medication,   missed or extra doses,   bruising or bleeding,   with no problem findings.  No questions or concerns.  Reviewed previous warfarin dosing with patient.  He took warfarin as instructed.    INR is therapeutic today.   Patient will continue same maintenance dose.   Follow up in 6 weeks.                 OBJECTIVE    Recent labs: (last 7 days)     21  0954   INR 2.20*       ASSESSMENT / PLAN  INR assessment THER    Recheck INR In: 6 WEEKS    INR Location Clinic lab     Anticoagulation Summary  As of 2021    INR goal:  2.0-3.0   TTR:  99.3 % (1 y)   INR used for dosin.20 (2021)   Warfarin maintenance plan:  7.5 mg (5 mg x 1.5) every Mon, Thu, Sat; 5 mg (5 mg x 1) all other days   Full warfarin instructions:  7.5 mg every Mon, Thu, Sat; 5 mg all other days   Weekly warfarin total:  42.5 mg   No change documented:  Zena Ren RN   Plan last modified:  Lovely Ashby RN (2020)   Next INR check:  2021   Priority:  Maintenance   Target end date:  Indefinite    Indications    History of recurrent deep vein thrombosis (DVT) [Z86.718]  Long term current use of anticoagulant therapy [Z79.01]  History of pulmonary embolism [Z86.711]             Anticoagulation Episode Summary     INR check location:  Anticoagulation Clinic    Preferred lab:      Send INR reminders to:  LEANDRA ALBERTO    Comments:        Anticoagulation Care  Providers     Provider Role Specialty Phone number    Hina Humphreys MD Referring Internal Medicine [phone]    Rojas Chun MD  Internal Medicine - Pediatrics [phone]            See the Encounter Report to view Anticoagulation Flowsheet and Dosing Calendar (Go to Encounters tab in chart review, and find the Anticoagulation Therapy Visit)        Zena Ren RN

## 2021-02-16 ENCOUNTER — ANTICOAGULATION THERAPY VISIT (OUTPATIENT)
Dept: NURSING | Facility: CLINIC | Age: 66
End: 2021-02-16

## 2021-02-16 DIAGNOSIS — Z86.718 HISTORY OF RECURRENT DEEP VEIN THROMBOSIS (DVT): ICD-10-CM

## 2021-02-16 DIAGNOSIS — Z86.711 HISTORY OF PULMONARY EMBOLISM: ICD-10-CM

## 2021-02-16 DIAGNOSIS — Z79.01 LONG TERM CURRENT USE OF ANTICOAGULANT THERAPY: ICD-10-CM

## 2021-02-16 LAB
CAPILLARY BLOOD COLLECTION: NORMAL
INR PPP: 2.3 (ref 0.86–1.14)

## 2021-02-16 PROCEDURE — 85610 PROTHROMBIN TIME: CPT | Performed by: INTERNAL MEDICINE

## 2021-02-16 PROCEDURE — 36416 COLLJ CAPILLARY BLOOD SPEC: CPT | Performed by: INTERNAL MEDICINE

## 2021-02-16 PROCEDURE — 99207 PR NO CHARGE NURSE ONLY: CPT

## 2021-02-16 NOTE — PROGRESS NOTES
ANTICOAGULATION FOLLOW-UP     Patient Name:  Jozef Saunders  Date:  2021  Contact Type:  Telephone    SUBJECTIVE:  Patient Findings     Comments:  The patient was assessed for   diet, medication,   missed or extra doses,   bruising or bleeding,   with no problem findings.  No questions or concerns.  Reviewed previous warfarin dosing with patient.  He took warfarin as instructed.    INR is therapeutic today.   Patient will continue same maintenance dose.   Follow up in 6 weeks.              Clinical Outcomes     Comments:  The patient was assessed for   diet, medication,   missed or extra doses,   bruising or bleeding,   with no problem findings.  No questions or concerns.  Reviewed previous warfarin dosing with patient.  He took warfarin as instructed.    INR is therapeutic today.   Patient will continue same maintenance dose.   Follow up in 6 weeks.                 OBJECTIVE    Recent labs: (last 7 days)     21  1006   INR 2.30*       ASSESSMENT / PLAN  INR assessment THER    Recheck INR In: 6 WEEKS    INR Location Clinic lab     Anticoagulation Summary  As of 2021    INR goal:  2.0-3.0   TTR:  100.0 % (1 y)   INR used for dosin.30 (2021)   Warfarin maintenance plan:  7.5 mg (5 mg x 1.5) every Mon, Thu, Sat; 5 mg (5 mg x 1) all other days   Full warfarin instructions:  7.5 mg every Mon, Thu, Sat; 5 mg all other days   Weekly warfarin total:  42.5 mg   No change documented:  Zena Ren RN   Plan last modified:  Lovely Ashby RN (2020)   Next INR check:  3/30/2021   Priority:  Maintenance   Target end date:  Indefinite    Indications    History of recurrent deep vein thrombosis (DVT) [Z86.718]  Long term current use of anticoagulant therapy [Z79.01]  History of pulmonary embolism [Z86.711]             Anticoagulation Episode Summary     INR check location:  Anticoagulation Clinic    Preferred lab:      Send INR reminders to:  LEANDRA ALBERTO    Comments:        Anticoagulation  Care Providers     Provider Role Specialty Phone number    Hina Humphreys MD Referring Internal Medicine [phone]    Rojas Chun MD  Internal Medicine - Pediatrics [phone]            See the Encounter Report to view Anticoagulation Flowsheet and Dosing Calendar (Go to Encounters tab in chart review, and find the Anticoagulation Therapy Visit)        Zena Ren RN

## 2021-03-05 ENCOUNTER — IMMUNIZATION (OUTPATIENT)
Dept: NURSING | Facility: CLINIC | Age: 66
End: 2021-03-05
Payer: COMMERCIAL

## 2021-03-05 PROCEDURE — 91303 PR COVID VAC JANSSEN AD26 0.5ML: CPT

## 2021-03-05 PROCEDURE — 0031A PR COVID VAC JANSSEN AD26 0.5ML: CPT

## 2021-03-05 NOTE — PROGRESS NOTES
"SUBJECTIVE:   Jozef Saunders is a 65 year old male who presents for Preventive Visit.    Are you in the first 12 months of your Medicare coverage?      Healthy Habits:     In general, how would you rate your overall health?  Good    Frequency of exercise:  6-7 days/week    Duration of exercise:  45-60 minutes    Do you usually eat at least 4 servings of fruit and vegetables a day, include whole grains    & fiber and avoid regularly eating high fat or \"junk\" foods?  Yes    Taking medications regularly:  Yes    Medication side effects:  None    Ability to successfully perform activities of daily living:  No assistance needed    Home Safety:  No safety concerns identified    Hearing Impairment:  Difficulty understanding soft or whispered speech    In the past 6 months, have you been bothered by leaking of urine?  No    In general, how would you rate your overall mental or emotional health?  Good      PHQ-2 Total Score: 0    Additional concerns today:  No     A few weeks developed a rectangle that moves around. Doesn't notice when he is up and around, but if he sits and stares at something for a bit, it is noticeable. Goes to [facility].     Having some difficulty swallowing meat, has a prior hiatal hernia, has needed egd with dilation.     Do you feel safe in your environment? Yes    Have you ever done Advance Care Planning? (For example, a Health Directive, POLST, or a discussion with a medical provider or your loved ones about your wishes): No, advance care planning information given to patient to review.  Patient declined advance care planning discussion at this time.       Fall risk  Fallen 2 or more times in the past year?: (P) No  Any fall with injury in the past year?: (P) No    Cognitive Screening   1) Repeat 3 items (Leader, Season, Table)    2) Clock draw: NORMAL  3) 3 item recall: Recalls 3 objects  Results: NORMAL clock, 1-2 items recalled: COGNITIVE IMPAIRMENT LESS LIKELY    Mini-CogTM Copyright WILLIE Power. " Licensed by the author for use in [facility]; reprinted with permission (gino@[facility]). All rights reserved.      Do you have sleep apnea, excessive snoring or daytime drowsiness?: no    Reviewed and updated as needed this visit by clinical staff  Tobacco  Allergies               Reviewed and updated as needed this visit by Provider                Social History     Tobacco Use     Smoking status: Former Smoker     Quit date: 1989     Years since quittin.3     Smokeless tobacco: Never Used   Substance Use Topics     Alcohol use: No     Frequency: Never     Drinks per session: Patient refused     Binge frequency: Patient refused     Comment: sober x26y     If you drink alcohol do you typically have >3 drinks per day or >7 drinks per week? No    Alcohol Use 3/8/2021   Prescreen: >3 drinks/day or >7 drinks/week? Not Applicable   Prescreen: >3 drinks/day or >7 drinks/week? -   No flowsheet data found.        Hyperlipidemia Follow-Up      Are you regularly taking any medication or supplement to lower your cholesterol?   Yes- simvastatin    Are you having muscle aches or other side effects that you think could be caused by your cholesterol lowering medication?  No    Hypertension Follow-up      Do you check your blood pressure regularly outside of the clinic? No     Are you following a low salt diet? Yes    Are your blood pressures ever more than 140 on the top number (systolic) OR more   than 90 on the bottom number (diastolic), for example 140/90? No     Anxiety Follow-Up    How are you doing with your anxiety since your last visit? No change    Are you having other symptoms that might be associated with anxiety? No    Have you had a significant life event? No     Are you feeling depressed? No    Do you have any concerns with your use of alcohol or other drugs? No    Social History     Tobacco Use     Smoking status: Former Smoker     Quit date: 1989     Years since quittin.3      Smokeless tobacco: Never Used   Substance Use Topics     Alcohol use: No     Frequency: Never     Drinks per session: Patient refused     Binge frequency: Patient refused     Comment: sober x26y     Drug use: No     MARIANNE-7 SCORE 8/29/2017 10/11/2018 3/8/2021   Total Score - - 0 (minimal anxiety)   Total Score 0 0 0     PHQ 8/29/2017 10/11/2018 3/8/2021   PHQ-9 Total Score 0 0 0   Q9: Thoughts of better off dead/self-harm past 2 weeks Not at all Not at all Not at all     Last PHQ-9 3/8/2021   1.  Little interest or pleasure in doing things 0   2.  Feeling down, depressed, or hopeless 0   3.  Trouble falling or staying asleep, or sleeping too much 0   4.  Feeling tired or having little energy 0   5.  Poor appetite or overeating 0   6.  Feeling bad about yourself 0   7.  Trouble concentrating 0   8.  Moving slowly or restless 0   Q9: Thoughts of better off dead/self-harm past 2 weeks 0   PHQ-9 Total Score 0   Difficulty at work, home, or with people -     MARIANNE-7  3/8/2021   1. Feeling nervous, anxious, or on edge 0   2. Not being able to stop or control worrying 0   3. Worrying too much about different things 0   4. Trouble relaxing 0   5. Being so restless that it is hard to sit still 0   6. Becoming easily annoyed or irritable 0   7. Feeling afraid, as if something awful might happen 0   MARIANNE-7 Total Score 0   If you checked any problems, how difficult have they made it for you to do your work, take care of things at home, or get along with other people? -         Current providers sharing in care for this patient include:   Patient Care Team:  Hina Humphreys MD as PCP - General (Internal Medicine)  Hina Humphreys MD as Assigned PCP Answers for HPI/ROS submitted by the patient on 3/8/2021   Annual Exam:  PHQ9 TOTAL SCORE: 0  MARIANNE 7 TOTAL SCORE: 0      The following health maintenance items are reviewed in Epic and correct as of today:  Health Maintenance   Topic Date Due     ANNUAL REVIEW OF HM ORDERS  Never  done     ZOSTER IMMUNIZATION (1 of 2) Never done     FALL RISK ASSESSMENT  Never done     COVID-19 Vaccine (COVID-19,PF,Sana) Refer to guidelines     MEDICARE ANNUAL WELLNESS VISIT  03/08/2022     ADVANCE CARE PLANNING  02/15/2024     LIPID  02/19/2025     DTAP/TDAP/TD IMMUNIZATION (3 - Td) 07/08/2026     COLORECTAL CANCER SCREENING  12/26/2028     HEPATITIS C SCREENING  Completed     PHQ-2  Completed     INFLUENZA VACCINE  Completed     Pneumococcal Vaccine: 65+ Years  Completed     AORTIC ANEURYSM SCREENING (SYSTEM ASSIGNED)  Completed     Pneumococcal Vaccine: Pediatrics (0 to 5 Years) and At-Risk Patients (6 to 64 Years)  Aged Out     IPV IMMUNIZATION  Aged Out     MENINGITIS IMMUNIZATION  Aged Out     HEPATITIS B IMMUNIZATION  Aged Out     Lab work is in process  Labs reviewed in EPIC  BP Readings from Last 3 Encounters:   03/08/21 120/80   02/19/20 118/80   11/15/19 104/61    Wt Readings from Last 3 Encounters:   03/08/21 86.5 kg (190 lb 9.6 oz)   02/19/20 88.5 kg (195 lb 1.6 oz)   11/13/19 88.6 kg (195 lb 4.8 oz)              Review of Systems   Constitutional: Negative for chills and fever.   HENT: Negative for congestion, ear pain, hearing loss and sore throat.    Eyes: Positive for visual disturbance. Negative for pain.   Respiratory: Negative for cough and shortness of breath.    Cardiovascular: Negative for chest pain, palpitations and peripheral edema.   Gastrointestinal: Negative for abdominal pain, constipation, diarrhea, heartburn, hematochezia and nausea.   Genitourinary: Negative for dysuria, frequency, genital sores, hematuria and urgency.   Musculoskeletal: Positive for arthralgias. Negative for joint swelling and myalgias.   Skin: Negative for rash.   Neurological: Negative for dizziness, weakness, headaches and paresthesias.   Psychiatric/Behavioral: Negative for mood changes. The patient is not nervous/anxious.      Constitutional, HEENT, cardiovascular, pulmonary, gi and gu systems are  "negative, except as otherwise noted.    OBJECTIVE:   /80   Pulse 58   Temp 98  F (36.7  C)   Ht 1.743 m (5' 8.62\")   Wt 86.5 kg (190 lb 9.6 oz)   SpO2 97%   BMI 28.46 kg/m   Estimated body mass index is 28.46 kg/m  as calculated from the following:    Height as of this encounter: 1.743 m (5' 8.62\").    Weight as of this encounter: 86.5 kg (190 lb 9.6 oz).  Physical Exam  GENERAL: healthy, alert and no distress  EYES: Eyes grossly normal to inspection, PERRL and conjunctivae and sclerae normal  HENT: ear canals and TM's normal, nose and mouth without ulcers or lesions  NECK: no adenopathy, no asymmetry, masses, or scars and thyroid normal to palpation  RESP: lungs clear to auscultation - no rales, rhonchi or wheezes  CV: regular rate and rhythm, normal S1 S2, no S3 or S4, no murmur, click or rub, no peripheral edema and peripheral pulses strong  ABDOMEN: soft, nontender, no hepatosplenomegaly, no masses and bowel sounds normal  MS: no gross musculoskeletal defects noted, no edema  SKIN: L anterior forehead with irregular dark brown flat papule with scaling, consistent with seborrheic keratosis. 4mm irregular light brown macule. Multiple freckles.  NEURO: Normal strength and tone, mentation intact and speech normal  PSYCH: mentation appears normal, affect normal/bright    Diagnostic Test Results:  Labs reviewed in Epic    ASSESSMENT / PLAN:   1. Medicare annual wellness visit, initial      2. Dysphagia, unspecified type  EGD in past. Tells me he has needed dilation in past as well. Last EGD 2 years ago. Worrisome episode last where couldn't get a piece of meat down, had to throw it up. Recommended follow up EGD. Soft diet until then. To ER if further episode of food getting stuck.  - GASTROENTEROLOGY ADULT REF PROCEDURE ONLY; Future    3. Vitreous floaters, unspecified laterality  This is quite concerning to me, as he has a very large floater present for 2 weeks, which is unusual for him, with a sensation " of decreased vision. He has an appointment with ophthalmology but not for 2 weeks. I have recommended he call back and request to be seen in 1-2 days to evaluate retina.    4. BENIGN HYPERTENSION  Reasonable control.  - atenolol (TENORMIN) 50 MG tablet; Take 1.5 tablets (75 mg) by mouth daily  Dispense: 135 tablet; Refill: 11  - lisinopril (ZESTRIL) 20 MG tablet; TAKE 1 TABLET BY MOUTH EVERY DAY  Dispense: 90 tablet; Refill: 11  - Comprehensive metabolic panel (BMP + Alb, Alk Phos, ALT, AST, Total. Bili, TP)    5. Generalized anxiety disorder  Stable. No change at this time.  - sertraline (ZOLOFT) 50 MG tablet; TAKE 0.5 TABLETS (25 MG) BY MOUTH DAILY  Dispense: 45 tablet; Refill: 11    6. Hypercholesterolemia  Follow lab.    7. Iron deficiency anemia, unspecified iron deficiency anemia type  Multiple prior results with iron deficiency. Has not taken iron replacement for any significant amount of time. Discussed likely causes of fe def anemia. Recheck lab, start iron, and plan recheck in 3 months with follow up after that  - CBC with platelets  - Iron and iron binding capacity  - Ferritin    8. History of pulmonary embolism    - warfarin ANTICOAGULANT (COUMADIN) 5 MG tablet; TAKE 7.5 MG ON Mon,Thur & Sat / TAKE 5 MG ALL OTHER DAYS OR AS DIRECTED BY ACC  Dispense: 111 tablet; Refill: 3    9. Deep vein thrombosis (DVT) of proximal lower extremity, unspecified chronicity, unspecified laterality (H)    - warfarin ANTICOAGULANT (COUMADIN) 5 MG tablet; TAKE 7.5 MG ON Mon,Thur & Sat / TAKE 5 MG ALL OTHER DAYS OR AS DIRECTED BY ACC  Dispense: 111 tablet; Refill: 3    10. Long term current use of anticoagulants with INR goal of 2.0-3.0    - warfarin ANTICOAGULANT (COUMADIN) 5 MG tablet; TAKE 7.5 MG ON Mon,Thur & Sat / TAKE 5 MG ALL OTHER DAYS OR AS DIRECTED BY ACC  Dispense: 111 tablet; Refill: 3    11. Screening for prostate cancer    - PSA, screen      Patient has been advised of split billing requirements and indicates  "understanding: No  COUNSELING:  Reviewed preventive health counseling, as reflected in patient instructions    Estimated body mass index is 28.46 kg/m  as calculated from the following:    Height as of this encounter: 1.743 m (5' 8.62\").    Weight as of this encounter: 86.5 kg (190 lb 9.6 oz).    Weight management plan: Discussed healthy diet and exercise guidelines    He reports that he quit smoking about 31 years ago. He has never used smokeless tobacco.      Appropriate preventive services were discussed with this patient, including applicable screening as appropriate for cardiovascular disease, diabetes, osteopenia/osteoporosis, and glaucoma.  As appropriate for age/gender, discussed screening for colorectal cancer, prostate cancer, breast cancer, and cervical cancer. Checklist reviewing preventive services available has been given to the patient.    Reviewed patients plan of care and provided an AVS. The Intermediate Care Plan ( asthma action plan, low back pain action plan, and migraine action plan) for Jozef meets the Care Plan requirement. This Care Plan has been established and reviewed with the Patient.    Counseling Resources:  ATP IV Guidelines  Pooled Cohorts Equation Calculator  Breast Cancer Risk Calculator  Breast Cancer: Medication to Reduce Risk  FRAX Risk Assessment  ICSI Preventive Guidelines  Dietary Guidelines for Americans, 2010  [employer]'s MyPlate  ASA Prophylaxis  Lung CA Screening    Hina Humphreys MD  [facility]    Identified Health Risks:  "

## 2021-03-07 DIAGNOSIS — K44.9 HIATAL HERNIA: ICD-10-CM

## 2021-03-08 ENCOUNTER — OFFICE VISIT (OUTPATIENT)
Dept: PEDIATRICS | Facility: CLINIC | Age: 66
End: 2021-03-08
Payer: COMMERCIAL

## 2021-03-08 VITALS
TEMPERATURE: 98 F | SYSTOLIC BLOOD PRESSURE: 120 MMHG | OXYGEN SATURATION: 97 % | HEIGHT: 69 IN | WEIGHT: 190.6 LBS | BODY MASS INDEX: 28.23 KG/M2 | HEART RATE: 58 BPM | DIASTOLIC BLOOD PRESSURE: 80 MMHG

## 2021-03-08 DIAGNOSIS — Z12.5 SCREENING FOR PROSTATE CANCER: ICD-10-CM

## 2021-03-08 DIAGNOSIS — Z79.01 LONG TERM CURRENT USE OF ANTICOAGULANTS WITH INR GOAL OF 2.0-3.0: ICD-10-CM

## 2021-03-08 DIAGNOSIS — E78.00 HYPERCHOLESTEROLEMIA: ICD-10-CM

## 2021-03-08 DIAGNOSIS — H43.399 VITREOUS FLOATERS, UNSPECIFIED LATERALITY: ICD-10-CM

## 2021-03-08 DIAGNOSIS — I10 ESSENTIAL HYPERTENSION, BENIGN: ICD-10-CM

## 2021-03-08 DIAGNOSIS — R13.10 DYSPHAGIA, UNSPECIFIED TYPE: ICD-10-CM

## 2021-03-08 DIAGNOSIS — Z86.711 HISTORY OF PULMONARY EMBOLISM: ICD-10-CM

## 2021-03-08 DIAGNOSIS — I82.4Y9 DEEP VEIN THROMBOSIS (DVT) OF PROXIMAL LOWER EXTREMITY, UNSPECIFIED CHRONICITY, UNSPECIFIED LATERALITY (H): ICD-10-CM

## 2021-03-08 DIAGNOSIS — F41.1 GENERALIZED ANXIETY DISORDER: ICD-10-CM

## 2021-03-08 DIAGNOSIS — Z00.00 MEDICARE ANNUAL WELLNESS VISIT, INITIAL: Primary | ICD-10-CM

## 2021-03-08 DIAGNOSIS — D50.9 IRON DEFICIENCY ANEMIA, UNSPECIFIED IRON DEFICIENCY ANEMIA TYPE: ICD-10-CM

## 2021-03-08 LAB
ERYTHROCYTE [DISTWIDTH] IN BLOOD BY AUTOMATED COUNT: 14.5 % (ref 10–15)
HCT VFR BLD AUTO: 46.4 % (ref 40–53)
HGB BLD-MCNC: 14.6 G/DL (ref 13.3–17.7)
MCH RBC QN AUTO: 27.2 PG (ref 26.5–33)
MCHC RBC AUTO-ENTMCNC: 31.5 G/DL (ref 31.5–36.5)
MCV RBC AUTO: 86 FL (ref 78–100)
PLATELET # BLD AUTO: 83 10E9/L (ref 150–450)
RBC # BLD AUTO: 5.37 10E12/L (ref 4.4–5.9)
WBC # BLD AUTO: 5.5 10E9/L (ref 4–11)

## 2021-03-08 PROCEDURE — 80053 COMPREHEN METABOLIC PANEL: CPT | Performed by: INTERNAL MEDICINE

## 2021-03-08 PROCEDURE — 80061 LIPID PANEL: CPT | Performed by: INTERNAL MEDICINE

## 2021-03-08 PROCEDURE — G0103 PSA SCREENING: HCPCS | Performed by: INTERNAL MEDICINE

## 2021-03-08 PROCEDURE — 83550 IRON BINDING TEST: CPT | Performed by: INTERNAL MEDICINE

## 2021-03-08 PROCEDURE — 36415 COLL VENOUS BLD VENIPUNCTURE: CPT | Performed by: INTERNAL MEDICINE

## 2021-03-08 PROCEDURE — 85027 COMPLETE CBC AUTOMATED: CPT | Performed by: INTERNAL MEDICINE

## 2021-03-08 PROCEDURE — 82728 ASSAY OF FERRITIN: CPT | Performed by: INTERNAL MEDICINE

## 2021-03-08 PROCEDURE — 83540 ASSAY OF IRON: CPT | Performed by: INTERNAL MEDICINE

## 2021-03-08 PROCEDURE — G0438 PPPS, INITIAL VISIT: HCPCS | Performed by: INTERNAL MEDICINE

## 2021-03-08 PROCEDURE — 99214 OFFICE O/P EST MOD 30 MIN: CPT | Mod: 25 | Performed by: INTERNAL MEDICINE

## 2021-03-08 RX ORDER — ATENOLOL 50 MG/1
75 TABLET ORAL DAILY
Qty: 135 TABLET | Refills: 11 | Status: SHIPPED | OUTPATIENT
Start: 2021-03-08 | End: 2022-04-04

## 2021-03-08 RX ORDER — WARFARIN SODIUM 5 MG/1
TABLET ORAL
Qty: 111 TABLET | Refills: 3 | Status: SHIPPED | OUTPATIENT
Start: 2021-03-08 | End: 2022-04-15

## 2021-03-08 RX ORDER — FAMOTIDINE 20 MG/1
TABLET, FILM COATED ORAL
Qty: 180 TABLET | Refills: 3 | Status: SHIPPED | OUTPATIENT
Start: 2021-03-08 | End: 2022-03-01

## 2021-03-08 RX ORDER — LISINOPRIL 20 MG/1
TABLET ORAL
Qty: 90 TABLET | Refills: 11 | Status: SHIPPED | OUTPATIENT
Start: 2021-03-08 | End: 2022-04-04

## 2021-03-08 ASSESSMENT — ENCOUNTER SYMPTOMS
EYE PAIN: 0
ARTHRALGIAS: 1
HEMATURIA: 0
WEAKNESS: 0
SORE THROAT: 0
PARESTHESIAS: 0
FREQUENCY: 0
FEVER: 0
PALPITATIONS: 0
COUGH: 0
NERVOUS/ANXIOUS: 0
SHORTNESS OF BREATH: 0
HEARTBURN: 0
DIZZINESS: 0
DYSURIA: 0
DIARRHEA: 0
ABDOMINAL PAIN: 0
NAUSEA: 0
CHILLS: 0
JOINT SWELLING: 0
CONSTIPATION: 0
HEMATOCHEZIA: 0
MYALGIAS: 0
HEADACHES: 0

## 2021-03-08 ASSESSMENT — ACTIVITIES OF DAILY LIVING (ADL): CURRENT_FUNCTION: NO ASSISTANCE NEEDED

## 2021-03-08 ASSESSMENT — ANXIETY QUESTIONNAIRES
GAD7 TOTAL SCORE: 0
2. NOT BEING ABLE TO STOP OR CONTROL WORRYING: NOT AT ALL
5. BEING SO RESTLESS THAT IT IS HARD TO SIT STILL: NOT AT ALL
1. FEELING NERVOUS, ANXIOUS, OR ON EDGE: NOT AT ALL
GAD7 TOTAL SCORE: 0
7. FEELING AFRAID AS IF SOMETHING AWFUL MIGHT HAPPEN: NOT AT ALL
3. WORRYING TOO MUCH ABOUT DIFFERENT THINGS: NOT AT ALL
6. BECOMING EASILY ANNOYED OR IRRITABLE: NOT AT ALL
7. FEELING AFRAID AS IF SOMETHING AWFUL MIGHT HAPPEN: NOT AT ALL
4. TROUBLE RELAXING: NOT AT ALL
GAD7 TOTAL SCORE: 0

## 2021-03-08 ASSESSMENT — PATIENT HEALTH QUESTIONNAIRE - PHQ9
SUM OF ALL RESPONSES TO PHQ QUESTIONS 1-9: 0
SUM OF ALL RESPONSES TO PHQ QUESTIONS 1-9: 0

## 2021-03-08 ASSESSMENT — MIFFLIN-ST. JEOR: SCORE: 1628.93

## 2021-03-08 NOTE — PATIENT INSTRUCTIONS
[facility]. Please call them at [phone] if you have not heard from them within one week.     Patient Education     Low-Salt Diet  This diet removes foods that are high in salt. It also limits the amount of salt you use when cooking. It is most often used for people with high blood pressure, fluid retention (edema), and kidney, liver, or heart disease.   Table salt has the mineral sodium. Your body needs sodium to work normally. But too much sodium can make your health problems worse. Your healthcare provider advises a low-salt (low-sodium) diet for you. Your total daily allowed amount of salt is 1,500 to 2,300 milligrams (mg). This is less than 1 teaspoon of table salt. This means you can have only about 500 to 700 mg of sodium at each meal. People with certain health problems should limit salt intake to the lower end of the advised range.     When you cook, don t add much salt. If you can cook without using salt, even better. Don t add salt to your food at the table.   When shopping, read food labels. Salt is often called sodium on the label. Choose foods that are salt-free, low salt, or very low salt. Note that foods with reduced salt may not lower your salt intake enough.     Beans, potatoes, and pasta  OK: Dry beans, split peas, lentils, potatoes, rice, macaroni, pasta, spaghetti with no added salt, canned beans with no added salt   Avoid: Salted potato chips; regular (salt added) canned beans   Breads and grains  OK: Low-sodium breads, rolls, cereals, and cakes; low-salt crackers, matzo crackers   Avoid: Salted crackers, pretzels, tortilla chips, popcorn, and other salty snacks; Italian toast, pancakes, muffins, regular bread   Dairy  OK: Milk, chocolate milk, hot chocolate mix, low-salt cheeses, yogurt   Avoid: Processed cheese and cheese spreads; Roquefort, Camembert, and cottage cheese; buttermilk, instant breakfast drink   Desserts  OK: Ice cream, frozen yogurt, juice bars, gelatin,  cookies and pies, sugar, honey, jelly, hard candy   Avoid: Most pies, cakes and cookies made with salt; instant pudding   Drinks  OK: Tea, coffee, fizzy (carbonated) drinks, juices   Avoid: Flavored coffees, electrolyte replacement drinks, sports drinks   Meats  OK: All fresh meat, fish, poultry, low-salt tuna, eggs, egg substitute   Avoid: Smoked, pickled, brine-cured, or salted meats and fish, and processed poultry injected with salt or marinade. This includes dunham, chipped beef, corned beef, hot dogs, deli meats, ham, kosher meats, salt pork, sausage, canned tuna, salted codfish, smoked salmon, herring, sardines, and anchovies.   Seasonings  OK: Most seasonings are okay. Good substitutes for salt include: fresh herb blends, hot sauce, lemon, garlic, crow, vinegar, dry mustard, parsley, cilantro, horseradish, tomato paste, margarine, mayonnaise, unsalted butter, cream cheese, vegetable and olive oil, cream, low-salt salad dressing and gravy.   Avoid: Regular ketchup, relishes, pickles, soy sauce, teriyaki sauce, Worcestershire sauce, BBQ sauce, tartar sauce, meat tenderizer, chili sauce, regular gravy, regular salad dressing, salted butter   Soups  OK: Low-salt soups and broths made with allowed foods   Avoid: Bouillon cubes, soups with smoked or salted meats, regular soup and broth   Vegetables  OK: Most vegetables are okay, including canned vegetables with no salt added, and plain frozen vegetables; low-salt tomato and vegetable juices   Avoid: Sauerkraut and other brined vegetables; pickles and pickled vegetables; tomato juice, olives, canned vegetables with salt, frozen vegetables with sauces   Chad last reviewed this educational content on 2/1/2020 2000-2020 [employer]. All rights reserved. This information is not intended as a substitute for professional medical care. Always follow your healthcare professional's instructions.           Ferrous sulfate or ferrous gluconate 235 mg  daily, increase to twice daily if you tolerate.   This is absorbed best if taken with orange juice or vitamin C 125-250 mg.    nonfasting lab in 3 months. Follow up after that.     Call your eye doctor, I'd want you seen today or tomorrow.    Dr. Leydi Saucedo at [facility] in [place] ([phone])

## 2021-03-09 LAB
ALBUMIN SERPL-MCNC: 3.7 G/DL (ref 3.4–5)
ALP SERPL-CCNC: 81 U/L (ref 40–150)
ALT SERPL W P-5'-P-CCNC: 41 U/L (ref 0–70)
ANION GAP SERPL CALCULATED.3IONS-SCNC: 4 MMOL/L (ref 3–14)
AST SERPL W P-5'-P-CCNC: 32 U/L (ref 0–45)
BILIRUB SERPL-MCNC: 0.4 MG/DL (ref 0.2–1.3)
BUN SERPL-MCNC: 17 MG/DL (ref 7–30)
CALCIUM SERPL-MCNC: 8.9 MG/DL (ref 8.5–10.1)
CHLORIDE SERPL-SCNC: 110 MMOL/L (ref 94–109)
CHOLEST SERPL-MCNC: 155 MG/DL
CO2 SERPL-SCNC: 27 MMOL/L (ref 20–32)
CREAT SERPL-MCNC: 0.89 MG/DL (ref 0.66–1.25)
FERRITIN SERPL-MCNC: 14 NG/ML (ref 26–388)
GFR SERPL CREATININE-BSD FRML MDRD: 89 ML/MIN/{1.73_M2}
GLUCOSE SERPL-MCNC: 83 MG/DL (ref 70–99)
HDLC SERPL-MCNC: 45 MG/DL
IRON SATN MFR SERPL: 10 % (ref 15–46)
IRON SERPL-MCNC: 37 UG/DL (ref 35–180)
LDLC SERPL CALC-MCNC: 79 MG/DL
NONHDLC SERPL-MCNC: 110 MG/DL
POTASSIUM SERPL-SCNC: 4.3 MMOL/L (ref 3.4–5.3)
PROT SERPL-MCNC: 7.3 G/DL (ref 6.8–8.8)
PSA SERPL-ACNC: 0.8 UG/L (ref 0–4)
SODIUM SERPL-SCNC: 141 MMOL/L (ref 133–144)
TIBC SERPL-MCNC: 366 UG/DL (ref 240–430)
TRIGL SERPL-MCNC: 156 MG/DL

## 2021-03-09 ASSESSMENT — ANXIETY QUESTIONNAIRES: GAD7 TOTAL SCORE: 0

## 2021-03-09 ASSESSMENT — PATIENT HEALTH QUESTIONNAIRE - PHQ9: SUM OF ALL RESPONSES TO PHQ QUESTIONS 1-9: 0

## 2021-03-19 ENCOUNTER — TELEPHONE (OUTPATIENT)
Dept: PEDIATRICS | Facility: CLINIC | Age: 66
End: 2021-03-19

## 2021-03-19 NOTE — TELEPHONE ENCOUNTER
Received VM on PAL line left at 11:45am. MORTEZA Browne from [facility] #[phone]. Pt has upper endo scheduled for 4/19. Pt takes warfarin. Hollie is requesting a 4 day hold on warfarin and any bridging that is needed.    Will route to Dr. Humphreys to review/advise.    Tiki ONOFRE RN

## 2021-03-24 NOTE — TELEPHONE ENCOUNTER
Placed call to [facility] at [phone]. No answer. Left detailed VM on nurse VM that per Dr. Mata patient becerra snot require bridging. It has been sent to our anticoag team to advise on pre and post procedure instructions for the warfarin.    Tiki ONOFRE RN

## 2021-03-24 NOTE — TELEPHONE ENCOUNTER
Does not require bridging. pls let [facility] know.   Will forward to [facility] clinic for management of pre and post procedure warfarin.  Hina Humphreys M.D.

## 2021-03-26 ENCOUNTER — TELEPHONE (OUTPATIENT)
Dept: PEDIATRICS | Facility: CLINIC | Age: 66
End: 2021-03-26

## 2021-03-26 NOTE — TELEPHONE ENCOUNTER
Tiki PRO left  on [facility] phone asking for a call back from [facility] nurse [facility].   Please contact Tiki at [phone].    Jennifer Heller RN, BSN, PHN

## 2021-03-26 NOTE — TELEPHONE ENCOUNTER
Called and spoke to Tiki.  See other telephone encounter.    Zena Ren RN  [facility]

## 2021-03-26 NOTE — TELEPHONE ENCOUNTER
Called [facility]. Was on hold for 3 minutes and then [facility] No option to keep holding.  Left message that patient could hold warfarin for 4 days pre procedure and did not need bridging. For questions, call [phone].  Will discuss warfarin hold with patient when he comes in for his INR on 3/30/21.  Upper endoscopy scheduled for 4/19/21 at [facility].    Zena Ren RN  [facility]

## 2021-03-30 ENCOUNTER — ANTICOAGULATION THERAPY VISIT (OUTPATIENT)
Dept: NURSING | Facility: CLINIC | Age: 66
End: 2021-03-30

## 2021-03-30 DIAGNOSIS — Z86.711 HISTORY OF PULMONARY EMBOLISM: ICD-10-CM

## 2021-03-30 DIAGNOSIS — Z79.01 LONG TERM CURRENT USE OF ANTICOAGULANT THERAPY: ICD-10-CM

## 2021-03-30 DIAGNOSIS — Z86.718 HISTORY OF RECURRENT DEEP VEIN THROMBOSIS (DVT): ICD-10-CM

## 2021-03-30 LAB
CAPILLARY BLOOD COLLECTION: NORMAL
INR PPP: 2.4 (ref 0.86–1.14)

## 2021-03-30 PROCEDURE — 85610 PROTHROMBIN TIME: CPT | Performed by: INTERNAL MEDICINE

## 2021-03-30 PROCEDURE — 99207 PR NO CHARGE NURSE ONLY: CPT

## 2021-03-30 PROCEDURE — 36416 COLLJ CAPILLARY BLOOD SPEC: CPT | Performed by: INTERNAL MEDICINE

## 2021-03-30 NOTE — PROGRESS NOTES
ANTICOAGULATION FOLLOW-UP     Patient Name:  Jozef Saunders  Date:  3/30/2021  Contact Type:  Telephone    SUBJECTIVE:  Patient Findings     Positives:  Upcoming invasive procedure (Upper endoscopy scheduled for 21 at [facility].)    Comments:  The patient was assessed for   diet, medication,   missed or extra doses,   bruising or bleeding,   with no problem findings.  No questions or concerns.  Reviewed previous warfarin dosing with patient.  He took warfarin as instructed.    INR is therapeutic today.   Patient will continue same maintenance dose except for 4 day hold (4/15-21) for endoscopy.   Does not need lovenox bridge per Dr. Humphreys  Follow up in 5 weeks.    AVS calendar mailed to home address.          Clinical Outcomes     Comments:  The patient was assessed for   diet, medication,   missed or extra doses,   bruising or bleeding,   with no problem findings.  No questions or concerns.  Reviewed previous warfarin dosing with patient.  He took warfarin as instructed.    INR is therapeutic today.   Patient will continue same maintenance dose except for 4 day hold (4/15-21) for endoscopy.   Does not need lovenox bridge per Dr. Humphreys  Follow up in 5 weeks.    AVS calendar mailed to home address.             OBJECTIVE    Recent labs: (last 7 days)     21  0958   INR 2.40*       ASSESSMENT / PLAN  INR assessment THER    Recheck INR In: 5 WEEKS    INR Location Clinic lab     Anticoagulation Summary  As of 3/30/2021    INR goal:  2.0-3.0   TTR:  100.0 % (1 y)   INR used for dosin.40 (3/30/2021)   Warfarin maintenance plan:  7.5 mg (5 mg x 1.5) every Mon, Thu, Sat; 5 mg (5 mg x 1) all other days   Full warfarin instructions:  15: Hold; 16: Hold; 17: Hold; 18: Hold; Otherwise 7.5 mg every Mon, Thu, Sat; 5 mg all other days   Weekly warfarin total:  42.5 mg   Plan last modified:  Lovely Ashby RN (2020)   Next INR check:  5/3/2021   Priority:  Maintenance   Target end date:   Indefinite    Indications    History of recurrent deep vein thrombosis (DVT) [Z86.718]  Long term current use of anticoagulant therapy [Z79.01]  History of pulmonary embolism [Z86.711]             Anticoagulation Episode Summary     INR check location:  Clinic Lab    Preferred lab:      Send INR reminders to:  LEANDRA ALBERTO    Comments:        Anticoagulation Care Providers     Provider Role Specialty Phone number    Hina Humphreys MD [facility] Internal Medicine [phone]    Rojas Chun MD  Internal Medicine - Pediatrics [phone]            See the Encounter Report to view Anticoagulation Flowsheet and Dosing Calendar (Go to Encounters tab in chart review, and find the Anticoagulation Therapy Visit)        Zena Ren RN

## 2021-04-19 ENCOUNTER — TRANSFERRED RECORDS (OUTPATIENT)
Dept: HEALTH INFORMATION MANAGEMENT | Facility: CLINIC | Age: 66
End: 2021-04-19

## 2021-05-03 ENCOUNTER — ANTICOAGULATION THERAPY VISIT (OUTPATIENT)
Dept: NURSING | Facility: CLINIC | Age: 66
End: 2021-05-03
Payer: COMMERCIAL

## 2021-05-03 DIAGNOSIS — Z79.01 LONG TERM CURRENT USE OF ANTICOAGULANT THERAPY: ICD-10-CM

## 2021-05-03 DIAGNOSIS — Z86.718 HISTORY OF RECURRENT DEEP VEIN THROMBOSIS (DVT): ICD-10-CM

## 2021-05-03 DIAGNOSIS — Z86.711 HISTORY OF PULMONARY EMBOLISM: ICD-10-CM

## 2021-05-03 LAB
CAPILLARY BLOOD COLLECTION: NORMAL
INR PPP: 2.1 (ref 0.86–1.14)

## 2021-05-03 PROCEDURE — 36416 COLLJ CAPILLARY BLOOD SPEC: CPT | Performed by: INTERNAL MEDICINE

## 2021-05-03 PROCEDURE — 85610 PROTHROMBIN TIME: CPT | Performed by: INTERNAL MEDICINE

## 2021-05-03 PROCEDURE — 99207 PR NO CHARGE NURSE ONLY: CPT

## 2021-05-03 NOTE — PROGRESS NOTES
ANTICOAGULATION FOLLOW-UP     Patient Name:  Jozef Saunders  Date:  5/3/2021  Contact Type:  Telephone    SUBJECTIVE:  Patient Findings     Comments:  The patient was assessed for   diet, medication,   missed or extra doses,   bruising or bleeding,   with no problem findings.  No questions or concerns.  Reviewed previous warfarin dosing with patient.  He took warfarin as instructed.    INR is therapeutic today.   Patient will continue same maintenance dose.   Follow up in 6 weeks.              Clinical Outcomes     Comments:  The patient was assessed for   diet, medication,   missed or extra doses,   bruising or bleeding,   with no problem findings.  No questions or concerns.  Reviewed previous warfarin dosing with patient.  He took warfarin as instructed.    INR is therapeutic today.   Patient will continue same maintenance dose.   Follow up in 6 weeks.                 OBJECTIVE    Recent labs: (last 7 days)     21  0917   INR 2.10*       ASSESSMENT / PLAN  INR assessment THER    Recheck INR In: 6 WEEKS    INR Location Clinic lab     Anticoagulation Summary  As of 5/3/2021    INR goal:  2.0-3.0   TTR:  100.0 % (1 y)   INR used for dosin.10 (5/3/2021)   Warfarin maintenance plan:  7.5 mg (5 mg x 1.5) every Mon, Thu, Sat; 5 mg (5 mg x 1) all other days   Full warfarin instructions:  7.5 mg every Mon, Thu, Sat; 5 mg all other days   Weekly warfarin total:  42.5 mg   No change documented:  Zena Ren RN   Plan last modified:  Lovely Ashby RN (2020)   Next INR check:  2021   Priority:  Maintenance   Target end date:  Indefinite    Indications    History of recurrent deep vein thrombosis (DVT) [Z86.718]  Long term current use of anticoagulant therapy [Z79.01]  History of pulmonary embolism [Z86.711]             Anticoagulation Episode Summary     INR check location:  Clinic Lab    Preferred lab:      Send INR reminders to:  LEANDRA ALBERTO    Comments:        Anticoagulation Care Providers      Provider Role Specialty Phone number    Hina Humphreys MD Referring Internal Medicine [phone]    Rojas Chun MD  Internal Medicine - Pediatrics [phone]            See the Encounter Report to view Anticoagulation Flowsheet and Dosing Calendar (Go to Encounters tab in chart review, and find the Anticoagulation Therapy Visit)        Zena Ren RN

## 2021-05-10 ENCOUNTER — TRANSFERRED RECORDS (OUTPATIENT)
Dept: HEALTH INFORMATION MANAGEMENT | Facility: CLINIC | Age: 66
End: 2021-05-10

## 2021-06-02 ENCOUNTER — TRANSFERRED RECORDS (OUTPATIENT)
Dept: HEALTH INFORMATION MANAGEMENT | Facility: CLINIC | Age: 66
End: 2021-06-02

## 2021-06-03 NOTE — PROGRESS NOTES
Pre-Visit Planning     Appointment Notes for this encounter:   Recheck- labs done 6/4/21    Questionnaires Reviewed/Assigned  No additional questionnaires are needed    Med Rec completed. No changes.    Tiki ONOFRE RN

## 2021-06-04 DIAGNOSIS — D50.9 IRON DEFICIENCY ANEMIA, UNSPECIFIED IRON DEFICIENCY ANEMIA TYPE: ICD-10-CM

## 2021-06-04 LAB
FERRITIN SERPL-MCNC: 41 NG/ML (ref 26–388)
HGB BLD-MCNC: 16 G/DL (ref 13.3–17.7)
IRON SATN MFR SERPL: 33 % (ref 15–46)
IRON SERPL-MCNC: 104 UG/DL (ref 35–180)
TIBC SERPL-MCNC: 314 UG/DL (ref 240–430)

## 2021-06-04 PROCEDURE — 83540 ASSAY OF IRON: CPT | Performed by: INTERNAL MEDICINE

## 2021-06-04 PROCEDURE — 85018 HEMOGLOBIN: CPT | Performed by: INTERNAL MEDICINE

## 2021-06-04 PROCEDURE — 36415 COLL VENOUS BLD VENIPUNCTURE: CPT | Performed by: INTERNAL MEDICINE

## 2021-06-04 PROCEDURE — 82728 ASSAY OF FERRITIN: CPT | Performed by: INTERNAL MEDICINE

## 2021-06-04 PROCEDURE — 83550 IRON BINDING TEST: CPT | Performed by: INTERNAL MEDICINE

## 2021-06-07 ENCOUNTER — OFFICE VISIT (OUTPATIENT)
Dept: PEDIATRICS | Facility: CLINIC | Age: 66
End: 2021-06-07
Payer: COMMERCIAL

## 2021-06-07 VITALS
BODY MASS INDEX: 28.23 KG/M2 | TEMPERATURE: 98 F | WEIGHT: 190.6 LBS | RESPIRATION RATE: 20 BRPM | OXYGEN SATURATION: 95 % | HEART RATE: 55 BPM | SYSTOLIC BLOOD PRESSURE: 114 MMHG | DIASTOLIC BLOOD PRESSURE: 78 MMHG | HEIGHT: 69 IN

## 2021-06-07 DIAGNOSIS — K44.9 HIATAL HERNIA: ICD-10-CM

## 2021-06-07 DIAGNOSIS — R13.19 ESOPHAGEAL DYSPHAGIA: ICD-10-CM

## 2021-06-07 DIAGNOSIS — D50.9 IRON DEFICIENCY ANEMIA, UNSPECIFIED IRON DEFICIENCY ANEMIA TYPE: Primary | ICD-10-CM

## 2021-06-07 DIAGNOSIS — R14.0 ABDOMINAL BLOATING: ICD-10-CM

## 2021-06-07 PROCEDURE — 99214 OFFICE O/P EST MOD 30 MIN: CPT | Performed by: INTERNAL MEDICINE

## 2021-06-07 ASSESSMENT — MIFFLIN-ST. JEOR: SCORE: 1628.9

## 2021-06-07 NOTE — PATIENT INSTRUCTIONS
Consider going back to [facility] to discuss bloating.  Dr. Lopez. Dr. Walls, Dr. Hernandez    You could try a dairy free diet for 2 weeks. If that doesn't make a difference, consider reducing some of the fruits and vegetables on the FODMAP list.     Consider a 24 hour lookback on your diet when your symptoms are worse.     You could try beano or simethicone.     OK to stop your iron. Recheck iron levels in 6 months. Nonfasting.

## 2021-06-07 NOTE — PROGRESS NOTES
Assessment & Plan     Iron deficiency anemia, unspecified iron deficiency anemia type  Normal levels and hgb after 3 months on iron replacement. On review, slow trend down in hemoglobin. No clear source of bleeding. Has had colonoscopy in 2018 with small polyp, no bleeding. Would not repeat right now, since anemia noted around that time. Recent EGD. Given he is on anticoagulation, plan stop iron now, recheck lab in 6 months to ensure he isn't trending down again.    - **Iron and iron binding capacity FUTURE anytime; Future  - **Ferritin FUTURE 6mo; Future  - **Hemoglobin FUTURE anytime; Future    Abdominal bloating  Discussed possible causes. Symptoms start in AM and progress through day. Discussed possible malabsorption or lactose/fructose intolerance. For now, plan trial on dairy free diet for 2 weeks. If not better, reviewed low FODMAP diet. Will follow up with GI if not better.     Hiatal hernia  Reviewed EGD    Esophageal dysphagia  No current symptoms.     39 minutes spent on the date of the encounter doing chart review, history and exam, documentation and further activities per the note       Patient Instructions   Consider going back to [facility] to discuss bloating.  Dr. Lopez. Dr. Walls, Dr. Hernandez    You could try a dairy free diet for 2 weeks. If that doesn't make a difference, consider reducing some of the fruits and vegetables on the FODMAP list.     Consider a 24 hour lookback on your diet when your symptoms are worse.     You could try beano or simethicone.     OK to stop your iron. Recheck iron levels in 6 months. Nonfasting.       No follow-ups on file.    Hina Humphreys MD  [facility] DOLLY Davenport is a 65 year old who presents for the following health issues     HPI     Follow up on labs. Taking iron daily. Had recent follow up lab done. No blood in stool. No unusual bleeding, stable on coumadin.    Eating more slowly, no current issues with dysphagia. Has  "seen GI.    Bloating in abdomen, not necessarily gas. Is fine in the morning. Later in the day, feels more and more bloated. Not significantly painful, but uncomfortable.     Review of Systems   Constitutional, HEENT, cardiovascular, pulmonary, gi and gu systems are negative, except as otherwise noted.      Objective    /78 (BP Location: Right arm, Patient Position: Sitting, Cuff Size: Adult Regular)   Pulse 55   Temp 98  F (36.7  C) (Tympanic)   Resp 20   Ht 1.743 m (5' 8.62\")   Wt 86.5 kg (190 lb 9.6 oz)   SpO2 95%   BMI 28.46 kg/m    Body mass index is 28.46 kg/m .  Physical Exam   GENERAL: healthy, alert and no distress  NECK: no adenopathy, no asymmetry, masses, or scars and thyroid normal to palpation  RESP: lungs clear to auscultation - no rales, rhonchi or wheezes  CV: regular rate and rhythm, normal S1 S2, no S3 or S4, no murmur, click or rub, no peripheral edema and peripheral pulses strong  ABDOMEN: soft, nontender, no hepatosplenomegaly, no masses and bowel sounds normal  MS: no gross musculoskeletal defects noted, no edema    Orders Only on 06/04/2021   Component Date Value Ref Range Status     Hemoglobin 06/04/2021 16.0  13.3 - 17.7 g/dL Final     Iron 06/04/2021 104  35 - 180 ug/dL Final     Iron Binding Cap 06/04/2021 314  240 - 430 ug/dL Final     Iron Saturation Index 06/04/2021 33  15 - 46 % Final     Ferritin 06/04/2021 41  26 - 388 ng/mL Final     "

## 2021-06-14 ENCOUNTER — ANTICOAGULATION THERAPY VISIT (OUTPATIENT)
Dept: NURSING | Facility: CLINIC | Age: 66
End: 2021-06-14
Payer: COMMERCIAL

## 2021-06-14 DIAGNOSIS — Z79.01 LONG TERM CURRENT USE OF ANTICOAGULANT THERAPY: ICD-10-CM

## 2021-06-14 DIAGNOSIS — Z86.718 HISTORY OF RECURRENT DEEP VEIN THROMBOSIS (DVT): ICD-10-CM

## 2021-06-14 DIAGNOSIS — Z86.711 HISTORY OF PULMONARY EMBOLISM: ICD-10-CM

## 2021-06-14 DIAGNOSIS — Z86.718 HISTORY OF RECURRENT DEEP VEIN THROMBOSIS (DVT): Primary | ICD-10-CM

## 2021-06-14 LAB
CAPILLARY BLOOD COLLECTION: NORMAL
INR PPP: 2.1 (ref 0.86–1.14)

## 2021-06-14 PROCEDURE — 36416 COLLJ CAPILLARY BLOOD SPEC: CPT | Performed by: INTERNAL MEDICINE

## 2021-06-14 PROCEDURE — 85610 PROTHROMBIN TIME: CPT | Performed by: INTERNAL MEDICINE

## 2021-06-14 PROCEDURE — 99207 PR NO CHARGE NURSE ONLY: CPT

## 2021-06-14 NOTE — PROGRESS NOTES
Attempted to reach patient by phone.  Left message to call INR Clinic. Zena [phone]    Zena Ren RN

## 2021-06-14 NOTE — PROGRESS NOTES
ANTICOAGULATION FOLLOW-UP     Patient Name:  Jozef Saunders  Date:  2021  Contact Type:  Telephone    SUBJECTIVE:  Patient Findings     Comments:  The patient was assessed for   diet, medication,   missed or extra doses,   bruising or bleeding,   with no problem findings.  No questions or concerns.  Reviewed previous warfarin dosing with patient.  He took warfarin as instructed.    INR is therapeutic today.   Patient will continue same maintenance dose.   Follow up in 6 weeks.          Clinical Outcomes     Comments:  The patient was assessed for   diet, medication,   missed or extra doses,   bruising or bleeding,   with no problem findings.  No questions or concerns.  Reviewed previous warfarin dosing with patient.  He took warfarin as instructed.    INR is therapeutic today.   Patient will continue same maintenance dose.   Follow up in 6 weeks.             OBJECTIVE    Recent labs: (last 7 days)     21  0931   INR 2.10*       ASSESSMENT / PLAN  INR assessment THER    Recheck INR In: 6 WEEKS    INR Location Clinic lab     Anticoagulation Summary  As of 2021    INR goal:  2.0-3.0   TTR:  100.0 % (1 y)   INR used for dosin.10 (2021)   Warfarin maintenance plan:  7.5 mg (5 mg x 1.5) every Mon, Thu, Sat; 5 mg (5 mg x 1) all other days   Full warfarin instructions:  7.5 mg every Mon, Thu, Sat; 5 mg all other days   Weekly warfarin total:  42.5 mg   No change documented:  Zena Ren RN   Plan last modified:  Lovely Ashby RN (2020)   Next INR check:  2021   Priority:  Maintenance   Target end date:  Indefinite    Indications    History of recurrent deep vein thrombosis (DVT) [Z86.718]  Long term current use of anticoagulant therapy [Z79.01]  History of pulmonary embolism [Z86.711]             Anticoagulation Episode Summary     INR check location:  Clinic Lab    Preferred lab:      Send INR reminders to:  LEANDRA ALBERTO    Comments:  3/19/21 Does not need lovenox bridge per  Dr. Humphreys      Anticoagulation Care Providers     Provider Role Specialty Phone number    Hina Humphreys MD Referring Internal Medicine [phone]    Rojas Chun MD  Internal Medicine - Pediatrics [phone]            See the Encounter Report to view Anticoagulation Flowsheet and Dosing Calendar (Go to Encounters tab in chart review, and find the Anticoagulation Therapy Visit)        Zena Ren RN

## 2021-07-26 ENCOUNTER — LAB (OUTPATIENT)
Dept: LAB | Facility: CLINIC | Age: 66
End: 2021-07-26
Payer: COMMERCIAL

## 2021-07-26 ENCOUNTER — ANTICOAGULATION THERAPY VISIT (OUTPATIENT)
Dept: ANTICOAGULATION | Facility: CLINIC | Age: 66
End: 2021-07-26

## 2021-07-26 DIAGNOSIS — Z86.711 HISTORY OF PULMONARY EMBOLISM: ICD-10-CM

## 2021-07-26 DIAGNOSIS — Z86.718 HISTORY OF RECURRENT DEEP VEIN THROMBOSIS (DVT): Primary | ICD-10-CM

## 2021-07-26 DIAGNOSIS — Z79.01 LONG TERM CURRENT USE OF ANTICOAGULANT THERAPY: ICD-10-CM

## 2021-07-26 DIAGNOSIS — Z86.718 HISTORY OF RECURRENT DEEP VEIN THROMBOSIS (DVT): ICD-10-CM

## 2021-07-26 LAB — INR BLD: 2.2 (ref 0.9–1.1)

## 2021-07-26 PROCEDURE — 85610 PROTHROMBIN TIME: CPT

## 2021-07-26 PROCEDURE — 36416 COLLJ CAPILLARY BLOOD SPEC: CPT

## 2021-07-26 NOTE — PROGRESS NOTES
ANTICOAGULATION MANAGEMENT     Jozef Saunders 65 year old male is on warfarin with therapeutic INR result. (Goal INR 2.0-3.0)    Recent labs: (last 7 days)     07/26/21  0950   INR 2.2*       ASSESSMENT     Source(s): Chart Review and Patient/Caregiver Call       Warfarin doses taken: Warfarin taken as instructed    Diet: No new diet changes identified    New illness, injury, or hospitalization: No    Medication/supplement changes: None noted    Signs or symptoms of bleeding or clotting: No    Previous INR: Therapeutic last 2(+) visits    Additional findings: None     PLAN     Recommended plan for no diet, medication or health factor changes affecting INR     Dosing Instructions: Continue your current warfarin dose with next INR in 5 weeks. He usually goes 6 weeks, but 6 weeks is Labor Day       Summary  As of 7/26/2021    Full warfarin instructions:  7.5 mg every Mon, Thu, Sat; 5 mg all other days   Next INR check:  8/30/2021             Telephone call with  Issac who verbalizes understanding and agrees to plan    Lab visit scheduled    Education provided: Monitoring for bleeding signs and symptoms and Travel related clotting risk and prevention. He is going to a wedding in [place].    Plan made per [facility] anticoagulation protocol    Zena Ren RN  Anticoagulation Clinic  7/26/2021    _______________________________________________________________________     Anticoagulation Episode Summary     Current INR goal:  2.0-3.0   TTR:  100.0 % (1 y)   Target end date:  Indefinite   Send INR reminders to:  LEANDRA ALBERTO    Indications    History of recurrent deep vein thrombosis (DVT) [Z86.718]  Long term current use of anticoagulant therapy [Z79.01]  History of pulmonary embolism [Z86.711]           Comments:  3/19/21 Does not need lovenox bridge per Dr. Humphreys         Anticoagulation Care Providers     Provider Role Specialty Phone number    Hina Humphreys MD Referring Internal Medicine [phone]     Rojas Chun MD  Internal Medicine - Pediatrics [phone]

## 2021-07-26 NOTE — PROGRESS NOTES
Attempted to reach patient by phone.  Left message to call INR Clinic. Zena [phone]    Zena Ren RN

## 2021-08-30 ENCOUNTER — LAB (OUTPATIENT)
Dept: LAB | Facility: CLINIC | Age: 66
End: 2021-08-30
Payer: COMMERCIAL

## 2021-08-30 ENCOUNTER — ANTICOAGULATION THERAPY VISIT (OUTPATIENT)
Dept: ANTICOAGULATION | Facility: CLINIC | Age: 66
End: 2021-08-30

## 2021-08-30 DIAGNOSIS — Z86.718 HISTORY OF RECURRENT DEEP VEIN THROMBOSIS (DVT): ICD-10-CM

## 2021-08-30 DIAGNOSIS — Z86.711 HISTORY OF PULMONARY EMBOLISM: ICD-10-CM

## 2021-08-30 DIAGNOSIS — Z79.01 LONG TERM CURRENT USE OF ANTICOAGULANT THERAPY: ICD-10-CM

## 2021-08-30 DIAGNOSIS — Z86.718 HISTORY OF RECURRENT DEEP VEIN THROMBOSIS (DVT): Primary | ICD-10-CM

## 2021-08-30 LAB — INR BLD: 2.2 (ref 0.9–1.1)

## 2021-08-30 PROCEDURE — 85610 PROTHROMBIN TIME: CPT

## 2021-08-30 PROCEDURE — 36416 COLLJ CAPILLARY BLOOD SPEC: CPT

## 2021-08-30 NOTE — PROGRESS NOTES
ANTICOAGULATION MANAGEMENT     Jozef Saunders 66 year old male is on warfarin with therapeutic INR result. (Goal INR 2.0-3.0)    Recent labs: (last 7 days)     08/30/21  1005   INR 2.2*       ASSESSMENT     Source(s): Chart Review and Patient/Caregiver Call       Warfarin doses taken: Warfarin taken as instructed    Diet: No new diet changes identified    New illness, injury, or hospitalization: No    Medication/supplement changes: None noted    Signs or symptoms of bleeding or clotting: No    Previous INR: Therapeutic last 2(+) visits    Additional findings: None     PLAN     Recommended plan for no diet, medication or health factor changes affecting INR     Dosing Instructions: Continue your current warfarin dose with next INR in 6 weeks       Summary  As of 8/30/2021    Full warfarin instructions:  7.5 mg every Mon, Thu, Sat; 5 mg all other days   Next INR check:  10/11/2021             Telephone call with  Issac who agrees to plan and repeated back plan correctly    Lab visit scheduled    Education provided: None required    Plan made per [facility] anticoagulation protocol    Zena Ren RN  Anticoagulation Clinic  8/30/2021    _______________________________________________________________________     Anticoagulation Episode Summary     Current INR goal:  2.0-3.0   TTR:  100.0 % (1 y)   Target end date:  Indefinite   Send INR reminders to:  LEANDRA ALBERTO    Indications    History of recurrent deep vein thrombosis (DVT) [Z86.718]  Long term current use of anticoagulant therapy [Z79.01]  History of pulmonary embolism [Z86.711]           Comments:  3/19/21 Does not need lovenox bridge per Dr. Humphreys         Anticoagulation Care Providers     Provider Role Specialty Phone number    Hina Humphreys MD Referring Internal Medicine [phone]    Rojas Chun MD  Internal Medicine - Pediatrics [phone]

## 2021-10-11 ENCOUNTER — ANTICOAGULATION THERAPY VISIT (OUTPATIENT)
Dept: ANTICOAGULATION | Facility: CLINIC | Age: 66
End: 2021-10-11

## 2021-10-11 ENCOUNTER — LAB (OUTPATIENT)
Dept: LAB | Facility: CLINIC | Age: 66
End: 2021-10-11
Payer: COMMERCIAL

## 2021-10-11 ENCOUNTER — DOCUMENTATION ONLY (OUTPATIENT)
Dept: PEDIATRICS | Facility: CLINIC | Age: 66
End: 2021-10-11

## 2021-10-11 DIAGNOSIS — Z86.718 HISTORY OF RECURRENT DEEP VEIN THROMBOSIS (DVT): Primary | ICD-10-CM

## 2021-10-11 DIAGNOSIS — Z86.711 HISTORY OF PULMONARY EMBOLISM: ICD-10-CM

## 2021-10-11 DIAGNOSIS — Z79.01 LONG TERM CURRENT USE OF ANTICOAGULANT THERAPY: ICD-10-CM

## 2021-10-11 DIAGNOSIS — Z86.718 HISTORY OF RECURRENT DEEP VEIN THROMBOSIS (DVT): ICD-10-CM

## 2021-10-11 LAB — INR BLD: 2.4 (ref 0.9–1.1)

## 2021-10-11 PROCEDURE — 36415 COLL VENOUS BLD VENIPUNCTURE: CPT

## 2021-10-11 PROCEDURE — 85610 PROTHROMBIN TIME: CPT

## 2021-10-11 NOTE — PROGRESS NOTES
ANTICOAGULATION MANAGEMENT      Jozef Saunders due for annual renewal of referral to anticoagulation monitoring. Order pended for your review and signature.      ANTICOAGULATION SUMMARY      Warfarin indication(s)     DVT  PE    Heart valve present?  NO       Current goal range   INR: 2.0-3.0     Goal appropriate for indication? Yes, INR 2-3 appropriate for hx of DVT, PE, hypercoagulable state, Afib, LVAD, or bileaflet AVR without risk factors     Current duration of therapy Indefinite/long term therapy   Time in Therapeutic Range (TTR)  (Goal > 60%) 100%       Office visit with referring provider's group within last year yes on 6/7/21       Zena Ren RN

## 2021-10-11 NOTE — PROGRESS NOTES
Attempted to reach patient by phone.  Left message to call INR Clinic. [phone].    Zena Ren RN  [facility] INR Clinic  [phone]  P LEANDRA ALBERTO [[id number]]

## 2021-10-11 NOTE — PROGRESS NOTES
ANTICOAGULATION MANAGEMENT     Jozef Saunders 66 year old male is on warfarin with therapeutic INR result. (Goal INR 2.0-3.0)    Recent labs: (last 7 days)     10/11/21  1000   INR 2.4*       ASSESSMENT     Source(s): Chart Review and Patient/Caregiver Call       Warfarin doses taken: Warfarin taken as instructed    Diet: No new diet changes identified    New illness, injury, or hospitalization: No    Medication/supplement changes: None noted    Signs or symptoms of bleeding or clotting: No    Previous INR: Therapeutic last 2(+) visits    Additional findings: None     PLAN     Recommended plan for no diet, medication or health factor changes affecting INR     Dosing Instructions: Continue your current warfarin dose with next INR in 6 weeks       Summary  As of 10/11/2021    Full warfarin instructions:  7.5 mg every Mon, Thu, Sat; 5 mg all other days   Next INR check:  11/22/2021             Telephone call with Issac who agrees to plan and repeated back plan correctly    Lab visit scheduled    Education provided: Please call back if any changes to your diet, medications or how you've been taking warfarin    Plan made per [facility] anticoagulation protocol    Zena Ren RN  Anticoagulation Clinic  10/11/2021    _______________________________________________________________________     Anticoagulation Episode Summary     Current INR goal:  2.0-3.0   TTR:  100.0 % (1 y)   Target end date:  Indefinite   Send INR reminders to:  LEANDRA ALBERTO    Indications    History of recurrent deep vein thrombosis (DVT) [Z86.718]  Long term current use of anticoagulant therapy [Z79.01]  History of pulmonary embolism [Z86.711]           Comments:  3/19/21 Does not need lovenox bridge per Dr. Humphreys         Anticoagulation Care Providers     Provider Role Specialty Phone number    Hina Humphreys MD Referring Internal Medicine [phone]    Rojas Chun MD  Internal Medicine - Pediatrics [phone]

## 2021-10-22 ENCOUNTER — TRANSFERRED RECORDS (OUTPATIENT)
Dept: HEALTH INFORMATION MANAGEMENT | Facility: CLINIC | Age: 66
End: 2021-10-22
Payer: COMMERCIAL

## 2021-11-22 ENCOUNTER — ANTICOAGULATION THERAPY VISIT (OUTPATIENT)
Dept: ANTICOAGULATION | Facility: CLINIC | Age: 66
End: 2021-11-22

## 2021-11-22 ENCOUNTER — LAB (OUTPATIENT)
Dept: LAB | Facility: CLINIC | Age: 66
End: 2021-11-22
Payer: COMMERCIAL

## 2021-11-22 DIAGNOSIS — Z79.01 LONG TERM CURRENT USE OF ANTICOAGULANT THERAPY: ICD-10-CM

## 2021-11-22 DIAGNOSIS — Z86.711 HISTORY OF PULMONARY EMBOLISM: ICD-10-CM

## 2021-11-22 DIAGNOSIS — Z86.718 HISTORY OF RECURRENT DEEP VEIN THROMBOSIS (DVT): ICD-10-CM

## 2021-11-22 DIAGNOSIS — Z86.718 HISTORY OF RECURRENT DEEP VEIN THROMBOSIS (DVT): Primary | ICD-10-CM

## 2021-11-22 LAB — INR BLD: 2.1 (ref 0.9–1.1)

## 2021-11-22 PROCEDURE — 36416 COLLJ CAPILLARY BLOOD SPEC: CPT

## 2021-11-22 PROCEDURE — 85610 PROTHROMBIN TIME: CPT

## 2021-11-22 NOTE — PROGRESS NOTES
ANTICOAGULATION MANAGEMENT     Jozef Saunders 66 year old male is on warfarin with therapeutic INR result. (Goal INR 2.0-3.0)    Recent labs: (last 7 days)     11/22/21  1009   INR 2.1*       ASSESSMENT     Source(s): Chart Review and Patient/Caregiver Call       Warfarin doses taken: Warfarin taken as instructed    Diet: No new diet changes identified    New illness, injury, or hospitalization: No    Medication/supplement changes: None noted    Signs or symptoms of bleeding or clotting: No    Previous INR: Therapeutic last 2(+) visits    Additional findings: None     PLAN     Recommended plan for no diet, medication or health factor changes affecting INR     Dosing Instructions: Continue your current warfarin dose with next INR in 6 weeks       Summary  As of 11/22/2021    Full warfarin instructions:  7.5 mg every Mon, Thu, Sat; 5 mg all other days   Next INR check:  1/3/2022             Telephone call with  Issac who verbalizes understanding and agrees to plan    Lab visit scheduled    Education provided: Please call back if any changes to your diet, medications or how you've been taking warfarin    Plan made per [facility] anticoagulation protocol    Zena Ren RN  Anticoagulation Clinic  11/22/2021    _______________________________________________________________________     Anticoagulation Episode Summary     Current INR goal:  2.0-3.0   TTR:  100.0 % (1 y)   Target end date:  Indefinite   Send INR reminders to:  LEANDRA ALBERTO    Indications    History of recurrent deep vein thrombosis (DVT) [Z86.718]  Long term current use of anticoagulant therapy [Z79.01]  History of pulmonary embolism [Z86.711]           Comments:  3/19/21 Does not need lovenox bridge per Dr. Humphreys         Anticoagulation Care Providers     Provider Role Specialty Phone number    Hina Humphreys MD Referring Internal Medicine [phone]    Rojas Chun MD  Internal Medicine - Pediatrics [phone]

## 2022-01-03 ENCOUNTER — LAB (OUTPATIENT)
Dept: LAB | Facility: CLINIC | Age: 67
End: 2022-01-03
Payer: COMMERCIAL

## 2022-01-03 ENCOUNTER — ANTICOAGULATION THERAPY VISIT (OUTPATIENT)
Dept: ANTICOAGULATION | Facility: CLINIC | Age: 67
End: 2022-01-03

## 2022-01-03 DIAGNOSIS — Z86.718 HISTORY OF RECURRENT DEEP VEIN THROMBOSIS (DVT): Primary | ICD-10-CM

## 2022-01-03 DIAGNOSIS — Z86.711 HISTORY OF PULMONARY EMBOLISM: ICD-10-CM

## 2022-01-03 DIAGNOSIS — Z79.01 LONG TERM CURRENT USE OF ANTICOAGULANT THERAPY: ICD-10-CM

## 2022-01-03 DIAGNOSIS — Z86.718 HISTORY OF RECURRENT DEEP VEIN THROMBOSIS (DVT): ICD-10-CM

## 2022-01-03 LAB — INR BLD: 2.3 (ref 0.9–1.1)

## 2022-01-03 PROCEDURE — 36416 COLLJ CAPILLARY BLOOD SPEC: CPT

## 2022-01-03 PROCEDURE — 85610 PROTHROMBIN TIME: CPT

## 2022-01-03 NOTE — PROGRESS NOTES
ANTICOAGULATION MANAGEMENT     Jozef Saunders 66 year old male is on warfarin with therapeutic INR result. (Goal INR 2.0-3.0)    Recent labs: (last 7 days)     01/03/22  1005   INR 2.3*       ASSESSMENT     Source(s): Chart Review and Patient/Caregiver Call       Warfarin doses taken: Warfarin taken as instructed    Diet: No new diet changes identified    New illness, injury, or hospitalization: No    Medication/supplement changes: None noted    Signs or symptoms of bleeding or clotting: No    Previous INR: Therapeutic last 2(+) visits    Additional findings: He is going to [place] on 2/12/22 for 2 weeks.     PLAN     Recommended plan for no diet, medication or health factor changes affecting INR     Dosing Instructions: Continue your current warfarin dose with next INR in 6 weeks       Summary  As of 1/3/2022    Full warfarin instructions:  7.5 mg every Mon, Thu, Sat; 5 mg all other days   Next INR check:  2/7/2022             Telephone call with  Issac who agrees to plan and repeated back plan correctly    Lab visit scheduled for 5 weeks because he will be out of town.    Education provided: Contact [phone]  with any changes, questions or concerns.     Plan made per [facility] anticoagulation protocol    Zena Ren RN  Anticoagulation Clinic  1/3/2022    _______________________________________________________________________     Anticoagulation Episode Summary     Current INR goal:  2.0-3.0   TTR:  100.0 % (1 y)   Target end date:  Indefinite   Send INR reminders to:  LEANDRA ALBERTO    Indications    History of recurrent deep vein thrombosis (DVT) [Z86.718]  Long term current use of anticoagulant therapy [Z79.01]  History of pulmonary embolism [Z86.711]           Comments:  3/19/21 Does not need lovenox bridge per Dr. Humphreys         Anticoagulation Care Providers     Provider Role Specialty Phone number    Hina Humphreys MD Referring Internal Medicine [phone]    Rojas Chun MD   Internal Medicine - Pediatrics [phone]

## 2022-01-03 NOTE — PROGRESS NOTES
Attempted to reach patient by phone.  Left message to call INR Clinic. [phone].    Zena Ren RN  [facility] INR Clinic  If returning call, transfer to Zena [phone] or route to MATTEO ALBERTO [[id number]]

## 2022-01-11 ENCOUNTER — OFFICE VISIT (OUTPATIENT)
Dept: PEDIATRICS | Facility: CLINIC | Age: 67
End: 2022-01-11
Payer: COMMERCIAL

## 2022-01-11 VITALS
SYSTOLIC BLOOD PRESSURE: 126 MMHG | RESPIRATION RATE: 20 BRPM | BODY MASS INDEX: 28.88 KG/M2 | DIASTOLIC BLOOD PRESSURE: 84 MMHG | HEIGHT: 69 IN | WEIGHT: 195 LBS | HEART RATE: 57 BPM | OXYGEN SATURATION: 96 % | TEMPERATURE: 97.4 F

## 2022-01-11 DIAGNOSIS — E78.00 HYPERCHOLESTEROLEMIA: ICD-10-CM

## 2022-01-11 DIAGNOSIS — F10.21 ALCOHOL DEPENDENCE IN REMISSION (H): ICD-10-CM

## 2022-01-11 DIAGNOSIS — R14.0 ABDOMINAL BLOATING: Primary | ICD-10-CM

## 2022-01-11 DIAGNOSIS — I82.4Y9 DEEP VEIN THROMBOSIS (DVT) OF PROXIMAL LOWER EXTREMITY, UNSPECIFIED CHRONICITY, UNSPECIFIED LATERALITY (H): ICD-10-CM

## 2022-01-11 DIAGNOSIS — D69.6 THROMBOCYTOPENIA (H): ICD-10-CM

## 2022-01-11 DIAGNOSIS — F41.1 GENERALIZED ANXIETY DISORDER: ICD-10-CM

## 2022-01-11 DIAGNOSIS — Z12.5 SCREENING FOR PROSTATE CANCER: ICD-10-CM

## 2022-01-11 DIAGNOSIS — D50.9 IRON DEFICIENCY ANEMIA, UNSPECIFIED IRON DEFICIENCY ANEMIA TYPE: ICD-10-CM

## 2022-01-11 LAB
ERYTHROCYTE [DISTWIDTH] IN BLOOD BY AUTOMATED COUNT: 13 % (ref 10–15)
HCT VFR BLD AUTO: 51.3 % (ref 40–53)
HGB BLD-MCNC: 16.3 G/DL (ref 13.3–17.7)
MCH RBC QN AUTO: 30.7 PG (ref 26.5–33)
MCHC RBC AUTO-ENTMCNC: 31.8 G/DL (ref 31.5–36.5)
MCV RBC AUTO: 97 FL (ref 78–100)
PLATELET # BLD AUTO: 106 10E3/UL (ref 150–450)
RBC # BLD AUTO: 5.31 10E6/UL (ref 4.4–5.9)
WBC # BLD AUTO: 5.5 10E3/UL (ref 4–11)

## 2022-01-11 PROCEDURE — 80061 LIPID PANEL: CPT | Performed by: INTERNAL MEDICINE

## 2022-01-11 PROCEDURE — 83550 IRON BINDING TEST: CPT | Performed by: INTERNAL MEDICINE

## 2022-01-11 PROCEDURE — 99214 OFFICE O/P EST MOD 30 MIN: CPT | Performed by: INTERNAL MEDICINE

## 2022-01-11 PROCEDURE — 80053 COMPREHEN METABOLIC PANEL: CPT | Performed by: INTERNAL MEDICINE

## 2022-01-11 PROCEDURE — 85027 COMPLETE CBC AUTOMATED: CPT | Performed by: INTERNAL MEDICINE

## 2022-01-11 PROCEDURE — 82728 ASSAY OF FERRITIN: CPT | Performed by: INTERNAL MEDICINE

## 2022-01-11 PROCEDURE — 36415 COLL VENOUS BLD VENIPUNCTURE: CPT | Performed by: INTERNAL MEDICINE

## 2022-01-11 PROCEDURE — G0103 PSA SCREENING: HCPCS | Performed by: INTERNAL MEDICINE

## 2022-01-11 ASSESSMENT — MIFFLIN-ST. JEOR: SCORE: 1643.85

## 2022-01-11 NOTE — PATIENT INSTRUCTIONS
I'll let you know how your blood tests look. We may recheck again in 3 months.     Call for an appointment with gastroenterology. Please call [facility] at [phone] to set up an appointment.   [address], [place]    Let's have you meet with our [facility] pharmacist.     The [facility] Medication Therapy Management department will contact you to schedule an appointment.  You may also schedule the appointment by calling [phone]    Try to decrease the sweets.

## 2022-01-11 NOTE — PROGRESS NOTES
Assessment & Plan     Abdominal Bloating  Has been for many years. Unclear onset. Actually significantly improved since stopping iron this summer, but still persists in milder state. Discussed whether could be bp med side effect vs malabsorption issue. Recommended he meet with GI, but also will see [facility] pharmacy. May be worthwhile to try alternate blood pressure medication to beta blocker.      Iron deficiency anemia, unspecified iron deficiency anemia type  Recheck iron today. Reviewed prior EGD and Colonoscopy. If levels falling, would also need to check in with GI on this and consider meeting with heme.   - **Hemoglobin FUTURE anytime  - **Ferritin FUTURE 6mo  - **Iron and iron binding capacity FUTURE anytime  - CBC with platelets; Future  - Iron and iron binding capacity; Future  - Ferritin; Future  - CBC with platelets  - Iron and iron binding capacity  - Ferritin    Deep vein thrombosis (DVT) of proximal lower extremity, unspecified chronicity, unspecified laterality (H)  Remains on coumadin.    Thrombocytopenia (H)  Stable.    Generalized anxiety disorder  Inadequate control. Plan increase dose.   - sertraline (ZOLOFT) 50 MG tablet; Take 1 tablet (50 mg) by mouth daily    Hypercholesterolemia  Ordered lab prior to next physical. Tolerating statin.   - Comprehensive metabolic panel (BMP + Alb, Alk Phos, ALT, AST, Total. Bili, TP); Future  - Lipid panel reflex to direct LDL Fasting; Future  - Comprehensive metabolic panel (BMP + Alb, Alk Phos, ALT, AST, Total. Bili, TP)  - Lipid panel reflex to direct LDL Fasting    Alcohol dependence in remission (H)  Not an active issue, therefore not at increased risk of GI source of bleeding due to EtOH.     Screening for prostate cancer    - PSA, screen; Future  - PSA, screen    37 minutes spent on the date of the encounter doing chart review, history and exam, documentation and further activities per the note       BMI:   Estimated body mass index is 29.12 kg/m  as  "calculated from the following:    Height as of this encounter: 1.743 m (5' 8.62\").    Weight as of this encounter: 88.5 kg (195 lb).   Weight management plan: work on cutting down on sweets, is already doing great with exercise    See Patient Instructions    Return in about 3 months (around 4/11/2022) for Preventive Visit - lab visit a few days before.    Hina Humphreys MD  [facility] [place] is a 66 year old who presents for the following health issues     History of Present Illness       He eats 2-3 servings of fruits and vegetables daily.He consumes 1 sweetened beverage(s) daily.He exercises with enough effort to increase his heart rate 30 to 60 minutes per day.  He exercises with enough effort to increase his heart rate 6 days per week.   He is taking medications regularly.       Recheck iron levels    Never went to GI for bloating, bloating has gotten better. Still feels a little bit in the evenings, but since stopping iron, is much better during the day. 50% better during the day. The only time he has symptoms now is after eating. When the weather is nice, usually they go for a walk after dinner, now that the weather is cold, he does notice the bloating after dinner.     Bloating started before hiatal hernia surgery. Present at least 5-10 years.     In the morning exercises, doesn't eat until 11AM.    Review of Systems   Constitutional, HEENT, cardiovascular, pulmonary, gi and gu systems are negative, except as otherwise noted.      Objective    /84 (BP Location: Right arm, Patient Position: Sitting, Cuff Size: Adult Regular)   Pulse 57   Temp 97.4  F (36.3  C) (Tympanic)   Resp 20   Ht 1.743 m (5' 8.62\")   Wt 88.5 kg (195 lb)   SpO2 96%   BMI 29.12 kg/m    Body mass index is 29.12 kg/m .  Physical Exam   GENERAL: healthy, alert and no distress    Lab on 01/03/2022   Component Date Value Ref Range Status     INR 01/03/2022 2.3* 0.9 - 1.1 Final       "

## 2022-01-12 LAB
ALBUMIN SERPL-MCNC: 3.8 G/DL (ref 3.4–5)
ALP SERPL-CCNC: 75 U/L (ref 40–150)
ALT SERPL W P-5'-P-CCNC: 40 U/L (ref 0–70)
ANION GAP SERPL CALCULATED.3IONS-SCNC: 5 MMOL/L (ref 3–14)
AST SERPL W P-5'-P-CCNC: 28 U/L (ref 0–45)
BILIRUB SERPL-MCNC: 0.5 MG/DL (ref 0.2–1.3)
BUN SERPL-MCNC: 17 MG/DL (ref 7–30)
CALCIUM SERPL-MCNC: 9.1 MG/DL (ref 8.5–10.1)
CHLORIDE BLD-SCNC: 111 MMOL/L (ref 94–109)
CHOLEST SERPL-MCNC: 158 MG/DL
CO2 SERPL-SCNC: 25 MMOL/L (ref 20–32)
CREAT SERPL-MCNC: 0.76 MG/DL (ref 0.66–1.25)
FASTING STATUS PATIENT QL REPORTED: YES
FERRITIN SERPL-MCNC: 48 NG/ML (ref 26–388)
GFR SERPL CREATININE-BSD FRML MDRD: >90 ML/MIN/1.73M2
GLUCOSE BLD-MCNC: 88 MG/DL (ref 70–99)
HDLC SERPL-MCNC: 51 MG/DL
IRON SATN MFR SERPL: 36 % (ref 15–46)
IRON SERPL-MCNC: 109 UG/DL (ref 35–180)
LDLC SERPL CALC-MCNC: 76 MG/DL
NONHDLC SERPL-MCNC: 107 MG/DL
POTASSIUM BLD-SCNC: 4.4 MMOL/L (ref 3.4–5.3)
PROT SERPL-MCNC: 7.1 G/DL (ref 6.8–8.8)
PSA SERPL-MCNC: 0.65 UG/L (ref 0–4)
SODIUM SERPL-SCNC: 141 MMOL/L (ref 133–144)
TIBC SERPL-MCNC: 303 UG/DL (ref 240–430)
TRIGL SERPL-MCNC: 153 MG/DL

## 2022-01-24 ENCOUNTER — OFFICE VISIT (OUTPATIENT)
Dept: PHARMACY | Facility: CLINIC | Age: 67
End: 2022-01-24
Payer: COMMERCIAL

## 2022-01-24 VITALS
SYSTOLIC BLOOD PRESSURE: 128 MMHG | WEIGHT: 192.7 LBS | OXYGEN SATURATION: 95 % | BODY MASS INDEX: 28.77 KG/M2 | DIASTOLIC BLOOD PRESSURE: 78 MMHG | HEART RATE: 61 BPM

## 2022-01-24 DIAGNOSIS — F41.1 GENERALIZED ANXIETY DISORDER: ICD-10-CM

## 2022-01-24 DIAGNOSIS — Z79.01 LONG TERM CURRENT USE OF ANTICOAGULANT THERAPY: ICD-10-CM

## 2022-01-24 DIAGNOSIS — J30.2 SEASONAL ALLERGIC RHINITIS, UNSPECIFIED TRIGGER: ICD-10-CM

## 2022-01-24 DIAGNOSIS — R51.9 NONINTRACTABLE HEADACHE, UNSPECIFIED CHRONICITY PATTERN, UNSPECIFIED HEADACHE TYPE: ICD-10-CM

## 2022-01-24 DIAGNOSIS — K44.9 PARAESOPHAGEAL HIATAL HERNIA: ICD-10-CM

## 2022-01-24 DIAGNOSIS — I10 ESSENTIAL HYPERTENSION, BENIGN: ICD-10-CM

## 2022-01-24 DIAGNOSIS — E78.00 HYPERCHOLESTEROLEMIA: Primary | ICD-10-CM

## 2022-01-24 DIAGNOSIS — R10.9 ABDOMINAL DISCOMFORT: ICD-10-CM

## 2022-01-24 DIAGNOSIS — E78.2 MIXED HYPERLIPIDEMIA: ICD-10-CM

## 2022-01-24 PROCEDURE — 99607 MTMS BY PHARM ADDL 15 MIN: CPT | Performed by: PHARMACIST

## 2022-01-24 PROCEDURE — 99605 MTMS BY PHARM NP 15 MIN: CPT | Performed by: PHARMACIST

## 2022-01-24 RX ORDER — NAPROXEN SODIUM 220 MG
220 TABLET ORAL 2 TIMES DAILY PRN
COMMUNITY
End: 2024-08-04

## 2022-01-24 RX ORDER — ROSUVASTATIN CALCIUM 5 MG/1
5 TABLET, COATED ORAL DAILY
Qty: 90 TABLET | Refills: 0 | Status: SHIPPED | OUTPATIENT
Start: 2022-01-24 | End: 2022-03-28

## 2022-01-24 NOTE — PATIENT INSTRUCTIONS
Recommendations from today's MTM visit:                                                      1. Ok to try a few weeks off the loratadine and monitor if the allergy sinus congestion is worse.    2. Try to acetaminophen up to 1000 mg as needed for headache instead of naproxen (easier on the stomach). Max 1000 mg 3 times daily.    3. Change simvastatin to rosuvastatin 5 mg once daily.     Consider waiting 2 weeks apart to make these changes. I think trying change #1 at anytime is ok.     Follow-up:     It was great to speak with you today.  I value your experience and would be very thankful for your time with providing feedback on our clinic survey. You may receive a survey via email or text message in the next few days.     To schedule another MTM appointment, please call the clinic directly or you may call the MTM scheduling line at [phone] or toll-free at [phone].     My Clinical Pharmacist's contact information:                                                      Please feel free to contact me with any questions or concerns you have.      Kristina Kent, PharmD  Medication Therapy Management Pharmacist

## 2022-01-24 NOTE — LETTER
January 25, 2022  Jozef Saunders  [address]  [place] [address]    [facility] [place]        Thank you for talking with me on Jan 24, 2022 about your health and medications. As a follow-up to our conversation, I have included two documents:      1. Your Recommended To-Do List has steps you should take to get the best results from your medications.  2. Your Medication List will help you keep track of your medications and how to take them.    If you want to talk about these documents, please call Kristina Kent RPH at phone: [phone], Monday-Friday 8-4:30pm.    I look forward to working with you and your doctors to make sure your medications work well for you.    Sincerely,    Kristina Kent RPH

## 2022-01-24 NOTE — PROGRESS NOTES
Medication Therapy Management (MTM) Encounter    ASSESSMENT:                            Medication Adherence/Access: No issues identified    HA: would prefer stay off NSAID due to GI issues but also on selective serotonin reuptake inhibitor and warfarin increased risk of bleed concerns.    Hx hernia/Abdominal Discomfort: patient benefit from working with GI on concerns and likely needs to work on diet. Will evaluate his medications today though with the ongoing symptoms may not be from just the medications itself given the onset of loose stools right after meals and worsened by the end of the days when he has more to eat.   Per UpToDate:  Atenolol: Nausea (3% to 4%), diarrhea (2% to 3%)  Famotidine: Constipation (1.2% to 1.4% ), Diarrhea (1.7% ), <1% GI distress  Lisinopril: Constipation, diarrhea, dysgeusia, flatulence, pancreatitis, xerostomia  Loratadine: Xerostomia (adults: 2% to 4%), abdominal pain (children: 2%), vomiting (children: 2%), diarrhea (children: 1%)  Naproxen: Dyspepsia (?14%)  Simvastatin: Abdominal pain (7%), constipation (7%), gastritis (5%), nausea (5%)  Sertraline: Diarrhea (20%), nausea (26%), xerostomia (14%), Abdominal pain (?5%), bruxism (<2%), constipation (6%), decreased appetite (7%), dyspepsia (8%), hematochezia (<2%), increased appetite (<2%), melena (<2%), vomiting (adults: 4%)    Based on the above, suggest the following 1) patient avoid naproxen change to acetaminophen, 2) change simvastatin to rosuvastatin (less incidence of GI issues ~Constipation (3% to 5%), nausea (4% to 6%)), and 3) trial off loratadine especially in the setting that patient questions if necessary to continue.     Although sertraline has high incidence and numerous GI symptoms reports I would of expected his symptoms to be worse given recent dose change. Given no change in symptoms unlikely to be cause.      Hx of PE and DVT: INR at goal 2-3.    Hypertension: no changes blood pressure < 130/80 mm Hg.      Anxiety: no changes at this time. Discussed too soon for evaluate efficacy of dose change may take ~8-12 weeks for full benefit.    Hyperlipidemia: see above.    Allergic Rhinitis: see above.    PLAN:                            1. May try holding loratadine and monitor congestion/allergy symptoms. If symptoms return then restart therapy.     May separate the trial of these 2 weeks apart:  2. Stop simvastatin, start rosuvastatin 5mg daily (will need fasting lipid panel in 3 months)  3. Stop naproxen, start over-the-counter acetaminophen 1000mg 3 times daily as needed for headaches instead    4. Did encourage patient to start paying attention to fiber foods and diary to see if symptoms worse with those foods (futher discuss FODMAP next time).    5. Follow-up with [facility] as planned let me know about what we discussed today.    Follow-up: Return in about 1 month (around 2/24/2022) for Medication Therapy Management Visit.    SUBJECTIVE/OBJECTIVE:                          Jozef Saunders is a 66 year old male coming in for an initial visit. He was referred to me from Dr. Humphreys for GI side effects to blood pressure agent.      Reason for visit: patient reports long hx of GI issues. Met with primary care provider recently referred to r/o medication causes such as atenolol. He has set up a follow-up with [facility] in early Feb as well since his visit with PCP.    Allergies/ADRs: Reviewed in chart  Past Medical History: Reviewed in chart  Tobacco: He reports that he quit smoking about 32 years ago. He has never used smokeless tobacco.  Alcohol: not currently using    Medication Adherence/Access: no issues reported    HA: patient is using Aleve OTC as needed ~ 2-3 times a week. He has used acetaminophen in the past and not as effective, does not recall the dose. Has been a while since he tried this. He does recall when he had his hernia surgery that he should not take NSAIDs.     Hx hernia/Abdominal Discomfort: he reports  surgery about 2 years ago for his hernia. Before the surgery had a lot of the similar bloating symptoms. No heartburn/reflux or nausea symptoms. However, since the surgery still has bloating. The bloating seems to gets worse as the day goes on. Worse after dinner. He feels right after eating will get loose stools. He can't identify any specific patterns. PCP has suggest try lactose free in the past, patient does not recall and doesn't think he tried this.  He reports the dysphagia has improved quite a lot. Last visit with [facility] last year in May focused on dysphagia.  Diet: usually no breakfast but may have oatmeal or banana. First meal typically is lunch between 11-12p sandwich, fruit or yogurt. Dinner is the larger meal can be a variety of items meats, potato, veggies that is a hot dish. Doesn't snack much but does eat sweet late after dinner or right before bed time about an hour. No bloating after the sweets that he notices.   He was on omeprazole in the past but diarrhea was significantly worse. He is now on famotidine - feels this works somewhat.   EDG report 4/19/21 -->      Hx of PE and DVT: patient is taking warfarin as directed by INR clinic. No bleeding or excessive bruising concerns.  Lab Results   Component Value Date    INR 2.3 01/03/2022    INR 2.1 11/22/2021    INR 2.4 10/11/2021    INR 2.2 08/30/2021    INR 2.2 07/26/2021    INR 2.10 06/14/2021    INR 2.10 05/03/2021    INR 2.40 03/30/2021    INR 2.30 02/16/2021    INR 2.20 01/05/2021    INR 2.10 11/23/2020    INR 2.20 10/16/2020    INR 2.40 09/04/2020    INR 2.80 08/03/2020    INR 2.80 07/10/2020    INR 2.00 06/25/2020    INR 2.20 05/19/2020         Hypertension: Current medications include atenolol 75 mg daily, lisinopril 20 mg once daily.  Patient does not self-monitor blood pressure.  Patient reports no current medication side effects.  BP Readings from Last 3 Encounters:   01/24/22 128/78   01/11/22 126/84   06/07/21 114/78     Anxiety:  Current  medications include: Sertraline 25 mg (0.5 tablet) once daily for a long time. Now increase the 50 mg once daily for the past week. Has been on for many years and helps takes the edge off for him. Has not noticed any changes with the dose increase yet. He also denies any changes in the GI episodes since increase.  He feels anxiety has been a little worse possibly from covid.   PHQ-9 SCORE 8/29/2017 10/11/2018 3/8/2021   PHQ-9 Total Score MyChart - - 0   PHQ-9 Total Score 0 0 0     MARIANNE-7 SCORE 8/29/2017 10/11/2018 3/8/2021   Total Score - - 0 (minimal anxiety)   Total Score 0 0 0     Hyperlipidemia: Current therapy includes simvastatin 20mg at bedtime.  Patient reports no significant myalgias or other side effects.  The 10-year ASCVD risk score (Rita GIFFORD Jr., et al., 2013) is: 14.7%    Values used to calculate the score:      Age: 66 years      Sex: Male      Is Non- : No      Diabetic: No      Tobacco smoker: No      Systolic Blood Pressure: 128 mmHg      Is BP treated: Yes      HDL Cholesterol: 51 mg/dL      Total Cholesterol: 158 mg/dL  Recent Labs   Lab Test 01/11/22  1132 03/08/21  1333 07/08/16  0822 10/24/14  0735 07/18/14  0738   CHOL 158 155   < > 148 229*   HDL 51 45   < > 40* 37*   LDL 76 79   < > 83 152*   TRIG 153* 156*   < > 127 203*   CHOLHDLRATIO  --   --   --  3.7 6.2*    < > = values in this interval not displayed.       Liver Function Studies - Recent Labs   Lab Test 01/11/22  1132   PROTTOTAL 7.1   ALBUMIN 3.8   BILITOTAL 0.5   ALKPHOS 75   AST 28   ALT 40     Allergic Rhinitis: Current medications include loratadine 10mg once daily, fluticasone nasal spray 2 spray(s) once daily. Primary symptoms are nasal congestion/sinus pressure. Patient feels that current therapy is effective. However, he does question if loratadine is needed. He has been using for such a long time that he is not very sure.      Today's Vitals: /78   Pulse 61   Wt 192 lb 11.2 oz (87.4 kg)   SpO2  95%   BMI 28.77 kg/m    ----------------      I spent 55 minutes with this patient today. All changes were made via collaborative practice agreement with Hina Humphreys MD. A copy of the visit note was provided to the patient's provider(s).    The patient was given a summary of these recommendations.     Kristina Kent PharmD  Medication Therapy Management Pharmacist  Pager#: [phone]       Medication Therapy Recommendations  Hypercholesterolemia    Current Medication: simvastatin (ZOCOR) 20 MG tablet (Discontinued)   Rationale: Undesirable effect - Adverse medication event - Safety   Recommendation: Change Medication - rosuvastatin 5 MG tablet   Status: Accepted per CPA         Nonintractable headache, unspecified chronicity pattern, unspecified headache type    Current Medication: naproxen sodium (ANAPROX) 220 MG tablet   Rationale: Undesirable effect - Adverse medication event - Safety   Recommendation: Change Medication - acetaminophen 500 MG tablet   Status: Accepted - no CPA Needed         Seasonal allergic rhinitis, unspecified trigger    Current Medication: loratadine (CLARITIN) 10 MG tablet   Rationale: Patient prefers not to take - Adherence - Adherence   Recommendation: Discontinue Medication   Status: Accepted - no CPA Needed

## 2022-01-24 NOTE — LETTER
"Recommended To-Do List      Prepared on: 1/25/2022     You can get the best results from your medications by completing the items on this \"To-Do List.\"      Bring your To-Do List when you go to your doctor. And, share it with your family or caregivers.    My To-Do List:  What we talked about: What I should do:   An issue with your medication    Change the medication you are taking from simvastatin (ZOCOR) to rosuvastatin (CRESTOR)          What we talked about: What I should do:   An issue with your medication    Change the medication you are taking from naproxen sodium (ANAPROX) to acetaminophen (TYLENOL)          What we talked about: What I should do:   The importance of taking your medication as intended    Stop taking loratadine (CLARITIN)          What we talked about: What I should do:                     "

## 2022-01-24 NOTE — LETTER
_  Medication List        Prepared on: 1/25/2022     Bring your Medication List when you go to the doctor, hospital, or   emergency room. And, share it with your family or caregivers.     Note any changes to how you take your medications.  Cross out medications when you no longer use them.    Medication How I take it Why I use it Prescriber   atenolol (TENORMIN) 50 MG tablet Take 1.5 tablets (75 mg) by mouth daily Hypertension Hina Humphreys MD   famotidine (PEPCID) 20 MG tablet TAKE 1 TABLET BY MOUTH TWICE A DAY Hiatal Hernia Hina Humphreys MD   fluticasone (FLONASE) 50 MCG/ACT nasal spray SPRAY 2 SPRAYS INTO BOTH NOSTRILS DAILY. Chronic Rhinitis Hina Humphreys MD   lisinopril (ZESTRIL) 20 MG tablet TAKE 1 TABLET BY MOUTH EVERY DAY Hypertension Hina Humphreys MD   loratadine (CLARITIN) 10 MG tablet Take 10 mg by mouth nightly as needed Chronic Rhinitis Patient Reported   naproxen sodium (ANAPROX) 220 MG tablet Take 220 mg by mouth 2 times daily as needed for moderate pain Headaches Patient Reported   rosuvastatin (CRESTOR) 5 MG tablet Take 1 tablet (5 mg) by mouth daily Hypercholesterolemia Hina Humphreys MD   sertraline (ZOLOFT) 50 MG tablet Take 1 tablet (50 mg) by mouth daily Anxiety Hina Humphreys MD   warfarin ANTICOAGULANT (COUMADIN) 5 MG tablet TAKE 7.5 MG ON Mon,Thur & Sat / TAKE 5 MG ALL OTHER DAYS OR AS DIRECTED BY [facility] Clot prevention INR goal of 2.0-3.0 Hina Humphreys MD         Add new medications, over-the-counter drugs, herbals, vitamins, or  minerals in the blank rows below.    Medication How I take it Why I use it Prescriber                          Allergies:      clindamycin hcl; heparin        Side effects I have had:              Other Information:             My notes and questions:

## 2022-02-01 ENCOUNTER — TRANSFERRED RECORDS (OUTPATIENT)
Dept: HEALTH INFORMATION MANAGEMENT | Facility: CLINIC | Age: 67
End: 2022-02-01
Payer: COMMERCIAL

## 2022-02-07 ENCOUNTER — ANTICOAGULATION THERAPY VISIT (OUTPATIENT)
Dept: ANTICOAGULATION | Facility: CLINIC | Age: 67
End: 2022-02-07

## 2022-02-07 ENCOUNTER — LAB (OUTPATIENT)
Dept: LAB | Facility: CLINIC | Age: 67
End: 2022-02-07
Payer: COMMERCIAL

## 2022-02-07 DIAGNOSIS — Z79.01 LONG TERM CURRENT USE OF ANTICOAGULANT THERAPY: ICD-10-CM

## 2022-02-07 DIAGNOSIS — Z86.711 HISTORY OF PULMONARY EMBOLISM: ICD-10-CM

## 2022-02-07 DIAGNOSIS — Z86.718 HISTORY OF RECURRENT DEEP VEIN THROMBOSIS (DVT): ICD-10-CM

## 2022-02-07 DIAGNOSIS — Z86.718 HISTORY OF RECURRENT DEEP VEIN THROMBOSIS (DVT): Primary | ICD-10-CM

## 2022-02-07 LAB — INR BLD: 2.3 (ref 0.9–1.1)

## 2022-02-07 PROCEDURE — 85610 PROTHROMBIN TIME: CPT

## 2022-02-07 PROCEDURE — 36416 COLLJ CAPILLARY BLOOD SPEC: CPT

## 2022-02-07 NOTE — PROGRESS NOTES
ANTICOAGULATION MANAGEMENT     Jozef Saunders 66 year old male is on warfarin with therapeutic INR result. (Goal INR 2.0-3.0)    Recent labs: (last 7 days)     02/07/22  1018   INR 2.3*       ASSESSMENT     Source(s): Chart Review and Patient/Caregiver Call       Warfarin doses taken: Warfarin taken as instructed    Diet: No new diet changes identified    New illness, injury, or hospitalization: No    Medication/supplement changes: simvastatin was changed to rosuvastatin. Both may increase INR    Signs or symptoms of bleeding or clotting: No    Previous INR: Therapeutic last 2(+) visits    Additional findings: He is going to [place] on 2/12/22 for 2 weeks     PLAN     Recommended plan for no diet, medication or health factor changes affecting INR     Dosing Instructions: Continue your current warfarin dose with next INR in 6 weeks       Summary  As of 2/7/2022    Full warfarin instructions:  7.5 mg every Mon, Thu, Sat; 5 mg all other days   Next INR check:  3/21/2022             Telephone call with  Issac who agrees to plan and repeated back plan correctly    Lab visit scheduled    Education provided: Travel related clotting risk and prevention and Contact [phone]  with any changes, questions or concerns.     Plan made per [facility] anticoagulation protocol    Zena Ren RN  Anticoagulation Clinic  2/7/2022    _______________________________________________________________________     Anticoagulation Episode Summary     Current INR goal:  2.0-3.0   TTR:  100.0 % (1 y)   Target end date:  Indefinite   Send INR reminders to:  LEANDRA ALBERTO    Indications    History of recurrent deep vein thrombosis (DVT) [Z86.718]  Long term current use of anticoagulant therapy [Z79.01]  History of pulmonary embolism [Z86.711]           Comments:  3/19/21 Does not need lovenox bridge per Dr. Humphreys         Anticoagulation Care Providers     Provider Role Specialty Phone number    Hina Humphreys MD Referring Internal  Medicine [phone]    Rojas Chun MD  Internal Medicine - Pediatrics [phone]

## 2022-02-28 DIAGNOSIS — J31.0 CHRONIC RHINITIS: ICD-10-CM

## 2022-02-28 DIAGNOSIS — K44.9 HIATAL HERNIA: ICD-10-CM

## 2022-03-01 RX ORDER — FAMOTIDINE 20 MG/1
TABLET, FILM COATED ORAL
Qty: 180 TABLET | Refills: 0 | Status: SHIPPED | OUTPATIENT
Start: 2022-03-01 | End: 2022-05-27

## 2022-03-01 RX ORDER — FLUTICASONE PROPIONATE 50 MCG
SPRAY, SUSPENSION (ML) NASAL
Qty: 48 ML | Refills: 2 | Status: SHIPPED | OUTPATIENT
Start: 2022-03-01 | End: 2022-11-28

## 2022-03-21 ENCOUNTER — ANTICOAGULATION THERAPY VISIT (OUTPATIENT)
Dept: ANTICOAGULATION | Facility: CLINIC | Age: 67
End: 2022-03-21

## 2022-03-21 ENCOUNTER — LAB (OUTPATIENT)
Dept: LAB | Facility: CLINIC | Age: 67
End: 2022-03-21
Payer: COMMERCIAL

## 2022-03-21 DIAGNOSIS — Z79.01 LONG TERM CURRENT USE OF ANTICOAGULANT THERAPY: ICD-10-CM

## 2022-03-21 DIAGNOSIS — Z86.718 HISTORY OF RECURRENT DEEP VEIN THROMBOSIS (DVT): ICD-10-CM

## 2022-03-21 DIAGNOSIS — Z86.718 HISTORY OF RECURRENT DEEP VEIN THROMBOSIS (DVT): Primary | ICD-10-CM

## 2022-03-21 DIAGNOSIS — Z86.711 HISTORY OF PULMONARY EMBOLISM: ICD-10-CM

## 2022-03-21 LAB — INR BLD: 2.7 (ref 0.9–1.1)

## 2022-03-21 PROCEDURE — 85610 PROTHROMBIN TIME: CPT

## 2022-03-21 PROCEDURE — 36416 COLLJ CAPILLARY BLOOD SPEC: CPT

## 2022-03-21 NOTE — PROGRESS NOTES
ANTICOAGULATION MANAGEMENT     Jozef Saunders 66 year old male is on warfarin with therapeutic INR result. (Goal INR 2.0-3.0)    Recent labs: (last 7 days)     03/21/22  1033   INR 2.7*       ASSESSMENT       Source(s): Chart Review and Patient/Caregiver Call       Warfarin doses taken: Warfarin taken as instructed    Diet: No new diet changes identified    New illness, injury, or hospitalization: No    Medication/supplement changes: None noted    Signs or symptoms of bleeding or clotting: No    Previous INR: Therapeutic last 2(+) visits    Additional findings: None       PLAN     Recommended plan for no diet, medication or health factor changes affecting INR     Dosing Instructions: Continue your current warfarin dose with next INR in 6 weeks       Summary  As of 3/21/2022    Full warfarin instructions:  7.5 mg every Mon, Thu, Sat; 5 mg all other days   Next INR check:  5/2/2022             Telephone call with  Issac who agrees to plan and repeated back plan correctly    Lab visit scheduled    Education provided: Contact [phone]  with any changes, questions or concerns.     Plan made per [facility] anticoagulation protocol    Zena Ren RN  Anticoagulation Clinic  3/21/2022    _______________________________________________________________________     Anticoagulation Episode Summary     Current INR goal:  2.0-3.0   TTR:  100.0 % (1 y)   Target end date:  Indefinite   Send INR reminders to:  LEANDRA ALBERTO    Indications    History of recurrent deep vein thrombosis (DVT) [Z86.718]  Long term current use of anticoagulant therapy [Z79.01]  History of pulmonary embolism [Z86.711]           Comments:  3/19/21 Does not need lovenox bridge per Dr. Humphreys         Anticoagulation Care Providers     Provider Role Specialty Phone number    Hina Humphreys MD Referring Internal Medicine [phone]    Rojas Chun MD  Internal Medicine - Pediatrics [phone]

## 2022-03-23 ENCOUNTER — TELEPHONE (OUTPATIENT)
Dept: PEDIATRICS | Facility: CLINIC | Age: 67
End: 2022-03-23
Payer: COMMERCIAL

## 2022-03-23 NOTE — TELEPHONE ENCOUNTER
Pt called and LVM on PAL4 direct line. Pt needs a new RX for a medication per [facility] pharmacy. This is a [facility] pt and does have an appointment with her during her leave of absence. Might want to reschedule while seeing what medication needs refill. Routing to PAL3    Gi Polk, EMT at 10:31 AM on March 23, 2022  [facility]  [phone]

## 2022-03-23 NOTE — TELEPHONE ENCOUNTER
Called patient: LM for patient to call back. (The appointment will have to be rescheduled).  I need to know which medication he is requesting and where to send it, if it has not been sent already.  Marnie Charlton RN  Message handled by Nurse Triage.

## 2022-03-28 DIAGNOSIS — E78.00 HYPERCHOLESTEROLEMIA: ICD-10-CM

## 2022-03-28 RX ORDER — ROSUVASTATIN CALCIUM 5 MG/1
5 TABLET, COATED ORAL DAILY
Qty: 90 TABLET | Refills: 3 | Status: SHIPPED | OUTPATIENT
Start: 2022-03-28 | End: 2022-08-01

## 2022-03-28 NOTE — TELEPHONE ENCOUNTER
Prescription approved per [facility] Refill Protocol.  Melvi Guidry RN      LDL Cholesterol Calculated   Date Value Ref Range Status   01/11/2022 76 <=100 mg/dL Final   03/08/2021 79 <100 mg/dL Final     Comment:     Desirable:       <100 mg/dl      
EVELIN HERNÁNDEZ Patient is a 48y old  Male who presents with a chief complaint of sepsis (08 Nov 2019 13:35)     HPI:  ED HPI: 47y/o M with no significant PMHx presents to the ED c/o urinary hesitancy and dysuria, onset this morning with sx of left sided flank pain, subjective fever and chills. Pt reports having sexual relations with only one person. Denies penial discharge, N/V/D, loss of appetite, CP, earache or sore throat. No additional complaints at this time.  · Associated Symptoms: PAIN, urinary hesitancy, dysuria, left sided flank, fever, chills  · Significant Negative Findings: no diarrhea, no nausea, no vomiting, penial discharge, Loss of appetite, CP earache, sore throat  · Location: flank  · Laterality: left  · Radiation: none  · Context: Unknown  · Quality: BURNING  · Timing: sudden onset  this morning  · Aggravating factors: nothing  · Relieving factors: nothing    Above ED HPI reviewed and noted: patient has a significant history of ESBL coli UTI in 5/2/2018 and again 10/10/2018; Resistant to Rocephin ; patient out patient HIE chart and labs reviewed. (07 Nov 2019 00:43)    The patient was seen and evaluated   The patient is in no acute distress.  Denied any fever chest pain, palpitations, shortness of breath  Complains of not feeling well and feeling hungry despite the breakfast would like more eggs     I&O's Summary    Allergies    No Known Allergies    Intolerances      HEALTH ISSUES - PROBLEM Dx:  Sepsis: Sepsis        PAST MEDICAL & SURGICAL HISTORY:  Recurrent UTI: ESBL E.coli (5/2/2018 and 10/10/2018)  No significant past surgical history          Vital Signs Last 24 Hrs  T(C): 37.1 (08 Nov 2019 11:28), Max: 37.3 (07 Nov 2019 19:50)  T(F): 98.8 (08 Nov 2019 11:28), Max: 99.2 (07 Nov 2019 19:50)  HR: 90 (08 Nov 2019 11:28) (90 - 115)  BP: 114/61 (08 Nov 2019 11:28) (98/61 - 114/61)  BP(mean): --  RR: 18 (08 Nov 2019 11:28) (17 - 18)  SpO2: 98% (08 Nov 2019 11:28) (94% - 98%)T(C): 37.1 (11-08-19 @ 11:28), Max: 37.3 (11-07-19 @ 19:50)  HR: 90 (11-08-19 @ 11:28) (90 - 115)  BP: 114/61 (11-08-19 @ 11:28) (98/61 - 114/61)  RR: 18 (11-08-19 @ 11:28) (17 - 18)  SpO2: 98% (11-08-19 @ 11:28) (94% - 98%)  Wt(kg): --    PHYSICAL EXAM:  General: NAD, A/O x 3  ENT: MMM, no thrush  Neck: Supple, No JVD  Lungs: Clear to auscultation bilaterally, good air entry, non-labored breathing  Cardio: +s1/s2, No pitting edema  Abdomen: Soft, Nontender, Nondistended; Bowel sounds present  Extremities: No calf tenderness      acetaminophen   Tablet .. 650 milliGRAM(s) Oral every 6 hours PRN  ertapenem  IVPB 1000 milliGRAM(s) IV Intermittent every 24 hours  ertapenem  IVPB      influenza   Vaccine 0.5 milliLiter(s) IntraMuscular once  lactated ringers. 1000 milliLiter(s) IV Continuous <Continuous>      LABS:                          13.8   15.58 )-----------( 142      ( 08 Nov 2019 06:14 )             41.0     11-08    141  |  105  |  10.0  ----------------------------<  104  3.5   |  23.0  |  0.78    Ca    8.6      08 Nov 2019 06:14    TPro  6.2<L>  /  Alb  3.3  /  TBili  1.8  /  DBili  x   /  AST  19  /  ALT  27  /  AlkPhos  76  11-07    LIVER FUNCTIONS - ( 07 Nov 2019 07:05 )  Alb: 3.3 g/dL / Pro: 6.2 g/dL / ALK PHOS: 76 U/L / ALT: 27 U/L / AST: 19 U/L / GGT: x                   CAPILLARY BLOOD GLUCOSE          RADIOLOGY & ADDITIONAL TESTS:      Consultant notes reviewed    Case discussed with consultant/provider/ family /patient
EVELIN HERNÁNDEZ Patient is a 48y old  Male who presents with a chief complaint of sepsis (08 Nov 2019 14:07)     HPI:  ED HPI: 47y/o M with no significant PMHx presents to the ED c/o urinary hesitancy and dysuria, onset this morning with sx of left sided flank pain, subjective fever and chills. Pt reports having sexual relations with only one person. Denies penial discharge, N/V/D, loss of appetite, CP, earache or sore throat. No additional complaints at this time.  · Associated Symptoms: PAIN, urinary hesitancy, dysuria, left sided flank, fever, chills  · Significant Negative Findings: no diarrhea, no nausea, no vomiting, penial discharge, Loss of appetite, CP earache, sore throat  · Location: flank  · Laterality: left  · Radiation: none  · Context: Unknown  · Quality: BURNING  · Timing: sudden onset  this morning  · Aggravating factors: nothing  · Relieving factors: nothing    Above ED HPI reviewed and noted: patient has a significant history of ESBL coli UTI in 5/2/2018 and again 10/10/2018; Resistant to Rocephin ; patient out patient HIE chart and labs reviewed. (07 Nov 2019 00:43)    The patient was seen and evaluated   The patient is in no acute distress.  Denied any fever chest pain, palpitations, shortness of breath, abdominal pain, fever, dysuria, cough, edema       I&O's Summary    08 Nov 2019 07:01  -  09 Nov 2019 07:00  --------------------------------------------------------  IN: 1465 mL / OUT: 0 mL / NET: 1465 mL    09 Nov 2019 07:01  -  09 Nov 2019 16:57  --------------------------------------------------------  IN: 875 mL / OUT: 1575 mL / NET: -700 mL      Allergies    No Known Allergies    Intolerances      HEALTH ISSUES - PROBLEM Dx:  Sepsis: Sepsis        PAST MEDICAL & SURGICAL HISTORY:  Recurrent UTI: ESBL E.coli (5/2/2018 and 10/10/2018)  No significant past surgical history          Vital Signs Last 24 Hrs  T(C): 36.7 (09 Nov 2019 16:12), Max: 36.8 (09 Nov 2019 00:28)  T(F): 98.1 (09 Nov 2019 16:12), Max: 98.2 (09 Nov 2019 00:28)  HR: 95 (09 Nov 2019 16:12) (82 - 95)  BP: 110/71 (09 Nov 2019 16:12) (110/71 - 136/80)  BP(mean): --  RR: 19 (09 Nov 2019 16:12) (18 - 19)  SpO2: 94% (09 Nov 2019 16:12) (93% - 95%)T(C): 36.7 (11-09-19 @ 16:12), Max: 36.8 (11-09-19 @ 00:28)  HR: 95 (11-09-19 @ 16:12) (82 - 95)  BP: 110/71 (11-09-19 @ 16:12) (110/71 - 136/80)  RR: 19 (11-09-19 @ 16:12) (18 - 19)  SpO2: 94% (11-09-19 @ 16:12) (93% - 95%)  Wt(kg): --    PHYSICAL EXAM:    GENERAL: NAD, well-groomed, well-developed  HEAD:  Atraumatic, Normocephalic  EYES: EOMI, PERRL  NERVOUS SYSTEM:  Alert & Oriented X3,  Moves upper and lower extremities; CNS-II-XII  CHEST/LUNG: Clear to auscultation bilaterally; No rales, rhonchi, wheezing,   HEART: Regular rate and rhythm; No murmurs,   ABDOMEN: Soft, Nontender, Nondistended; Bowel sounds present  EXTREMITIES:  Peripheral Pulses, No  cyanosis, or edema  SKIN: No rashes or lesions  psychiatry- mood and affect approprite, Insight and judgement intact     acetaminophen   Tablet .. 650 milliGRAM(s) Oral every 6 hours PRN  ertapenem  IVPB 1000 milliGRAM(s) IV Intermittent every 24 hours  ertapenem  IVPB      influenza   Vaccine 0.5 milliLiter(s) IntraMuscular once  lactated ringers. 1000 milliLiter(s) IV Continuous <Continuous>      LABS:                          13.8   15.58 )-----------( 142      ( 08 Nov 2019 06:14 )             41.0     11-08    141  |  105  |  10.0  ----------------------------<  104  3.5   |  23.0  |  0.78    Ca    8.6      08 Nov 2019 06:14                CAPILLARY BLOOD GLUCOSE          RADIOLOGY & ADDITIONAL TESTS:      Consultant notes reviewed    Case discussed with consultant/provider/ family /patient
Patient is a 48y old  Male who presents with a chief complaint of sepsis (2019 00:43)    Patient seen and examined at bedside.     ALLERGIES:  No Known Allergies    MEDICATIONS  (STANDING):  ertapenem  IVPB      lactated ringers. 1000 milliLiter(s) (125 mL/Hr) IV Continuous <Continuous>    MEDICATIONS  (PRN):  acetaminophen   Tablet .. 650 milliGRAM(s) Oral every 6 hours PRN Mild Pain (1 - 3)    Vital Signs Last 24 Hrs  T(F): 99.1 (2019 05:17), Max: 103 (2019 17:05)  HR: 108 (2019 05:17) (87 - 115)  BP: 100/60 (2019 05:17) (82/47 - 146/62)  RR: 20 (2019 05:) (16 - 20)  SpO2: 94% (2019 05:17) (94% - 99%)  I&O's Summary    2019 07:01  -  2019 07:00  --------------------------------------------------------  IN: 250 mL / OUT: 1100 mL / NET: -850 mL      PHYSICAL EXAM:  General: NAD, A/O x 3  ENT: MMM, no thrush  Neck: Supple, No JVD  Lungs: Clear to auscultation bilaterally, good air entry, non-labored breathing  Cardio: +s1/s2, No pitting edema  Abdomen: Soft, Nontender, Nondistended; Bowel sounds present  Extremities: No calf tenderness    LABS:                        13.8   22.90 )-----------( 142      ( 2019 07:05 )             41.0     11-07    140  |  105  |  10.0  ----------------------------<  104  3.6   |  25.0  |  0.86    Ca    8.6      2019 07:05    TPro  6.2  /  Alb  3.3  /  TBili  1.8  /  DBili  x   /  AST  19  /  ALT  27  /  AlkPhos  76  11-07    eGFR if Non African American: 102 mL/min/1.73M2 (19 @ 07:05)  eGFR if African American: 118 mL/min/1.73M2 (19 @ 07:05)    16:03 - VBG - pH:       | pCO2:       | pO2:       | Lactate: 1.1      Urinalysis Basic - ( 2019 12:21 )  Color: Yellow / Appearance: Clear / S.015 / pH: x  Gluc: x / Ketone: Negative  / Bili: Negative / Urobili: Negative mg/dL   Blood: x / Protein: 15 mg/dL / Nitrite: Negative   Leuk Esterase: Small / RBC: 3-5 /HPF / WBC 6-10   Sq Epi: x / Non Sq Epi: Occasional / Bacteria: Negative    RADIOLOGY & ADDITIONAL TESTS:  < from: Xray Chest 2 Views PA/Lat (19 @ 00:43) >  IMPRESSION:  LEFT lower lobe posterior basilar airspace consolidation.  < end of copied text >    < from: CT Abdomen and Pelvis w/ Oral Cont and w/ IV Cont (19 @ 16:24) >  IMPRESSION:   No acute inflammatory process within the abdomen or pelvis.  < end of copied text >
[facility]  INFECTIOUS DISEASES AND INTERNAL MEDICINE at [place]  =======================================================  Chadd Karimi MD  Diplomates American Board of Internal Medicine and Infectious Diseases  Tel: [phone]      Fax: [phone]  =======================================================      Follow up- ESBl E. coli UTI   symptoms resolved        MEDICATIONS  (STANDING):  ertapenem  IVPB      lactated ringers. 1000 milliLiter(s) (125 mL/Hr) IV Continuous <Continuous>    MEDICATIONS  (PRN):  acetaminophen   Tablet .. 650 milliGRAM(s) Oral every 6 hours PRN Mild Pain (1 - 3)      ANTIMICROBIALS:  ertapenem  IVPB        OTHER MEDS: MEDICATIONS  (STANDING):  acetaminophen   Tablet .. 650 every 6 hours PRN             REVIEW OF SYSTEMS:  CONSTITUTIONAL:  No Fever or chills  HEENT:  No diplopia or blurred vision.  No earache, sore throat or runny nose.  CARDIOVASCULAR:  No pressure, squeezing, strangling, tightness, heaviness or aching about the chest, neck, axilla or epigastrium.  RESPIRATORY:  No cough, shortness of breath  GASTROINTESTINAL:  No nausea, vomiting or diarrhea.  GENITOURINARY:  No dysuria, frequency or urgency. No Blood in urine  MUSCULOSKELETAL:  no joint aches, no muscle pain  SKIN:  No change in skin, hair or nails.  NEUROLOGIC:  No Headaches, seizures or weakness.  PSYCHIATRIC:  No disorder of thought or mood.  ENDOCRINE:  No heat or cold intolerance  HEMATOLOGICAL:  No easy bruising or bleeding.           I&O's Detail    2019 07:01  -  2019 07:00  --------------------------------------------------------  IN:    lactated ringers.: 250 mL  Total IN: 250 mL    OUT:    Voided: 1100 mL  Total OUT: 1100 mL    Total NET: -850 mL            Physical Exam:  Vital Signs Last 24 Hrs  T(C): 37.3 (2019 05:17), Max: 39.4 (2019 17:05)  T(F): 99.1 (2019 05:17), Max: 103 (2019 17:05)  HR: 105 (2019 08:59) (87 - 115)  BP: 129/71 (2019 08:59) (82/47 - 133/60)  BP(mean): 93 (2019 08:59) (79 - 93)  RR: 17 (2019 08:59) (16 - 20)  SpO2: 96% (2019 08:59) (94% - 99%)    GEN: NAD, pleasant  HEENT: normocephalic and atraumatic. EOMI. PERRL.  Anicteric  NECK: Supple.   LUNGS: Clear to auscultation.  HEART: Regular rate and rhythm without murmur.  ABDOMEN: Soft, nontender, and nondistended.  Positive bowel sounds.    : No CVA tenderness  EXTREMITIES: Without any edema.  MSK: No joint swelling  NEUROLOGIC: Cranial nerves II through XII are grossly intact. No Focal Deficits  PSYCHIATRIC: Appropriate affect .  SKIN: No Rash        Labs:      140  |  105  |  10.0  ----------------------------<  104  3.6   |  25.0  |  0.86    Ca    8.6      2019 07:05    TPro  6.2<L>  /  Alb  3.3  /  TBili  1.8  /  DBili  x   /  AST  19  /  ALT  27  /  AlkPhos  76                            13.8   22.90 )-----------( 142      ( 2019 07:05 )             41.0         Urinalysis Basic - ( 2019 12:21 )    Color: Yellow / Appearance: Clear / S.015 / pH: x  Gluc: x / Ketone: Negative  / Bili: Negative / Urobili: Negative mg/dL   Blood: x / Protein: 15 mg/dL / Nitrite: Negative   Leuk Esterase: Small / RBC: 3-5 /HPF / WBC 6-10   Sq Epi: x / Non Sq Epi: Occasional / Bacteria: Negative      LIVER FUNCTIONS - ( 2019 07:05 )  Alb: 3.3 g/dL / Pro: 6.2 g/dL / ALK PHOS: 76 U/L / ALT: 27 U/L / AST: 19 U/L / GGT: x               CAPILLARY BLOOD GLUCOSE            RECENT CULTURES:   @ 01:19          NotDetec   @ 12:21 .Urine     50,000 - 99,000 CFU/mL Gram Negative Rods Identification and  susceptibility to follow.  Culture in progress              Radiology:

## 2022-04-02 DIAGNOSIS — I10 ESSENTIAL HYPERTENSION, BENIGN: ICD-10-CM

## 2022-04-04 RX ORDER — LISINOPRIL 20 MG/1
TABLET ORAL
Qty: 90 TABLET | Refills: 1 | Status: SHIPPED | OUTPATIENT
Start: 2022-04-04 | End: 2022-08-01

## 2022-04-04 RX ORDER — ATENOLOL 50 MG/1
TABLET ORAL
Qty: 135 TABLET | Refills: 1 | Status: SHIPPED | OUTPATIENT
Start: 2022-04-04 | End: 2022-08-16 | Stop reason: ALTCHOICE

## 2022-04-10 ENCOUNTER — HEALTH MAINTENANCE LETTER (OUTPATIENT)
Age: 67
End: 2022-04-10

## 2022-04-15 DIAGNOSIS — I82.4Y9 DEEP VEIN THROMBOSIS (DVT) OF PROXIMAL LOWER EXTREMITY, UNSPECIFIED CHRONICITY, UNSPECIFIED LATERALITY (H): ICD-10-CM

## 2022-04-15 DIAGNOSIS — Z86.711 HISTORY OF PULMONARY EMBOLISM: ICD-10-CM

## 2022-04-15 DIAGNOSIS — Z79.01 LONG TERM CURRENT USE OF ANTICOAGULANTS WITH INR GOAL OF 2.0-3.0: ICD-10-CM

## 2022-04-15 RX ORDER — WARFARIN SODIUM 5 MG/1
TABLET ORAL
Qty: 111 TABLET | Refills: 1 | Status: SHIPPED | OUTPATIENT
Start: 2022-04-15 | End: 2022-10-14

## 2022-04-15 NOTE — TELEPHONE ENCOUNTER
ANTICOAGULATION MANAGEMENT:  Medication Refill    Anticoagulation Summary  As of 3/21/2022    Warfarin maintenance plan:  7.5 mg (5 mg x 1.5) every Mon, Thu, Sat; 5 mg (5 mg x 1) all other days   Next INR check:  5/2/2022   Target end date:  Indefinite    Indications    History of recurrent deep vein thrombosis (DVT) [Z86.718]  Long term current use of anticoagulant therapy [Z79.01]  History of pulmonary embolism [Z86.711]             Anticoagulation Care Providers     Provider Role Specialty Phone number    Hina Humphreys MD Referring Internal Medicine [phone]    Rojas Chun MD  Internal Medicine - Pediatrics [phone]          Visit with referring provider/group within last year: Yes    [facility] referral signed within last year: Yes    Jozef meets all criteria for refill (current [facility] referral, office visit with referring provider/group in last year, lab monitoring up to date or not exceeding 2 weeks overdue). Rx instructions and quantity match patient's current dosing plan. Warfarin 90 day supply with 1 refill granted per [facility] protocol     Tiki Castañeda RN  Anticoagulation Clinic

## 2022-05-02 ENCOUNTER — LAB (OUTPATIENT)
Dept: LAB | Facility: CLINIC | Age: 67
End: 2022-05-02
Payer: COMMERCIAL

## 2022-05-02 ENCOUNTER — ANTICOAGULATION THERAPY VISIT (OUTPATIENT)
Dept: ANTICOAGULATION | Facility: CLINIC | Age: 67
End: 2022-05-02

## 2022-05-02 DIAGNOSIS — Z79.01 LONG TERM CURRENT USE OF ANTICOAGULANT THERAPY: ICD-10-CM

## 2022-05-02 DIAGNOSIS — Z86.711 HISTORY OF PULMONARY EMBOLISM: ICD-10-CM

## 2022-05-02 DIAGNOSIS — Z86.718 HISTORY OF RECURRENT DEEP VEIN THROMBOSIS (DVT): ICD-10-CM

## 2022-05-02 DIAGNOSIS — Z86.718 HISTORY OF RECURRENT DEEP VEIN THROMBOSIS (DVT): Primary | ICD-10-CM

## 2022-05-02 LAB — INR BLD: 2.3 (ref 0.9–1.1)

## 2022-05-02 PROCEDURE — 36416 COLLJ CAPILLARY BLOOD SPEC: CPT

## 2022-05-02 PROCEDURE — 85610 PROTHROMBIN TIME: CPT

## 2022-05-02 NOTE — PROGRESS NOTES
ANTICOAGULATION MANAGEMENT     Jozef Saunders 66 year old male is on warfarin with therapeutic INR result. (Goal INR 2.0-3.0)    Recent labs: (last 7 days)     05/02/22  1030   INR 2.3*       ASSESSMENT       Source(s): Chart Review and Patient/Caregiver Call       Warfarin doses taken: Warfarin taken as instructed    Diet: No new diet changes identified    New illness, injury, or hospitalization: No    Medication/supplement changes: None noted    Signs or symptoms of bleeding or clotting: No    Previous INR: Therapeutic last 2(+) visits    Additional findings: None       PLAN     Recommended plan for no diet, medication or health factor changes affecting INR     Dosing Instructions: continue your current warfarin dose with next INR in 6 weeks       Summary  As of 5/2/2022    Full warfarin instructions:  7.5 mg every Mon, Thu, Sat; 5 mg all other days   Next INR check:  6/13/2022             Telephone call with Issac who verbalizes understanding and agrees to plan    Lab visit scheduled    Education provided: Please call back if any changes to your diet, medications or how you've been taking warfarin and Contact [phone]  with any changes, questions or concerns.     Plan made per [facility] anticoagulation protocol    Parvin Guidry RN  Anticoagulation Clinic  5/2/2022    _______________________________________________________________________     Anticoagulation Episode Summary     Current INR goal:  2.0-3.0   TTR:  100.0 % (1 y)   Target end date:  Indefinite   Send INR reminders to:  LEANDRA ALBERTO    Indications    History of recurrent deep vein thrombosis (DVT) [Z86.718]  Long term current use of anticoagulant therapy [Z79.01]  History of pulmonary embolism [Z86.711]           Comments:  3/19/21 Does not need lovenox bridge per Dr. Humphreys         Anticoagulation Care Providers     Provider Role Specialty Phone number    Hina Humphreys MD Referring Internal Medicine [phone]    Rojas Chun  MD Adan  Internal Medicine - Pediatrics [phone]

## 2022-05-27 DIAGNOSIS — K44.9 HIATAL HERNIA: ICD-10-CM

## 2022-05-27 RX ORDER — FAMOTIDINE 20 MG/1
TABLET, FILM COATED ORAL
Qty: 180 TABLET | Refills: 0 | Status: SHIPPED | OUTPATIENT
Start: 2022-05-27 | End: 2022-08-01

## 2022-06-13 ENCOUNTER — ANTICOAGULATION THERAPY VISIT (OUTPATIENT)
Dept: ANTICOAGULATION | Facility: CLINIC | Age: 67
End: 2022-06-13

## 2022-06-13 ENCOUNTER — LAB (OUTPATIENT)
Dept: LAB | Facility: CLINIC | Age: 67
End: 2022-06-13
Payer: COMMERCIAL

## 2022-06-13 DIAGNOSIS — Z79.01 LONG TERM CURRENT USE OF ANTICOAGULANT THERAPY: ICD-10-CM

## 2022-06-13 DIAGNOSIS — Z86.711 HISTORY OF PULMONARY EMBOLISM: ICD-10-CM

## 2022-06-13 DIAGNOSIS — Z86.718 HISTORY OF RECURRENT DEEP VEIN THROMBOSIS (DVT): Primary | ICD-10-CM

## 2022-06-13 DIAGNOSIS — Z86.718 HISTORY OF RECURRENT DEEP VEIN THROMBOSIS (DVT): ICD-10-CM

## 2022-06-13 LAB — INR BLD: 2.2 (ref 0.9–1.1)

## 2022-06-13 PROCEDURE — 36416 COLLJ CAPILLARY BLOOD SPEC: CPT

## 2022-06-13 PROCEDURE — 85610 PROTHROMBIN TIME: CPT

## 2022-06-13 NOTE — PROGRESS NOTES
ANTICOAGULATION MANAGEMENT     Jozef Saunders 66 year old male is on warfarin with therapeutic INR result. (Goal INR 2.0-3.0)    Recent labs: (last 7 days)     06/13/22  1021   INR 2.2*       ASSESSMENT       Source(s): Chart Review and Patient/Caregiver Call       Warfarin doses taken: Warfarin taken as instructed    Diet: No new diet changes identified    New illness, injury, or hospitalization: No    Medication/supplement changes: None noted    Signs or symptoms of bleeding or clotting: No    Previous INR: Therapeutic last 2(+) visits    Additional findings: None       PLAN     Recommended plan for no diet, medication or health factor changes affecting INR     Dosing Instructions: continue your current warfarin dose with next INR in 6 weeks       Summary  As of 6/13/2022    Full warfarin instructions:  7.5 mg every Mon, Thu, Sat; 5 mg all other days   Next INR check:  7/25/2022             Telephone call with Issac who verbalizes understanding and agrees to plan    Lab visit scheduled    Education provided: Please call back if any changes to your diet, medications or how you've been taking warfarin, Importance of notifying clinic for changes in medications; a sooner lab recheck maybe needed. and Importance of notifying clinic of upcoming surgeries and procedures 2 weeks in advance    Plan made per [facility] anticoagulation protocol    Shaina Rubio RN  Anticoagulation Clinic  6/13/2022    _______________________________________________________________________     Anticoagulation Episode Summary     Current INR goal:  2.0-3.0   TTR:  100.0 % (1 y)   Target end date:  Indefinite   Send INR reminders to:  LEANDRA ALBERTO    Indications    History of recurrent deep vein thrombosis (DVT) [Z86.718]  Long term current use of anticoagulant therapy [Z79.01]  History of pulmonary embolism [Z86.711]           Comments:  3/19/21 Does not need lovenox bridge per Dr. Humphreys         Anticoagulation Care Providers     Provider  Role Specialty Phone number    Hina Humphreys MD Referring Internal Medicine [phone]    Rojas Chun MD  Internal Medicine - Pediatrics [phone]

## 2022-07-20 ENCOUNTER — ANTICOAGULATION THERAPY VISIT (OUTPATIENT)
Dept: ANTICOAGULATION | Facility: CLINIC | Age: 67
End: 2022-07-20

## 2022-07-20 ENCOUNTER — LAB (OUTPATIENT)
Dept: LAB | Facility: CLINIC | Age: 67
End: 2022-07-20
Payer: COMMERCIAL

## 2022-07-20 DIAGNOSIS — Z86.711 HISTORY OF PULMONARY EMBOLISM: ICD-10-CM

## 2022-07-20 DIAGNOSIS — Z79.01 LONG TERM CURRENT USE OF ANTICOAGULANT THERAPY: ICD-10-CM

## 2022-07-20 DIAGNOSIS — Z86.718 HISTORY OF RECURRENT DEEP VEIN THROMBOSIS (DVT): ICD-10-CM

## 2022-07-20 DIAGNOSIS — Z86.718 HISTORY OF RECURRENT DEEP VEIN THROMBOSIS (DVT): Primary | ICD-10-CM

## 2022-07-20 LAB — INR BLD: 2.3 (ref 0.9–1.1)

## 2022-07-20 PROCEDURE — 85610 PROTHROMBIN TIME: CPT

## 2022-07-20 PROCEDURE — 36416 COLLJ CAPILLARY BLOOD SPEC: CPT

## 2022-07-29 ENCOUNTER — LAB (OUTPATIENT)
Dept: LAB | Facility: CLINIC | Age: 67
End: 2022-07-29
Payer: COMMERCIAL

## 2022-07-29 ENCOUNTER — ANTICOAGULATION THERAPY VISIT (OUTPATIENT)
Dept: ANTICOAGULATION | Facility: CLINIC | Age: 67
End: 2022-07-29

## 2022-07-29 DIAGNOSIS — Z86.711 HISTORY OF PULMONARY EMBOLISM: ICD-10-CM

## 2022-07-29 DIAGNOSIS — Z79.01 LONG TERM CURRENT USE OF ANTICOAGULANT THERAPY: ICD-10-CM

## 2022-07-29 DIAGNOSIS — Z86.718 HISTORY OF RECURRENT DEEP VEIN THROMBOSIS (DVT): ICD-10-CM

## 2022-07-29 DIAGNOSIS — Z86.718 HISTORY OF RECURRENT DEEP VEIN THROMBOSIS (DVT): Primary | ICD-10-CM

## 2022-07-29 LAB — INR BLD: 1.9 (ref 0.9–1.1)

## 2022-07-29 PROCEDURE — 36416 COLLJ CAPILLARY BLOOD SPEC: CPT

## 2022-07-29 PROCEDURE — 85610 PROTHROMBIN TIME: CPT

## 2022-07-29 NOTE — PROGRESS NOTES
ANTICOAGULATION MANAGEMENT     Jozef Saunders 66 year old male is on warfarin with subtherapeutic INR result. (Goal INR 2.0-3.0)    Recent labs: (last 7 days)     07/29/22  1005   INR 1.9*       ASSESSMENT       Source(s): Chart Review and Patient/Caregiver Call       Warfarin doses taken: Warfarin taken as instructed    Diet: Increased greens/vitamin K in diet; plans to resume previous intake - more salads this past week    New illness, injury, or hospitalization: No    Medication/supplement changes: None noted    Signs or symptoms of bleeding or clotting: No    Previous INR: Therapeutic last 2(+) visits    Additional findings: Patient was scheduled for labs pre-ov next week, however, no labs were ordered by PCP - apologized for this mistake/miscommunciation        PLAN     Recommended plan for temporary change(s) affecting INR     Dosing Instructions: Continue your current warfarin dose & hold greens for 1-2 days with next INR in 2 weeks       Summary  As of 7/29/2022    Full warfarin instructions:  7.5 mg every Mon, Thu, Sat; 5 mg all other days   Next INR check:  8/15/2022             Telephone call with Issac who verbalizes understanding and agrees to plan    Patient elected to schedule next visit 8/15/22    Education provided: Please call back if any changes to your diet, medications or how you've been taking warfarin, Monitoring for bleeding signs and symptoms, Monitoring for clotting signs and symptoms and Importance of notifying clinic for changes in medications; a sooner lab recheck maybe needed.    Plan made per [facility] anticoagulation protocol    No Mullen RN  Anticoagulation Clinic  7/29/2022    _______________________________________________________________________     Anticoagulation Episode Summary     Current INR goal:  2.0-3.0   TTR:  99.4 % (1 y)   Target end date:  Indefinite   Send INR reminders to:  LEANDRA ALBERTO    Indications    History of recurrent deep vein thrombosis (DVT)  [Z86.718]  Long term current use of anticoagulant therapy [Z79.01]  History of pulmonary embolism [Z86.711]           Comments:  3/19/21 Does not need lovenox bridge per Dr. Humphreys         Anticoagulation Care Providers     Provider Role Specialty Phone number    Hina Humphreys MD Referring Internal Medicine [phone]    Rojas Chun MD  Internal Medicine - Pediatrics [phone]

## 2022-08-01 ENCOUNTER — OFFICE VISIT (OUTPATIENT)
Dept: PEDIATRICS | Facility: CLINIC | Age: 67
End: 2022-08-01
Payer: COMMERCIAL

## 2022-08-01 VITALS
TEMPERATURE: 98 F | HEIGHT: 69 IN | BODY MASS INDEX: 27.83 KG/M2 | RESPIRATION RATE: 20 BRPM | SYSTOLIC BLOOD PRESSURE: 140 MMHG | WEIGHT: 187.9 LBS | DIASTOLIC BLOOD PRESSURE: 102 MMHG | HEART RATE: 68 BPM | OXYGEN SATURATION: 97 %

## 2022-08-01 DIAGNOSIS — Z86.39 HISTORY OF IRON DEFICIENCY: ICD-10-CM

## 2022-08-01 DIAGNOSIS — I10 ESSENTIAL HYPERTENSION, BENIGN: ICD-10-CM

## 2022-08-01 DIAGNOSIS — F41.1 GENERALIZED ANXIETY DISORDER: ICD-10-CM

## 2022-08-01 DIAGNOSIS — Z12.5 SCREENING FOR PROSTATE CANCER: ICD-10-CM

## 2022-08-01 DIAGNOSIS — Z00.00 ENCOUNTER FOR MEDICARE ANNUAL WELLNESS EXAM: Primary | ICD-10-CM

## 2022-08-01 DIAGNOSIS — E78.00 HYPERCHOLESTEROLEMIA: ICD-10-CM

## 2022-08-01 DIAGNOSIS — K44.9 HIATAL HERNIA: ICD-10-CM

## 2022-08-01 DIAGNOSIS — Z98.890 S/P GASTROPLASTY: ICD-10-CM

## 2022-08-01 PROBLEM — K63.5 POLYP OF COLON: Status: ACTIVE | Noted: 2018-12-26

## 2022-08-01 PROBLEM — B37.0 THRUSH: Status: RESOLVED | Noted: 2018-07-15 | Resolved: 2022-08-01

## 2022-08-01 PROBLEM — K64.9 HEMORRHOIDS: Status: ACTIVE | Noted: 2018-12-26

## 2022-08-01 LAB
ERYTHROCYTE [DISTWIDTH] IN BLOOD BY AUTOMATED COUNT: 12.6 % (ref 10–15)
HCT VFR BLD AUTO: 52.2 % (ref 40–53)
HGB BLD-MCNC: 16.7 G/DL (ref 13.3–17.7)
MCH RBC QN AUTO: 30 PG (ref 26.5–33)
MCHC RBC AUTO-ENTMCNC: 32 G/DL (ref 31.5–36.5)
MCV RBC AUTO: 94 FL (ref 78–100)
PLATELET # BLD AUTO: 104 10E3/UL (ref 150–450)
RBC # BLD AUTO: 5.56 10E6/UL (ref 4.4–5.9)
WBC # BLD AUTO: 4.8 10E3/UL (ref 4–11)

## 2022-08-01 PROCEDURE — G0009 ADMIN PNEUMOCOCCAL VACCINE: HCPCS | Performed by: INTERNAL MEDICINE

## 2022-08-01 PROCEDURE — 99214 OFFICE O/P EST MOD 30 MIN: CPT | Mod: 25 | Performed by: INTERNAL MEDICINE

## 2022-08-01 PROCEDURE — G0103 PSA SCREENING: HCPCS | Performed by: INTERNAL MEDICINE

## 2022-08-01 PROCEDURE — 83550 IRON BINDING TEST: CPT | Performed by: INTERNAL MEDICINE

## 2022-08-01 PROCEDURE — 90677 PCV20 VACCINE IM: CPT | Performed by: INTERNAL MEDICINE

## 2022-08-01 PROCEDURE — 80053 COMPREHEN METABOLIC PANEL: CPT | Performed by: INTERNAL MEDICINE

## 2022-08-01 PROCEDURE — G0439 PPPS, SUBSEQ VISIT: HCPCS | Performed by: INTERNAL MEDICINE

## 2022-08-01 PROCEDURE — 85027 COMPLETE CBC AUTOMATED: CPT | Performed by: INTERNAL MEDICINE

## 2022-08-01 PROCEDURE — 36415 COLL VENOUS BLD VENIPUNCTURE: CPT | Performed by: INTERNAL MEDICINE

## 2022-08-01 PROCEDURE — 80061 LIPID PANEL: CPT | Performed by: INTERNAL MEDICINE

## 2022-08-01 RX ORDER — LISINOPRIL 20 MG/1
20 TABLET ORAL DAILY
Qty: 90 TABLET | Refills: 1 | Status: SHIPPED | OUTPATIENT
Start: 2022-08-01 | End: 2023-03-10

## 2022-08-01 RX ORDER — FAMOTIDINE 20 MG/1
20 TABLET, FILM COATED ORAL 2 TIMES DAILY
Qty: 180 TABLET | Refills: 4 | Status: SHIPPED | OUTPATIENT
Start: 2022-08-01 | End: 2023-08-02

## 2022-08-01 RX ORDER — ROSUVASTATIN CALCIUM 5 MG/1
5 TABLET, COATED ORAL DAILY
Qty: 90 TABLET | Refills: 3 | Status: SHIPPED | OUTPATIENT
Start: 2022-08-01 | End: 2023-08-02

## 2022-08-01 SDOH — ECONOMIC STABILITY: INCOME INSECURITY: IN THE LAST 12 MONTHS, WAS THERE A TIME WHEN YOU WERE NOT ABLE TO PAY THE MORTGAGE OR RENT ON TIME?: NO

## 2022-08-01 SDOH — ECONOMIC STABILITY: FOOD INSECURITY: WITHIN THE PAST 12 MONTHS, THE FOOD YOU BOUGHT JUST DIDN'T LAST AND YOU DIDN'T HAVE MONEY TO GET MORE.: NEVER TRUE

## 2022-08-01 SDOH — HEALTH STABILITY: PHYSICAL HEALTH: ON AVERAGE, HOW MANY DAYS PER WEEK DO YOU ENGAGE IN MODERATE TO STRENUOUS EXERCISE (LIKE A BRISK WALK)?: 6 DAYS

## 2022-08-01 SDOH — ECONOMIC STABILITY: INCOME INSECURITY: HOW HARD IS IT FOR YOU TO PAY FOR THE VERY BASICS LIKE FOOD, HOUSING, MEDICAL CARE, AND HEATING?: NOT HARD AT ALL

## 2022-08-01 SDOH — HEALTH STABILITY: PHYSICAL HEALTH: ON AVERAGE, HOW MANY MINUTES DO YOU ENGAGE IN EXERCISE AT THIS LEVEL?: 60 MIN

## 2022-08-01 SDOH — ECONOMIC STABILITY: FOOD INSECURITY: WITHIN THE PAST 12 MONTHS, YOU WORRIED THAT YOUR FOOD WOULD RUN OUT BEFORE YOU GOT MONEY TO BUY MORE.: NEVER TRUE

## 2022-08-01 ASSESSMENT — ENCOUNTER SYMPTOMS
CHILLS: 0
ARTHRALGIAS: 0
HEADACHES: 0
WEAKNESS: 0
COUGH: 0
NERVOUS/ANXIOUS: 0
NAUSEA: 0
HEMATOCHEZIA: 0
SHORTNESS OF BREATH: 0
MYALGIAS: 0
HEARTBURN: 0
FREQUENCY: 0
ABDOMINAL PAIN: 0
CONSTIPATION: 0
JOINT SWELLING: 0
PARESTHESIAS: 0
DYSURIA: 0
FEVER: 0
SORE THROAT: 0
HEMATURIA: 0
DIARRHEA: 0
PALPITATIONS: 0
DIZZINESS: 0
EYE PAIN: 0

## 2022-08-01 ASSESSMENT — LIFESTYLE VARIABLES
HOW MANY STANDARD DRINKS CONTAINING ALCOHOL DO YOU HAVE ON A TYPICAL DAY: PATIENT DOES NOT DRINK
SKIP TO QUESTIONS 9-10: 1
AUDIT-C TOTAL SCORE: 0
HOW OFTEN DO YOU HAVE A DRINK CONTAINING ALCOHOL: NEVER
HOW OFTEN DO YOU HAVE SIX OR MORE DRINKS ON ONE OCCASION: NEVER

## 2022-08-01 ASSESSMENT — ACTIVITIES OF DAILY LIVING (ADL): CURRENT_FUNCTION: NO ASSISTANCE NEEDED

## 2022-08-01 ASSESSMENT — SOCIAL DETERMINANTS OF HEALTH (SDOH)
HOW OFTEN DO YOU ATTEND CHURCH OR RELIGIOUS SERVICES?: MORE THAN 4 TIMES PER YEAR
DO YOU BELONG TO ANY CLUBS OR ORGANIZATIONS SUCH AS CHURCH GROUPS UNIONS, FRATERNAL OR ATHLETIC GROUPS, OR SCHOOL GROUPS?: YES
IN A TYPICAL WEEK, HOW MANY TIMES DO YOU TALK ON THE PHONE WITH FAMILY, FRIENDS, OR NEIGHBORS?: MORE THAN THREE TIMES A WEEK
HOW OFTEN DO YOU GET TOGETHER WITH FRIENDS OR RELATIVES?: MORE THAN THREE TIMES A WEEK

## 2022-08-01 ASSESSMENT — PAIN SCALES - GENERAL: PAINLEVEL: NO PAIN (0)

## 2022-08-01 NOTE — PROGRESS NOTES
"SUBJECTIVE:   Jozef Saunders is a 66 year old male who presents for Preventive Visit.    Patient has been advised of split billing requirements and indicates understanding: Yes  Are you in the first 12 months of your Medicare coverage?  No    Healthy Habits:     In general, how would you rate your overall health?  Excellent    Frequency of exercise:  6-7 days/week    Duration of exercise:  45-60 minutes    Do you usually eat at least 4 servings of fruit and vegetables a day, include whole grains    & fiber and avoid regularly eating high fat or \"junk\" foods?  Yes    Taking medications regularly:  Yes    Barriers to taking medications:  None    Medication side effects:  None    Ability to successfully perform activities of daily living:  No assistance needed    Home Safety:  No safety concerns identified    Hearing Impairment:  No hearing concerns    In the past 6 months, have you been bothered by leaking of urine?  No    In general, how would you rate your overall mental or emotional health?  Excellent      PHQ-2 Total Score: 0    Additional concerns today:  No    Do you feel safe in your environment? Yes    Have you ever done Advance Care Planning? (For example, a Health Directive, POLST, or a discussion with a medical provider or your loved ones about your wishes): Yes, advance care planning is on file.    No hearing concerns   Fall risk  Fallen 2 or more times in the past year?: No  Any fall with injury in the past year?: No    Cognitive Screening: Pt declines    Do you have sleep apnea, excessive snoring or daytime drowsiness?: no    Reviewed and updated as needed this visit by clinical staff   Tobacco  Allergies  Meds  Problems  Med Hx  Surg Hx  Fam Hx  Soc   Hx          Reviewed and updated as needed this visit by Provider    Allergies  Meds  Problems              Social History     Tobacco Use     Smoking status: Former Smoker     Quit date: 1989     Years since quittin.7     Smokeless " tobacco: Never Used   Substance Use Topics     Alcohol use: No     Comment: sober [id number]     If you drink alcohol do you typically have >3 drinks per day or >7 drinks per week? No    Alcohol Use 8/1/2022   Prescreen: >3 drinks/day or >7 drinks/week? Not Applicable   Prescreen: >3 drinks/day or >7 drinks/week? -   No flowsheet data found.        Hyperlipidemia Follow-Up      Are you regularly taking any medication or supplement to lower your cholesterol?   Yes- crestor    Are you having muscle aches or other side effects that you think could be caused by your cholesterol lowering medication?  No    Hypertension Follow-up      Do you check your blood pressure regularly outside of the clinic? Yes, numbers usually look good, 120's systolic, 60's diastolic     Are you following a low salt diet? Yes    Are your blood pressures ever more than 140 on the top number (systolic) OR more   than 90 on the bottom number (diastolic), for example 140/90? No      Current providers sharing in care for this patient include:   Patient Care Team:  Hina Humphreys MD as PCP - General (Internal Medicine)  Hina Humphreys MD as Assigned PCP  Tiki Gerber, RN as Personal Advocate & Liaison (PAL)  Leydi Saucedo (Dermatology)  Kristina Kent [facility] as Assigned MTM Pharmacist    The following health maintenance items are reviewed in Epic and correct as of today:  There are no preventive care reminders to display for this patient.  Labs reviewed in EPIC      Review of Systems   Constitutional: Negative for chills and fever.   HENT: Negative for congestion, ear pain, hearing loss and sore throat.    Eyes: Negative for pain and visual disturbance.   Respiratory: Negative for cough and shortness of breath.    Cardiovascular: Negative for chest pain, palpitations and peripheral edema.   Gastrointestinal: Negative for abdominal pain, constipation, diarrhea, heartburn, hematochezia and nausea.   Genitourinary: Negative for  "dysuria, frequency, genital sores, hematuria, impotence and urgency.   Musculoskeletal: Negative for arthralgias, joint swelling and myalgias.   Skin: Negative for rash.   Neurological: Negative for dizziness, weakness, headaches and paresthesias.   Psychiatric/Behavioral: Negative for mood changes. The patient is not nervous/anxious.        OBJECTIVE:   BP (!) 140/102   Pulse 68   Temp 98  F (36.7  C) (Oral)   Resp 20   Ht 1.753 m (5' 9\")   Wt 85.2 kg (187 lb 14.4 oz)   SpO2 97%   BMI 27.75 kg/m   Estimated body mass index is 27.75 kg/m  as calculated from the following:    Height as of this encounter: 1.753 m (5' 9\").    Weight as of this encounter: 85.2 kg (187 lb 14.4 oz).  Physical Exam  GENERAL: healthy, alert and no distress  EYES: Eyes grossly normal to inspection, PERRL and conjunctivae and sclerae normal  NECK: no adenopathy, no asymmetry, masses, or scars and thyroid normal to palpation  RESP: lungs clear to auscultation - no rales, rhonchi or wheezes  CV: regular rate and rhythm, normal S1 S2, no S3 or S4, no murmur, click or rub, no peripheral edema and peripheral pulses strong  ABDOMEN: soft, nontender, no hepatosplenomegaly, no masses and bowel sounds normal  MS: no gross musculoskeletal defects noted, no edema    Diagnostic Test Results:  Labs reviewed in Epic    ASSESSMENT / PLAN:   1. Encounter for Medicare annual wellness exam      2. BENIGN HYPERTENSION  Elevated today, but normal at home. Same on recheck in office today. Plan recheck in 1-2 weeks nurse visit with his home monitor as well. See patient instructions section.  - lisinopril (ZESTRIL) 20 MG tablet; Take 1 tablet (20 mg) by mouth daily  Dispense: 90 tablet; Refill: 1  - Comprehensive metabolic panel (BMP + Alb, Alk Phos, ALT, AST, Total. Bili, TP); Future  - Comprehensive metabolic panel (BMP + Alb, Alk Phos, ALT, AST, Total. Bili, TP)    3. Hypercholesterolemia  Tolerating statin  - rosuvastatin (CRESTOR) 5 MG tablet; Take 1 " "tablet (5 mg) by mouth daily  Dispense: 90 tablet; Refill: 3  - sertraline (ZOLOFT) 50 MG tablet; Take 1 tablet (50 mg) by mouth daily  Dispense: 90 tablet; Refill: 11  - Lipid panel reflex to direct LDL Fasting; Future  - Lipid panel reflex to direct LDL Fasting    4. Generalized anxiety disorder  Stable on low dose sertraline  - sertraline (ZOLOFT) 50 MG tablet; Take 1 tablet (50 mg) by mouth daily  Dispense: 90 tablet; Refill: 11    5. Hiatal hernia  Heartburn controlled with famotidine. Diarrhea nearly completely resolved with stopping PPI.  - famotidine (PEPCID) 20 MG tablet; Take 1 tablet (20 mg) by mouth 2 times daily  Dispense: 180 tablet; Refill: 4    6. History of iron deficiency  Will recheck. Low from 2018 to 2021. Resolved with iron replacement, now off. Hemoglobin dropped after Nissen surgery. If remains normal, would consider whether this was due to chronic esophagitis prior to nissen.   - CBC with platelets; Future  - Iron and iron binding capacity; Future  - CBC with platelets  - Iron and iron binding capacity    7. Screening for prostate cancer    - PSA, screen; Future  - PSA, screen    8. S/P [place] Nissen gastroplasty        COUNSELING:  Reviewed preventive health counseling, as reflected in patient instructions    Estimated body mass index is 27.75 kg/m  as calculated from the following:    Height as of this encounter: 1.753 m (5' 9\").    Weight as of this encounter: 85.2 kg (187 lb 14.4 oz).        He reports that he quit smoking about 32 years ago. He has never used smokeless tobacco.      Appropriate preventive services were discussed with this patient, including applicable screening as appropriate for cardiovascular disease, diabetes, osteopenia/osteoporosis, and glaucoma.  As appropriate for age/gender, discussed screening for colorectal cancer, prostate cancer, breast cancer, and cervical cancer. Checklist reviewing preventive services available has been given to the patient.    Reviewed " patients plan of care and provided an AVS. The Intermediate Care Plan ( asthma action plan, low back pain action plan, and migraine action plan) for Jozef meets the Care Plan requirement. This Care Plan has been established and reviewed with the Patient.    Counseling Resources:  ATP IV Guidelines  Pooled Cohorts Equation Calculator  Breast Cancer Risk Calculator  Breast Cancer: Medication to Reduce Risk  FRAX Risk Assessment  ICSI Preventive Guidelines  Dietary Guidelines for Americans, 2010  [employer]'s MyPlate  ASA Prophylaxis  Lung CA Screening    Hina Humphreys MD  [facility]    Identified Health Risks:

## 2022-08-01 NOTE — PATIENT INSTRUCTIONS
If your blood pressure is still elevated at your follow up visit or if it looks like we need better bp control, I would switch your atenolol to carvedilol, which is a bit more effective with less side effects.    Patient Education   Personalized Prevention Plan  You are due for the preventive services outlined below.  Your care team is available to assist you in scheduling these services.  If you have already completed any of these items, please share that information with your care team to update in your medical record.  Health Maintenance Due   Topic Date Due    Pneumococcal Vaccine (2 - PCV) 10/16/2021    Annual Wellness Visit  03/08/2022    ANNUAL REVIEW OF HM ORDERS  03/08/2022

## 2022-08-02 LAB
ALBUMIN SERPL-MCNC: 3.8 G/DL (ref 3.4–5)
ALP SERPL-CCNC: 75 U/L (ref 40–150)
ALT SERPL W P-5'-P-CCNC: 35 U/L (ref 0–70)
ANION GAP SERPL CALCULATED.3IONS-SCNC: 5 MMOL/L (ref 3–14)
AST SERPL W P-5'-P-CCNC: 31 U/L (ref 0–45)
BILIRUB SERPL-MCNC: 0.5 MG/DL (ref 0.2–1.3)
BUN SERPL-MCNC: 17 MG/DL (ref 7–30)
CALCIUM SERPL-MCNC: 8.4 MG/DL (ref 8.5–10.1)
CHLORIDE BLD-SCNC: 110 MMOL/L (ref 94–109)
CHOLEST SERPL-MCNC: 147 MG/DL
CO2 SERPL-SCNC: 27 MMOL/L (ref 20–32)
CREAT SERPL-MCNC: 0.79 MG/DL (ref 0.66–1.25)
FASTING STATUS PATIENT QL REPORTED: YES
GFR SERPL CREATININE-BSD FRML MDRD: >90 ML/MIN/1.73M2
GLUCOSE BLD-MCNC: 86 MG/DL (ref 70–99)
HDLC SERPL-MCNC: 44 MG/DL
IRON SATN MFR SERPL: 37 % (ref 15–46)
IRON SERPL-MCNC: 119 UG/DL (ref 35–180)
LDLC SERPL CALC-MCNC: 70 MG/DL
NONHDLC SERPL-MCNC: 103 MG/DL
POTASSIUM BLD-SCNC: 4.3 MMOL/L (ref 3.4–5.3)
PROT SERPL-MCNC: 7.1 G/DL (ref 6.8–8.8)
PSA SERPL-MCNC: 0.62 UG/L (ref 0–4)
SODIUM SERPL-SCNC: 142 MMOL/L (ref 133–144)
TIBC SERPL-MCNC: 319 UG/DL (ref 240–430)
TRIGL SERPL-MCNC: 165 MG/DL

## 2022-08-15 ENCOUNTER — ANTICOAGULATION THERAPY VISIT (OUTPATIENT)
Dept: ANTICOAGULATION | Facility: CLINIC | Age: 67
End: 2022-08-15

## 2022-08-15 ENCOUNTER — ALLIED HEALTH/NURSE VISIT (OUTPATIENT)
Dept: PEDIATRICS | Facility: CLINIC | Age: 67
End: 2022-08-15

## 2022-08-15 ENCOUNTER — LAB (OUTPATIENT)
Dept: LAB | Facility: CLINIC | Age: 67
End: 2022-08-15
Payer: COMMERCIAL

## 2022-08-15 VITALS — SYSTOLIC BLOOD PRESSURE: 150 MMHG | HEART RATE: 53 BPM | DIASTOLIC BLOOD PRESSURE: 88 MMHG

## 2022-08-15 DIAGNOSIS — Z86.718 HISTORY OF RECURRENT DEEP VEIN THROMBOSIS (DVT): Primary | ICD-10-CM

## 2022-08-15 DIAGNOSIS — I10 ESSENTIAL HYPERTENSION, BENIGN: Primary | ICD-10-CM

## 2022-08-15 DIAGNOSIS — Z86.718 HISTORY OF RECURRENT DEEP VEIN THROMBOSIS (DVT): ICD-10-CM

## 2022-08-15 DIAGNOSIS — Z79.01 LONG TERM CURRENT USE OF ANTICOAGULANT THERAPY: ICD-10-CM

## 2022-08-15 DIAGNOSIS — Z86.711 HISTORY OF PULMONARY EMBOLISM: ICD-10-CM

## 2022-08-15 LAB — INR BLD: 2.2 (ref 0.9–1.1)

## 2022-08-15 PROCEDURE — 85610 PROTHROMBIN TIME: CPT

## 2022-08-15 PROCEDURE — 99207 PR NO CHARGE NURSE ONLY: CPT

## 2022-08-15 PROCEDURE — 36416 COLLJ CAPILLARY BLOOD SPEC: CPT

## 2022-08-15 RX ORDER — LISINOPRIL AND HYDROCHLOROTHIAZIDE 20; 25 MG/1; MG/1
1 TABLET ORAL DAILY
Qty: 30 TABLET | Refills: 1 | Status: CANCELLED | OUTPATIENT
Start: 2022-08-15

## 2022-08-15 NOTE — PROGRESS NOTES
Jozef Saunders is a 66 year old patient who comes in today for a Blood Pressure check.  Initial BP:  BP (!) 150/88   Pulse 53   **Home BP machine used to check BP a few minutes later showing /82 and pulse 53   Disposition: results routed to provider, patient would like instructions on how to proceed via mychart.

## 2022-08-15 NOTE — PROGRESS NOTES
ANTICOAGULATION MANAGEMENT     Jozef FELIPE Nicky 66 year old male is on warfarin with therapeutic INR result. (Goal INR 2.0-3.0)    Recent labs: (last 7 days)     08/15/22  1040   INR 2.2*       ASSESSMENT       Source(s): Chart Review    Previous INR was Subtherapeutic    Medication, diet, health changes since last INR chart reviewed; none identified           PLAN     Unable to reach Issac today.    Left message to call [facility] team for assessment.    Alison Guidry RN  Anticoagulation Clinic  8/15/2022

## 2022-08-15 NOTE — PROGRESS NOTES
ANTICOAGULATION MANAGEMENT     Jozef Saunders 66 year old male is on warfarin with therapeutic INR result. (Goal INR 2.0-3.0)    Recent labs: (last 7 days)     08/15/22  1040   INR 2.2*       ASSESSMENT       Source(s): Chart Review and Patient/Caregiver Call       Warfarin doses taken: Warfarin taken as instructed    Diet: No new diet changes identified    New illness, injury, or hospitalization: No    Medication/supplement changes: None noted    Signs or symptoms of bleeding or clotting: No    Previous INR: Subtherapeutic    Additional findings: None       PLAN     Recommended plan for no diet, medication or health factor changes affecting INR     Dosing Instructions: Continue your current warfarin dose with next INR in 3 weeks       Summary  As of 8/15/2022    Full warfarin instructions:  7.5 mg every Mon, Thu, Sat; 5 mg all other days   Next INR check:  9/7/2022             Telephone call with Issac who verbalizes understanding and agrees to plan    Lab visit scheduled    Education provided: Please call back if any changes to your diet, medications or how you've been taking warfarin    Plan made per [facility] anticoagulation protocol    Alison Guidry RN  Anticoagulation Clinic  8/15/2022    _______________________________________________________________________     Anticoagulation Episode Summary     Current INR goal:  2.0-3.0   TTR:  97.8 % (1 y)   Target end date:  Indefinite   Send INR reminders to:  LEANDRA ALBERTO    Indications    History of recurrent deep vein thrombosis (DVT) [Z86.718]  Long term current use of anticoagulant therapy [Z79.01]  History of pulmonary embolism [Z86.711]           Comments:  3/19/21 Does not need lovenox bridge per Dr. Humphreys         Anticoagulation Care Providers     Provider Role Specialty Phone number    Hina Humphreys MD Referring Internal Medicine [phone]    Rojas Chun MD  Internal Medicine - Pediatrics [phone]

## 2022-08-16 RX ORDER — CARVEDILOL 12.5 MG/1
12.5 TABLET ORAL 2 TIMES DAILY WITH MEALS
Qty: 60 TABLET | Refills: 3 | Status: SHIPPED | OUTPATIENT
Start: 2022-08-16 | End: 2022-09-14

## 2022-08-16 NOTE — TELEPHONE ENCOUNTER
[facility]  Would switch atenolol to carvedilol sending to [facility] for equivalent dosing. On atenolol 75 mg. I'm having difficulty finding a conversion for dose.  Station staff:  Once we hear back from [facility], pls call him. bp elevated at nurse visit.  Could switch him to lisinopril/hctz. Looks like he was on hydrochlorothiazide (dyazide) in the past and it was stopped when he was having recurrent urticaria. Stopping this and ACEI at that time didn't decrease his urticaria. Was going to see allergist but I don't see records from this. Would be more comfortable seeing allergy recommendations before retrying.  See if we can get records.  Hina Humphreys M.D.

## 2022-08-16 NOTE — TELEPHONE ENCOUNTER
I see atenolol 75mg is equal to 37.5mg carvedilol daily dose.  With the lower heart rate thinking changing to 12.5mg twice daily and can increase to 25mg twice daily in 1 week if not at goal    From [employer]  SUMMARY: Numerous studies have reported that carvedilol 25 to 50 milligrams orally once daily and atenolol 50 to 100 milligrams once daily are equally effective at lowering blood pressure and well- tolerated in patients with mild-to-moderate essential hypertension. Both drugs effectively lowered supine and standing blood pressure over a 24- hour period; heart rate was reduced with both agents, but less so with carvedilol. Results of 1 study indicate that carvedilol has improved metabolic effects and equivalent cardiovascular effects when compared to atenolol in the treatment of patients with non-insulin-dependent diabetes mellitus and hypertension [468][469][470][471][472][473].

## 2022-08-16 NOTE — TELEPHONE ENCOUNTER
Called patient: advised.  Patient in agreement with the plan and will start carvedilol and stop atenolol.  Nurse only appointment scheduled in one month for BP check.  He is aware that the script was sent to [facility].    Marnie Charlton RN

## 2022-08-16 NOTE — TELEPHONE ENCOUNTER
pls call him. Let's switch his atenolol 75 mg to carvedilol 12.5 mg TWICE daily. This is the lower side of equal dosing so we may need to increase to 25 mg twice daily.  Have him come in for bp check in 1 month. No need to check labs with this change. I will send rx.  Hina Humphreys M.D.

## 2022-09-07 ENCOUNTER — ANTICOAGULATION THERAPY VISIT (OUTPATIENT)
Dept: ANTICOAGULATION | Facility: CLINIC | Age: 67
End: 2022-09-07

## 2022-09-07 ENCOUNTER — TELEPHONE (OUTPATIENT)
Dept: PEDIATRICS | Facility: CLINIC | Age: 67
End: 2022-09-07

## 2022-09-07 ENCOUNTER — LAB (OUTPATIENT)
Dept: LAB | Facility: CLINIC | Age: 67
End: 2022-09-07
Payer: COMMERCIAL

## 2022-09-07 DIAGNOSIS — Z86.711 HISTORY OF PULMONARY EMBOLISM: ICD-10-CM

## 2022-09-07 DIAGNOSIS — I82.4Y9 DEEP VEIN THROMBOSIS (DVT) OF PROXIMAL LOWER EXTREMITY, UNSPECIFIED CHRONICITY, UNSPECIFIED LATERALITY (H): Primary | ICD-10-CM

## 2022-09-07 DIAGNOSIS — Z79.01 LONG TERM CURRENT USE OF ANTICOAGULANT THERAPY: ICD-10-CM

## 2022-09-07 DIAGNOSIS — Z86.718 HISTORY OF RECURRENT DEEP VEIN THROMBOSIS (DVT): ICD-10-CM

## 2022-09-07 LAB — INR BLD: 3 (ref 0.9–1.1)

## 2022-09-07 PROCEDURE — 85610 PROTHROMBIN TIME: CPT

## 2022-09-07 PROCEDURE — 36416 COLLJ CAPILLARY BLOOD SPEC: CPT

## 2022-09-07 NOTE — TELEPHONE ENCOUNTER
ANTICOAGULATION CLINIC REFERRAL RENEWAL REQUEST       An annual renewal order is required for all patients referred to [facility] Anticoagulation Clinic.?  Please review and sign the pended referral order for Jozef Saunders.       ANTICOAGULATION SUMMARY      Warfarin indication(s)   History of recurrent deep vein thrombosis (DVT) [Z86.718]  Long term current use of anticoagulant therapy [Z79.01]  History of pulmonary embolism [Z86.711]       Mechanical heart valve present?  NO       Current goal range   INR: 2.0-3.0     Goal appropriate for indication? Goal INR 2-3, standard for indication(s) above     Time in Therapeutic Range (TTR)  (Goal > 60%) 97.8%       Office visit with referring provider's group within last year yes on 8/1/22       Melody Zohu RN  [facility] Anticoagulation Clinic

## 2022-09-07 NOTE — PROGRESS NOTES
ANTICOAGULATION MANAGEMENT     Jozef Saunders 67 year old male is on warfarin with therapeutic INR result. (Goal INR 2.0-3.0)    Recent labs: (last 7 days)     09/07/22  1009   INR 3.0*       ASSESSMENT       Source(s): Chart Review    Previous INR was Therapeutic last visit; previously outside of goal range    Medication, diet, health changes since last INR chart reviewed; none identified     Left a detailed voicemail message with dosing. Pt to call back with any changes in health, updates, questions or concerns.           PLAN       Dosing Instructions: Continue your current warfarin dose with next INR in 4 weeks       Summary  As of 9/7/2022    Full warfarin instructions:  7.5 mg every Mon, Thu, Sat; 5 mg all other days   Next INR check:  10/5/2022             Detailed voice message left for Issac with dosing instructions and follow up date.     Contact [phone]  to schedule and with any changes, questions or concerns.     Education provided: Importance of notifying clinic for changes in medications; a sooner lab recheck maybe needed. and Contact [phone]  with any changes, questions or concerns.     Plan made per [facility] anticoagulation protocol    Melody Zhou, RN  Anticoagulation Clinic  9/7/2022    _______________________________________________________________________     Anticoagulation Episode Summary     Current INR goal:  2.0-3.0   TTR:  97.8 % (1 y)   Target end date:  Indefinite   Send INR reminders to:  LEANDRA ALBERTO    Indications    History of recurrent deep vein thrombosis (DVT) [Z86.718]  Long term current use of anticoagulant therapy [Z79.01]  History of pulmonary embolism [Z86.711]           Comments:  3/19/21 Does not need lovenox bridge per Dr. Humphreys         Anticoagulation Care Providers     Provider Role Specialty Phone number    Hina Humphreys MD Referring Internal Medicine [phone]    Rojas Chun MD  Internal Medicine - Pediatrics [phone]

## 2022-09-19 ENCOUNTER — ALLIED HEALTH/NURSE VISIT (OUTPATIENT)
Dept: PEDIATRICS | Facility: CLINIC | Age: 67
End: 2022-09-19
Payer: COMMERCIAL

## 2022-09-19 VITALS — SYSTOLIC BLOOD PRESSURE: 119 MMHG | HEART RATE: 75 BPM | DIASTOLIC BLOOD PRESSURE: 83 MMHG

## 2022-09-19 DIAGNOSIS — I10 ESSENTIAL HYPERTENSION, BENIGN: Primary | ICD-10-CM

## 2022-09-19 PROCEDURE — 99207 PR NO CHARGE NURSE ONLY: CPT

## 2022-09-19 NOTE — PROGRESS NOTES
Jozef Saunders is a 67 year old patient who comes in today for a Blood Pressure check.  Initial BP:  /83 (BP Location: Right arm, Patient Position: Chair, Cuff Size: Adult Large)   Pulse 75      Data Unavailable  Disposition: results routed to provider

## 2022-10-06 ENCOUNTER — TELEPHONE (OUTPATIENT)
Dept: ANTICOAGULATION | Facility: CLINIC | Age: 67
End: 2022-10-06

## 2022-10-06 NOTE — TELEPHONE ENCOUNTER
ANTICOAGULATION     Jozef Saunders is overdue for INR check.      Left message for patient to call and schedule lab appointment as soon as possible. If returning call, please schedule.     Lovely Ashby RN

## 2022-10-09 DIAGNOSIS — I10 ESSENTIAL HYPERTENSION, BENIGN: ICD-10-CM

## 2022-10-11 NOTE — TELEPHONE ENCOUNTER
Routing refill request to provider for review/approval because:  See last refill note and confirm ongoing refills, thanks    BP Readings from Last 2 Encounters:   09/19/22 119/83   08/15/22 (!) 150/88     Lab Results   Component Value Date    CR 0.79 08/01/2022    CR 0.89 03/08/2021     Lab Results   Component Value Date    POTASSIUM 4.3 08/01/2022    POTASSIUM 4.3 03/08/2021     Diandra Gipson, RN, BSN  [facility]

## 2022-10-12 ENCOUNTER — ANTICOAGULATION THERAPY VISIT (OUTPATIENT)
Dept: ANTICOAGULATION | Facility: CLINIC | Age: 67
End: 2022-10-12

## 2022-10-12 ENCOUNTER — LAB (OUTPATIENT)
Dept: LAB | Facility: CLINIC | Age: 67
End: 2022-10-12
Payer: COMMERCIAL

## 2022-10-12 DIAGNOSIS — I82.4Y9 DEEP VEIN THROMBOSIS (DVT) OF PROXIMAL LOWER EXTREMITY, UNSPECIFIED CHRONICITY, UNSPECIFIED LATERALITY (H): ICD-10-CM

## 2022-10-12 DIAGNOSIS — Z79.01 LONG TERM CURRENT USE OF ANTICOAGULANT THERAPY: ICD-10-CM

## 2022-10-12 DIAGNOSIS — Z86.718 HISTORY OF RECURRENT DEEP VEIN THROMBOSIS (DVT): Primary | ICD-10-CM

## 2022-10-12 DIAGNOSIS — Z79.01 LONG TERM CURRENT USE OF ANTICOAGULANTS WITH INR GOAL OF 2.0-3.0: ICD-10-CM

## 2022-10-12 DIAGNOSIS — Z86.711 HISTORY OF PULMONARY EMBOLISM: ICD-10-CM

## 2022-10-12 LAB — INR BLD: 2.3 (ref 0.9–1.1)

## 2022-10-12 PROCEDURE — 36416 COLLJ CAPILLARY BLOOD SPEC: CPT

## 2022-10-12 PROCEDURE — 85610 PROTHROMBIN TIME: CPT

## 2022-10-12 NOTE — PROGRESS NOTES
ANTICOAGULATION MANAGEMENT     Jozef Saunders 67 year old male is on warfarin with therapeutic INR result. (Goal INR 2.0-3.0)    Recent labs: (last 7 days)     10/12/22  1141   INR 2.3*       ASSESSMENT       Source(s): Chart Review and Patient/Caregiver Call       Warfarin doses taken: Warfarin taken as instructed    Diet: No new diet changes identified    New illness, injury, or hospitalization: No    Medication/supplement changes: None noted    Signs or symptoms of bleeding or clotting: No    Previous INR: Therapeutic last 2(+) visits    Additional findings: None       PLAN     Recommended plan for no diet, medication or health factor changes affecting INR     Dosing Instructions: Continue your current warfarin dose with next INR in 6 weeks       Summary  As of 10/12/2022    Full warfarin instructions:  7.5 mg every Mon, Thu, Sat; 5 mg all other days   Next INR check:  11/21/2022             Telephone call with Issac who agrees to plan and repeated back plan correctly    Lab visit scheduled    Education provided: Please call back if any changes to your diet, medications or how you've been taking warfarin    Plan made per [facility] anticoagulation protocol    Tiki Castañeda RN  Anticoagulation Clinic  10/12/2022    _______________________________________________________________________     Anticoagulation Episode Summary     Current INR goal:  2.0-3.0   TTR:  97.8 % (1 y)   Target end date:  Indefinite   Send INR reminders to:  LEANDRA ALBERTO    Indications    History of recurrent deep vein thrombosis (DVT) [Z86.718]  Long term current use of anticoagulant therapy [Z79.01]  History of pulmonary embolism [Z86.711]  Deep vein thrombosis (DVT) of proximal lower extremity  unspecified chronicity  unspecified laterality (H) [I82.4Y9]           Comments:  3/19/21 Does not need lovenox bridge per Dr. Humphreys         Anticoagulation Care Providers     Provider Role Specialty Phone number    Hina Humphreys MD Referring  Internal Medicine [phone]    Rojas Chun MD  Internal Medicine - Pediatrics [phone]

## 2022-10-14 ENCOUNTER — MYC REFILL (OUTPATIENT)
Dept: PEDIATRICS | Facility: CLINIC | Age: 67
End: 2022-10-14

## 2022-10-14 DIAGNOSIS — I10 ESSENTIAL HYPERTENSION, BENIGN: ICD-10-CM

## 2022-10-14 RX ORDER — WARFARIN SODIUM 5 MG/1
TABLET ORAL
Qty: 111 TABLET | Refills: 1 | Status: SHIPPED | OUTPATIENT
Start: 2022-10-14 | End: 2023-04-14

## 2022-10-14 RX ORDER — CARVEDILOL 12.5 MG/1
12.5 TABLET ORAL 2 TIMES DAILY WITH MEALS
Qty: 60 TABLET | Refills: 0 | Status: CANCELLED | OUTPATIENT
Start: 2022-10-14

## 2022-10-14 NOTE — TELEPHONE ENCOUNTER
Medication recheck , 2 months now of BP meds, bp checked, refill 1 x, and now he's at the end of 30 day supply. Pharmacy has tried to contact Doctor. Patient checks BP: 126/82, on 10/12/2022, every couple days monitors at home.   Ashtyn Weir MA

## 2022-10-14 NOTE — TELEPHONE ENCOUNTER
ANTICOAGULATION MANAGEMENT:  Medication Refill    Anticoagulation Summary  As of 10/12/2022    Warfarin maintenance plan:  7.5 mg (5 mg x 1.5) every Mon, Thu, Sat; 5 mg (5 mg x 1) all other days   Next INR check:  11/21/2022   Target end date:  Indefinite    Indications    History of recurrent deep vein thrombosis (DVT) [Z86.718]  Long term current use of anticoagulant therapy [Z79.01]  History of pulmonary embolism [Z86.711]  Deep vein thrombosis (DVT) of proximal lower extremity  unspecified chronicity  unspecified laterality (H) [I82.4Y9]             Anticoagulation Care Providers     Provider Role Specialty Phone number    Hina Humphreys MD Referring Internal Medicine [phone]    Rojas Chun MD  Internal Medicine - Pediatrics [phone]          Visit with referring provider/group within last year: Yes    [facility] referral signed within last year: Yes    Jozef meets all criteria for refill (current [facility] referral, office visit with referring provider/group in last year, lab monitoring up to date or not exceeding 2 weeks overdue). Rx instructions and quantity match patient's current dosing plan. Warfarin 90 day supply with 1 refill granted per [facility] protocol     Lovely Ashby RN  Anticoagulation Clinic

## 2022-10-15 ENCOUNTER — HEALTH MAINTENANCE LETTER (OUTPATIENT)
Age: 67
End: 2022-10-15

## 2022-10-17 RX ORDER — CARVEDILOL 12.5 MG/1
12.5 TABLET ORAL 2 TIMES DAILY WITH MEALS
Qty: 180 TABLET | Refills: 1 | Status: SHIPPED | OUTPATIENT
Start: 2022-10-17 | End: 2022-10-17

## 2022-10-17 RX ORDER — CARVEDILOL 12.5 MG/1
TABLET ORAL
Qty: 60 TABLET | Refills: 0 | OUTPATIENT
Start: 2022-10-17

## 2022-10-17 RX ORDER — CARVEDILOL 12.5 MG/1
12.5 TABLET ORAL 2 TIMES DAILY WITH MEALS
Qty: 180 TABLET | Refills: 3 | Status: SHIPPED | OUTPATIENT
Start: 2022-10-17 | End: 2023-08-02

## 2022-10-17 NOTE — TELEPHONE ENCOUNTER
Patient calling checking on status.  Patient informed of 72 hour   Patient is out of medication  Patient states he would like more than 1 refill.  Please advise    Parul Raza

## 2022-10-17 NOTE — TELEPHONE ENCOUNTER
Prescription approved per [facility] Refill Protocol.  Byron PHOENIX RN 10/17/2022 at 3:00 PM

## 2022-11-21 ENCOUNTER — ANTICOAGULATION THERAPY VISIT (OUTPATIENT)
Dept: ANTICOAGULATION | Facility: CLINIC | Age: 67
End: 2022-11-21

## 2022-11-21 ENCOUNTER — LAB (OUTPATIENT)
Dept: LAB | Facility: CLINIC | Age: 67
End: 2022-11-21
Payer: COMMERCIAL

## 2022-11-21 DIAGNOSIS — I82.4Y9 DEEP VEIN THROMBOSIS (DVT) OF PROXIMAL LOWER EXTREMITY, UNSPECIFIED CHRONICITY, UNSPECIFIED LATERALITY (H): ICD-10-CM

## 2022-11-21 DIAGNOSIS — Z79.01 LONG TERM CURRENT USE OF ANTICOAGULANT THERAPY: ICD-10-CM

## 2022-11-21 DIAGNOSIS — Z86.718 HISTORY OF RECURRENT DEEP VEIN THROMBOSIS (DVT): Primary | ICD-10-CM

## 2022-11-21 DIAGNOSIS — Z86.711 HISTORY OF PULMONARY EMBOLISM: ICD-10-CM

## 2022-11-21 LAB — INR BLD: 2.1 (ref 0.9–1.1)

## 2022-11-21 PROCEDURE — 85610 PROTHROMBIN TIME: CPT

## 2022-11-21 PROCEDURE — 36415 COLL VENOUS BLD VENIPUNCTURE: CPT

## 2022-11-21 NOTE — PROGRESS NOTES
ANTICOAGULATION MANAGEMENT     Jozef Saunders 67 year old male is on warfarin with therapeutic INR result. (Goal INR 2.0-3.0)    Recent labs: (last 7 days)     11/21/22  1003   INR 2.1*       ASSESSMENT       Source(s): Chart Review    Previous INR was Therapeutic last 2(+) visits    Medication, diet, health changes since last INR chart reviewed; none identified           PLAN     Recommended plan for no diet, medication or health factor changes affecting INR     Dosing Instructions: Continue your current warfarin dose with next INR in 6 weeks       Summary  As of 11/21/2022    Full warfarin instructions:  7.5 mg every Mon, Thu, Sat; 5 mg all other days; Starting 11/21/2022   Next INR check:  1/2/2023             Detailed voice message left for Issac with dosing instructions and follow up date.     Contact [phone]  to schedule and with any changes, questions or concerns.     Education provided:     Please call back if any changes to your diet, medications or how you've been taking warfarin    Contact [phone]  with any changes, questions or concerns.     Plan made per [facility] anticoagulation protocol    Parvin Guidry RN  Anticoagulation Clinic  11/21/2022    _______________________________________________________________________     Anticoagulation Episode Summary     Current INR goal:  2.0-3.0   TTR:  97.8 % (1 y)   Target end date:  Indefinite   Send INR reminders to:  LEANDRA DOLLY    Indications    History of recurrent deep vein thrombosis (DVT) [Z86.718]  Long term current use of anticoagulant therapy [Z79.01]  History of pulmonary embolism [Z86.711]  Deep vein thrombosis (DVT) of proximal lower extremity  unspecified chronicity  unspecified laterality (H) [I82.4Y9]           Comments:  3/19/21 Does not need lovenox bridge per Dr. Humphreys         Anticoagulation Care Providers     Provider Role Specialty Phone number    Hina Humphreys MD Referring Internal Medicine [phone]    Lay  Rojas Arellano MD  Internal Medicine - Pediatrics [phone]

## 2022-11-26 DIAGNOSIS — J31.0 CHRONIC RHINITIS: ICD-10-CM

## 2022-11-28 RX ORDER — FLUTICASONE PROPIONATE 50 MCG
SPRAY, SUSPENSION (ML) NASAL
Qty: 48 ML | Refills: 9 | Status: SHIPPED | OUTPATIENT
Start: 2022-11-28 | End: 2023-11-29

## 2022-11-28 NOTE — TELEPHONE ENCOUNTER
Prescription approved per [employer] Refill Protocol.  Diandra Gipson RN, BSN  [facility]

## 2023-01-02 ENCOUNTER — LAB (OUTPATIENT)
Dept: LAB | Facility: CLINIC | Age: 68
End: 2023-01-02
Payer: COMMERCIAL

## 2023-01-02 ENCOUNTER — ANTICOAGULATION THERAPY VISIT (OUTPATIENT)
Dept: ANTICOAGULATION | Facility: CLINIC | Age: 68
End: 2023-01-02

## 2023-01-02 DIAGNOSIS — Z86.718 HISTORY OF RECURRENT DEEP VEIN THROMBOSIS (DVT): Primary | ICD-10-CM

## 2023-01-02 DIAGNOSIS — Z86.711 HISTORY OF PULMONARY EMBOLISM: ICD-10-CM

## 2023-01-02 DIAGNOSIS — I82.4Y9 DEEP VEIN THROMBOSIS (DVT) OF PROXIMAL LOWER EXTREMITY, UNSPECIFIED CHRONICITY, UNSPECIFIED LATERALITY (H): ICD-10-CM

## 2023-01-02 DIAGNOSIS — Z79.01 LONG TERM CURRENT USE OF ANTICOAGULANT THERAPY: ICD-10-CM

## 2023-01-02 LAB — INR BLD: 2.1 (ref 0.9–1.1)

## 2023-01-02 PROCEDURE — 36416 COLLJ CAPILLARY BLOOD SPEC: CPT

## 2023-01-02 PROCEDURE — 85610 PROTHROMBIN TIME: CPT

## 2023-01-02 NOTE — PROGRESS NOTES
ANTICOAGULATION MANAGEMENT     Jozef Saunders 67 year old male is on warfarin with therapeutic INR result. (Goal INR 2.0-3.0)    Recent labs: (last 7 days)     01/02/23  1007   INR 2.1*       ASSESSMENT       Source(s): Chart Review and Patient/Caregiver Call       Warfarin doses taken: Warfarin taken as instructed    Diet: No new diet changes identified    New illness, injury, or hospitalization: No    Medication/supplement changes: None noted    Signs or symptoms of bleeding or clotting: No    Previous INR: Therapeutic last 2(+) visits    Additional findings: None       PLAN     Recommended plan for no diet, medication or health factor changes affecting INR     Dosing Instructions: Continue your current warfarin dose with next INR in 8 weeks, Patient will be in [place] when he would be due for 6 weeks.       Summary  As of 1/2/2023    Full warfarin instructions:  7.5 mg every Mon, Thu, Sat; 5 mg all other days   Next INR check:  3/2/2023             Telephone call with Issac who verbalizes understanding and agrees to plan    Lab visit scheduled    Education provided:     Please call back if any changes to your diet, medications or how you've been taking warfarin    Contact [phone]  with any changes, questions or concerns.     Plan made per [facility] anticoagulation protocol    Parvin Guidry RN  Anticoagulation Clinic  1/2/2023    _______________________________________________________________________     Anticoagulation Episode Summary     Current INR goal:  2.0-3.0   TTR:  97.8 % (1 y)   Target end date:  Indefinite   Send INR reminders to:  LEANDRA ALBERTO    Indications    History of recurrent deep vein thrombosis (DVT) [Z86.718]  Long term current use of anticoagulant therapy [Z79.01]  History of pulmonary embolism [Z86.711]  Deep vein thrombosis (DVT) of proximal lower extremity  unspecified chronicity  unspecified laterality (H) [I82.4Y9]           Comments:  3/19/21 Does not need lovenox bridge per   Juliann         Anticoagulation Care Providers     Provider Role Specialty Phone number    Hina Humphreys MD Referring Internal Medicine [phone]    Rojas Chun MD  Internal Medicine - Pediatrics [phone]

## 2023-03-02 ENCOUNTER — ANTICOAGULATION THERAPY VISIT (OUTPATIENT)
Dept: ANTICOAGULATION | Facility: CLINIC | Age: 68
End: 2023-03-02

## 2023-03-02 ENCOUNTER — LAB (OUTPATIENT)
Dept: LAB | Facility: CLINIC | Age: 68
End: 2023-03-02
Payer: COMMERCIAL

## 2023-03-02 DIAGNOSIS — Z79.01 LONG TERM CURRENT USE OF ANTICOAGULANT THERAPY: ICD-10-CM

## 2023-03-02 DIAGNOSIS — Z86.718 HISTORY OF RECURRENT DEEP VEIN THROMBOSIS (DVT): Primary | ICD-10-CM

## 2023-03-02 DIAGNOSIS — Z86.711 HISTORY OF PULMONARY EMBOLISM: ICD-10-CM

## 2023-03-02 DIAGNOSIS — I82.4Y9 DEEP VEIN THROMBOSIS (DVT) OF PROXIMAL LOWER EXTREMITY, UNSPECIFIED CHRONICITY, UNSPECIFIED LATERALITY (H): ICD-10-CM

## 2023-03-02 LAB — INR BLD: 1.8 (ref 0.9–1.1)

## 2023-03-02 PROCEDURE — 85610 PROTHROMBIN TIME: CPT

## 2023-03-02 PROCEDURE — 36416 COLLJ CAPILLARY BLOOD SPEC: CPT

## 2023-03-02 NOTE — PROGRESS NOTES
ANTICOAGULATION MANAGEMENT     Jozef Saunders 67 year old male is on warfarin with subtherapeutic INR result. (Goal INR 2.0-3.0)    Recent labs: (last 7 days)     03/02/23  1011   INR 1.8*       ASSESSMENT       Source(s): Chart Review and Patient/Caregiver Call       Warfarin doses taken: Warfarin taken as instructed    Diet: Just got back from 2 months in [place].  More greens while there    New illness, injury, or hospitalization: No    Medication/supplement changes: None noted    Signs or symptoms of bleeding or clotting: No    Previous INR: Therapeutic last 2(+) visits    Additional findings: None  More activity while in [place]         PLAN     Recommended plan for temporary change(s) affecting INR     Dosing Instructions: Continue your current warfarin dose with next INR in 3 weeks       Summary  As of 3/2/2023    Full warfarin instructions:  3/2: 10 mg; Otherwise 7.5 mg every Mon, Thu, Sat; 5 mg all other days   Next INR check:  3/23/2023             Telephone call with Issac who verbalizes understanding and agrees to plan    Lab visit scheduled    Education provided:     Please call back if any changes to your diet, medications or how you've been taking warfarin    Plan made per [facility] anticoagulation protocol    Amy Powers RN  Anticoagulation Clinic  3/2/2023    _______________________________________________________________________     Anticoagulation Episode Summary     Current INR goal:  2.0-3.0   TTR:  87.1 % (1 y)   Target end date:  Indefinite   Send INR reminders to:  LEANDRA ALBERTO    Indications    History of recurrent deep vein thrombosis (DVT) [Z86.718]  Long term current use of anticoagulant therapy [Z79.01]  History of pulmonary embolism [Z86.711]  Deep vein thrombosis (DVT) of proximal lower extremity  unspecified chronicity  unspecified laterality (H) [I82.4Y9]           Comments:  3/19/21 Does not need lovenox bridge per Dr. Humphreys         Anticoagulation Care Providers     Provider Role  Specialty Phone number    Hina Humphreys MD Referring Internal Medicine [phone]    Rojas Chun MD  Internal Medicine - Pediatrics [phone]

## 2023-03-10 DIAGNOSIS — I10 ESSENTIAL HYPERTENSION, BENIGN: ICD-10-CM

## 2023-03-10 RX ORDER — LISINOPRIL 20 MG/1
TABLET ORAL
Qty: 90 TABLET | Refills: 1 | Status: SHIPPED | OUTPATIENT
Start: 2023-03-10 | End: 2023-08-02

## 2023-03-23 ENCOUNTER — LAB (OUTPATIENT)
Dept: LAB | Facility: CLINIC | Age: 68
End: 2023-03-23
Payer: COMMERCIAL

## 2023-03-23 ENCOUNTER — ANTICOAGULATION THERAPY VISIT (OUTPATIENT)
Dept: ANTICOAGULATION | Facility: CLINIC | Age: 68
End: 2023-03-23

## 2023-03-23 DIAGNOSIS — Z86.718 HISTORY OF RECURRENT DEEP VEIN THROMBOSIS (DVT): Primary | ICD-10-CM

## 2023-03-23 DIAGNOSIS — Z86.711 HISTORY OF PULMONARY EMBOLISM: ICD-10-CM

## 2023-03-23 DIAGNOSIS — I82.4Y9 DEEP VEIN THROMBOSIS (DVT) OF PROXIMAL LOWER EXTREMITY, UNSPECIFIED CHRONICITY, UNSPECIFIED LATERALITY (H): ICD-10-CM

## 2023-03-23 DIAGNOSIS — Z79.01 LONG TERM CURRENT USE OF ANTICOAGULANT THERAPY: ICD-10-CM

## 2023-03-23 LAB — INR BLD: 2.2 (ref 0.9–1.1)

## 2023-03-23 PROCEDURE — 36416 COLLJ CAPILLARY BLOOD SPEC: CPT

## 2023-03-23 PROCEDURE — 85610 PROTHROMBIN TIME: CPT

## 2023-03-23 NOTE — PROGRESS NOTES
ANTICOAGULATION MANAGEMENT     Jozef Saunders 67 year old male is on warfarin with therapeutic INR result. (Goal INR 2.0-3.0)    Recent labs: (last 7 days)     03/23/23  1004   INR 2.2*       ASSESSMENT       Source(s): Chart Review and Patient/Caregiver Call       Warfarin doses taken: Warfarin taken as instructed    Diet: No new diet changes identified    New illness, injury, or hospitalization: No    Medication/supplement changes: None noted    Signs or symptoms of bleeding or clotting: No    Previous INR: Subtherapeutic    Additional findings: None         PLAN     Recommended plan for no diet, medication or health factor changes affecting INR     Dosing Instructions: Continue your current warfarin dose with next INR in 4 weeks       Summary  As of 3/23/2023    Full warfarin instructions:  7.5 mg every Mon, Thu, Sat; 5 mg all other days   Next INR check:  4/20/2023             Telephone call with Issac who verbalizes understanding and agrees to plan    Lab visit scheduled    Education provided:     Please call back if any changes to your diet, medications or how you've been taking warfarin    Contact [phone]  with any changes, questions or concerns.     Plan made per [facility] anticoagulation protocol    Parvin Guidry RN  Anticoagulation Clinic  3/23/2023    _______________________________________________________________________     Anticoagulation Episode Summary     Current INR goal:  2.0-3.0   TTR:  84.2 % (1 y)   Target end date:  Indefinite   Send INR reminders to:  LEANDRA ALBERTO    Indications    History of recurrent deep vein thrombosis (DVT) [Z86.718]  Long term current use of anticoagulant therapy [Z79.01]  History of pulmonary embolism [Z86.711]  Deep vein thrombosis (DVT) of proximal lower extremity  unspecified chronicity  unspecified laterality (H) [I82.4Y9]           Comments:  3/19/21 Does not need lovenox bridge per Dr. Humphreys         Anticoagulation Care Providers     Provider Role  Specialty Phone number    Hina Humphreys MD Referring Internal Medicine [phone]    Rojas Chun MD  Internal Medicine - Pediatrics [phone]

## 2023-04-13 DIAGNOSIS — Z86.711 HISTORY OF PULMONARY EMBOLISM: ICD-10-CM

## 2023-04-13 DIAGNOSIS — I82.4Y9 DEEP VEIN THROMBOSIS (DVT) OF PROXIMAL LOWER EXTREMITY, UNSPECIFIED CHRONICITY, UNSPECIFIED LATERALITY (H): ICD-10-CM

## 2023-04-13 DIAGNOSIS — Z79.01 LONG TERM CURRENT USE OF ANTICOAGULANTS WITH INR GOAL OF 2.0-3.0: ICD-10-CM

## 2023-04-13 NOTE — TELEPHONE ENCOUNTER
Routing refill request to provider for review/approval because:  Labs out of range:  INR  INR   Date Value Ref Range Status   03/23/2023 2.2 (H) 0.9 - 1.1 Final   06/14/2021 2.10 (H) 0.86 - 1.14 Final     Comment:     This test is intended for monitoring Coumadin therapy.  Results are not   accurate in patients with prolonged INR due to factor deficiency.        Joanie SEO RN, BSN

## 2023-04-14 RX ORDER — WARFARIN SODIUM 5 MG/1
TABLET ORAL
Qty: 111 TABLET | Refills: 1 | Status: SHIPPED | OUTPATIENT
Start: 2023-04-14 | End: 2023-10-16

## 2023-04-20 ENCOUNTER — LAB (OUTPATIENT)
Dept: LAB | Facility: CLINIC | Age: 68
End: 2023-04-20
Payer: COMMERCIAL

## 2023-04-20 ENCOUNTER — ANTICOAGULATION THERAPY VISIT (OUTPATIENT)
Dept: ANTICOAGULATION | Facility: CLINIC | Age: 68
End: 2023-04-20

## 2023-04-20 DIAGNOSIS — Z79.01 LONG TERM CURRENT USE OF ANTICOAGULANT THERAPY: ICD-10-CM

## 2023-04-20 DIAGNOSIS — Z86.711 HISTORY OF PULMONARY EMBOLISM: ICD-10-CM

## 2023-04-20 DIAGNOSIS — Z86.718 HISTORY OF RECURRENT DEEP VEIN THROMBOSIS (DVT): Primary | ICD-10-CM

## 2023-04-20 DIAGNOSIS — I82.4Y9 DEEP VEIN THROMBOSIS (DVT) OF PROXIMAL LOWER EXTREMITY, UNSPECIFIED CHRONICITY, UNSPECIFIED LATERALITY (H): ICD-10-CM

## 2023-04-20 LAB — INR BLD: 2 (ref 0.9–1.1)

## 2023-04-20 PROCEDURE — 36416 COLLJ CAPILLARY BLOOD SPEC: CPT

## 2023-04-20 PROCEDURE — 85610 PROTHROMBIN TIME: CPT

## 2023-04-20 NOTE — PROGRESS NOTES
ANTICOAGULATION MANAGEMENT     Jozef Saunders 67 year old male is on warfarin with therapeutic INR result. (Goal INR 2.0-3.0)    Recent labs: (last 7 days)     04/20/23  1013   INR 2.0*       ASSESSMENT       Source(s): Chart Review and Patient/Caregiver Call       Warfarin doses taken: Warfarin taken as instructed    Diet: No new diet changes identified    Medication/supplement changes: None noted    New illness, injury, or hospitalization: No    Signs or symptoms of bleeding or clotting: No    Previous INR: Therapeutic last 2(+) visits    Additional findings: None         PLAN     Recommended plan for no diet, medication or health factor changes affecting INR     Dosing Instructions: Continue your current warfarin dose with next INR in 5 weeks       Summary  As of 4/20/2023    Full warfarin instructions:  7.5 mg every Mon, Thu, Sat; 5 mg all other days   Next INR check:  5/25/2023             Telephone call with Issac who verbalizes understanding and agrees to plan    Lab visit scheduled    Education provided:     None required    Plan made per [facility] anticoagulation protocol    Lovely Ashby, RN  Anticoagulation Clinic  4/20/2023    _______________________________________________________________________     Anticoagulation Episode Summary     Current INR goal:  2.0-3.0   TTR:  84.2 % (1 y)   Target end date:  Indefinite   Send INR reminders to:  LEANDRA ALBERTO    Indications    History of recurrent deep vein thrombosis (DVT) [Z86.718]  Long term current use of anticoagulant therapy [Z79.01]  History of pulmonary embolism [Z86.711]  Deep vein thrombosis (DVT) of proximal lower extremity  unspecified chronicity  unspecified laterality (H) [I82.4Y9]           Comments:  3/19/21 Does not need lovenox bridge per Dr. Humphreys         Anticoagulation Care Providers     Provider Role Specialty Phone number    Hina Humphreys MD Referring Internal Medicine [phone]    Rojas Chun MD  Internal  Medicine - Pediatrics [phone]

## 2023-05-25 ENCOUNTER — LAB (OUTPATIENT)
Dept: LAB | Facility: CLINIC | Age: 68
End: 2023-05-25
Payer: COMMERCIAL

## 2023-05-25 ENCOUNTER — ANTICOAGULATION THERAPY VISIT (OUTPATIENT)
Dept: ANTICOAGULATION | Facility: CLINIC | Age: 68
End: 2023-05-25

## 2023-05-25 DIAGNOSIS — I82.4Y9 DEEP VEIN THROMBOSIS (DVT) OF PROXIMAL LOWER EXTREMITY, UNSPECIFIED CHRONICITY, UNSPECIFIED LATERALITY (H): ICD-10-CM

## 2023-05-25 DIAGNOSIS — Z86.718 HISTORY OF RECURRENT DEEP VEIN THROMBOSIS (DVT): Primary | ICD-10-CM

## 2023-05-25 DIAGNOSIS — Z86.711 HISTORY OF PULMONARY EMBOLISM: ICD-10-CM

## 2023-05-25 DIAGNOSIS — Z79.01 LONG TERM CURRENT USE OF ANTICOAGULANT THERAPY: ICD-10-CM

## 2023-05-25 LAB — INR BLD: 2.4 (ref 0.9–1.1)

## 2023-05-25 PROCEDURE — 85610 PROTHROMBIN TIME: CPT

## 2023-05-25 PROCEDURE — 36416 COLLJ CAPILLARY BLOOD SPEC: CPT

## 2023-05-25 NOTE — PROGRESS NOTES
ANTICOAGULATION MANAGEMENT     Jozef Saunders 67 year old male is on warfarin with therapeutic INR result. (Goal INR 2.0-3.0)    Recent labs: (last 7 days)     05/25/23  0917   INR 2.4*       ASSESSMENT       Source(s): Chart Review and Patient/Caregiver Call       Warfarin doses taken: Warfarin taken as instructed    Diet: No new diet changes identified    Medication/supplement changes: None noted    New illness, injury, or hospitalization: No    Signs or symptoms of bleeding or clotting: No    Previous result: Therapeutic last 2(+) visits    Additional findings: None         PLAN     Recommended plan for no diet, medication or health factor changes affecting INR     Dosing Instructions: Continue your current warfarin dose with next INR in 6 weeks       Summary  As of 5/25/2023    Full warfarin instructions:  7.5 mg every Mon, Thu, Sat; 5 mg all other days   Next INR check:  7/6/2023             Telephone call with Issac who verbalizes understanding and agrees to plan    Lab visit scheduled    Education provided:     Contact [phone]  with any changes, questions or concerns.     Plan made per [facility] anticoagulation protocol    Purvi Leija RN  Anticoagulation Clinic  5/25/2023    _______________________________________________________________________     Anticoagulation Episode Summary     Current INR goal:  2.0-3.0   TTR:  84.2 % (1 y)   Target end date:  Indefinite   Send INR reminders to:  LEANDRA ALBERTO    Indications    History of recurrent deep vein thrombosis (DVT) [Z86.718]  Long term current use of anticoagulant therapy [Z79.01]  History of pulmonary embolism [Z86.711]  Deep vein thrombosis (DVT) of proximal lower extremity  unspecified chronicity  unspecified laterality (H) [I82.4Y9]           Comments:  3/19/21 Does not need lovenox bridge per Dr. Humphreys         Anticoagulation Care Providers     Provider Role Specialty Phone number    Hina Humphreys MD Referring Internal Medicine  [phone]    Rojas Chun MD  Internal Medicine - Pediatrics [phone]

## 2023-07-03 ENCOUNTER — PATIENT OUTREACH (OUTPATIENT)
Dept: CARE COORDINATION | Facility: CLINIC | Age: 68
End: 2023-07-03
Payer: COMMERCIAL

## 2023-07-06 ENCOUNTER — ANTICOAGULATION THERAPY VISIT (OUTPATIENT)
Dept: ANTICOAGULATION | Facility: CLINIC | Age: 68
End: 2023-07-06

## 2023-07-06 ENCOUNTER — LAB (OUTPATIENT)
Dept: LAB | Facility: CLINIC | Age: 68
End: 2023-07-06
Payer: COMMERCIAL

## 2023-07-06 DIAGNOSIS — Z86.711 HISTORY OF PULMONARY EMBOLISM: ICD-10-CM

## 2023-07-06 DIAGNOSIS — Z86.718 HISTORY OF RECURRENT DEEP VEIN THROMBOSIS (DVT): Primary | ICD-10-CM

## 2023-07-06 DIAGNOSIS — Z79.01 LONG TERM CURRENT USE OF ANTICOAGULANT THERAPY: ICD-10-CM

## 2023-07-06 DIAGNOSIS — I82.4Y9 DEEP VEIN THROMBOSIS (DVT) OF PROXIMAL LOWER EXTREMITY, UNSPECIFIED CHRONICITY, UNSPECIFIED LATERALITY (H): ICD-10-CM

## 2023-07-06 LAB — INR BLD: 2.2 (ref 0.9–1.1)

## 2023-07-06 PROCEDURE — 85610 PROTHROMBIN TIME: CPT

## 2023-07-06 PROCEDURE — 36416 COLLJ CAPILLARY BLOOD SPEC: CPT

## 2023-07-06 NOTE — PROGRESS NOTES
ANTICOAGULATION MANAGEMENT     Jozef Saunders 67 year old male is on warfarin with therapeutic INR result. (Goal INR 2.0-3.0)    Recent labs: (last 7 days)     07/06/23  0913   INR 2.2*       ASSESSMENT       Source(s): Chart Review    Previous INR was Therapeutic last 2(+) visits    Medication, diet, health changes since last INR chart reviewed; none identified             PLAN     Recommended plan for no diet, medication or health factor changes affecting INR     Dosing Instructions: Continue your current warfarin dose with next INR in 6 weeks       Summary  As of 7/6/2023    Full warfarin instructions:  7.5 mg every Mon, Thu, Sat; 5 mg all other days   Next INR check:  8/17/2023             Detailed voice message left for Issac with dosing instructions and follow up date.   Sent [employer] message with dosing and follow up instructions    Contact [phone]  to schedule and with any changes, questions or concerns.     Education provided:     Please call back if any changes to your diet, medications or how you've been taking warfarin    Plan made per [facility] anticoagulation protocol    Tiki Castañeda RN  Anticoagulation Clinic  7/6/2023    _______________________________________________________________________     Anticoagulation Episode Summary     Current INR goal:  2.0-3.0   TTR:  84.2 % (1 y)   Target end date:  Indefinite   Send INR reminders to:  [facility]    Indications    History of recurrent deep vein thrombosis (DVT) [Z86.718]  Long term current use of anticoagulant therapy [Z79.01]  History of pulmonary embolism [Z86.711]  Deep vein thrombosis (DVT) of proximal lower extremity  unspecified chronicity  unspecified laterality (H) [I82.4Y9]           Comments:  3/19/21 Does not need lovenox bridge per Dr. Humphreys         Anticoagulation Care Providers     Provider Role Specialty Phone number    Hina Humphreys MD Referring Internal Medicine [phone]    WillRojas Arriola MD  Internal  Medicine - Pediatrics [phone]

## 2023-07-17 ENCOUNTER — PATIENT OUTREACH (OUTPATIENT)
Dept: CARE COORDINATION | Facility: CLINIC | Age: 68
End: 2023-07-17
Payer: COMMERCIAL

## 2023-08-02 ENCOUNTER — OFFICE VISIT (OUTPATIENT)
Dept: PEDIATRICS | Facility: CLINIC | Age: 68
End: 2023-08-02
Payer: COMMERCIAL

## 2023-08-02 VITALS
OXYGEN SATURATION: 94 % | TEMPERATURE: 98.1 F | HEIGHT: 69 IN | BODY MASS INDEX: 27.09 KG/M2 | HEART RATE: 62 BPM | RESPIRATION RATE: 16 BRPM | WEIGHT: 182.9 LBS | DIASTOLIC BLOOD PRESSURE: 86 MMHG | SYSTOLIC BLOOD PRESSURE: 132 MMHG

## 2023-08-02 DIAGNOSIS — F41.1 GENERALIZED ANXIETY DISORDER: ICD-10-CM

## 2023-08-02 DIAGNOSIS — Z12.5 SCREENING FOR PROSTATE CANCER: ICD-10-CM

## 2023-08-02 DIAGNOSIS — D69.6 THROMBOCYTOPENIA (H): ICD-10-CM

## 2023-08-02 DIAGNOSIS — I10 ESSENTIAL HYPERTENSION, BENIGN: ICD-10-CM

## 2023-08-02 DIAGNOSIS — Z79.01 LONG TERM CURRENT USE OF ANTICOAGULANTS WITH INR GOAL OF 2.0-3.0: ICD-10-CM

## 2023-08-02 DIAGNOSIS — E78.00 HYPERCHOLESTEROLEMIA: ICD-10-CM

## 2023-08-02 DIAGNOSIS — R12 HEARTBURN: ICD-10-CM

## 2023-08-02 DIAGNOSIS — I82.4Y9 DEEP VEIN THROMBOSIS (DVT) OF PROXIMAL LOWER EXTREMITY, UNSPECIFIED CHRONICITY, UNSPECIFIED LATERALITY (H): ICD-10-CM

## 2023-08-02 DIAGNOSIS — Z00.00 ENCOUNTER FOR MEDICARE ANNUAL WELLNESS EXAM: Primary | ICD-10-CM

## 2023-08-02 DIAGNOSIS — L30.9 DERMATITIS: ICD-10-CM

## 2023-08-02 DIAGNOSIS — R51.9 SINUS HEADACHE: ICD-10-CM

## 2023-08-02 LAB
ALBUMIN SERPL BCG-MCNC: 4.3 G/DL (ref 3.5–5.2)
ALP SERPL-CCNC: 75 U/L (ref 40–129)
ALT SERPL W P-5'-P-CCNC: 24 U/L (ref 0–70)
ANION GAP SERPL CALCULATED.3IONS-SCNC: 11 MMOL/L (ref 7–15)
AST SERPL W P-5'-P-CCNC: 34 U/L (ref 0–45)
BILIRUB SERPL-MCNC: 0.4 MG/DL
BUN SERPL-MCNC: 21.6 MG/DL (ref 8–23)
CALCIUM SERPL-MCNC: 9.2 MG/DL (ref 8.8–10.2)
CHLORIDE SERPL-SCNC: 108 MMOL/L (ref 98–107)
CHOLEST SERPL-MCNC: 151 MG/DL
CREAT SERPL-MCNC: 0.77 MG/DL (ref 0.67–1.17)
DEPRECATED HCO3 PLAS-SCNC: 22 MMOL/L (ref 22–29)
ERYTHROCYTE [DISTWIDTH] IN BLOOD BY AUTOMATED COUNT: 12.7 % (ref 10–15)
GFR SERPL CREATININE-BSD FRML MDRD: >90 ML/MIN/1.73M2
GLUCOSE SERPL-MCNC: 99 MG/DL (ref 70–99)
HCT VFR BLD AUTO: 46.5 % (ref 40–53)
HDLC SERPL-MCNC: 43 MG/DL
HGB BLD-MCNC: 15.8 G/DL (ref 13.3–17.7)
LDLC SERPL CALC-MCNC: 85 MG/DL
MCH RBC QN AUTO: 30.9 PG (ref 26.5–33)
MCHC RBC AUTO-ENTMCNC: 34 G/DL (ref 31.5–36.5)
MCV RBC AUTO: 91 FL (ref 78–100)
NONHDLC SERPL-MCNC: 108 MG/DL
PLATELET # BLD AUTO: 90 10E3/UL (ref 150–450)
POTASSIUM SERPL-SCNC: 4.1 MMOL/L (ref 3.4–5.3)
PROT SERPL-MCNC: 7 G/DL (ref 6.4–8.3)
PSA SERPL DL<=0.01 NG/ML-MCNC: 0.67 NG/ML (ref 0–4.5)
RBC # BLD AUTO: 5.12 10E6/UL (ref 4.4–5.9)
SODIUM SERPL-SCNC: 141 MMOL/L (ref 136–145)
TRIGL SERPL-MCNC: 116 MG/DL
WBC # BLD AUTO: 4.9 10E3/UL (ref 4–11)

## 2023-08-02 PROCEDURE — 99214 OFFICE O/P EST MOD 30 MIN: CPT | Mod: 25 | Performed by: INTERNAL MEDICINE

## 2023-08-02 PROCEDURE — 80053 COMPREHEN METABOLIC PANEL: CPT | Performed by: INTERNAL MEDICINE

## 2023-08-02 PROCEDURE — 80061 LIPID PANEL: CPT | Performed by: INTERNAL MEDICINE

## 2023-08-02 PROCEDURE — G0103 PSA SCREENING: HCPCS | Performed by: INTERNAL MEDICINE

## 2023-08-02 PROCEDURE — 36415 COLL VENOUS BLD VENIPUNCTURE: CPT | Performed by: INTERNAL MEDICINE

## 2023-08-02 PROCEDURE — 85027 COMPLETE CBC AUTOMATED: CPT | Performed by: INTERNAL MEDICINE

## 2023-08-02 PROCEDURE — G0439 PPPS, SUBSEQ VISIT: HCPCS | Performed by: INTERNAL MEDICINE

## 2023-08-02 RX ORDER — FAMOTIDINE 20 MG/1
20 TABLET, FILM COATED ORAL 2 TIMES DAILY
Qty: 180 TABLET | Refills: 4 | Status: SHIPPED | OUTPATIENT
Start: 2023-08-02 | End: 2024-10-01

## 2023-08-02 RX ORDER — TRIAMCINOLONE ACETONIDE 1 MG/G
CREAM TOPICAL 2 TIMES DAILY
Qty: 30 G | Refills: 0 | Status: SHIPPED | OUTPATIENT
Start: 2023-08-02 | End: 2024-04-03

## 2023-08-02 RX ORDER — ROSUVASTATIN CALCIUM 5 MG/1
5 TABLET, COATED ORAL DAILY
Qty: 90 TABLET | Refills: 4 | Status: SHIPPED | OUTPATIENT
Start: 2023-08-02 | End: 2024-08-12

## 2023-08-02 RX ORDER — CARVEDILOL 12.5 MG/1
12.5 TABLET ORAL 2 TIMES DAILY WITH MEALS
Qty: 180 TABLET | Refills: 3 | Status: SHIPPED | OUTPATIENT
Start: 2023-08-02 | End: 2024-08-12

## 2023-08-02 RX ORDER — LISINOPRIL 20 MG/1
20 TABLET ORAL DAILY
Qty: 90 TABLET | Refills: 4 | Status: SHIPPED | OUTPATIENT
Start: 2023-08-02 | End: 2024-08-07

## 2023-08-02 ASSESSMENT — PAIN SCALES - GENERAL: PAINLEVEL: NO PAIN (0)

## 2023-08-02 NOTE — PATIENT INSTRUCTIONS
Try using the flonase more often. Especially around times when you usually get headaches such as with travel or change of seasons.   Afrin OTC can be used for up to 3 days. You could see if this helps relieve sinus pressure.     Really try to minimize your naproxen use. We can try working on other options for headaches.     Consider adding vitamin D3 1000 international unit(s) daily.     Patient Education   Personalized Prevention Plan  You are due for the preventive services outlined below.  Your care team is available to assist you in scheduling these services.  If you have already completed any of these items, please share that information with your care team to update in your medical record.  Health Maintenance Due   Topic Date Due    LUNG CANCER SCREENING  11/03/2015    COVID-19 Vaccine (5 - Additional dose for Sana series) 02/05/2023    ANNUAL REVIEW OF HM ORDERS  08/01/2023    Annual Wellness Visit  08/01/2023    Prostate Test  08/01/2023

## 2023-08-02 NOTE — PROGRESS NOTES
SUBJECTIVE:   Issac is a 67 year old who presents for Preventive Visit.      2023     7:51 AM   Additional Questions   Roomed by candi schultz   Accompanied by n/a         2023     7:51 AM   Patient Reported Additional Medications   Patient reports taking the following new medications n/a       Are you in the first 12 months of your Medicare coverage?  No    History of Present Illness       Reason for visit:  Annual wellness visit    He eats 2-3 servings of fruits and vegetables daily.He consumes 0 sweetened beverage(s) daily.He exercises with enough effort to increase his heart rate 60 or more minutes per day.  He exercises with enough effort to increase his heart rate 6 days per week.   He is taking medications regularly.      Have you ever done Advance Care Planning? (For example, a Health Directive, POLST, or a discussion with a medical provider or your loved ones about your wishes): No, advance care planning information given to patient to review.  Patient declined advance care planning discussion at this time.    Fall risk  Fall Risk Assessment not completed.    Cognitive Screening   1) Repeat 3 items (Leader, Season, Table)    2) Clock draw: NORMAL  3) 3 item recall: Recalls 3 objects  Results: 3 items recalled: COGNITIVE IMPAIRMENT LESS LIKELY    Mini-CogTM Copyright S Tono. Licensed by the author for use in [facility]; reprinted with permission (soob@.[email or web address]). All rights reserved.      Do you have sleep apnea, excessive snoring or daytime drowsiness? : no    Reviewed and updated as needed this visit by clinical staff   Tobacco  Allergies  Meds              Reviewed and updated as needed this visit by Provider                 Social History     Tobacco Use     Smoking status: Former     Types: Cigarettes     Quit date: 1989     Years since quittin.7     Smokeless tobacco: Never   Substance Use Topics     Alcohol use: No     Comment: sober x26y           2023     7:59 AM    Alcohol Use   Prescreen: >3 drinks/day or >7 drinks/week? Not Applicable          No data to display              Do you have a current opioid prescription? No  Do you use any other controlled substances or medications that are not prescribed by a provider? None          Hyperlipidemia Follow-Up    Are you regularly taking any medication or supplement to lower your cholesterol?   Yes- statin  Are you having muscle aches or other side effects that you think could be caused by your cholesterol lowering medication?  No    Hypertension Follow-up    Do you check your blood pressure regularly outside of the clinic? Yes   Are you following a low salt diet? Yes  Are your blood pressures ever more than 140 on the top number (systolic) OR more   than 90 on the bottom number (diastolic), for example 140/90? No    Anxiety Follow-Up  How are you doing with your anxiety since your last visit? No change  Are you having other symptoms that might be associated with anxiety? No  Have you had a significant life event? No   Are you feeling depressed? No  Do you have any concerns with your use of alcohol or other drugs? No    Social History     Tobacco Use     Smoking status: Former     Types: Cigarettes     Quit date: 1989     Years since quittin.7     Smokeless tobacco: Never   Substance Use Topics     Alcohol use: No     Comment: sober x26y     Drug use: No         2017    10:11 AM 10/11/2018     9:37 AM 3/8/2021    11:34 AM   MARIANNE-7 SCORE   Total Score   0 (minimal anxiety)   Total Score 0 0 0         2017    10:11 AM 10/11/2018     9:37 AM 3/8/2021    11:33 AM   PHQ   PHQ-9 Total Score 0 0 0   Q9: Thoughts of better off dead/self-harm past 2 weeks Not at all Not at all Not at all         3/8/2021    11:33 AM   Last PHQ-9   1.  Little interest or pleasure in doing things 0   2.  Feeling down, depressed, or hopeless 0   3.  Trouble falling or staying asleep, or sleeping too much 0   4.  Feeling tired or having  little energy 0   5.  Poor appetite or overeating 0   6.  Feeling bad about yourself 0   7.  Trouble concentrating 0   8.  Moving slowly or restless 0   Q9: Thoughts of better off dead/self-harm past 2 weeks 0   PHQ-9 Total Score 0         3/8/2021    11:34 AM   MARIANNE-7    1. Feeling nervous, anxious, or on edge 0   2. Not being able to stop or control worrying 0   3. Worrying too much about different things 0   4. Trouble relaxing 0   5. Being so restless that it is hard to sit still 0   6. Becoming easily annoyed or irritable 0   7. Feeling afraid, as if something awful might happen 0   MARIANNE-7 Total Score 0       Current providers sharing in care for this patient include:  Patient Care Team:  Hina Humphreys MD as PCP - General (Internal Medicine)  Hina Humphreys MD as Assigned PCP  Leydi Saucedo (Dermatology)  Marnie Charlton RN as Personal Advocate & Liaison (PAL)    The following health maintenance items are reviewed in Epic and correct as of today:  Health Maintenance   Topic Date Due     COVID-19 Vaccine (5 - Additional dose for Sana series) 02/05/2023     MEDICARE ANNUAL WELLNESS VISIT  08/01/2023     PSA  08/01/2023     INFLUENZA VACCINE (1) 09/01/2023     ANNUAL REVIEW OF HM ORDERS  08/02/2024     FALL RISK ASSESSMENT  08/02/2024     DTAP/TDAP/TD IMMUNIZATION (4 - Td or Tdap) 07/08/2026     LIPID  08/01/2027     ADVANCE CARE PLANNING  08/02/2028     COLORECTAL CANCER SCREENING  12/26/2028     HEPATITIS C SCREENING  Completed     PHQ-2 (once per calendar year)  Completed     Pneumococcal Vaccine: 65+ Years  Completed     ZOSTER IMMUNIZATION  Completed     AORTIC ANEURYSM SCREENING (SYSTEM ASSIGNED)  Completed     IPV IMMUNIZATION  Aged Out     MENINGITIS IMMUNIZATION  Aged Out     LUNG CANCER SCREENING  Discontinued     Headaches if he doesn't have enough sleep or if he has nasal congestion. Takes naproxen up to 3 times a week, sometimes none.   Flonase helps for nasal  "congestion.      Review of Systems  Constitutional, HEENT, cardiovascular, pulmonary, gi and gu systems are negative, except as otherwise noted.    OBJECTIVE:   BP (!) 149/96 (BP Location: Right arm, Patient Position: Sitting, Cuff Size: Adult Regular)   Pulse 62   Temp 98.1  F (36.7  C) (Oral)   Resp 16   Ht 1.748 m (5' 8.82\")   Wt 83 kg (182 lb 14.4 oz)   SpO2 94%   BMI 27.15 kg/m   Estimated body mass index is 27.15 kg/m  as calculated from the following:    Height as of this encounter: 1.748 m (5' 8.82\").    Weight as of this encounter: 83 kg (182 lb 14.4 oz).  Physical Exam  GENERAL: healthy, alert and no distress  EYES: Eyes grossly normal to inspection, PERRL and conjunctivae and sclerae normal  HENT: ear canals and TM's normal, nose and mouth without ulcers or lesions  NECK: no adenopathy, no asymmetry, masses, or scars and thyroid normal to palpation  RESP: lungs clear to auscultation - no rales, rhonchi or wheezes  CV: regular rate and rhythm, normal S1 S2, no S3 or S4, no murmur, click or rub, no peripheral edema and peripheral pulses strong  ABDOMEN: soft, nontender, no hepatosplenomegaly, no masses and bowel sounds normal  MS: no gross musculoskeletal defects noted, no edema  SKIN: erythematous patches bilateral achilles region, with central papules. Scaling/dryness of plantar surfaces of feet. No intertriginous involvement.   NEURO: Normal strength and tone, mentation intact and speech normal  PSYCH: mentation appears normal, affect normal/bright    Diagnostic Test Results:  Labs reviewed in Epic    ASSESSMENT / PLAN:     1. Encounter for Medicare annual wellness exam      2. Generalized anxiety disorder  Stable,     - sertraline (ZOLOFT) 50 MG tablet; Take 1 tablet (50 mg) by mouth daily  Dispense: 90 tablet; Refill: 4    3. Long term current use of anticoagulants with INR goal of 2.0-3.0  Discussed pros/cons of warfarin vs doac. He prefers to stay on warfarin for now. May consider switching if " he has a procedure, as restart will be easier.    4. Deep vein thrombosis (DVT) of proximal lower extremity, unspecified chronicity, unspecified laterality (H)      5. Sinus headache  Does end up taking naproxen up to 3 times a week for this. Discussed utilizing the flonase which helps, and considering restart antihistamine when symptoms flare. Could also consider afrin for up to 3 days or atrovent nasal spray.    6. Essential hypertension, benign  Slightly above ideal today on recheck, but reports generally 120's/70's at home. Continue current regimen.    - Comprehensive metabolic panel (BMP + Alb, Alk Phos, ALT, AST, Total. Bili, TP); Future  - CBC with platelets; Future  - carvedilol (COREG) 12.5 MG tablet; Take 1 tablet (12.5 mg) by mouth 2 times daily (with meals)  Dispense: 180 tablet; Refill: 3  - lisinopril (ZESTRIL) 20 MG tablet; Take 1 tablet (20 mg) by mouth daily  Dispense: 90 tablet; Refill: 4  - Comprehensive metabolic panel (BMP + Alb, Alk Phos, ALT, AST, Total. Bili, TP)  - CBC with platelets    7. 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 Stable in  range.     8. Dermatitis  Appears more a contact dermatitis than fungal. However, given location, would have him use his antifungal in addition to steroid creamand let me know if not improving/resolved within a few weeks.   - triamcinolone (KENALOG) 0.1 % external cream; Apply topically 2 times daily For up to 2-4 weeks.  Dispense: 30 g; Refill: 0    9. Hypercholesterolemia  Doing well.  - Comprehensive metabolic panel (BMP + Alb, Alk Phos, ALT, AST, Total. Bili, TP); Future  - Lipid panel reflex to direct LDL Fasting; Future  - rosuvastatin (CRESTOR) 5 MG tablet; Take 1 tablet (5 mg) by mouth daily  Dispense: 90 tablet; Refill: 4  - Comprehensive metabolic panel (BMP + Alb, Alk Phos, ALT, AST, Total. Bili, TP)  - Lipid panel reflex to direct LDL Fasting    10. Heartburn  S/p Nissen for large hiatal hernia. Mild ongoing symptoms controlled well with pepcid.  "Had diarrhea on PPI.  - famotidine (PEPCID) 20 MG tablet; Take 1 tablet (20 mg) by mouth 2 times daily  Dispense: 180 tablet; Refill: 4    11. Screening for prostate cancer    - PROSTATE SPEC ANTIGEN SCREEN; Future  - PROSTATE SPEC ANTIGEN SCREEN    12. BENIGN HYPERTENSION  Reasonable control  - Comprehensive metabolic panel (BMP + Alb, Alk Phos, ALT, AST, Total. Bili, TP); Future  - CBC with platelets; Future  - carvedilol (COREG) 12.5 MG tablet; Take 1 tablet (12.5 mg) by mouth 2 times daily (with meals)  Dispense: 180 tablet; Refill: 3  - lisinopril (ZESTRIL) 20 MG tablet; Take 1 tablet (20 mg) by mouth daily  Dispense: 90 tablet; Refill: 4  - Comprehensive metabolic panel (BMP + Alb, Alk Phos, ALT, AST, Total. Bili, TP)  - CBC with platelets    13. Alcohol dependence in remission (H)  Not active.       COUNSELING:  Reviewed preventive health counseling, as reflected in patient instructions      BMI:   Estimated body mass index is 27.15 kg/m  as calculated from the following:    Height as of this encounter: 1.748 m (5' 8.82\").    Weight as of this encounter: 83 kg (182 lb 14.4 oz).         He reports that he quit smoking about 33 years ago. He has never used smokeless tobacco.      Appropriate preventive services were discussed with this patient, including applicable screening as appropriate for cardiovascular disease, diabetes, osteopenia/osteoporosis, and glaucoma.  As appropriate for age/gender, discussed screening for colorectal cancer, prostate cancer, breast cancer, and cervical cancer. Checklist reviewing preventive services available has been given to the patient.    Reviewed patients plan of care and provided an AVS. The Intermediate Care Plan ( asthma action plan, low back pain action plan, and migraine action plan) for Jozef meets the Care Plan requirement. This Care Plan has been established and reviewed with the Patient.          Hina Humphreys MD  [facility]    Identified " Health Risks:

## 2023-08-16 ENCOUNTER — ANTICOAGULATION THERAPY VISIT (OUTPATIENT)
Dept: ANTICOAGULATION | Facility: CLINIC | Age: 68
End: 2023-08-16

## 2023-08-16 ENCOUNTER — LAB (OUTPATIENT)
Dept: LAB | Facility: CLINIC | Age: 68
End: 2023-08-16
Payer: COMMERCIAL

## 2023-08-16 ENCOUNTER — DOCUMENTATION ONLY (OUTPATIENT)
Dept: ANTICOAGULATION | Facility: CLINIC | Age: 68
End: 2023-08-16

## 2023-08-16 DIAGNOSIS — Z86.711 HISTORY OF PULMONARY EMBOLISM: ICD-10-CM

## 2023-08-16 DIAGNOSIS — I82.4Y9 DEEP VEIN THROMBOSIS (DVT) OF PROXIMAL LOWER EXTREMITY, UNSPECIFIED CHRONICITY, UNSPECIFIED LATERALITY (H): Primary | ICD-10-CM

## 2023-08-16 DIAGNOSIS — Z79.01 LONG TERM CURRENT USE OF ANTICOAGULANT THERAPY: ICD-10-CM

## 2023-08-16 DIAGNOSIS — Z86.718 HISTORY OF RECURRENT DEEP VEIN THROMBOSIS (DVT): Primary | ICD-10-CM

## 2023-08-16 DIAGNOSIS — I82.4Y9 DEEP VEIN THROMBOSIS (DVT) OF PROXIMAL LOWER EXTREMITY, UNSPECIFIED CHRONICITY, UNSPECIFIED LATERALITY (H): ICD-10-CM

## 2023-08-16 LAB — INR BLD: 2.3 (ref 0.9–1.1)

## 2023-08-16 PROCEDURE — 36416 COLLJ CAPILLARY BLOOD SPEC: CPT

## 2023-08-16 PROCEDURE — 85610 PROTHROMBIN TIME: CPT

## 2023-08-16 NOTE — PROGRESS NOTES
ANTICOAGULATION MANAGEMENT     Jozef Saunders 67 year old male is on warfarin with therapeutic INR result. (Goal INR 2.0-3.0)    Recent labs: (last 7 days)     08/16/23  0918   INR 2.3*       ASSESSMENT     Source(s): Chart Review and Patient/Caregiver Call     Warfarin doses taken: Warfarin taken as instructed  Diet: No new diet changes identified  Medication/supplement changes: None noted  New illness, injury, or hospitalization: No  Signs or symptoms of bleeding or clotting: No  Previous result: Therapeutic last 2(+) visits  Additional findings: None       PLAN     Recommended plan for no diet, medication or health factor changes affecting INR     Dosing Instructions: Continue your current warfarin dose with next INR in 6 weeks       Summary  As of 8/16/2023      Full warfarin instructions:  7.5 mg every Mon, Thu, Sat; 5 mg all other days   Next INR check:  9/27/2023               Telephone call with Issac who verbalizes understanding and agrees to plan    Lab visit scheduled    Education provided:   Contact [phone]  with any changes, questions or concerns.     Plan made per [facility] anticoagulation protocol    Purvi Leija RN  Anticoagulation Clinic  8/16/2023    _______________________________________________________________________     Anticoagulation Episode Summary       Current INR goal:  2.0-3.0   TTR:  86.4 % (1 y)   Target end date:  Indefinite   Send INR reminders to:  LEANDRA ALBERTO    Indications    History of recurrent deep vein thrombosis (DVT) [Z86.718]  Long term current use of anticoagulant therapy [Z79.01]  History of pulmonary embolism [Z86.711]  Deep vein thrombosis (DVT) of proximal lower extremity  unspecified chronicity  unspecified laterality (H) [I82.4Y9]             Comments:  3/19/21 Does not need lovenox bridge per Dr. Humphreys             Anticoagulation Care Providers       Provider Role Specialty Phone number    Hina Humphreys MD Referring Internal Medicine [phone]     Rojas Chun MD  Internal Medicine - Pediatrics [phone]

## 2023-08-16 NOTE — PROGRESS NOTES
ANTICOAGULATION CLINIC REFERRAL RENEWAL REQUEST       An annual renewal order is required for all patients referred to [facility].?  Please review and sign the pended referral order for Jozef Saunders.       ANTICOAGULATION SUMMARY      Warfarin indication(s)   PE and Recurrent DVT    Mechanical heart valve present?  NO       Current goal range   INR: 2.0-3.0     Goal appropriate for indication? Goal INR 2-3, standard for indication(s) above     Time in Therapeutic Range (TTR)  (Goal > 60%) 86%       Office visit with referring provider's group within last year yes on 8/2/23       Purvi Leija RN  [facility]

## 2023-09-26 ENCOUNTER — MYC MEDICAL ADVICE (OUTPATIENT)
Dept: PEDIATRICS | Facility: CLINIC | Age: 68
End: 2023-09-26
Payer: COMMERCIAL

## 2023-09-26 DIAGNOSIS — L30.9 DERMATITIS: Primary | ICD-10-CM

## 2023-09-26 NOTE — TELEPHONE ENCOUNTER
"Patient sent an update on itching on his ankles-appointment was 8/2/23 and the plan was:    \"Dermatitis  Appears more a contact dermatitis than fungal. However, given location, would have him use his antifungal in addition to steroid cream and let me know if not improving/resolved within a few weeks.   - triamcinolone (KENALOG) 0.1 % external cream; Apply topically 2 times daily For up to 2-4 weeks.  Dispense: 30 g; Refill: 0\"    Patient has been using triamcinolone with no improvement in itching. Asking if he could try an alternative cream?  Was there a plan to try something else? Please advise.  Marnie Charlton RN        "

## 2023-09-27 ENCOUNTER — ANTICOAGULATION THERAPY VISIT (OUTPATIENT)
Dept: ANTICOAGULATION | Facility: CLINIC | Age: 68
End: 2023-09-27

## 2023-09-27 ENCOUNTER — LAB (OUTPATIENT)
Dept: LAB | Facility: CLINIC | Age: 68
End: 2023-09-27
Payer: COMMERCIAL

## 2023-09-27 DIAGNOSIS — Z86.718 HISTORY OF RECURRENT DEEP VEIN THROMBOSIS (DVT): Primary | ICD-10-CM

## 2023-09-27 DIAGNOSIS — I82.4Y9 DEEP VEIN THROMBOSIS (DVT) OF PROXIMAL LOWER EXTREMITY, UNSPECIFIED CHRONICITY, UNSPECIFIED LATERALITY (H): ICD-10-CM

## 2023-09-27 DIAGNOSIS — Z79.01 LONG TERM CURRENT USE OF ANTICOAGULANT THERAPY: ICD-10-CM

## 2023-09-27 DIAGNOSIS — Z86.711 HISTORY OF PULMONARY EMBOLISM: ICD-10-CM

## 2023-09-27 LAB — INR BLD: 2.1 (ref 0.9–1.1)

## 2023-09-27 PROCEDURE — 36416 COLLJ CAPILLARY BLOOD SPEC: CPT

## 2023-09-27 PROCEDURE — 85610 PROTHROMBIN TIME: CPT

## 2023-09-27 RX ORDER — KETOCONAZOLE 20 MG/G
CREAM TOPICAL DAILY
Qty: 60 G | Refills: 1 | Status: SHIPPED | OUTPATIENT
Start: 2023-09-27 | End: 2024-04-03

## 2023-09-27 NOTE — PROGRESS NOTES
ANTICOAGULATION MANAGEMENT     Jozef Saunders 68 year old male is on warfarin with therapeutic INR result. (Goal INR 2.0-3.0)    Recent labs: (last 7 days)     09/27/23  0915   INR 2.1*       ASSESSMENT     Source(s): Chart Review  Previous INR was Therapeutic last 2(+) visits  Medication, diet, health changes since last INR chart reviewed; none identified    I left a detailed voicemail with the orders reflected in flowsheet. I have also requested a call back if there have been any missed doses, concerns, illness, fever, or if there have been any changes in medications, activity level, or diet       PLAN     Recommended plan for no diet, medication or health factor changes affecting INR     Dosing Instructions: Continue your current warfarin dose with next INR in 6 weeks       Summary  As of 9/27/2023      Full warfarin instructions:  7.5 mg every Mon, Thu, Sat; 5 mg all other days   Next INR check:  11/8/2023               Detailed voice message left for Issac with dosing instructions and follow up date.     Contact [phone]  to schedule and with any changes, questions or concerns.     Education provided:   Please call back if any changes to your diet, medications or how you've been taking warfarin    Plan made per [facility] anticoagulation protocol    Melvi Moreno, RN  Anticoagulation Clinic  9/27/2023    _______________________________________________________________________     Anticoagulation Episode Summary       Current INR goal:  2.0-3.0   TTR:  86.4 % (1 y)   Target end date:  Indefinite   Send INR reminders to:  LEANDRA DOLLY    Indications    History of recurrent deep vein thrombosis (DVT) [Z86.718]  Long term current use of anticoagulant therapy [Z79.01]  History of pulmonary embolism [Z86.711]  Deep vein thrombosis (DVT) of proximal lower extremity  unspecified chronicity  unspecified laterality (H) [I82.4Y9]             Comments:  3/19/21 Does not need lovenox bridge per Dr. Humphreys              Anticoagulation Care Providers       Provider Role Specialty Phone number    Hina Humphreys MD Referring Internal Medicine [phone]    Rojas Chun MD  Internal Medicine - Pediatrics [phone]

## 2023-09-28 NOTE — TELEPHONE ENCOUNTER
Plan is to try ketoconazole for 4 weeks.  Advised patient to send an update in about 4 weeks.    Marnie hCarlton RN

## 2023-10-15 DIAGNOSIS — Z86.711 HISTORY OF PULMONARY EMBOLISM: ICD-10-CM

## 2023-10-15 DIAGNOSIS — Z79.01 LONG TERM CURRENT USE OF ANTICOAGULANTS WITH INR GOAL OF 2.0-3.0: ICD-10-CM

## 2023-10-15 DIAGNOSIS — I82.4Y9 DEEP VEIN THROMBOSIS (DVT) OF PROXIMAL LOWER EXTREMITY, UNSPECIFIED CHRONICITY, UNSPECIFIED LATERALITY (H): ICD-10-CM

## 2023-10-16 RX ORDER — WARFARIN SODIUM 5 MG/1
TABLET ORAL
Qty: 111 TABLET | Refills: 1 | Status: SHIPPED | OUTPATIENT
Start: 2023-10-16 | End: 2024-04-03

## 2023-10-16 NOTE — TELEPHONE ENCOUNTER
ANTICOAGULATION MANAGEMENT:  Medication Refill    Anticoagulation Summary  As of 9/27/2023      Warfarin maintenance plan:  7.5 mg (5 mg x 1.5) every Mon, Thu, Sat; 5 mg (5 mg x 1) all other days   Next INR check:  11/8/2023   Target end date:  Indefinite    Indications    History of recurrent deep vein thrombosis (DVT) [Z86.718]  Long term current use of anticoagulant therapy [Z79.01]  History of pulmonary embolism [Z86.711]  Deep vein thrombosis (DVT) of proximal lower extremity  unspecified chronicity  unspecified laterality (H) [I82.4Y9]                 Anticoagulation Care Providers       Provider Role Specialty Phone number    Hina Humphreys MD Referring Internal Medicine [phone]    Rojas Chun MD  Internal Medicine - Pediatrics [phone]            Refill Criteria    Visit with referring provider/group: Meets criteria: office visit within referring provider group in the last 1 year on 8/2/2023    [facility] referral signed last signed: 08/16/2023; within last year: Yes    Lab monitoring not exceeding 2 weeks overdue: Yes    Jozef meets all criteria for refill. Rx instructions and quantity match patient's current dosing plan. Warfarin 90 day supply with 1 refill granted per [facility] protocol     Parvin Guidry RN  Anticoagulation Clinic

## 2023-11-08 ENCOUNTER — ANTICOAGULATION THERAPY VISIT (OUTPATIENT)
Dept: ANTICOAGULATION | Facility: CLINIC | Age: 68
End: 2023-11-08

## 2023-11-08 ENCOUNTER — LAB (OUTPATIENT)
Dept: LAB | Facility: CLINIC | Age: 68
End: 2023-11-08
Payer: COMMERCIAL

## 2023-11-08 DIAGNOSIS — Z79.01 LONG TERM CURRENT USE OF ANTICOAGULANT THERAPY: ICD-10-CM

## 2023-11-08 DIAGNOSIS — I82.4Y9 DEEP VEIN THROMBOSIS (DVT) OF PROXIMAL LOWER EXTREMITY, UNSPECIFIED CHRONICITY, UNSPECIFIED LATERALITY (H): ICD-10-CM

## 2023-11-08 DIAGNOSIS — Z86.718 HISTORY OF RECURRENT DEEP VEIN THROMBOSIS (DVT): Primary | ICD-10-CM

## 2023-11-08 DIAGNOSIS — Z86.711 HISTORY OF PULMONARY EMBOLISM: ICD-10-CM

## 2023-11-08 LAB — INR BLD: 2.7 (ref 0.9–1.1)

## 2023-11-08 PROCEDURE — 36416 COLLJ CAPILLARY BLOOD SPEC: CPT

## 2023-11-08 PROCEDURE — 85610 PROTHROMBIN TIME: CPT

## 2023-11-08 NOTE — PROGRESS NOTES
ANTICOAGULATION MANAGEMENT     Jozef Saunders 68 year old male is on warfarin with therapeutic INR result. (Goal INR 2.0-3.0)    Recent labs: (last 7 days)     11/08/23  0949   INR 2.7*       ASSESSMENT     Source(s): Chart Review and Patient/Caregiver Call     Warfarin doses taken: Warfarin taken as instructed  Diet: No new diet changes identified  Medication/supplement changes: None noted  New illness, injury, or hospitalization: No  Signs or symptoms of bleeding or clotting: No  Previous result: Therapeutic last 2(+) visits  Additional findings: Patient will be leaving for [place] in January, will be gone for 3 months, patient will have more information at next INR check in December       PLAN     Recommended plan for no diet, medication or health factor changes affecting INR     Dosing Instructions: Continue your current warfarin dose with next INR in 6 weeks       Summary  As of 11/8/2023      Full warfarin instructions:  7.5 mg every Mon, Thu, Sat; 5 mg all other days   Next INR check:  12/20/2023               Telephone call with Issac who verbalizes understanding and agrees to plan    Lab visit scheduled    Education provided:   Please call back if any changes to your diet, medications or how you've been taking warfarin  Contact [phone]  with any changes, questions or concerns.     Plan made per [facility] anticoagulation protocol    Parvin Guidry RN  Anticoagulation Clinic  11/8/2023    _______________________________________________________________________     Anticoagulation Episode Summary       Current INR goal:  2.0-3.0   TTR:  86.4% (1 y)   Target end date:  Indefinite   Send INR reminders to:  LEANDRA ALBERTO    Indications    History of recurrent deep vein thrombosis (DVT) [Z86.718]  Long term current use of anticoagulant therapy [Z79.01]  History of pulmonary embolism [Z86.711]  Deep vein thrombosis (DVT) of proximal lower extremity  unspecified chronicity  unspecified laterality (H) [I82.4Y9]              Comments:  3/19/21 Does not need lovenox bridge per Dr. Humphreys             Anticoagulation Care Providers       Provider Role Specialty Phone number    Hina Humphreys MD Referring Internal Medicine [phone]    Rojas Chun MD  Internal Medicine - Pediatrics [phone]

## 2023-11-29 DIAGNOSIS — J31.0 CHRONIC RHINITIS: ICD-10-CM

## 2023-11-29 RX ORDER — FLUTICASONE PROPIONATE 50 MCG
SPRAY, SUSPENSION (ML) NASAL
Qty: 48 ML | Refills: 9 | Status: SHIPPED | OUTPATIENT
Start: 2023-11-29

## 2023-12-20 ENCOUNTER — LAB (OUTPATIENT)
Dept: LAB | Facility: CLINIC | Age: 68
End: 2023-12-20
Payer: COMMERCIAL

## 2023-12-20 ENCOUNTER — TRANSFERRED RECORDS (OUTPATIENT)
Dept: HEALTH INFORMATION MANAGEMENT | Facility: CLINIC | Age: 68
End: 2023-12-20

## 2023-12-20 ENCOUNTER — ANTICOAGULATION THERAPY VISIT (OUTPATIENT)
Dept: ANTICOAGULATION | Facility: CLINIC | Age: 68
End: 2023-12-20

## 2023-12-20 DIAGNOSIS — Z86.718 HISTORY OF RECURRENT DEEP VEIN THROMBOSIS (DVT): Primary | ICD-10-CM

## 2023-12-20 DIAGNOSIS — Z79.01 LONG TERM CURRENT USE OF ANTICOAGULANT THERAPY: ICD-10-CM

## 2023-12-20 DIAGNOSIS — I82.4Y9 DEEP VEIN THROMBOSIS (DVT) OF PROXIMAL LOWER EXTREMITY, UNSPECIFIED CHRONICITY, UNSPECIFIED LATERALITY (H): ICD-10-CM

## 2023-12-20 DIAGNOSIS — Z86.711 HISTORY OF PULMONARY EMBOLISM: ICD-10-CM

## 2023-12-20 LAB — INR BLD: 2.1 (ref 0.9–1.1)

## 2023-12-20 PROCEDURE — 85610 PROTHROMBIN TIME: CPT

## 2023-12-20 PROCEDURE — 36416 COLLJ CAPILLARY BLOOD SPEC: CPT

## 2023-12-20 NOTE — PROGRESS NOTES
Pt will  be going to [place] from 12/29 - the end of March. They are renting the house so they won't be able to access the house until 1/1/24. He is asking that we mail the standing order to the rental house the end of Dec/beginning of Jan.   Will send a staff message to mail order to:  [id number]  [place] [address]

## 2023-12-20 NOTE — PROGRESS NOTES
ANTICOAGULATION MANAGEMENT     Jozef Saunders 68 year old male is on warfarin with therapeutic INR result. (Goal INR 2.0-3.0)    Recent labs: (last 7 days)     12/20/23  0943   INR 2.1*       ASSESSMENT     Source(s): Chart Review  Previous INR was Therapeutic last 2(+) visits  Medication, diet, health changes since last INR chart reviewed; none identified    I left a detailed voicemail with the orders reflected in flowsheet. I have also requested a call back if there have been any missed doses, concerns, illness, fever, or if there have been any changes in medications, activity level, or diet       PLAN     Recommended plan for no diet, medication or health factor changes affecting INR     Dosing Instructions: Continue your current warfarin dose with next INR in 6 weeks       Summary  As of 12/20/2023      Full warfarin instructions:  7.5 mg every Mon, Thu, Sat; 5 mg all other days   Next INR check:  1/31/2024               Detailed voice message left for Issac with dosing instructions and follow up date.     Contact [phone]  to schedule and with any changes, questions or concerns.     Education provided:   Please call back if any changes to your diet, medications or how you've been taking warfarin    Plan made per [facility] anticoagulation protocol    Melvi Moreno, RN  Anticoagulation Clinic  12/20/2023    _______________________________________________________________________     Anticoagulation Episode Summary       Current INR goal:  2.0-3.0   TTR:  86.4% (1 y)   Target end date:  Indefinite   Send INR reminders to:  LEANDRA DOLLY    Indications    History of recurrent deep vein thrombosis (DVT) [Z86.718]  Long term current use of anticoagulant therapy [Z79.01]  History of pulmonary embolism [Z86.711]  Deep vein thrombosis (DVT) of proximal lower extremity  unspecified chronicity  unspecified laterality (H) [I82.4Y9]             Comments:  3/19/21 Does not need lovenox bridge per Dr. Humphreys              Anticoagulation Care Providers       Provider Role Specialty Phone number    Hina Humphreys MD Referring Internal Medicine [phone]    Rojas Chun MD  Internal Medicine - Pediatrics [phone]

## 2024-01-17 NOTE — TELEPHONE ENCOUNTER
Prescription approved per [facility] Refill Protocol.  Alice Yen RN  
No Exposure

## 2024-01-31 NOTE — PROGRESS NOTES
Received call from patient. Patient is currently in [place] and was told to have his INR checked. Patient was told he needed a new form with the diagnosis code on the form and signed by the provider in order to get his labs done.     Fax # [phone] to [facility]    Byron PHOENIX RN 1/31/2024 at 2:00 PM

## 2024-02-05 ENCOUNTER — MYC MEDICAL ADVICE (OUTPATIENT)
Dept: PEDIATRICS | Facility: CLINIC | Age: 69
End: 2024-02-05
Payer: COMMERCIAL

## 2024-02-05 NOTE — TELEPHONE ENCOUNTER
Patient has a standing order for INR and is currently in [place].  We need to print and send to the lab in [place]. Patient is requesting to email it to him.  Ok to send as the order is standing for 1-6 weeks prn.    Emailed: informed the patient that this is not secure.    Marnie Charlton RN

## 2024-02-05 NOTE — LETTER
2/5/2024         RE: Jozef Saunders  [address]  [place] [address]      To [facility]:      Order as follows:    Please draw INR.    Diagnosis associated with this order:    Deep vein thrombosis (DVT) of proximal lower extremity, unspecified chronicity, unspecified laterality (H)  - Primary      Long term current use of anticoagulant therapy              Sincerely,        Hina Humphreys MD

## 2024-02-06 LAB — INR (EXTERNAL): 1.86 (ref 0.9–1.1)

## 2024-02-06 NOTE — TELEPHONE ENCOUNTER
Patient is also asking to fax the INR order to [facility] at [phone]   The standing order for INR in the chart to be done every 1-6 weeks and PRN.    Letter written and faxed to [facility]. Patient informed.    Order: INR:  Diagnosis:  Deep vein thrombosis (DVT) of proximal lower extremity, unspecified chronicity, unspecified laterality (H)  - Primary      Long term current use of anticoagulant therapy        Marnie Charlton RN

## 2024-02-09 ENCOUNTER — TELEPHONE (OUTPATIENT)
Dept: ANTICOAGULATION | Facility: CLINIC | Age: 69
End: 2024-02-09
Payer: COMMERCIAL

## 2024-02-09 ENCOUNTER — ANTICOAGULATION THERAPY VISIT (OUTPATIENT)
Dept: ANTICOAGULATION | Facility: CLINIC | Age: 69
End: 2024-02-09
Payer: COMMERCIAL

## 2024-02-09 DIAGNOSIS — Z86.711 HISTORY OF PULMONARY EMBOLISM: ICD-10-CM

## 2024-02-09 DIAGNOSIS — Z79.01 LONG TERM CURRENT USE OF ANTICOAGULANT THERAPY: ICD-10-CM

## 2024-02-09 DIAGNOSIS — Z86.718 HISTORY OF RECURRENT DEEP VEIN THROMBOSIS (DVT): Primary | ICD-10-CM

## 2024-02-09 DIAGNOSIS — Z86.711 HISTORY OF PULMONARY EMBOLISM: Primary | ICD-10-CM

## 2024-02-09 DIAGNOSIS — I82.4Y9 DEEP VEIN THROMBOSIS (DVT) OF PROXIMAL LOWER EXTREMITY, UNSPECIFIED CHRONICITY, UNSPECIFIED LATERALITY (H): ICD-10-CM

## 2024-02-09 NOTE — TELEPHONE ENCOUNTER
Patient is currently in [place] and will be due for an INR check on 2/20/24.  Facility that he is going to for the INR checks requires a hand signed order from PCP.   Please print out and sign pended order and fax to [facility] at [phone]. Patient will be returning to [place] in April.

## 2024-02-09 NOTE — PROGRESS NOTES
ANTICOAGULATION MANAGEMENT     Jozef Saunders 68 year old male is on warfarin with subtherapeutic INR result. (Goal INR 2.0-3.0)    Recent labs: (last 7 days)     02/06/24  1337   INR 1.86*       ASSESSMENT     Source(s): Chart Review and Patient/Caregiver Call     Warfarin doses taken: Less warfarin taken than planned which may be affecting INR.  Accidentally took 5 mg instead of 7.5 mg one day within the last week.  Diet: No new diet changes identified  Medication/supplement changes: None noted  New illness, injury, or hospitalization: No  Signs or symptoms of bleeding or clotting: No  Previous result: Therapeutic last 2(+) visits  Additional findings: Patient is more active while in [place].  He will be staying in [place] until the end of March.       PLAN     Recommended plan for temporary change(s) and ongoing change(s) affecting INR     Dosing Instructions: booster dose then continue your current warfarin dose with next INR in 2 weeks       Summary  As of 2/9/2024      Full warfarin instructions:  7.5 mg every Mon, Thu, Sat; 5 mg all other days   Next INR check:  2/20/2024               Telephone call with Issac who agrees to plan and repeated back plan correctly    Patient using outside facility for labs    Education provided:   Please call back if any changes to your diet, medications or how you've been taking warfarin    Plan made per [facility] anticoagulation protocol    Tiki Castañeda RN  Anticoagulation Clinic  2/9/2024    _______________________________________________________________________     Anticoagulation Episode Summary       Current INR goal:  2.0-3.0   TTR:  83.4% (1 y)   Target end date:  Indefinite   Send INR reminders to:  [facility]    Indications    History of recurrent deep vein thrombosis (DVT) [Z86.718]  Long term current use of anticoagulant therapy [Z79.01]  History of pulmonary embolism [Z86.711]  Deep vein thrombosis (DVT) of proximal lower extremity  unspecified chronicity  unspecified  laterality (H) [I82.4Y9]             Comments:  3/19/21 Does not need lovenox bridge per Dr. Humphreys             Anticoagulation Care Providers       Provider Role Specialty Phone number    Hina Humphreys MD Referring Internal Medicine [phone]    Rojas Chun MD  Internal Medicine - Pediatrics [phone]

## 2024-02-09 NOTE — PROGRESS NOTES
ANTICOAGULATION MANAGEMENT     Jozef Saunders 68 year old male is on warfarin with subtherapeutic INR result. (Goal INR 2.0-3.0)    Recent labs: (last 7 days)     02/06/24  1337   INR 1.86*       ASSESSMENT     Source(s): Chart Review  Previous INR was Therapeutic last 2(+) visits  Medication, diet, health changes since last INR chart reviewed; none identified         PLAN     Unable to reach Issac today.    Left message for patient to call back for assessment.  Also sent [employer] message. Advised to continue warfarin maintenance dose over the weekend.    Follow up required to confirm warfarin dose taken and assess for changes    Tiki Castañeda RN  Anticoagulation Clinic  2/9/2024

## 2024-02-09 NOTE — TELEPHONE ENCOUNTER
INR lab order faxed to [facility] at [phone].    Sent pt myc.    Daiana Domínguez on 2/9/2024 at 3:15 PM

## 2024-02-26 ENCOUNTER — TRANSFERRED RECORDS (OUTPATIENT)
Dept: HEALTH INFORMATION MANAGEMENT | Facility: CLINIC | Age: 69
End: 2024-02-26
Payer: COMMERCIAL

## 2024-02-26 LAB — INR (EXTERNAL): 2.03 (ref 0.9–1.1)

## 2024-02-27 ENCOUNTER — ANTICOAGULATION THERAPY VISIT (OUTPATIENT)
Dept: ANTICOAGULATION | Facility: CLINIC | Age: 69
End: 2024-02-27
Payer: COMMERCIAL

## 2024-02-27 DIAGNOSIS — I82.4Y9 DEEP VEIN THROMBOSIS (DVT) OF PROXIMAL LOWER EXTREMITY, UNSPECIFIED CHRONICITY, UNSPECIFIED LATERALITY (H): ICD-10-CM

## 2024-02-27 DIAGNOSIS — Z79.01 LONG TERM CURRENT USE OF ANTICOAGULANT THERAPY: ICD-10-CM

## 2024-02-27 DIAGNOSIS — Z86.711 HISTORY OF PULMONARY EMBOLISM: ICD-10-CM

## 2024-02-27 DIAGNOSIS — Z86.718 HISTORY OF RECURRENT DEEP VEIN THROMBOSIS (DVT): Primary | ICD-10-CM

## 2024-02-27 NOTE — PROGRESS NOTES
Incoming fax from [facility]    Date of result 2/26/24    INR result 2.03

## 2024-02-27 NOTE — PROGRESS NOTES
ANTICOAGULATION MANAGEMENT     Jozef Saunders 68 year old male is on warfarin with therapeutic INR result. (Goal INR 2.0-3.0)    Recent labs: (last 7 days)     02/26/24  0000   INR 2.03*       ASSESSMENT     Source(s): Chart Review and Patient/Caregiver Call     Warfarin doses taken: Warfarin taken as instructed  Diet: No new diet changes identified  Medication/supplement changes: None noted  New illness, injury, or hospitalization: No  Signs or symptoms of bleeding or clotting: No  Previous result: Subtherapeutic  Additional findings:  patient reports more exercise while in [place], doesn't anticipate any more than he is exercising now.  Chart reviewed by LIBBY Cobos, no change in warfarin maintenance dose at this time.       PLAN     Recommended plan for ongoing change(s) affecting INR     Dosing Instructions: Continue your current warfarin dose with next INR in 3 weeks       Summary  As of 2/27/2024      Full warfarin instructions:  7.5 mg every Mon, Thu, Sat; 5 mg all other days   Next INR check:  3/19/2024               Telephone call with Issac who verbalizes understanding and agrees to plan    Patient using outside facility for labs    Education provided:   Contact [phone]  with any changes, questions or concerns.     Plan made with [facility] Pharmacist Chandrika Ashby, RN  Anticoagulation Clinic  2/27/2024    _______________________________________________________________________     Anticoagulation Episode Summary       Current INR goal:  2.0-3.0   TTR:  84.1% (1 y)   Target end date:  Indefinite   Send INR reminders to:  [facility]    Indications    History of recurrent deep vein thrombosis (DVT) [Z86.718]  Long term current use of anticoagulant therapy [Z79.01]  History of pulmonary embolism [Z86.711]  Deep vein thrombosis (DVT) of proximal lower extremity  unspecified chronicity  unspecified laterality (H) [I82.4Y9]             Comments:  3/19/21 Does not need lovenox bridge per Dr. Humphreys              Anticoagulation Care Providers       Provider Role Specialty Phone number    Hina Humphreys MD Referring Internal Medicine [phone]    Rojas Chun MD  Internal Medicine - Pediatrics [phone]

## 2024-03-11 NOTE — TELEPHONE ENCOUNTER
Has appointment with Dr. Page tomorrow  Hina Humphreys M.D.     Hypertension is stable.  Dietary sodium restriction.  Continue current medications.  Medication changes per orders.  Blood pressure will be reassessed at the next regular appointment continue with Amlodipine 10mg daily , Metoprolol 50mg daily. Hydralazine 25mg TID prn for SBP>180

## 2024-03-20 ENCOUNTER — MYC MEDICAL ADVICE (OUTPATIENT)
Dept: ANTICOAGULATION | Facility: CLINIC | Age: 69
End: 2024-03-20
Payer: COMMERCIAL

## 2024-03-20 ENCOUNTER — TRANSFERRED RECORDS (OUTPATIENT)
Dept: HEALTH INFORMATION MANAGEMENT | Facility: CLINIC | Age: 69
End: 2024-03-20
Payer: COMMERCIAL

## 2024-03-20 LAB — INR (EXTERNAL): 2.24 (ref 0.9–1.1)

## 2024-03-21 ENCOUNTER — MYC MEDICAL ADVICE (OUTPATIENT)
Dept: ANTICOAGULATION | Facility: CLINIC | Age: 69
End: 2024-03-21
Payer: COMMERCIAL

## 2024-03-21 ENCOUNTER — ANTICOAGULATION THERAPY VISIT (OUTPATIENT)
Dept: ANTICOAGULATION | Facility: CLINIC | Age: 69
End: 2024-03-21
Payer: COMMERCIAL

## 2024-03-21 DIAGNOSIS — Z86.711 HISTORY OF PULMONARY EMBOLISM: ICD-10-CM

## 2024-03-21 DIAGNOSIS — Z86.718 HISTORY OF RECURRENT DEEP VEIN THROMBOSIS (DVT): Primary | ICD-10-CM

## 2024-03-21 DIAGNOSIS — I82.4Y9 DEEP VEIN THROMBOSIS (DVT) OF PROXIMAL LOWER EXTREMITY, UNSPECIFIED CHRONICITY, UNSPECIFIED LATERALITY (H): ICD-10-CM

## 2024-03-21 DIAGNOSIS — Z79.01 LONG TERM CURRENT USE OF ANTICOAGULANT THERAPY: ICD-10-CM

## 2024-03-21 NOTE — PROGRESS NOTES
ANTICOAGULATION MANAGEMENT     Jozef Saunders 68 year old male is on warfarin with therapeutic INR result. (Goal INR 2.0-3.0)    Recent labs: (last 7 days)     03/20/24  0000   INR 2.24*       ASSESSMENT     Source(s): Chart Review  Previous INR was Therapeutic last 2(+) visits  Medication, diet, health changes since last INR chart reviewed; none identified         PLAN     Recommended plan for no diet, medication or health factor changes affecting INR     Dosing Instructions: Continue your current warfarin dose with next INR in 4 weeks       Summary  As of 3/21/2024      Full warfarin instructions:  7.5 mg every Mon, Thu, Sat; 5 mg all other days   Next INR check:  4/17/2024               Detailed voice message left for Issac with dosing instructions and follow up date.   Sent [employer] message with dosing and follow up instructions    Contact [phone]  to schedule and with any changes, questions or concerns.     Education provided:   Please call back if any changes to your diet, medications or how you've been taking warfarin    Plan made per [facility] anticoagulation protocol    Tiki Castañeda RN  Anticoagulation Clinic  3/21/2024    _______________________________________________________________________     Anticoagulation Episode Summary       Current INR goal:  2.0-3.0   TTR:  87.9% (1 y)   Target end date:  Indefinite   Send INR reminders to:  [facility]    Indications    History of recurrent deep vein thrombosis (DVT) [Z86.718]  Long term current use of anticoagulant therapy [Z79.01]  History of pulmonary embolism [Z86.711]  Deep vein thrombosis (DVT) of proximal lower extremity  unspecified chronicity  unspecified laterality (H) [I82.4Y9]             Comments:  3/19/21 Does not need lovenox bridge per Dr. Humphreys             Anticoagulation Care Providers       Provider Role Specialty Phone number    Hina Humphreys MD Referring Internal Medicine [phone]    Rojas Chun MD   Internal Medicine - Pediatrics [phone]

## 2024-03-21 NOTE — PROGRESS NOTES
Incoming fax from  [facility]    Date of result 03/20/2024    INR result 2.24    Isaura Villanueva RN    [facility]

## 2024-04-03 DIAGNOSIS — Z79.01 LONG TERM CURRENT USE OF ANTICOAGULANTS WITH INR GOAL OF 2.0-3.0: ICD-10-CM

## 2024-04-03 DIAGNOSIS — Z86.711 HISTORY OF PULMONARY EMBOLISM: ICD-10-CM

## 2024-04-03 DIAGNOSIS — I82.4Y9 DEEP VEIN THROMBOSIS (DVT) OF PROXIMAL LOWER EXTREMITY, UNSPECIFIED CHRONICITY, UNSPECIFIED LATERALITY (H): ICD-10-CM

## 2024-04-03 DIAGNOSIS — L30.9 DERMATITIS: ICD-10-CM

## 2024-04-03 RX ORDER — WARFARIN SODIUM 5 MG/1
TABLET ORAL
Qty: 111 TABLET | Refills: 1 | Status: SHIPPED | OUTPATIENT
Start: 2024-04-03

## 2024-04-03 RX ORDER — KETOCONAZOLE 20 MG/G
CREAM TOPICAL
Qty: 60 G | Refills: 1 | Status: SHIPPED | OUTPATIENT
Start: 2024-04-03 | End: 2024-08-04

## 2024-04-03 RX ORDER — TRIAMCINOLONE ACETONIDE 1 MG/G
CREAM TOPICAL 2 TIMES DAILY
Qty: 30 G | Refills: 0 | Status: SHIPPED | OUTPATIENT
Start: 2024-04-03 | End: 2024-08-04

## 2024-04-03 NOTE — TELEPHONE ENCOUNTER
ANTICOAGULATION MANAGEMENT:  Medication Refill    Anticoagulation Summary  As of 3/21/2024      Warfarin maintenance plan:  7.5 mg (5 mg x 1.5) every Mon, Thu, Sat; 5 mg (5 mg x 1) all other days   Next INR check:  4/17/2024   Target end date:  Indefinite    Indications    History of recurrent deep vein thrombosis (DVT) [Z86.718]  Long term current use of anticoagulant therapy [Z79.01]  History of pulmonary embolism [Z86.711]  Deep vein thrombosis (DVT) of proximal lower extremity  unspecified chronicity  unspecified laterality (H) [I82.4Y9]                 Anticoagulation Care Providers       Provider Role Specialty Phone number    Hina Humphreys MD Referring Internal Medicine [phone]    WillRojas Arriola MD  Internal Medicine - Pediatrics [phone]            Refill Criteria    Visit with referring provider/group: Meets criteria: office visit within referring provider group in the last 1 year on 08/02/2023    [facility] referral last signed: 08/16/2023; within last year: Yes    Lab monitoring not exceeding 2 weeks overdue: Yes    Jozef meets all criteria for refill. Rx instructions and quantity match patient's current dosing plan. Warfarin 90 day supply with 1 refill granted per [facility] protocol     Lovely Ashby RN  Anticoagulation Clinic

## 2024-04-09 ENCOUNTER — OFFICE VISIT (OUTPATIENT)
Dept: PEDIATRICS | Facility: CLINIC | Age: 69
End: 2024-04-09
Payer: COMMERCIAL

## 2024-04-09 VITALS
OXYGEN SATURATION: 95 % | RESPIRATION RATE: 18 BRPM | BODY MASS INDEX: 28.16 KG/M2 | SYSTOLIC BLOOD PRESSURE: 134 MMHG | DIASTOLIC BLOOD PRESSURE: 86 MMHG | WEIGHT: 185.8 LBS | HEART RATE: 73 BPM | HEIGHT: 68 IN | TEMPERATURE: 97.8 F

## 2024-04-09 DIAGNOSIS — I10 ESSENTIAL HYPERTENSION, BENIGN: ICD-10-CM

## 2024-04-09 DIAGNOSIS — F10.21 ALCOHOL DEPENDENCE IN REMISSION (H): ICD-10-CM

## 2024-04-09 DIAGNOSIS — R19.04 ABDOMINAL WALL MASS OF LEFT LOWER QUADRANT: Primary | ICD-10-CM

## 2024-04-09 DIAGNOSIS — K43.9 ABDOMINAL WALL HERNIA: ICD-10-CM

## 2024-04-09 PROCEDURE — 99213 OFFICE O/P EST LOW 20 MIN: CPT | Performed by: INTERNAL MEDICINE

## 2024-04-09 NOTE — PROGRESS NOTES
"  Assessment & Plan     Abdominal wall mass of left lower quadrant  Palpable mass when standing. Nontender. Plan CT for evaluation.   - CT Abdomen Pelvis w/o Contrast; Future    Alcohol dependence in remission (H)  Not an active issue.        See Patient Instructions    Maria Teresa Davenport is a 68 year old, presenting for the following health issues:  Follow Up        4/9/2024     2:47 PM   Additional Questions   Roomed by miss   Accompanied by self         4/9/2024     2:47 PM   Patient Reported Additional Medications   Patient reports taking the following new medications no     History of Present Illness       Reason for visit:  Stomach follow up    He eats 2-3 servings of fruits and vegetables daily.He consumes 0 sweetened beverage(s) daily.He exercises with enough effort to increase his heart rate 60 or more minutes per day.  He exercises with enough effort to increase his heart rate 6 days per week.   He is taking medications regularly.     Spent summer in [place], was very active, playing pickleball and golf. Riding his bike.    1 month ago had L side mid abdominal pain then started feeling a lump there. But since then has continued to be active. Never had tenderness or pain again, but lump is still there.     Blood pressures at home usually 125/76.         Review of Systems  Constitutional, HEENT, cardiovascular, pulmonary, gi and gu systems are negative, except as otherwise noted.      Objective    /87 (BP Location: Right arm, Patient Position: Sitting, Cuff Size: Adult Large)   Pulse 73   Temp 97.8  F (36.6  C) (Tympanic)   Resp 18   Ht 1.735 m (5' 8.31\")   Wt 84.3 kg (185 lb 12.8 oz)   SpO2 95%   BMI 28.00 kg/m    Body mass index is 28 kg/m .  Physical Exam   GENERAL: alert and no distress  NECK: no adenopathy, no asymmetry, masses, or scars  RESP: lungs clear to auscultation - no rales, rhonchi or wheezes  CV: regular rate and rhythm, normal S1 S2, no S3 or S4, no murmur, click or rub, no peripheral " edema  ABDOMEN: soft, nontender, no hepatosplenomegaly, no masses and bowel sounds normal. On standing, there is a palpable mass LLQ approx 6 cm. Not reducible.          Signed Electronically by: Hina Humphreys MD

## 2024-04-09 NOTE — PATIENT INSTRUCTIONS
[facility] radiology will call you to schedule your CT. Please call them at [phone] if you have not heard from then in the next 1-2 days.

## 2024-04-17 ENCOUNTER — LAB (OUTPATIENT)
Dept: LAB | Facility: CLINIC | Age: 69
End: 2024-04-17
Payer: COMMERCIAL

## 2024-04-17 ENCOUNTER — ANTICOAGULATION THERAPY VISIT (OUTPATIENT)
Dept: ANTICOAGULATION | Facility: CLINIC | Age: 69
End: 2024-04-17

## 2024-04-17 DIAGNOSIS — Z86.711 HISTORY OF PULMONARY EMBOLISM: ICD-10-CM

## 2024-04-17 DIAGNOSIS — I82.4Y9 DEEP VEIN THROMBOSIS (DVT) OF PROXIMAL LOWER EXTREMITY, UNSPECIFIED CHRONICITY, UNSPECIFIED LATERALITY (H): ICD-10-CM

## 2024-04-17 DIAGNOSIS — Z79.01 LONG TERM CURRENT USE OF ANTICOAGULANT THERAPY: ICD-10-CM

## 2024-04-17 DIAGNOSIS — Z86.718 HISTORY OF RECURRENT DEEP VEIN THROMBOSIS (DVT): Primary | ICD-10-CM

## 2024-04-17 LAB — INR BLD: 1.8 (ref 0.9–1.1)

## 2024-04-17 PROCEDURE — 85610 PROTHROMBIN TIME: CPT

## 2024-04-17 PROCEDURE — 36416 COLLJ CAPILLARY BLOOD SPEC: CPT

## 2024-04-17 NOTE — PROGRESS NOTES
ANTICOAGULATION MANAGEMENT     Jozef Saunders 68 year old male is on warfarin with subtherapeutic INR result. (Goal INR 2.0-3.0)    Recent labs: (last 7 days)     04/17/24  0939   INR 1.8*       ASSESSMENT     Source(s): Chart Review and Patient/Caregiver Call     Warfarin doses taken: Warfarin taken as instructed  Diet: Increased greens/vitamin K in diet; ongoing change  Medication/supplement changes: None noted  New illness, injury, or hospitalization: No  Signs or symptoms of bleeding or clotting: No  Previous result: Therapeutic last 2(+) visits  Additional findings: INR has been trending on low end of therapeutic and subtherapeutic x 2 so with ongoing increased vitamin K intake, warfarin maintenance dose increased.  Patient also reports that since his return from [place], he has not played pickle ball as he was daily while there; however, he reports that he is still walking daily.       PLAN     Recommended plan for ongoing change(s) affecting INR     Dosing Instructions: Increase your warfarin dose (6% change) with next INR in 2 weeks       Summary  As of 4/17/2024      Full warfarin instructions:  5 mg every Tue, Thu, Sat; 7.5 mg all other days   Next INR check:  5/2/2024               Telephone call with Issac who verbalizes understanding and agrees to plan and who agrees to plan and repeated back plan correctly    Lab visit scheduled    Education provided:   Taking warfarin: importance of following [facility] instructions vs instructions on the prescription bottle  Dietary considerations: importance of consistent vitamin K intake and impact of vitamin K foods on INR    Plan made per [facility] anticoagulation protocol    Lovely Ashby, RN  Anticoagulation Clinic  4/17/2024    _______________________________________________________________________     Anticoagulation Episode Summary       Current INR goal:  2.0-3.0   TTR:  84.4% (1 y)   Target end date:  Indefinite   Send INR reminders to:  [facility]    Indications     History of recurrent deep vein thrombosis (DVT) [Z86.718]  Long term current use of anticoagulant therapy [Z79.01]  History of pulmonary embolism [Z86.711]  Deep vein thrombosis (DVT) of proximal lower extremity  unspecified chronicity  unspecified laterality (H) [I82.4Y9]             Comments:  3/19/21 Does not need lovenox bridge per Dr. Humphreys             Anticoagulation Care Providers       Provider Role Specialty Phone number    Hina Humphreys MD Referring Internal Medicine [phone]    Rojas Chun MD  Internal Medicine - Pediatrics [phone]

## 2024-04-19 ENCOUNTER — HOSPITAL ENCOUNTER (OUTPATIENT)
Dept: CT IMAGING | Facility: CLINIC | Age: 69
Discharge: HOME | End: 2024-04-19
Attending: INTERNAL MEDICINE | Admitting: INTERNAL MEDICINE
Payer: COMMERCIAL

## 2024-04-19 DIAGNOSIS — R19.04 ABDOMINAL WALL MASS OF LEFT LOWER QUADRANT: ICD-10-CM

## 2024-04-19 PROCEDURE — 74176 CT ABD & PELVIS W/O CONTRAST: CPT

## 2024-04-21 PROBLEM — I82.4Y9 DEEP VEIN THROMBOSIS (DVT) OF PROXIMAL LOWER EXTREMITY, UNSPECIFIED CHRONICITY, UNSPECIFIED LATERALITY (H): Status: RESOLVED | Noted: 2022-01-11 | Resolved: 2024-04-21

## 2024-05-01 ENCOUNTER — OFFICE VISIT (OUTPATIENT)
Dept: SURGERY | Facility: CLINIC | Age: 69
End: 2024-05-01
Attending: INTERNAL MEDICINE
Payer: COMMERCIAL

## 2024-05-01 VITALS
RESPIRATION RATE: 16 BRPM | WEIGHT: 184 LBS | DIASTOLIC BLOOD PRESSURE: 82 MMHG | OXYGEN SATURATION: 95 % | SYSTOLIC BLOOD PRESSURE: 124 MMHG | BODY MASS INDEX: 27.89 KG/M2 | HEART RATE: 60 BPM | HEIGHT: 68 IN

## 2024-05-01 DIAGNOSIS — K40.21 BILATERAL RECURRENT INGUINAL HERNIA WITHOUT OBSTRUCTION OR GANGRENE: Primary | ICD-10-CM

## 2024-05-01 DIAGNOSIS — K43.9 ABDOMINAL WALL HERNIA: ICD-10-CM

## 2024-05-01 DIAGNOSIS — K43.9 SPIGELIAN HERNIA: ICD-10-CM

## 2024-05-01 PROCEDURE — 99203 OFFICE O/P NEW LOW 30 MIN: CPT | Performed by: SURGERY

## 2024-05-01 RX ORDER — HEPARIN SODIUM 10000 [USP'U]/ML
5000 INJECTION, SOLUTION INTRAVENOUS; SUBCUTANEOUS
OUTPATIENT
Start: 2024-05-01

## 2024-05-01 NOTE — Clinical Note
A total of 5 hernias.  Only 1 is symptomatic.  Fixing his spigelian hernia would make minimally invasive repair of his inguinal hernias down the road difficult.  It will be a lot easier to fix them at the time of surgery.  He would like me to fix both the spigelian hernia and inguinal hernias.  His umbilical hernia would likely never give him trouble.  The recurrent hiatal hernia is currently asymptomatic.  If it becomes symptomatic then I will tackle it.  That will be a big deal.  Thanks for sending him

## 2024-05-01 NOTE — LETTER
May 2, 2024          Hina Humphreys MD  [address] [place] [address]      RE:   Jozef Saunders 1955      Dear Colleague,    Thank you for referring your patient, Jozef Saunders, to [facility] at [facility]. Please see a copy of my visit note below.     Jozef Saunders is a 68 year old male who presented with a bulge in his left lower quadrant of his abdomen.  This popped up after playing pickle ball in [place].  It was giving him some pain but that pain is gone away.  The bulge has remained.  It does change in size.  He had a CT scan done which demonstrated that he has a left-sided spigelian hernia.  Incidentally it also shows that his hiatal hernia repair done transthoracic has recurred.  He denies any reflux or difficulty swallowing.  When this was done initially he was having the sensation of food sticking.  This recurrence seems to be asymptomatic.  Of note he also has a small umbilical hernia as well.  This is also asymptomatic.  He also has bilateral inguinal hernias on the CT scan as well.  These also are asymptomatic     PMH:  Jozef Saunders  has a past medical history of Deep vein thrombosis (DVT) of proximal lower extremity, unspecified chronicity, unspecified laterality (H) (01/11/2022), Iron deficiency anemia (11/16/2010), Pulmonary embolism (H) (09/25/2010), Recovering Alcoholic, and Thrush (07/15/2018).  PSH:  Jozef Saunders  has a past surgical history that includes REPAIR ING HERNIA,6MO-5YR,REDUC; REPAIR ING HERNIA,5+Y/O,REDUCIBL; ARTHROTOMY/EXPLORE/TREAT KNEE JOINT; Thoracotomy (N/A, 11/12/2019); and Herniorrhaphy hiatal (N/A, 11/12/2019).     Home medications and allergies reviewed.     Social History:  Jozef Saunders  reports that he quit smoking about 34 years ago. His smoking use included cigarettes. He has never used smokeless tobacco. He reports that he does not drink alcohol and does not use drugs.  Family History:  Jozef Saunders  "family history includes C.A.D. in his father; Cancer in his mother; Hypertension in his brother and father; Prostate Cancer in his brother.     ROS:  The 10 point Review of Systems is negative other than noted in the HPI.        Physical Exam:  Blood pressure 124/82, pulse 60, resp. rate 16, height 1.727 m (5' 8\"), weight 83.5 kg (184 lb), SpO2 95%.  184 lbs 0 oz     Patient has a pleasant affect and communicates well.   Pupils equal round and reactive to light.   No cervical lymphadenopathy or thyromegaly.   Lung fields clear, breathing comfortably.   Heart normal sinus rhythm.  No murmurs rubs or gallops.  Abdomen soft, nontender, palpable bulge in the left lower quadrant.  Small umbilical hernia  Skin warm, dry.  No obvious rashes or lesions.     Groin: I can feel a right-sided inguinal hernia, I do not feel a left-sided inguinal hernia     All new lab and imaging data was reviewed.       Assessment and Plan: Jozef Saunders is a 68 year old male with a total of 5 hernias.  1.  This spigelian hernia is only symptomatic hernia.  I did review his CT scan with him.  Using the images from the CT scan I educated him what all these hernias are.  I discussed the likelihood of them giving him issues down the road.  Given that has been [place] hernia is moderate size and symptomatic it should be repaired.  His inguinal hernias will likely progress to become symptomatic at some point.  His umbilical hernia is small and will likely not progress.  He has recurrent hiatal hernia is a whole another matter.  It is currently asymptomatic.  2, I reviewed different techniques for hernia repair.  I reviewed open, laparoscopic and robotic.  The best way in my hands would be minimally invasively with the robot.  I would be able to get a preperitoneal flap to reduce his spigelian hernia and repair it.  I could close the defect and blocking with mesh which would be covered by the peritoneal flap.  Any further minimally invasive repair " of his inguinal hernia however would be impacted by this.  It would be possible to extend that flap down into his space of [place] and I could fix the hernias as well.  He would like his inguinal hernias fixed.  His umbilical hernia will likely not become symptomatic and he is fine letting that be.  3.  We discussed his recurrence of his hiatal hernia.  Repairing that would be quite difficult as there will be quite a few mediastinal adhesions given the fact that this was done from the chest in the past and an uncut [place] gastroplasty was done.  Fixing his symptomatic hernia will not impact a further down the road repair of this if needed.  If he becomes symptomatic, and I defined those with him as difficulty eating, severe reflux, food feeling like it gets stuck, severe epigastric chest pain.  He is fine with that plan.  4.  Will plan on a robotic spigelian and bilateral inguinal hernia repair when we can find a mutually acceptable time.  He will require preoperative history and physical.       Again, thank you for allowing me to participate in the care of your patient.      Sincerely,      Robi De Luna MD

## 2024-05-02 ENCOUNTER — ANTICOAGULATION THERAPY VISIT (OUTPATIENT)
Dept: ANTICOAGULATION | Facility: CLINIC | Age: 69
End: 2024-05-02

## 2024-05-02 ENCOUNTER — LAB (OUTPATIENT)
Dept: LAB | Facility: CLINIC | Age: 69
End: 2024-05-02
Payer: COMMERCIAL

## 2024-05-02 DIAGNOSIS — Z86.718 HISTORY OF RECURRENT DEEP VEIN THROMBOSIS (DVT): Primary | ICD-10-CM

## 2024-05-02 DIAGNOSIS — I82.4Y9 DEEP VEIN THROMBOSIS (DVT) OF PROXIMAL LOWER EXTREMITY, UNSPECIFIED CHRONICITY, UNSPECIFIED LATERALITY (H): ICD-10-CM

## 2024-05-02 DIAGNOSIS — Z86.711 HISTORY OF PULMONARY EMBOLISM: ICD-10-CM

## 2024-05-02 DIAGNOSIS — Z79.01 LONG TERM CURRENT USE OF ANTICOAGULANT THERAPY: ICD-10-CM

## 2024-05-02 LAB — INR BLD: 2.1 (ref 0.9–1.1)

## 2024-05-02 PROCEDURE — 85610 PROTHROMBIN TIME: CPT

## 2024-05-02 PROCEDURE — 36416 COLLJ CAPILLARY BLOOD SPEC: CPT

## 2024-05-02 NOTE — PROGRESS NOTES
Surgery Consultation, [facility], EDDIE De Luna MD    Jozef Saunders MRN# [id number]   YOB: 1955 Age: 68 year old     PCP:  Hina Humphreys [phone]    Chief Complaint: Left lower quadrant abdominal hernia    Pt was seen in consultation from Hina Humphreys.    History of Present Illness:  Jozef Saunders is a 68 year old male who presented with a bulge in his left lower quadrant of his abdomen.  This popped up after playing pickle ball in [place].  It was giving him some pain but that pain is gone away.  The bulge has remained.  It does change in size.  He had a CT scan done which demonstrated that he has a left-sided spigelian hernia.  Incidentally it also shows that his hiatal hernia repair done transthoracic has recurred.  He denies any reflux or difficulty swallowing.  When this was done initially he was having the sensation of food sticking.  This recurrence seems to be asymptomatic.  Of note he also has a small umbilical hernia as well.  This is also asymptomatic.  He also has bilateral inguinal hernias on the CT scan as well.  These also are asymptomatic    PMH:  Jozef Saunders  has a past medical history of Deep vein thrombosis (DVT) of proximal lower extremity, unspecified chronicity, unspecified laterality (H) (01/11/2022), Iron deficiency anemia (11/16/2010), Pulmonary embolism (H) (09/25/2010), Recovering Alcoholic, and Thrush (07/15/2018).  PSH:  Jozef Saunders  has a past surgical history that includes REPAIR ING HERNIA,6MO-5YR,REDUC; REPAIR ING HERNIA,5+Y/O,REDUCIBL; ARTHROTOMY/EXPLORE/TREAT KNEE JOINT; Thoracotomy (N/A, 11/12/2019); and Herniorrhaphy hiatal (N/A, 11/12/2019).    Home medications and allergies reviewed.    Social History:  Jozef Saunders  reports that he quit smoking about 34 years ago. His smoking use included cigarettes. He has never used smokeless tobacco. He reports that he does not drink alcohol and does not use  "drugs.  Family History:  Jozef Saunders family history includes C.A.D. in his father; Cancer in his mother; Hypertension in his brother and father; Prostate Cancer in his brother.    ROS:  The 10 point Review of Systems is negative other than noted in the HPI.      Physical Exam:  Blood pressure 124/82, pulse 60, resp. rate 16, height 1.727 m (5' 8\"), weight 83.5 kg (184 lb), SpO2 95%.  184 lbs 0 oz    Patient has a pleasant affect and communicates well.   Pupils equal round and reactive to light.   No cervical lymphadenopathy or thyromegaly.   Lung fields clear, breathing comfortably.   Heart normal sinus rhythm.  No murmurs rubs or gallops.  Abdomen soft, nontender, palpable bulge in the left lower quadrant.  Small umbilical hernia  Skin warm, dry.  No obvious rashes or lesions.    Groin: I can feel a right-sided inguinal hernia, I do not feel a left-sided inguinal hernia    All new lab and imaging data was reviewed.      Assessment and Plan: Jozef Saunders is a 68 year old male with a total of 5 hernias.  1.  This spigelian hernia is only symptomatic hernia.  I did review his CT scan with him.  Using the images from the CT scan I educated him what all these hernias are.  I discussed the likelihood of them giving him issues down the road.  Given that has been Galeon hernia is moderate size and symptomatic it should be repaired.  His inguinal hernias will likely progress to become symptomatic at some point.  His umbilical hernia is small and will likely not progress.  He has recurrent hiatal hernia is a whole another matter.  It is currently asymptomatic.  2, I reviewed different techniques for hernia repair.  I reviewed open, laparoscopic and robotic.  The best way in my hands would be minimally invasively with the robot.  I would be able to get a preperitoneal flap to reduce his spigelian hernia and repair it.  I could close the defect and blocking with mesh which would be covered by the peritoneal flap.  Any " further minimally invasive repair of his inguinal hernia however would be impacted by this.  It would be possible to extend that flap down into his space of Retzius and I could fix the hernias as well.  He would like his inguinal hernias fixed.  His umbilical hernia will likely not become symptomatic and he is fine letting that be.  3.  We discussed his recurrence of his hiatal hernia.  Repairing that would be quite difficult as there will be quite a few mediastinal adhesions given the fact that this was done from the chest in the past and an uncut [place] gastroplasty was done.  Fixing his symptomatic hernia will not impact a further down the road repair of this if needed.  If he becomes symptomatic, and I defined those with him as difficulty eating, severe reflux, food feeling like it gets stuck, severe epigastric chest pain.  He is fine with that plan.  4.  Will plan on a robotic spigelian and bilateral inguinal hernia repair when we can find a mutually acceptable time.  He will require preoperative history and physical.    Robi De Luna M.D.  [facility]  [phone]    Please route or send letter to:  Primary Care Provider (PCP) and Referring Provider

## 2024-05-02 NOTE — PROGRESS NOTES
ANTICOAGULATION MANAGEMENT     Jozef Saunders 68 year old male is on warfarin with therapeutic INR result. (Goal INR 2.0-3.0)    Recent labs: (last 7 days)     05/02/24  0948   INR 2.1*       ASSESSMENT     Source(s): Chart Review  Previous INR was Subtherapeutic  Medication, diet, health changes since last INR chart reviewed; none identified         PLAN     Recommended plan for no diet, medication or health factor changes affecting INR per chart review     Dosing Instructions: Continue your current warfarin dose with next INR in 3 weeks       Summary  As of 5/2/2024      Full warfarin instructions:  5 mg every Tue, Thu, Sat; 7.5 mg all other days   Next INR check:  5/23/2024               Detailed voice message left for Issac with dosing instructions and follow up date.     Contact [phone]  to schedule and with any changes, questions or concerns.     Education provided:   Please call back if any changes to your diet, medications or how you've been taking warfarin  Contact [phone]  with any changes, questions or concerns.     Plan made per [facility] anticoagulation protocol    Parvin Guidry RN  Anticoagulation Clinic  5/2/2024    _______________________________________________________________________     Anticoagulation Episode Summary       Current INR goal:  2.0-3.0   TTR:  81.7% (1 y)   Target end date:  Indefinite   Send INR reminders to:  LEANDRA ALBERTO    Indications    History of recurrent deep vein thrombosis (DVT) [Z86.718]  Long term current use of anticoagulant therapy [Z79.01]  History of pulmonary embolism [Z86.711]  Deep vein thrombosis (DVT) of proximal lower extremity  unspecified chronicity  unspecified laterality (H) (Resolved) [I82.4Y9]             Comments:  3/19/21 Does not need lovenox bridge per Dr. Humphreys             Anticoagulation Care Providers       Provider Role Specialty Phone number    Hina Humphreys MD Referring Internal Medicine [phone]    Rojas Chun  MD Adan  Internal Medicine - Pediatrics [phone]

## 2024-05-03 ENCOUNTER — TELEPHONE (OUTPATIENT)
Dept: PEDIATRICS | Facility: CLINIC | Age: 69
End: 2024-05-03
Payer: COMMERCIAL

## 2024-05-03 DIAGNOSIS — D69.6 THROMBOCYTOPENIA (H): Primary | ICD-10-CM

## 2024-05-03 NOTE — TELEPHONE ENCOUNTER
Called the patient at [phone] and left a message requesting a call back   - Awaiting a call back at this time     Melvi RICO RN   [facility]

## 2024-05-03 NOTE — TELEPHONE ENCOUNTER
"Pls call him. I got note from surgeon. Will be looking at hernia repairs.     I think we need to re-look at his platelet counts and why they are slightly low before then.  If he is OK, I'll place an order for a few more blood tests, but I'd like to have him see hematology. If he is ok with this I can place a referral. Lab orders are in.    I always let people that the schedulers that call might introduce themselves as being from the \"cancer clinic\". I\"m not sure if they still do this but it can be confusing and upsetting to patients. I am sending him for hematology, not oncology.    Hina Humphreys M.D.    "

## 2024-05-06 ENCOUNTER — LAB (OUTPATIENT)
Dept: LAB | Facility: CLINIC | Age: 69
End: 2024-05-06
Payer: COMMERCIAL

## 2024-05-06 DIAGNOSIS — D69.6 THROMBOCYTOPENIA (H): ICD-10-CM

## 2024-05-06 LAB
BASOPHILS # BLD AUTO: 0 10E3/UL (ref 0–0.2)
BASOPHILS NFR BLD AUTO: 0 %
EOSINOPHIL # BLD AUTO: 0.1 10E3/UL (ref 0–0.7)
EOSINOPHIL NFR BLD AUTO: 2 %
ERYTHROCYTE [DISTWIDTH] IN BLOOD BY AUTOMATED COUNT: 12.2 % (ref 10–15)
HCT VFR BLD AUTO: 46.3 % (ref 40–53)
HGB BLD-MCNC: 15.7 G/DL (ref 13.3–17.7)
IMM GRANULOCYTES # BLD: 0 10E3/UL
IMM GRANULOCYTES NFR BLD: 0 %
LYMPHOCYTES # BLD AUTO: 0.9 10E3/UL (ref 0.8–5.3)
LYMPHOCYTES NFR BLD AUTO: 19 %
MCH RBC QN AUTO: 31.5 PG (ref 26.5–33)
MCHC RBC AUTO-ENTMCNC: 33.9 G/DL (ref 31.5–36.5)
MCV RBC AUTO: 93 FL (ref 78–100)
MONOCYTES # BLD AUTO: 0.5 10E3/UL (ref 0–1.3)
MONOCYTES NFR BLD AUTO: 10 %
NEUTROPHILS # BLD AUTO: 3.2 10E3/UL (ref 1.6–8.3)
NEUTROPHILS NFR BLD AUTO: 68 %
PLATELET # BLD AUTO: 87 10E3/UL (ref 150–450)
RBC # BLD AUTO: 4.99 10E6/UL (ref 4.4–5.9)
RETICS # AUTO: 0.06 10E6/UL (ref 0.03–0.1)
RETICS/RBC NFR AUTO: 1.3 % (ref 0.5–2)
WBC # BLD AUTO: 4.7 10E3/UL (ref 4–11)

## 2024-05-06 PROCEDURE — 36415 COLL VENOUS BLD VENIPUNCTURE: CPT

## 2024-05-06 PROCEDURE — 99207 BLOOD MORPHOLOGY PATHOLOGIST REVIEW: CPT | Performed by: PATHOLOGY

## 2024-05-06 PROCEDURE — 85025 COMPLETE CBC W/AUTO DIFF WBC: CPT

## 2024-05-06 PROCEDURE — 80053 COMPREHEN METABOLIC PANEL: CPT

## 2024-05-06 PROCEDURE — 85045 AUTOMATED RETICULOCYTE COUNT: CPT

## 2024-05-06 NOTE — TELEPHONE ENCOUNTER
Patient calling back. Relayed provider message. Patient was given an opportunity to ask questions, verbalized understanding of plan, and is agreeable.      He is open to the referral.     Scheduled for lab only 5/6 at 1200 at  LAB.       Kristina WILD RN on 5/6/2024 at 10:17 AM

## 2024-05-07 LAB
ALBUMIN SERPL BCG-MCNC: 4.2 G/DL (ref 3.5–5.2)
ALP SERPL-CCNC: 72 U/L (ref 40–150)
ALT SERPL W P-5'-P-CCNC: 25 U/L (ref 0–70)
ANION GAP SERPL CALCULATED.3IONS-SCNC: 9 MMOL/L (ref 7–15)
AST SERPL W P-5'-P-CCNC: 26 U/L (ref 0–45)
BILIRUB SERPL-MCNC: 0.4 MG/DL
BUN SERPL-MCNC: 15.7 MG/DL (ref 8–23)
CALCIUM SERPL-MCNC: 9 MG/DL (ref 8.8–10.2)
CHLORIDE SERPL-SCNC: 107 MMOL/L (ref 98–107)
CREAT SERPL-MCNC: 0.79 MG/DL (ref 0.67–1.17)
DEPRECATED HCO3 PLAS-SCNC: 25 MMOL/L (ref 22–29)
EGFRCR SERPLBLD CKD-EPI 2021: >90 ML/MIN/1.73M2
GLUCOSE SERPL-MCNC: 95 MG/DL (ref 70–99)
PATH REPORT.COMMENTS IMP SPEC: NORMAL
PATH REPORT.COMMENTS IMP SPEC: NORMAL
PATH REPORT.FINAL DX SPEC: NORMAL
PATH REPORT.MICROSCOPIC SPEC OTHER STN: NORMAL
PATH REPORT.MICROSCOPIC SPEC OTHER STN: NORMAL
POTASSIUM SERPL-SCNC: 3.9 MMOL/L (ref 3.4–5.3)
PROT SERPL-MCNC: 6.9 G/DL (ref 6.4–8.3)
SODIUM SERPL-SCNC: 141 MMOL/L (ref 135–145)

## 2024-05-09 ENCOUNTER — PATIENT OUTREACH (OUTPATIENT)
Dept: ONCOLOGY | Facility: CLINIC | Age: 69
End: 2024-05-09
Payer: COMMERCIAL

## 2024-05-09 NOTE — PROGRESS NOTES
Hematology referral reviewed for [facility], see below.    Referral reason: referral received from primary care for chronically low platelets with smear not reflective of clumping, suggests possible sequestration or destruction, also with slight elevation to INR, trendline with indication that he is at his approximate baseline x5 years, had recent CT-Abd/Pelv without splenomegaly        Clinical question entered by referring provider or through order transcription: persistent mild thrombocytopenia     Referral received via: Internal referral by [facility]    Current abnormal labs: Available in Chart Review    Outreach: Detailed voicemail left regarding referral. and MyChart sent to patient    Plan: Triage instructions updated and sent to NPS for completion, VM left for patient and [employer] message sent to patient.

## 2024-05-24 ENCOUNTER — ANTICOAGULATION THERAPY VISIT (OUTPATIENT)
Dept: ANTICOAGULATION | Facility: CLINIC | Age: 69
End: 2024-05-24

## 2024-05-24 ENCOUNTER — LAB (OUTPATIENT)
Dept: LAB | Facility: CLINIC | Age: 69
End: 2024-05-24
Payer: COMMERCIAL

## 2024-05-24 DIAGNOSIS — Z86.711 HISTORY OF PULMONARY EMBOLISM: ICD-10-CM

## 2024-05-24 DIAGNOSIS — Z79.01 LONG TERM CURRENT USE OF ANTICOAGULANT THERAPY: ICD-10-CM

## 2024-05-24 DIAGNOSIS — I82.4Y9 DEEP VEIN THROMBOSIS (DVT) OF PROXIMAL LOWER EXTREMITY, UNSPECIFIED CHRONICITY, UNSPECIFIED LATERALITY (H): ICD-10-CM

## 2024-05-24 DIAGNOSIS — Z86.718 HISTORY OF RECURRENT DEEP VEIN THROMBOSIS (DVT): Primary | ICD-10-CM

## 2024-05-24 LAB — INR BLD: 2.1 (ref 0.9–1.1)

## 2024-05-24 PROCEDURE — 36415 COLL VENOUS BLD VENIPUNCTURE: CPT

## 2024-05-24 PROCEDURE — 85610 PROTHROMBIN TIME: CPT

## 2024-05-24 NOTE — TELEPHONE ENCOUNTER
RECORDS STATUS - ALL OTHER DIAGNOSIS      RECORDS RECEIVED FROM: [facility]   NOTES STATUS DETAILS   OFFICE NOTE from referring provider Epic 04/09/24: Dr. Hina Humphreys   MEDICATION LIST [facility]    LABS     PATHOLOGY REPORTS Reports in Epic 05/06/24: [id number]  02/15/19: [id number]  10/29/14: [id number]   ANYTHING RELATED TO DIAGNOSIS Epic Most recent 05/06/24

## 2024-05-24 NOTE — PROGRESS NOTES
ANTICOAGULATION MANAGEMENT     Jozef Saunders 68 year old male is on warfarin with therapeutic INR result. (Goal INR 2.0-3.0)    Recent labs: (last 7 days)     05/24/24  0951   INR 2.1*       ASSESSMENT     Source(s): Chart Review and Patient/Caregiver Call     Warfarin doses taken: Warfarin taken as instructed  Diet: No new diet changes identified  Medication/supplement changes: None noted  New illness, injury, or hospitalization: No  Signs or symptoms of bleeding or clotting: No  Previous result: Therapeutic last visit; previously outside of goal range  Additional findings: None       PLAN     Recommended plan for no diet, medication or health factor changes affecting INR     Dosing Instructions: Continue your current warfarin dose with next INR in 4 weeks       Summary  As of 5/24/2024      Full warfarin instructions:  5 mg every Tue, Thu, Sat; 7.5 mg all other days   Next INR check:  6/21/2024               Telephone call with Issac who verbalizes understanding and agrees to plan    Lab visit scheduled    Education provided:   Please call back if any changes to your diet, medications or how you've been taking warfarin    Plan made per [facility] anticoagulation protocol    Alison Guidry RN  Anticoagulation Clinic  5/24/2024    _______________________________________________________________________     Anticoagulation Episode Summary       Current INR goal:  2.0-3.0   TTR:  81.7% (1 y)   Target end date:  Indefinite   Send INR reminders to:  LEANDRA ALBERTO    Indications    History of recurrent deep vein thrombosis (DVT) [Z86.718]  Long term current use of anticoagulant therapy [Z79.01]  History of pulmonary embolism [Z86.711]  Deep vein thrombosis (DVT) of proximal lower extremity  unspecified chronicity  unspecified laterality (H) (Resolved) [I82.4Y9]             Comments:  3/19/21 Does not need lovenox bridge per Dr. Humphreys             Anticoagulation Care Providers       Provider Role Specialty  Phone number    Hina Humphreys MD Referring Internal Medicine [phone]    Rojas Chun MD  Internal Medicine - Pediatrics [phone]

## 2024-06-20 LAB
DAT POLY: NEGATIVE
SPECIMEN EXPIRATION DATE: NORMAL

## 2024-06-21 ENCOUNTER — LAB (OUTPATIENT)
Dept: LAB | Facility: CLINIC | Age: 69
End: 2024-06-21
Payer: COMMERCIAL

## 2024-06-21 ENCOUNTER — ANTICOAGULATION THERAPY VISIT (OUTPATIENT)
Dept: ANTICOAGULATION | Facility: CLINIC | Age: 69
End: 2024-06-21

## 2024-06-21 ENCOUNTER — LAB (OUTPATIENT)
Dept: INFUSION THERAPY | Facility: HOSPITAL | Age: 69
End: 2024-06-21
Attending: INTERNAL MEDICINE
Payer: COMMERCIAL

## 2024-06-21 ENCOUNTER — ONCOLOGY VISIT (OUTPATIENT)
Dept: ONCOLOGY | Facility: HOSPITAL | Age: 69
End: 2024-06-21
Attending: INTERNAL MEDICINE
Payer: COMMERCIAL

## 2024-06-21 ENCOUNTER — PRE VISIT (OUTPATIENT)
Dept: ONCOLOGY | Facility: HOSPITAL | Age: 69
End: 2024-06-21
Payer: COMMERCIAL

## 2024-06-21 VITALS
TEMPERATURE: 99 F | OXYGEN SATURATION: 96 % | RESPIRATION RATE: 16 BRPM | BODY MASS INDEX: 26.71 KG/M2 | SYSTOLIC BLOOD PRESSURE: 150 MMHG | HEART RATE: 64 BPM | HEIGHT: 70 IN | WEIGHT: 186.6 LBS | DIASTOLIC BLOOD PRESSURE: 88 MMHG

## 2024-06-21 DIAGNOSIS — I82.4Y9 DEEP VEIN THROMBOSIS (DVT) OF PROXIMAL LOWER EXTREMITY, UNSPECIFIED CHRONICITY, UNSPECIFIED LATERALITY (H): ICD-10-CM

## 2024-06-21 DIAGNOSIS — Z86.711 HISTORY OF PULMONARY EMBOLISM: ICD-10-CM

## 2024-06-21 DIAGNOSIS — Z86.718 HISTORY OF RECURRENT DEEP VEIN THROMBOSIS (DVT): Primary | ICD-10-CM

## 2024-06-21 DIAGNOSIS — Z79.01 LONG TERM CURRENT USE OF ANTICOAGULANT THERAPY: ICD-10-CM

## 2024-06-21 DIAGNOSIS — Z11.59 ENCOUNTER FOR SCREENING FOR OTHER VIRAL DISEASES: ICD-10-CM

## 2024-06-21 DIAGNOSIS — D69.6 THROMBOCYTOPENIA (H): ICD-10-CM

## 2024-06-21 LAB
ALBUMIN SERPL BCG-MCNC: 4.1 G/DL (ref 3.5–5.2)
ALP SERPL-CCNC: 79 U/L (ref 40–150)
ALT SERPL W P-5'-P-CCNC: 25 U/L (ref 0–70)
ANION GAP SERPL CALCULATED.3IONS-SCNC: 6 MMOL/L (ref 7–15)
AST SERPL W P-5'-P-CCNC: 27 U/L (ref 0–45)
BASOPHILS # BLD AUTO: 0 10E3/UL (ref 0–0.2)
BASOPHILS NFR BLD AUTO: 0 %
BILIRUB SERPL-MCNC: 0.3 MG/DL
BUN SERPL-MCNC: 20.3 MG/DL (ref 8–23)
CALCIUM SERPL-MCNC: 9.3 MG/DL (ref 8.8–10.2)
CHLORIDE SERPL-SCNC: 108 MMOL/L (ref 98–107)
CREAT SERPL-MCNC: 0.87 MG/DL (ref 0.67–1.17)
DEPRECATED HCO3 PLAS-SCNC: 29 MMOL/L (ref 22–29)
EGFRCR SERPLBLD CKD-EPI 2021: >90 ML/MIN/1.73M2
EOSINOPHIL # BLD AUTO: 0.1 10E3/UL (ref 0–0.7)
EOSINOPHIL NFR BLD AUTO: 2 %
ERYTHROCYTE [DISTWIDTH] IN BLOOD BY AUTOMATED COUNT: 12.5 % (ref 10–15)
FOLATE SERPL-MCNC: 6.7 NG/ML (ref 4.6–34.8)
GLUCOSE SERPL-MCNC: 80 MG/DL (ref 70–99)
HAPTOGLOB SERPL-MCNC: 45 MG/DL (ref 30–200)
HBV SURFACE AG SERPL QL IA: NONREACTIVE
HCT VFR BLD AUTO: 48 % (ref 40–53)
HGB BLD-MCNC: 16.3 G/DL (ref 13.3–17.7)
HIV 1+2 AB+HIV1 P24 AG SERPL QL IA: NONREACTIVE
HOLD SPECIMEN: NORMAL
IMM GRANULOCYTES # BLD: 0 10E3/UL
IMM GRANULOCYTES NFR BLD: 1 %
INR BLD: 2.9 (ref 0.9–1.1)
LDH SERPL L TO P-CCNC: 195 U/L (ref 0–250)
LYMPHOCYTES # BLD AUTO: 0.9 10E3/UL (ref 0.8–5.3)
LYMPHOCYTES NFR BLD AUTO: 19 %
MCH RBC QN AUTO: 30.4 PG (ref 26.5–33)
MCHC RBC AUTO-ENTMCNC: 34 G/DL (ref 31.5–36.5)
MCV RBC AUTO: 89 FL (ref 78–100)
MONOCYTES # BLD AUTO: 0.5 10E3/UL (ref 0–1.3)
MONOCYTES NFR BLD AUTO: 10 %
NEUTROPHILS # BLD AUTO: 3.3 10E3/UL (ref 1.6–8.3)
NEUTROPHILS NFR BLD AUTO: 68 %
NRBC # BLD AUTO: 0 10E3/UL
NRBC BLD AUTO-RTO: 0 /100
PLATELET # BLD AUTO: 104 10E3/UL (ref 150–450)
PLATELETS.RETICULATED NFR BLD AUTO: 1.9 % (ref 1–7)
POTASSIUM SERPL-SCNC: 4.3 MMOL/L (ref 3.4–5.3)
PROT SERPL-MCNC: 6.7 G/DL (ref 6.4–8.3)
RBC # BLD AUTO: 5.37 10E6/UL (ref 4.4–5.9)
RETICS # AUTO: 0.06 10E6/UL (ref 0.03–0.1)
RETICS/RBC NFR AUTO: 1.2 % (ref 0.5–2)
SODIUM SERPL-SCNC: 143 MMOL/L (ref 135–145)
VIT B12 SERPL-MCNC: 412 PG/ML (ref 232–1245)
WBC # BLD AUTO: 4.8 10E3/UL (ref 4–11)

## 2024-06-21 PROCEDURE — 83615 LACTATE (LD) (LDH) ENZYME: CPT | Performed by: INTERNAL MEDICINE

## 2024-06-21 PROCEDURE — G0463 HOSPITAL OUTPT CLINIC VISIT: HCPCS | Performed by: INTERNAL MEDICINE

## 2024-06-21 PROCEDURE — 82746 ASSAY OF FOLIC ACID SERUM: CPT | Performed by: INTERNAL MEDICINE

## 2024-06-21 PROCEDURE — 85045 AUTOMATED RETICULOCYTE COUNT: CPT | Performed by: INTERNAL MEDICINE

## 2024-06-21 PROCEDURE — 83010 ASSAY OF HAPTOGLOBIN QUANT: CPT | Performed by: INTERNAL MEDICINE

## 2024-06-21 PROCEDURE — 85055 RETICULATED PLATELET ASSAY: CPT | Performed by: INTERNAL MEDICINE

## 2024-06-21 PROCEDURE — 86880 COOMBS TEST DIRECT: CPT | Performed by: INTERNAL MEDICINE

## 2024-06-21 PROCEDURE — 36415 COLL VENOUS BLD VENIPUNCTURE: CPT | Performed by: INTERNAL MEDICINE

## 2024-06-21 PROCEDURE — 82607 VITAMIN B-12: CPT | Performed by: INTERNAL MEDICINE

## 2024-06-21 PROCEDURE — 80053 COMPREHEN METABOLIC PANEL: CPT | Performed by: INTERNAL MEDICINE

## 2024-06-21 PROCEDURE — G2211 COMPLEX E/M VISIT ADD ON: HCPCS | Performed by: INTERNAL MEDICINE

## 2024-06-21 PROCEDURE — 99207 BLOOD MORPHOLOGY PATHOLOGIST REVIEW: CPT | Performed by: PATHOLOGY

## 2024-06-21 PROCEDURE — 85041 AUTOMATED RBC COUNT: CPT | Performed by: INTERNAL MEDICINE

## 2024-06-21 PROCEDURE — 87340 HEPATITIS B SURFACE AG IA: CPT | Performed by: INTERNAL MEDICINE

## 2024-06-21 PROCEDURE — 99205 OFFICE O/P NEW HI 60 MIN: CPT | Performed by: INTERNAL MEDICINE

## 2024-06-21 PROCEDURE — 87389 HIV-1 AG W/HIV-1&-2 AB AG IA: CPT | Performed by: INTERNAL MEDICINE

## 2024-06-21 PROCEDURE — 85610 PROTHROMBIN TIME: CPT

## 2024-06-21 PROCEDURE — 36416 COLLJ CAPILLARY BLOOD SPEC: CPT

## 2024-06-21 ASSESSMENT — PAIN SCALES - GENERAL: PAINLEVEL: NO PAIN (0)

## 2024-06-21 NOTE — PROGRESS NOTES
ANTICOAGULATION MANAGEMENT     Jozef Saunders 68 year old male is on warfarin with therapeutic INR result. (Goal INR 2.0-3.0)    Recent labs: (last 7 days)     06/21/24  0949   INR 2.9*       ASSESSMENT     Source(s): Chart Review     Warfarin doses taken: Warfarin taken as instructed  Diet: No new diet changes identified, discussed consistency of Vit K foods  Medication/supplement changes: None noted  New illness, injury, or hospitalization: Yes: Patient has a few hernias, will plan to have repair this fall, patient will have another appointment with surgery in October  Signs or symptoms of bleeding or clotting: No  Previous result: Therapeutic last 2(+) visits  Additional findings: Upcoming surgery/procedure Patient will have a couple hernia repairs done this fall, not yet scheduled       PLAN     Recommended plan for no diet, medication or health factor changes affecting INR     Dosing Instructions: Continue your current warfarin dose with next INR in 4 weeks as patient will be out of town in 5 weeks      Summary  As of 6/21/2024      Full warfarin instructions:  5 mg every Tue, Thu, Sat; 7.5 mg all other days   Next INR check:  7/19/2024               Telephone call with Issac who verbalizes understanding and agrees to plan    Lab visit scheduled    Education provided:   Please call back if any changes to your diet, medications or how you've been taking warfarin  Dietary considerations: importance of consistent vitamin K intake  Importance of notifying anticoagulation clinic for: upcoming surgeries and procedures 2 weeks in advance  Contact [phone]  with any changes, questions or concerns.     Plan made per [facility] anticoagulation protocol    Parvin Guidry RN  Anticoagulation Clinic  6/21/2024    _______________________________________________________________________     Anticoagulation Episode Summary       Current INR goal:  2.0-3.0   TTR:  81.7% (1 y)   Target end date:  Indefinite   Send INR reminders  to:  LEANDRA DOLLY    Indications    History of recurrent deep vein thrombosis (DVT) [Z86.718]  Long term current use of anticoagulant therapy [Z79.01]  History of pulmonary embolism [Z86.711]  Deep vein thrombosis (DVT) of proximal lower extremity  unspecified chronicity  unspecified laterality (H) (Resolved) [I82.4Y9]             Comments:  3/19/21 Does not need lovenox bridge per Dr. Humphreys             Anticoagulation Care Providers       Provider Role Specialty Phone number    Hina Humphreys MD Referring Internal Medicine [phone]    Rojas Chun MD  Internal Medicine - Pediatrics [phone]

## 2024-06-21 NOTE — PROGRESS NOTES
[facility] Consult Note    Patient: Jozef Saunders  MRN: [id number]  Date of Service: Jun 21, 2024       Reason for Visit    Chief Complaint   Patient presents with    Hematology     Thrombocytopenia          Assessment/Plan    ECOG Performance 0 - Independent    #.  Mild chronic thrombocytopenia  #.  Recurrent VTE, on long-term anticoagulation therapy     I reviewed his labs and clinical course in detail.  He has mild chronic thrombocytopenia since 2010 as far as I can go back in our records.  His platelet count fluctuated between 70s to 140s. He has normal WBC and hemoglobin.  He looks well.  No B symptoms.  I reviewed the possible etiologies ofthrombocytopenia including medications, chronic viral infection, chronic bacterial infection, chronic liver disease, autoimmune disease or primary blood and bone marrow disease.  From the history, he does not have any clear possible culprit of her thrombocytopenia.  He did not recall any acute illness prior to these labs.  I explained to the patient that we will start with initial workup to look for these above etiologies of thrombocytopenia.  Immune-related thrombocytopenia is a diagnosis of exclusion.  Clinically, this degree of thrombocytopenia is expected to cause clinical significance.    -I will follow-up once these above results areavailable.      The longitudinal plan of care for the diagnosis(es)/condition(s) as documented were addressed during this visit. Due to the added complexity in care, I will continue to support Issac in the subsequent management and with ongoing continuity of care.      Encounter Diagnoses:    Problem List Items Addressed This Visit       Thrombocytopenia (H24)       ______________________________________________________________________________    History    Mr. Jozef Saunders is a very pleasant 68 year old male in today for further evaluation of thrombocytopenia.  He has chronic low platelet count.  He  does not have any excess bleeding or bruising.  He does not take any other medication than listed.  He has history of recurrent DVT and pulmonary emboli in , and .  He has been on long-term anticoagulation therapy with warfarin.  He does not have any chronic liver disease.    He does not smoke.  He does not use recreational drugs.  He is recovering alcoholic and sober for 4 years.  He is a retired [employer] .  Family history is significant for some type of blood cancer in his ankle.    Review of systems.  Apart from describing in history, the remainder of comprehensive ROS was negative.    Past History    Past Medical History:   Diagnosis Date    Deep vein thrombosis (DVT) of proximal lower extremity, unspecified chronicity, unspecified laterality (H) 2022    Iron deficiency anemia 2010    Probably due to warfarin and hiatal hernia resulting in occult GI bleed    Pulmonary embolism (H) 2010    Recovering Alcoholic     Thrush 07/15/2018     Past Surgical History:   Procedure Laterality Date    HC ARTHROTOMY/EXPLORE/TREAT KNEE JOINT      torn ligaments    HC REPAIR ING HERNIA,5+Y/O,REDUCIBL      age 8 - right    HC REPAIR ING HERNIA,6MO-5YR,REDUC      infancy - left    HERNIORRHAPHY HIATAL N/A 2019    Procedure: HIATAL HERNIA REPAIR WITH UNCUT COLIS NISSEN GASTROPLASTY;  Surgeon: Dewey Roa MD;  Location:  OR    THORACOTOMY N/A 2019    Procedure: LEFT THORACOTOMY;  Surgeon: Dewey Roa MD;  Location:  OR     Family History   Problem Relation Age of Onset    Cancer Mother         liver;  at age 47    Hypertension Father     C.A.D. Father         CABG; age of onset over age 60    Hypertension Brother         age of onset under 50    Prostate Cancer Brother         diagnosed at age 58    Diabetes No family hx of      Social History     Socioeconomic History    Marital status:      Spouse name: Nina    Number of children: 1    Years of  education: 14    Highest education level: Some college, no degree   Occupational History    Occupation:  [employer], retired   Tobacco Use    Smoking status: Former     Current packs/day: 0.00     Types: Cigarettes     Quit date: 1989     Years since quittin.6    Smokeless tobacco: Never   Substance and Sexual Activity    Alcohol use: No     Comment: sober x26y    Drug use: No    Sexual activity: Yes     Partners: Female     Social Determinants of Health     Financial Resource Strain: Low Risk  (2022)    Overall Financial Resource Strain (CARDIA)     Difficulty of Paying Living Expenses: Not hard at all   Food Insecurity: No Food Insecurity (2022)    Hunger Vital Sign     Worried About Running Out of Food in the Last Year: Never true     Ran Out of Food in the Last Year: Never true   Transportation Needs: No Transportation Needs (2022)    PRAPARE - Transportation     Lack of Transportation (Medical): No     Lack of Transportation (Non-Medical): No   Physical Activity: Sufficiently Active (2022)    Exercise Vital Sign     Days of Exercise per Week: 6 days     Minutes of Exercise per Session: 60 min   Stress: No Stress Concern Present (2022)    [employer] [place] [employer] - Occupational Stress Questionnaire     Feeling of Stress : Not at all   Social Connections: Socially Integrated (2022)    Social Connection and Isolation Panel [NHANES]     Frequency of Communication with Friends and Family: More than three times a week     Frequency of Social Gatherings with Friends and Family: More than three times a week     Attends Orthodox Services: More than 4 times per year     Active Member of Clubs or Organizations: Yes     Marital Status:    Interpersonal Safety: Low Risk  (2024)    Interpersonal Safety     Do you feel physically and emotionally safe where you currently live?: Yes     Within the past 12 months, have you been hit, slapped, kicked or otherwise  "physically hurt by someone?: No     Within the past 12 months, have you been humiliated or emotionally abused in other ways by your partner or ex-partner?: No   Housing Stability: Low Risk  (8/1/2022)    Housing Stability Vital Sign     Unable to Pay for Housing in the Last Year: No     Number of Places Lived in the Last Year: 1     Unstable Housing in the Last Year: No       Allergies    Allergies   Allergen Reactions    Clindamycin Hcl Hives    Heparin      Heparin-induced thrombocytopenia    Omeprazole Diarrhea       Physical Exam    BP (!) 150/88   Pulse 64   Temp 99  F (37.2  C)   Resp 16   Ht 1.778 m (5' 10\")   Wt 84.6 kg (186 lb 9.6 oz)   SpO2 96%   BMI 26.77 kg/m      General: alert, awake, not in acute distress  HEENT: Head: Normal, normocephalic, atraumatic.  Eye: Normal external eye, conjunctiva, lids cornea, LUIS.  Nose: Normal external nose, mucus membranes and septum.  Pharynx: Normal buccal mucosa. Normal pharynx.  Neck / Thyroid: Supple, no masses, nodes, nodules or enlargement.  Lymphatics: No abnormally enlarged lymph nodes.  Chest: Normal chest wall and respirations. Clear to auscultation.  Heart: S1 S2 RRR, no murmur.   Abdomen: abdomen is soft without significant tenderness, masses, organomegaly or guarding  Extremities: normal strength, tone, and muscle mass  Skin: normal. no rash or abnormalities  CNS: non focal.    Lab Results    Recent Results (from the past 168 hour(s))   INR point of care   Result Value Ref Range    INR 2.9 (H) 0.9 - 1.1       Imaging Results    No results found.    60 minutes spent on the date of the encounter doing chart review, history and exam, documentation, communication of the treatment plan with the care team and further activities as noted above.    Signed by: Shante Caraballo MD     "

## 2024-06-21 NOTE — PROGRESS NOTES
"Oncology Rooming Note    June 21, 2024 2:10 PM   Jozef Saunders is a 68 year old male who presents for:    Chief Complaint   Patient presents with    Hematology     Thrombocytopenia      Initial Vitals: BP (!) 150/88   Pulse 64   Temp 99  F (37.2  C)   Resp 16   Ht 1.778 m (5' 10\")   Wt 84.6 kg (186 lb 9.6 oz)   SpO2 96%   BMI 26.77 kg/m   Estimated body mass index is 26.77 kg/m  as calculated from the following:    Height as of this encounter: 1.778 m (5' 10\").    Weight as of this encounter: 84.6 kg (186 lb 9.6 oz). Body surface area is 2.04 meters squared.  No Pain (0) Comment: Data Unavailable   No LMP for male patient.  Allergies reviewed: No  Medications reviewed: No    Medications: Medication refills not needed today.  Pharmacy name entered into [employer]:    [id number] [place], [place] - [address] AT [address] [facility]  [id number] - [place], [place] - [address] AT [facility]    Frailty Screening:   Is the patient here for a new oncology consult visit in cancer care? 2. No      Clinical concerns: New PT      Annalee Womack LPN             "

## 2024-06-21 NOTE — LETTER
"6/21/2024      Jozef Saunders  [address]  [place] [address]      Dear Colleague,    Thank you for referring your patient, Jozef Saunders, to the [facility]. Please see a copy of my visit note below.    Oncology Rooming Note    June 21, 2024 2:10 PM   Jozef Saunders is a 68 year old male who presents for:    Chief Complaint   Patient presents with     Hematology     Thrombocytopenia      Initial Vitals: BP (!) 150/88   Pulse 64   Temp 99  F (37.2  C)   Resp 16   Ht 1.778 m (5' 10\")   Wt 84.6 kg (186 lb 9.6 oz)   SpO2 96%   BMI 26.77 kg/m   Estimated body mass index is 26.77 kg/m  as calculated from the following:    Height as of this encounter: 1.778 m (5' 10\").    Weight as of this encounter: 84.6 kg (186 lb 9.6 oz). Body surface area is 2.04 meters squared.  No Pain (0) Comment: Data Unavailable   No LMP for male patient.  Allergies reviewed: No  Medications reviewed: No    Medications: Medication refills not needed today.  Pharmacy name entered into [facility]:    [id number] - [place], [place] - [address] AT COR[address] OF [facility]  [id number] - [place], [place] - [address] AT [facility]    Frailty Screening:   Is the patient here for a new oncology consult visit in cancer care? 2. No      Clinical concerns: New PT      Annalee Womack LPN               [facility] Hematology and Oncology Consult Note    Patient: Jozef Saunders  MRN: [id number]  Date of Service: Jun 21, 2024       Reason for Visit    Chief Complaint   Patient presents with     Hematology     Thrombocytopenia          Assessment/Plan    ECOG Performance 0 - Independent    #.  Mild chronic thrombocytopenia  #.  Recurrent VTE, on long-term anticoagulation therapy     I reviewed his labs and clinical course in detail.  He has mild chronic thrombocytopenia since 2010 as far as I can go back in our records.  His platelet count fluctuated " between 70s to 140s. He has normal WBC and hemoglobin.  He looks well.  No B symptoms.  I reviewed the possible etiologies ofthrombocytopenia including medications, chronic viral infection, chronic bacterial infection, chronic liver disease, autoimmune disease or primary blood and bone marrow disease.  From the history, he does not have any clear possible culprit of her thrombocytopenia.  He did not recall any acute illness prior to these labs.  I explained to the patient that we will start with initial workup to look for these above etiologies of thrombocytopenia.  Immune-related thrombocytopenia is a diagnosis of exclusion.  Clinically, this degree of thrombocytopenia is expected to cause clinical significance.    -I will follow-up once these above results areavailable.      The longitudinal plan of care for the diagnosis(es)/condition(s) as documented were addressed during this visit. Due to the added complexity in care, I will continue to support Issac in the subsequent management and with ongoing continuity of care.      Encounter Diagnoses:    Problem List Items Addressed This Visit       Thrombocytopenia (H24)       ______________________________________________________________________________    History    Mr. Jozef Saunders is a very pleasant 68 year old male in today for further evaluation of thrombocytopenia.  He has chronic low platelet count.  He does not have any excess bleeding or bruising.  He does not take any other medication than listed.  He has history of recurrent DVT and pulmonary emboli in 2010, and 2014.  He has been on long-term anticoagulation therapy with warfarin.  He does not have any chronic liver disease.    He does not smoke.  He does not use recreational drugs.  He is recovering alcoholic and sober for 4 years.  He is a retired [employer] .  Family history is significant for some type of blood cancer in his ankle.    Review of systems.  Apart from describing in history, the  remainder of comprehensive ROS was negative.    Past History    Past Medical History:   Diagnosis Date     Deep vein thrombosis (DVT) of proximal lower extremity, unspecified chronicity, unspecified laterality (H) 2022     Iron deficiency anemia 2010    Probably due to warfarin and hiatal hernia resulting in occult GI bleed     Pulmonary embolism (H) 2010     Recovering Alcoholic      Thrush 07/15/2018     Past Surgical History:   Procedure Laterality Date     HC ARTHROTOMY/EXPLORE/TREAT KNEE JOINT      torn ligaments     HC REPAIR ING HERNIA,5+Y/O,REDUCIBL      age 8 - right     HC REPAIR ING HERNIA,6MO-5YR,REDUC      infancy - left     HERNIORRHAPHY HIATAL N/A 2019    Procedure: HIATAL HERNIA REPAIR WITH UNCUT COLIS NISSEN GASTROPLASTY;  Surgeon: Dewey Roa MD;  Location: [facility] OR     THORACOTOMY N/A 2019    Procedure: LEFT THORACOTOMY;  Surgeon: Dewey Roa MD;  Location:  OR     Family History   Problem Relation Age of Onset     Cancer Mother         liver;  at age 47     Hypertension Father      C.A.D. Father         CABG; age of onset over age 60     Hypertension Brother         age of onset under 50     Prostate Cancer Brother         diagnosed at age 58     Diabetes No family hx of      Social History     Socioeconomic History     Marital status:      Spouse name: Nina     Number of children: 1     Years of education: 14     Highest education level: Some college, no degree   Occupational History     Occupation:  [employer], retired   Tobacco Use     Smoking status: Former     Current packs/day: 0.00     Types: Cigarettes     Quit date: 1989     Years since quittin.6     Smokeless tobacco: Never   Substance and Sexual Activity     Alcohol use: No     Comment: sober x26y     Drug use: No     Sexual activity: Yes     Partners: Female     Social Determinants of Health     Financial Resource Strain: Low Risk  (2022)    Overall  Financial Resource Strain (CARDIA)      Difficulty of Paying Living Expenses: Not hard at all   Food Insecurity: No Food Insecurity (8/1/2022)    Hunger Vital Sign      Worried About Running Out of Food in the Last Year: Never true      Ran Out of Food in the Last Year: Never true   Transportation Needs: No Transportation Needs (8/1/2022)    PRAPARE - Transportation      Lack of Transportation (Medical): No      Lack of Transportation (Non-Medical): No   Physical Activity: Sufficiently Active (8/1/2022)    Exercise Vital Sign      Days of Exercise per Week: 6 days      Minutes of Exercise per Session: 60 min   Stress: No Stress Concern Present (8/1/2022)    [place] [employer] - Occupational Stress Questionnaire      Feeling of Stress : Not at all   Social Connections: Socially Integrated (8/1/2022)    Social Connection and Isolation Panel [NHANES]      Frequency of Communication with Friends and Family: More than three times a week      Frequency of Social Gatherings with Friends and Family: More than three times a week      Attends Orthodoxy Services: More than 4 times per year      Active Member of Clubs or Organizations: Yes      Marital Status:    Interpersonal Safety: Low Risk  (4/9/2024)    Interpersonal Safety      Do you feel physically and emotionally safe where you currently live?: Yes      Within the past 12 months, have you been hit, slapped, kicked or otherwise physically hurt by someone?: No      Within the past 12 months, have you been humiliated or emotionally abused in other ways by your partner or ex-partner?: No   Housing Stability: Low Risk  (8/1/2022)    Housing Stability Vital Sign      Unable to Pay for Housing in the Last Year: No      Number of Places Lived in the Last Year: 1      Unstable Housing in the Last Year: No       Allergies    Allergies   Allergen Reactions     Clindamycin Hcl Hives     Heparin      Heparin-induced thrombocytopenia     Omeprazole  "Diarrhea       Physical Exam    BP (!) 150/88   Pulse 64   Temp 99  F (37.2  C)   Resp 16   Ht 1.778 m (5' 10\")   Wt 84.6 kg (186 lb 9.6 oz)   SpO2 96%   BMI 26.77 kg/m      General: alert, awake, not in acute distress  HEENT: Head: Normal, normocephalic, atraumatic.  Eye: Normal external eye, conjunctiva, lids cornea, LUIS.  Nose: Normal external nose, mucus membranes and septum.  Pharynx: Normal buccal mucosa. Normal pharynx.  Neck / Thyroid: Supple, no masses, nodes, nodules or enlargement.  Lymphatics: No abnormally enlarged lymph nodes.  Chest: Normal chest wall and respirations. Clear to auscultation.  Heart: S1 S2 RRR, no murmur.   Abdomen: abdomen is soft without significant tenderness, masses, organomegaly or guarding  Extremities: normal strength, tone, and muscle mass  Skin: normal. no rash or abnormalities  CNS: non focal.    Lab Results    Recent Results (from the past 168 hour(s))   INR point of care   Result Value Ref Range    INR 2.9 (H) 0.9 - 1.1       Imaging Results    No results found.    60 minutes spent on the date of the encounter doing chart review, history and exam, documentation, communication of the treatment plan with the care team and further activities as noted above.    Signed by: Shante Caraballo MD       Again, thank you for allowing me to participate in the care of your patient.        Sincerely,        Shante Caraballo MD  "

## 2024-06-24 LAB
PATH REPORT.COMMENTS IMP SPEC: NORMAL
PATH REPORT.COMMENTS IMP SPEC: NORMAL
PATH REPORT.FINAL DX SPEC: NORMAL
PATH REPORT.MICROSCOPIC SPEC OTHER STN: NORMAL
PATH REPORT.RELEVANT HX SPEC: NORMAL

## 2024-07-19 ENCOUNTER — LAB (OUTPATIENT)
Dept: LAB | Facility: CLINIC | Age: 69
End: 2024-07-19
Payer: COMMERCIAL

## 2024-07-19 ENCOUNTER — ANTICOAGULATION THERAPY VISIT (OUTPATIENT)
Dept: ANTICOAGULATION | Facility: CLINIC | Age: 69
End: 2024-07-19

## 2024-07-19 ENCOUNTER — DOCUMENTATION ONLY (OUTPATIENT)
Dept: ANTICOAGULATION | Facility: CLINIC | Age: 69
End: 2024-07-19

## 2024-07-19 DIAGNOSIS — Z79.01 LONG TERM CURRENT USE OF ANTICOAGULANT THERAPY: ICD-10-CM

## 2024-07-19 DIAGNOSIS — Z86.711 HISTORY OF PULMONARY EMBOLISM: ICD-10-CM

## 2024-07-19 DIAGNOSIS — Z12.5 SCREENING FOR PROSTATE CANCER: Primary | ICD-10-CM

## 2024-07-19 DIAGNOSIS — Z86.711 HISTORY OF PULMONARY EMBOLISM: Primary | ICD-10-CM

## 2024-07-19 DIAGNOSIS — Z86.718 HISTORY OF RECURRENT DEEP VEIN THROMBOSIS (DVT): ICD-10-CM

## 2024-07-19 DIAGNOSIS — Z86.718 HISTORY OF RECURRENT DEEP VEIN THROMBOSIS (DVT): Primary | ICD-10-CM

## 2024-07-19 DIAGNOSIS — I82.4Y9 DEEP VEIN THROMBOSIS (DVT) OF PROXIMAL LOWER EXTREMITY, UNSPECIFIED CHRONICITY, UNSPECIFIED LATERALITY (H): ICD-10-CM

## 2024-07-19 LAB — INR BLD: 2.3 (ref 0.9–1.1)

## 2024-07-19 PROCEDURE — 85610 PROTHROMBIN TIME: CPT

## 2024-07-19 PROCEDURE — 36416 COLLJ CAPILLARY BLOOD SPEC: CPT

## 2024-07-19 NOTE — PROGRESS NOTES
ANTICOAGULATION CLINIC REFERRAL RENEWAL REQUEST       An annual renewal order is required for all patients referred to [facility].?  Please review and sign the pended referral order for Jozef Saunders.       ANTICOAGULATION SUMMARY      Warfarin indication(s)   PE and Recurrent DVT    Mechanical heart valve present?  NO       Current goal range   INR: 2.0-3.0     Goal appropriate for indication? Goal INR 2-3, standard for indication(s) above     Time in Therapeutic Range (TTR)  (Goal > 60%) 81.7%       Office visit with referring provider's group within last year yes on 4/9/24       Alison Guidry RN  [facility]

## 2024-07-19 NOTE — PROGRESS NOTES
ANTICOAGULATION MANAGEMENT     Jozef Saunders 68 year old male is on warfarin with therapeutic INR result. (Goal INR 2.0-3.0)    Recent labs: (last 7 days)     07/19/24  0944   INR 2.3*       ASSESSMENT     Source(s): Chart Review and Patient/Caregiver Call     Warfarin doses taken: Warfarin taken as instructed  Diet: No new diet changes identified  Medication/supplement changes: None noted  New illness, injury, or hospitalization: No  Signs or symptoms of bleeding or clotting: No  Previous result: Therapeutic last 2(+) visits  Additional findings: None       PLAN     Recommended plan for no diet, medication or health factor changes affecting INR     Dosing Instructions: Continue your current warfarin dose with next INR in 5 weeks       Summary  As of 7/19/2024      Full warfarin instructions:  5 mg every Tue, Thu, Sat; 7.5 mg all other days   Next INR check:  8/23/2024               Telephone call with Issac who verbalizes understanding and agrees to plan    Lab visit scheduled    Education provided: Please call back if any changes to your diet, medications or how you've been taking warfarin    Plan made per [facility] anticoagulation protocol    Alison Guidry RN  Anticoagulation Clinic  7/19/2024    _______________________________________________________________________     Anticoagulation Episode Summary       Current INR goal:  2.0-3.0   TTR:  81.7% (1 y)   Target end date:  Indefinite   Send INR reminders to:  LEANDRA ALBERTO    Indications    History of recurrent deep vein thrombosis (DVT) [Z86.718]  Long term current use of anticoagulant therapy [Z79.01]  History of pulmonary embolism [Z86.711]  Deep vein thrombosis (DVT) of proximal lower extremity  unspecified chronicity  unspecified laterality (H) (Resolved) [I82.4Y9]             Comments:  3/19/21 Does not need lovenox bridge per Dr. Humphreys             Anticoagulation Care Providers       Provider Role Specialty Phone number    Hina Humphreys  MD [facility] Internal Medicine [phone]    Rojas Chun MD  Internal Medicine - Pediatrics [phone]

## 2024-08-03 DIAGNOSIS — I10 ESSENTIAL HYPERTENSION, BENIGN: ICD-10-CM

## 2024-08-04 ENCOUNTER — OFFICE VISIT (OUTPATIENT)
Dept: URGENT CARE | Facility: URGENT CARE | Age: 69
End: 2024-08-04
Payer: COMMERCIAL

## 2024-08-04 VITALS
DIASTOLIC BLOOD PRESSURE: 87 MMHG | TEMPERATURE: 97.9 F | HEART RATE: 57 BPM | WEIGHT: 188.3 LBS | BODY MASS INDEX: 27.02 KG/M2 | SYSTOLIC BLOOD PRESSURE: 160 MMHG | OXYGEN SATURATION: 96 %

## 2024-08-04 DIAGNOSIS — L08.9 INFECTION OF THUMB: Primary | ICD-10-CM

## 2024-08-04 PROCEDURE — 99213 OFFICE O/P EST LOW 20 MIN: CPT | Performed by: FAMILY MEDICINE

## 2024-08-04 RX ORDER — CEPHALEXIN 500 MG/1
500 CAPSULE ORAL 4 TIMES DAILY
Qty: 40 CAPSULE | Refills: 0 | Status: SHIPPED | OUTPATIENT
Start: 2024-08-04 | End: 2024-08-14

## 2024-08-04 NOTE — PROGRESS NOTES
SUBJECTIVE:  Chief Complaint   Patient presents with    POSS RT THUMB INECTION     ONSET 1 WEEK AGO PER PT FEELS BETTER WHILE SOAKING BUT PAIN HAS NOT IMPROVED PER PT BELIEVES IT MAY BE A HANGNAIL      Jozef FELIPE Nicky is a 68 year old male who presents with a chief complaint of right thumb infection X 1 week.    Developed more pain and swelling on right thumb, has been soaking and does help but pain has persisted.  Has hangnail on the side.  No drainage.    On coumadin, INR therapeutic.    Past Medical History:   Diagnosis Date    Deep vein thrombosis (DVT) of proximal lower extremity, unspecified chronicity, unspecified laterality (H) 01/11/2022    Iron deficiency anemia 11/16/2010    Probably due to warfarin and hiatal hernia resulting in occult GI bleed    Pulmonary embolism (H) 09/25/2010    Recovering Alcoholic     Thrush 07/15/2018     Current Outpatient Medications   Medication Sig Dispense Refill    carvedilol (COREG) 12.5 MG tablet Take 1 tablet (12.5 mg) by mouth 2 times daily (with meals) 180 tablet 3    famotidine (PEPCID) 20 MG tablet Take 1 tablet (20 mg) by mouth 2 times daily 180 tablet 4    fluticasone (FLONASE) 50 MCG/ACT nasal spray INSTILL 2 SPRAYS INTO BOTH NOSTRILS DAILY. 48 mL 9    lisinopril (ZESTRIL) 20 MG tablet Take 1 tablet (20 mg) by mouth daily 90 tablet 4    rosuvastatin (CRESTOR) 5 MG tablet Take 1 tablet (5 mg) by mouth daily 90 tablet 4    sertraline (ZOLOFT) 50 MG tablet Take 1 tablet (50 mg) by mouth daily 90 tablet 4    warfarin ANTICOAGULANT (COUMADIN) 5 MG tablet TAKE 7.5 MG ON MON,THUR & SAT / TAKE 5 MG ALL OTHER DAYS OR AS DIRECTED BY  tablet 1    ketoconazole (NIZORAL) 2 % external cream APPLY TO AFFECTED AREA TOPICALLY EVERY DAY (Patient not taking: Reported on 4/9/2024) 60 g 1    loratadine (CLARITIN) 10 MG tablet Take 10 mg by mouth nightly as needed (Patient not taking: Reported on 8/4/2024)      naproxen sodium (ANAPROX) 220 MG tablet Take 220 mg by mouth 2  times daily as needed for moderate pain (takes rarely) (Patient not taking: Reported on 2024)      triamcinolone (KENALOG) 0.1 % external cream APPLY TOPICALLY 2 TIMES DAILY FOR UP TO 2-4 WEEKS. (Patient not taking: Reported on 2024) 30 g 0     Social History     Tobacco Use    Smoking status: Former     Current packs/day: 0.00     Types: Cigarettes     Quit date: 1989     Years since quittin.7    Smokeless tobacco: Never   Substance Use Topics    Alcohol use: No     Comment: sober x26y       ROS:  Review of systems negative except as stated above.    EXAM:   BP (!) 160/87   Pulse 57   Temp 97.9  F (36.6  C)   Wt 85.4 kg (188 lb 4.8 oz)   SpO2 96%   BMI 27.02 kg/m    GENERAL APPEARANCE: healthy, alert and no distress  EXTREMITIES: peripheral pulses normal, right thumb - medial edge of nail with mild redness, swelling, small purulent area with dried scab on nail edge  PSYCH:alert, affect bright      ASSESSMENT/PLAN:  (L08.9) Infection of thumb  (primary encounter diagnosis)  Comment: right   Plan: cephALEXin (KEFLEX) 500 MG capsule            Reviewed that has infection/paronychia and encourage tylenol and continue with soaking.  RX Keflex given due to worsening symptoms.  Patient instructed to contact INR clinic for coumadin adjustment if needed.    Follow up with primary provider if no improvement of symptoms in 1 week    Robert Mueller MD  2024 1:34 PM

## 2024-08-07 RX ORDER — LISINOPRIL 20 MG/1
20 TABLET ORAL DAILY
Qty: 90 TABLET | Refills: 2 | Status: SHIPPED | OUTPATIENT
Start: 2024-08-07 | End: 2024-08-12

## 2024-08-09 ENCOUNTER — PATIENT OUTREACH (OUTPATIENT)
Dept: CARE COORDINATION | Facility: CLINIC | Age: 69
End: 2024-08-09
Payer: COMMERCIAL

## 2024-08-12 ENCOUNTER — OFFICE VISIT (OUTPATIENT)
Dept: PEDIATRICS | Facility: CLINIC | Age: 69
End: 2024-08-12
Payer: COMMERCIAL

## 2024-08-12 ENCOUNTER — ANTICOAGULATION THERAPY VISIT (OUTPATIENT)
Dept: ANTICOAGULATION | Facility: CLINIC | Age: 69
End: 2024-08-12

## 2024-08-12 VITALS
DIASTOLIC BLOOD PRESSURE: 76 MMHG | HEIGHT: 68 IN | TEMPERATURE: 97.8 F | SYSTOLIC BLOOD PRESSURE: 124 MMHG | OXYGEN SATURATION: 97 % | WEIGHT: 187 LBS | RESPIRATION RATE: 18 BRPM | BODY MASS INDEX: 28.34 KG/M2 | HEART RATE: 73 BPM

## 2024-08-12 DIAGNOSIS — Z79.01 LONG TERM CURRENT USE OF ANTICOAGULANT THERAPY: ICD-10-CM

## 2024-08-12 DIAGNOSIS — Z86.711 HISTORY OF PULMONARY EMBOLISM: ICD-10-CM

## 2024-08-12 DIAGNOSIS — Z00.00 ENCOUNTER FOR MEDICARE ANNUAL WELLNESS EXAM: Primary | ICD-10-CM

## 2024-08-12 DIAGNOSIS — Z12.5 SCREENING FOR PROSTATE CANCER: ICD-10-CM

## 2024-08-12 DIAGNOSIS — D69.6 THROMBOCYTOPENIA (H): ICD-10-CM

## 2024-08-12 DIAGNOSIS — Z86.718 HISTORY OF RECURRENT DEEP VEIN THROMBOSIS (DVT): ICD-10-CM

## 2024-08-12 DIAGNOSIS — Z86.718 HISTORY OF RECURRENT DEEP VEIN THROMBOSIS (DVT): Primary | ICD-10-CM

## 2024-08-12 DIAGNOSIS — I10 ESSENTIAL HYPERTENSION, BENIGN: ICD-10-CM

## 2024-08-12 DIAGNOSIS — E78.00 HYPERCHOLESTEROLEMIA: ICD-10-CM

## 2024-08-12 LAB
CHOLEST SERPL-MCNC: 163 MG/DL
FASTING STATUS PATIENT QL REPORTED: ABNORMAL
HDLC SERPL-MCNC: 47 MG/DL
INR BLD: 2.7 (ref 0.9–1.1)
LDLC SERPL CALC-MCNC: 83 MG/DL
NONHDLC SERPL-MCNC: 116 MG/DL
PSA SERPL DL<=0.01 NG/ML-MCNC: 0.74 NG/ML (ref 0–4.5)
TRIGL SERPL-MCNC: 166 MG/DL

## 2024-08-12 PROCEDURE — 99213 OFFICE O/P EST LOW 20 MIN: CPT | Mod: 25 | Performed by: INTERNAL MEDICINE

## 2024-08-12 PROCEDURE — 80061 LIPID PANEL: CPT | Performed by: INTERNAL MEDICINE

## 2024-08-12 PROCEDURE — G0439 PPPS, SUBSEQ VISIT: HCPCS | Performed by: INTERNAL MEDICINE

## 2024-08-12 PROCEDURE — 85610 PROTHROMBIN TIME: CPT | Performed by: INTERNAL MEDICINE

## 2024-08-12 PROCEDURE — G0103 PSA SCREENING: HCPCS | Performed by: INTERNAL MEDICINE

## 2024-08-12 PROCEDURE — 36415 COLL VENOUS BLD VENIPUNCTURE: CPT | Performed by: INTERNAL MEDICINE

## 2024-08-12 RX ORDER — ROSUVASTATIN CALCIUM 5 MG/1
5 TABLET, COATED ORAL DAILY
Qty: 90 TABLET | Refills: 4 | Status: SHIPPED | OUTPATIENT
Start: 2024-08-12 | End: 2024-10-01

## 2024-08-12 RX ORDER — CARVEDILOL 12.5 MG/1
12.5 TABLET ORAL 2 TIMES DAILY WITH MEALS
Qty: 180 TABLET | Refills: 3 | Status: SHIPPED | OUTPATIENT
Start: 2024-08-12

## 2024-08-12 RX ORDER — LISINOPRIL 20 MG/1
20 TABLET ORAL DAILY
Qty: 90 TABLET | Refills: 4 | Status: SHIPPED | OUTPATIENT
Start: 2024-08-12

## 2024-08-12 RX ORDER — LISINOPRIL 20 MG/1
20 TABLET ORAL DAILY
Qty: 90 TABLET | Refills: 2 | Status: SHIPPED | OUTPATIENT
Start: 2024-08-12 | End: 2024-08-12

## 2024-08-12 SDOH — HEALTH STABILITY: PHYSICAL HEALTH: ON AVERAGE, HOW MANY DAYS PER WEEK DO YOU ENGAGE IN MODERATE TO STRENUOUS EXERCISE (LIKE A BRISK WALK)?: 6 DAYS

## 2024-08-12 ASSESSMENT — PAIN SCALES - GENERAL: PAINLEVEL: NO PAIN (0)

## 2024-08-12 ASSESSMENT — SOCIAL DETERMINANTS OF HEALTH (SDOH): HOW OFTEN DO YOU GET TOGETHER WITH FRIENDS OR RELATIVES?: MORE THAN THREE TIMES A WEEK

## 2024-08-12 NOTE — PROGRESS NOTES
"Preventive Care Visit  [facility] DOLLY Humphreys MD, Internal Medicine  Aug 12, 2024      Assessment & Plan     Encounter for Medicare annual wellness exam      Thrombocytopenia (H24)  Reviewed visit with heme. No further evaluation at this time. If plt drops <50-75, should see heme again for possible BMBx    Hypercholesterolemia  Recheck.  - rosuvastatin (CRESTOR) 5 MG tablet; Take 1 tablet (5 mg) by mouth daily  - Lipid panel reflex to direct LDL Fasting; Future  - Lipid panel reflex to direct LDL Fasting    BENIGN HYPERTENSION  Initially elevated, tends to have white coat hypertension. Well controlled at home. Recheck today significantly improved as well. Will continue current regimen.  - carvedilol (COREG) 12.5 MG tablet; Take 1 tablet (12.5 mg) by mouth 2 times daily (with meals)  - lisinopril (ZESTRIL) 20 MG tablet; Take 1 tablet (20 mg) by mouth daily    Screening for prostate cancer    - PSA, screen; Future  - PROSTATE SPEC ANTIGEN SCREEN    History of pulmonary embolism  Planning hernia repair in a few months. He will call for preop when needed. He will confirm with surgeon if they would like him off coumadin, he tells me today he doesn't think he needs to be off. Would defer to surgeon depending on procedure details.  - INR point of care (finger stick)    History of recurrent deep vein thrombosis (DVT)    - INR point of care (finger stick)        BMI  Estimated body mass index is 28.23 kg/m  as calculated from the following:    Height as of this encounter: 1.734 m (5' 8.25\").    Weight as of this encounter: 84.8 kg (187 lb).       Counseling  Appropriate preventive services were addressed with this patient via screening, questionnaire, or discussion as appropriate for fall prevention, nutrition, physical activity, Tobacco-use cessation, weight loss and cognition.  Checklist reviewing preventive services available has been given to the patient.  Reviewed patient's diet, addressing " concerns and/or questions.   He is at risk for psychosocial distress and has been provided with information to reduce risk.       See Patient Instructions    Subjective   Issac is a 68 year old, presenting for the following:  Physical        8/12/2024    11:02 AM   Additional Questions   Roomed by KM   Accompanied by Self         8/12/2024    11:02 AM   Patient Reported Additional Medications   Patient reports taking the following new medications Cephalexin         Health Care Directive  Patient does not have a Health Care Directive or Living Will: Discussed advance care planning with patient; information given to patient to review.    HPI      Hypertension Follow-up    Do you check your blood pressure regularly outside of the clinic? Yes   Are you following a low salt diet? Yes  Are your blood pressures ever more than 140 on the top number (systolic) OR more   than 90 on the bottom number (diastolic), for example 140/90? No    Bp at home 120's/70's.     Planning on having hernia surgery in a few months. Meeting again with surgeon. Discussed whether he would need to stop coumadin and do bridging.   Hyperlipidemia Follow-Up    Are you regularly taking any medication or supplement to lower your cholesterol?   Yes- crestor  Are you having muscle aches or other side effects that you think could be caused by your cholesterol lowering medication?  No          8/12/2024   General Health   How would you rate your overall physical health? Good   Feel stress (tense, anxious, or unable to sleep) Only a little      (!) STRESS CONCERN      8/12/2024   Nutrition   Diet: Regular (no restrictions)            8/12/2024   Exercise   Days per week of moderate/strenous exercise 6 days            8/12/2024   Social Factors   Frequency of gathering with friends or relatives More than three times a week   Worry food won't last until get money to buy more No   Food not last or not have enough money for food? No   Do you have housing?  (Housing is defined as stable permanent housing and does not include staying ouside in a car, in a tent, in an abandoned building, in an overnight shelter, or couch-surfing.) Yes   Are you worried about losing your housing? No   Lack of transportation? No   Unable to get utilities (heat,electricity)? No            2024   Fall Risk   Fallen 2 or more times in the past year? No    No   Trouble with walking or balance? No    No       Multiple values from one day are sorted in reverse-chronological order          2024   Activities of Daily Living- Home Safety   Needs help with the following daily activites None of the above   Safety concerns in the home None of the above            2024   Dental   Dentist two times every year? Yes            2024   Hearing Screening   Hearing concerns? None of the above            2024   Driving Risk Screening   Patient/family members have concerns about driving No            2024   General Alertness/Fatigue Screening   Have you been more tired than usual lately? No            2024   Urinary Incontinence Screening   Bothered by leaking urine in past 6 months No            2024   TB Screening   Were you born outside of the [place]? No                2024   Substance Use   Alcohol more than 3/day or more than 7/wk Not Applicable   Do you have a current opioid prescription? No   How severe/bad is pain from 1 to 10? 1/10   Do you use any other substances recreationally? No        Social History     Tobacco Use    Smoking status: Former     Current packs/day: 0.00     Types: Cigarettes     Quit date: 1989     Years since quittin.7     Passive exposure: Never    Smokeless tobacco: Never   Substance Use Topics    Alcohol use: No     Comment: sober x26y    Drug use: No       Last PSA:   PSA   Date Value Ref Range Status   2021 0.80 0 - 4 ug/L Final     Comment:     Assay Method:  Chemiluminescence using Siemens Vista analyzer     Prostate  Specific Antigen Screen   Date Value Ref Range Status   08/02/2023 0.67 0.00 - 4.50 ng/mL Final   08/01/2022 0.62 0.00 - 4.00 ug/L Final     ASCVD Risk   The 10-year ASCVD risk score (Eligio LANIER, et al., 2019) is: 17.2%    Values used to calculate the score:      Age: 68 years      Sex: Male      Is Non- : No      Diabetic: No      Tobacco smoker: No      Systolic Blood Pressure: 124 mmHg      Is BP treated: Yes      HDL Cholesterol: 43 mg/dL      Total Cholesterol: 151 mg/dL      Reviewed and updated as needed this visit by Provider                    Lab work is in process  Labs reviewed in EPIC  Current providers sharing in care for this patient include:  Patient Care Team:  Hina Humphreys MD as PCP - General (Internal Medicine)  Hina Humphreys MD as Assigned PCP  Leydi Saucedo (Dermatology)  Shante Caraballo MD as MD (Hematology)  Robi De Luna MD as Assigned Surgical Provider  Shante Caraballo MD as Assigned Cancer Care Provider    The following health maintenance items are reviewed in Epic and correct as of today:  Health Maintenance   Topic Date Due    RSV VACCINE (Pregnancy & 60+) (1 - 1-dose 60+ series) Never done    COVID-19 Vaccine (6 - 2023-24 season) 03/30/2024    PSA  08/02/2024    INFLUENZA VACCINE (1) 09/01/2024    MEDICARE ANNUAL WELLNESS VISIT  08/12/2025    ANNUAL REVIEW OF HM ORDERS  08/12/2025    FALL RISK ASSESSMENT  08/12/2025    DTAP/TDAP/TD IMMUNIZATION (4 - Td or Tdap) 07/08/2026    GLUCOSE  06/21/2027    LIPID  08/02/2028    ADVANCE CARE PLANNING  08/02/2028    COLORECTAL CANCER SCREENING  12/26/2028    HEPATITIS C SCREENING  Completed    PHQ-2 (once per calendar year)  Completed    Pneumococcal Vaccine: 65+ Years  Completed    ZOSTER IMMUNIZATION  Completed    AORTIC ANEURYSM SCREENING (SYSTEM ASSIGNED)  Completed    IPV IMMUNIZATION  Aged Out    HPV IMMUNIZATION  Aged Out    MENINGITIS IMMUNIZATION  Aged Out    RSV MONOCLONAL ANTIBODY  Aged  "Out    LUNG CANCER SCREENING  Discontinued         Review of Systems  Constitutional, HEENT, cardiovascular, pulmonary, gi and gu systems are negative, except as otherwise noted.     Objective    Exam  /76 (BP Location: Right arm, Patient Position: Sitting, Cuff Size: Adult Regular)   Pulse 73   Temp 97.8  F (36.6  C) (Tympanic)   Resp 18   Ht 1.734 m (5' 8.25\")   Wt 84.8 kg (187 lb)   SpO2 97%   BMI 28.23 kg/m     Estimated body mass index is 28.23 kg/m  as calculated from the following:    Height as of this encounter: 1.734 m (5' 8.25\").    Weight as of this encounter: 84.8 kg (187 lb).    Physical Exam  GENERAL: alert and no distress  NECK: no adenopathy, no asymmetry, masses, or scars  RESP: lungs clear to auscultation - no rales, rhonchi or wheezes  CV: regular rate and rhythm, normal S1 S2, no S3 or S4, no murmur, click or rub, no peripheral edema  ABDOMEN: soft, nontender, no hepatosplenomegaly, no masses and bowel sounds normal  MS: no gross musculoskeletal defects noted, no edema         No data to display                       Signed Electronically by: Hina Humphreys MD    "

## 2024-08-12 NOTE — PROGRESS NOTES
ANTICOAGULATION MANAGEMENT     Jozef Saunders 68 year old male is on warfarin with therapeutic INR result. (Goal INR 2.0-3.0)    Recent labs: (last 7 days)     08/12/24  1235   INR 2.7*       ASSESSMENT     Source(s): Chart Review  Previous INR was Therapeutic last 2(+) visits  Medication, diet, health changes since last INR UC visit with antibiotics Keflex for thumb infections, 8/4-8/14/24         PLAN     Recommended plan for temporary change(s) affecting INR     Dosing Instructions: Continue your current warfarin dose with next INR in 4-6 weeks       Summary  As of 8/12/2024      Full warfarin instructions:  5 mg every Tue, Thu, Sat; 7.5 mg all other days   Next INR check:  9/23/2024               Detailed voice message left for Issac with dosing instructions and follow up date.   Sent [employer] message with dosing and follow up instructions    Contact [phone] to schedule and with any changes, questions or concerns.     Education provided: Please call back if any changes to your diet, medications or how you've been taking warfarin    Plan made per [facility] anticoagulation protocol    Hallie Villarreal RN  Anticoagulation Clinic  8/12/2024    _______________________________________________________________________     Anticoagulation Episode Summary       Current INR goal:  2.0-3.0   TTR:  81.7% (1 y)   Target end date:  Indefinite   Send INR reminders to:  LEANDRA DOLLY    Indications    History of recurrent deep vein thrombosis (DVT) [Z86.718]  Long term current use of anticoagulant therapy [Z79.01]  History of pulmonary embolism [Z86.711]  Deep vein thrombosis (DVT) of proximal lower extremity  unspecified chronicity  unspecified laterality (H) (Resolved) [I82.4Y9]             Comments:  3/19/21 Does not need lovenox bridge per Dr. Humphreys             Anticoagulation Care Providers       Provider Role Specialty Phone number    Hina Humphreys MD Referring Internal Medicine [phone]    Rojas Chun  MD Adan  Internal Medicine - Pediatrics [phone]

## 2024-08-12 NOTE — PATIENT INSTRUCTIONS
Check bp at home right before bp medication in AM and 2 hours after morning dose.  Check in evening or afternoon as well.  After 2 weeks, let me know if any readings >140/90.  If we are finding higher pressures at home during day, we could add a low dose amlodipine.     Patient Education   Preventive Care Advice   This is general advice given by our system to help you stay healthy. However, your care team may have specific advice just for you. Please talk to your care team about your preventive care needs.  Nutrition  Eat 5 or more servings of fruits and vegetables each day.  Try wheat bread, brown rice and whole grain pasta (instead of white bread, rice, and pasta).  Get enough calcium and vitamin D. Check the label on foods and aim for 100% of the RDA (recommended daily allowance).  Lifestyle  Exercise at least 150 minutes each week  (30 minutes a day, 5 days a week).  Do muscle strengthening activities 2 days a week. These help control your weight and prevent disease.  No smoking.  Wear sunscreen to prevent skin cancer.  Have a dental exam and cleaning every 6 months.  Yearly exams  See your health care team every year to talk about:  Any changes in your health.  Any medicines your care team has prescribed.  Preventive care, family planning, and ways to prevent chronic diseases.  Shots (vaccines)   HPV shots (up to age 26), if you've never had them before.  Hepatitis B shots (up to age 59), if you've never had them before.  COVID-19 shot: Get this shot when it's due.  Flu shot: Get a flu shot every year.  Tetanus shot: Get a tetanus shot every 10 years.  Pneumococcal, hepatitis A, and RSV shots: Ask your care team if you need these based on your risk.  Shingles shot (for age 50 and up)  General health tests  Diabetes screening:  Starting at age 35, Get screened for diabetes at least every 3 years.  If you are younger than age 35, ask your care team if you should be screened for diabetes.  Cholesterol test: At age  39, start having a cholesterol test every 5 years, or more often if advised.  Bone density scan (DEXA): At age 50, ask your care team if you should have this scan for osteoporosis (brittle bones).  Hepatitis C: Get tested at least once in your life.  STIs (sexually transmitted infections)  Before age 24: Ask your care team if you should be screened for STIs.  After age 24: Get screened for STIs if you're at risk. You are at risk for STIs (including HIV) if:  You are sexually active with more than one person.  You don't use condoms every time.  You or a partner was diagnosed with a sexually transmitted infection.  If you are at risk for HIV, ask about PrEP medicine to prevent HIV.  Get tested for HIV at least once in your life, whether you are at risk for HIV or not.  Cancer screening tests  Cervical cancer screening: If you have a cervix, begin getting regular cervical cancer screening tests starting at age 21.  Breast cancer scan (mammogram): If you've ever had breasts, begin having regular mammograms starting at age 40. This is a scan to check for breast cancer.  Colon cancer screening: It is important to start screening for colon cancer at age 45.  Have a colonoscopy test every 10 years (or more often if you're at risk) Or, ask your provider about stool tests like a FIT test every year or Cologuard test every 3 years.  To learn more about your testing options, visit:   .  For help making a decision, visit:   https://bit.ly/fg86410.  Prostate cancer screening test: If you have a prostate, ask your care team if a prostate cancer screening test (PSA) at age 55 is right for you.  Lung cancer screening: If you are a current or former smoker ages 50 to 80, ask your care team if ongoing lung cancer screenings are right for you.  For informational purposes only. Not to replace the advice of your health care provider. Copyright   2023 [employer]. All rights reserved. Clinically reviewed by the [facility]  [place] [facility]. [employer] [id number] - REV 01/24.

## 2024-08-16 ENCOUNTER — TELEPHONE (OUTPATIENT)
Dept: SURGERY | Facility: CLINIC | Age: 69
End: 2024-08-16
Payer: COMMERCIAL

## 2024-08-16 NOTE — TELEPHONE ENCOUNTER
Type of surgery: robotic bilateral inguinal hernia repair, robotic spegalian hernia repair  Location of surgery: [facility]  Date and time of surgery: 10/29/24 7:30am  Surgeon: Dr De Luna  Pre-Op Appt Date: pt to schedule  Post-Op Appt Date: pt to schedule   Packet sent out: Yes  Pre-cert/Authorization completed:  Not Applicable  Date: 8/16/24

## 2024-08-23 ENCOUNTER — LAB (OUTPATIENT)
Dept: LAB | Facility: CLINIC | Age: 69
End: 2024-08-23
Payer: COMMERCIAL

## 2024-08-23 ENCOUNTER — ANTICOAGULATION THERAPY VISIT (OUTPATIENT)
Dept: ANTICOAGULATION | Facility: CLINIC | Age: 69
End: 2024-08-23

## 2024-08-23 DIAGNOSIS — Z86.718 HISTORY OF RECURRENT DEEP VEIN THROMBOSIS (DVT): Primary | ICD-10-CM

## 2024-08-23 DIAGNOSIS — Z79.01 LONG TERM CURRENT USE OF ANTICOAGULANT THERAPY: ICD-10-CM

## 2024-08-23 DIAGNOSIS — Z86.711 HISTORY OF PULMONARY EMBOLISM: ICD-10-CM

## 2024-08-23 DIAGNOSIS — Z86.718 HISTORY OF RECURRENT DEEP VEIN THROMBOSIS (DVT): ICD-10-CM

## 2024-08-23 LAB — INR BLD: 2.7 (ref 0.9–1.1)

## 2024-08-23 PROCEDURE — 85610 PROTHROMBIN TIME: CPT

## 2024-08-23 PROCEDURE — 36416 COLLJ CAPILLARY BLOOD SPEC: CPT

## 2024-08-23 NOTE — PROGRESS NOTES
ANTICOAGULATION MANAGEMENT     Jozef Saunders 69 year old male is on warfarin with therapeutic INR result. (Goal INR 2.0-3.0)    Recent labs: (last 7 days)     08/23/24  0959   INR 2.7*       ASSESSMENT     Source(s): Chart Review and Patient/Caregiver Call     Warfarin doses taken: Warfarin taken as instructed  Diet: No new diet changes identified  Medication/supplement changes: None noted  New illness, injury, or hospitalization: No  Signs or symptoms of bleeding or clotting: No  Previous result: Therapeutic last 2(+) visits  Additional findings: Upcoming surgery/procedure hernia repair 10/29/24. Encounter for procedure plan to be sent to [facility] at later date closer to surgery.       PLAN     Recommended plan for no diet, medication or health factor changes affecting INR     Dosing Instructions: Continue your current warfarin dose with next INR in 6 weeks       Summary  As of 8/23/2024      Full warfarin instructions:  5 mg every Tue, Thu, Sat; 7.5 mg all other days   Next INR check:  10/4/2024               Telephone call with Issac who verbalizes understanding and agrees to plan    Lab visit scheduled    Education provided: Please call back if any changes to your diet, medications or how you've been taking warfarin  Contact [phone] with any changes, questions or concerns.     Plan made per [facility] anticoagulation protocol    Radha Bolanos RN  Anticoagulation Clinic  8/23/2024    _______________________________________________________________________     Anticoagulation Episode Summary       Current INR goal:  2.0-3.0   TTR:  81.7% (1 y)   Target end date:  Indefinite   Send INR reminders to:  LEANDRA ALBERTO    Indications    History of recurrent deep vein thrombosis (DVT) [Z86.718]  Long term current use of anticoagulant therapy [Z79.01]  History of pulmonary embolism [Z86.711]  Deep vein thrombosis (DVT) of proximal lower extremity  unspecified chronicity  unspecified laterality (H) (Resolved) [I82.4Y9]              Comments:  3/19/21 Does not need lovenox bridge per Dr. Humphreys             Anticoagulation Care Providers       Provider Role Specialty Phone number    Hina Humphreys MD Referring Internal Medicine [phone]    Rojas Chun MD  Internal Medicine - Pediatrics [phone]

## 2024-08-28 ENCOUNTER — TELEPHONE (OUTPATIENT)
Dept: PEDIATRICS | Facility: CLINIC | Age: 69
End: 2024-08-28
Payer: COMMERCIAL

## 2024-08-29 NOTE — TELEPHONE ENCOUNTER
Reason for Call:  Appointment Request    Patient requesting this type of appt: Pre-op    Requested provider: Hina Humphreys    Reason patient unable to be scheduled: Not within requested timeframe    When does patient want to be seen/preferred time:  10/16-10/29/24    Comments: Pt needs prop for a procedure at [facility] on 10/29/24    Could we send this information to you in [facility] or would you prefer to receive a phone call?:   Patient would prefer a phone call   Okay to leave a detailed message?: Yes at Home number on file [phone] (home)    Call taken on 8/28/2024 at 10:22 AM by Sabra Rai   
1st attempt. Left voicemail for patient. Sent [employer] message with appointment options with Adeola Pena. See if patient is okay with seeing her PCP partner Adeola instead.  Lynne Cisneros, [facility]    
Called and was able to speak with Issac.     Scheduled appointment for patient pre-op 10/23    Samira Patten MA    
no

## 2024-09-28 DIAGNOSIS — I10 ESSENTIAL HYPERTENSION, BENIGN: ICD-10-CM

## 2024-09-29 DIAGNOSIS — E78.00 HYPERCHOLESTEROLEMIA: ICD-10-CM

## 2024-09-29 DIAGNOSIS — R12 HEARTBURN: ICD-10-CM

## 2024-09-30 RX ORDER — CARVEDILOL 12.5 MG/1
12.5 TABLET ORAL 2 TIMES DAILY WITH MEALS
Qty: 180 TABLET | Refills: 1 | OUTPATIENT
Start: 2024-09-30

## 2024-09-30 RX ORDER — ROSUVASTATIN CALCIUM 5 MG/1
5 TABLET, COATED ORAL DAILY
Qty: 90 TABLET | Refills: 2 | OUTPATIENT
Start: 2024-09-30

## 2024-10-01 RX ORDER — ROSUVASTATIN CALCIUM 5 MG/1
5 TABLET, COATED ORAL DAILY
Qty: 90 TABLET | Refills: 4 | Status: SHIPPED | OUTPATIENT
Start: 2024-10-01

## 2024-10-01 RX ORDER — FAMOTIDINE 20 MG/1
20 TABLET, FILM COATED ORAL 2 TIMES DAILY
Qty: 180 TABLET | Refills: 4 | Status: SHIPPED | OUTPATIENT
Start: 2024-10-01

## 2024-10-04 ENCOUNTER — LAB (OUTPATIENT)
Dept: LAB | Facility: CLINIC | Age: 69
End: 2024-10-04
Payer: COMMERCIAL

## 2024-10-04 ENCOUNTER — TELEPHONE (OUTPATIENT)
Dept: PEDIATRICS | Facility: CLINIC | Age: 69
End: 2024-10-04

## 2024-10-04 ENCOUNTER — ANTICOAGULATION THERAPY VISIT (OUTPATIENT)
Dept: ANTICOAGULATION | Facility: CLINIC | Age: 69
End: 2024-10-04

## 2024-10-04 DIAGNOSIS — Z86.711 HISTORY OF PULMONARY EMBOLISM: ICD-10-CM

## 2024-10-04 DIAGNOSIS — Z86.718 HISTORY OF RECURRENT DEEP VEIN THROMBOSIS (DVT): Primary | ICD-10-CM

## 2024-10-04 DIAGNOSIS — Z86.718 HISTORY OF RECURRENT DEEP VEIN THROMBOSIS (DVT): ICD-10-CM

## 2024-10-04 DIAGNOSIS — Z79.01 LONG TERM CURRENT USE OF ANTICOAGULANT THERAPY: ICD-10-CM

## 2024-10-04 LAB — INR BLD: 2.7 (ref 0.9–1.1)

## 2024-10-04 PROCEDURE — 36416 COLLJ CAPILLARY BLOOD SPEC: CPT

## 2024-10-04 PROCEDURE — 85610 PROTHROMBIN TIME: CPT

## 2024-10-04 NOTE — TELEPHONE ENCOUNTER
JANETH-PROCEDURAL ANTICOAGULATION  MANAGEMENT    Jozef requesting pre-procedure hold orders for warfarin and review for bridging      Procedure date: 10/29/24       Procedure: Hernia Repair      Procedure location and phone number (if external): [place]     Number of warfarin hold days requested and/or target INR: unknown    Pre-op date:  10/23/24      Routing to Anticoagulation Pharmacist for review.     [facility] pool: mayra Ashby RN

## 2024-10-04 NOTE — PROGRESS NOTES
ANTICOAGULATION MANAGEMENT     Jozef Saunders 69 year old male is on warfarin with therapeutic INR result. (Goal INR 2.0-3.0)    Recent labs: (last 7 days)     10/04/24  0946   INR 2.7*       ASSESSMENT     Source(s): Chart Review and Patient/Caregiver Call     Warfarin doses taken: Warfarin taken as instructed  Diet: No new diet changes identified  Medication/supplement changes: None noted  New illness, injury, or hospitalization: No  Signs or symptoms of bleeding or clotting: No  Previous result: Therapeutic last 2(+) visits  Additional findings: Upcoming surgery/procedure hernia repair scheduled on 10/29/24, TE routed to [facility] today. Patient informed [facility] will contact him with procedure instructions once received from [facility], patient verbalized understanding.        PLAN     Recommended plan for no diet, medication or health factor changes affecting INR     Dosing Instructions: Continue your current warfarin dose with next INR in 4 1/2 weeks (1 week after surgery)      Summary  As of 10/4/2024      Full warfarin instructions:  5 mg every Tue, Thu, Sat; 7.5 mg all other days   Next INR check:  11/5/2024               Telephone call with Issac who verbalizes understanding and agrees to plan    Lab visit scheduled    Education provided:  [facility]N will call back with procedure instructions    Plan made per [facility] anticoagulation protocol    Lovely Ashby RN  10/4/2024  Anticoagulation Clinic  [facility] for routing messages: mayra ALBERTO  [facility] patient phone line: [phone]        _______________________________________________________________________     Anticoagulation Episode Summary       Current INR goal:  2.0-3.0   TTR:  81.7% (1 y)   Target end date:  Indefinite   Send INR reminders to:  LEANDRA ALBERTO    Indications    History of recurrent deep vein thrombosis (DVT) [Z86.988]  Long term current use of anticoagulant therapy [Z79.01]  History of pulmonary embolism [Z86.711]  Deep vein thrombosis (DVT) of proximal  lower extremity  unspecified chronicity  unspecified laterality (H) (Resolved) [I82.4Y9]             Comments:  3/19/21 Does not need lovenox bridge per Dr. Humphreys             Anticoagulation Care Providers       Provider Role Specialty Phone number    Hina Humphreys MD Referring Internal Medicine [phone]    Rojas Chun MD  Internal Medicine - Pediatrics [phone]

## 2024-10-14 NOTE — TELEPHONE ENCOUNTER
"KATIE-PROCEDURAL ANTICOAGULATION  MANAGEMENT    ASSESSMENT     Warfarin interruption plan for robotic bilateral inguinal hernia repair on 10/29/24.    Indication for Anticoagulation: Recurrent VTE     history of recurrent DVT and pulmonary emboli in 2010, and 2014.  Hypercoagulable work up negative    Katie-Procedure Risk stratification for thromboembolism: moderate (2022 Chest guidelines)    VTE: 2022 CHEST Perioperative Management guidelines note, suggesting against bridging with a therapeutic-dose regimen does not preclude the use of a low-dose heparin regimen, typically started within 24 hours after surgery and continued for up to 5 days while VKA therapy is resumed, to decrease the risk for postoperative VTE.    RECOMMENDATION     Pre-Procedure:  Hold warfarin for 4 days, until after procedure starting: Friday, October 25    No pre-procedure bridge    Post-Procedure:  Resume warfarin dose if okay with provider doing procedure on night of procedure, Tuesday, October 29 PM: take 10 mg x 2, then resume home dose  Start enoxaparin (Lovenox) 40 mg subq Q 24 hrs (prophylaxis dose) ~ 24 hours post procedure when okay with provider doing procedure. Continue until INR >= 2.0  Recheck INR 5-7 days after resuming warfarin     Plan routed to referring provider for approval  ?   Chandrika Maxwell [facility]    SUBJECTIVE/OBJECTIVE     Jozef MATTEO Saunders, a 69 year old male    Goal INR Range: 2.0-3.0     Patient bridged in past: Yes: prophylactic enoxaparin 2018 (colonoscopy), 2019 (s/p hiatal hernia repair)    Wt Readings from Last 3 Encounters:   08/12/24 84.8 kg (187 lb)   08/04/24 85.4 kg (188 lb 4.8 oz)   06/21/24 84.6 kg (186 lb 9.6 oz)      Ideal body weight: 69 kg (152 lb 1 oz)  Adjusted ideal body weight: 75.3 kg (166 lb 0.6 oz)     Estimated body mass index is 28.23 kg/m  as calculated from the following:    Height as of 8/12/24: 1.734 m (5' 8.25\").    Weight as of 8/12/24: 84.8 kg (187 lb).    Lab Results   Component Value Date "    INR 2.7 (H) 10/04/2024    INR 2.7 (H) 08/23/2024    INR 2.7 (H) 08/12/2024     Lab Results   Component Value Date    HGB 16.3 06/21/2024    HCT 48.0 06/21/2024     (L) 06/21/2024     Lab Results   Component Value Date    CR 0.87 06/21/2024    CR 0.79 05/06/2024    CR 0.77 08/02/2023     Estimated Creatinine Clearance: 84.1 mL/min (based on SCr of 0.87 mg/dL).

## 2024-10-15 DIAGNOSIS — E78.00 HYPERCHOLESTEROLEMIA: ICD-10-CM

## 2024-10-15 DIAGNOSIS — F41.1 GENERALIZED ANXIETY DISORDER: ICD-10-CM

## 2024-10-15 RX ORDER — ENOXAPARIN SODIUM 100 MG/ML
40 INJECTION SUBCUTANEOUS EVERY 24 HOURS
Qty: 5.6 ML | Refills: 1 | Status: SHIPPED | OUTPATIENT
Start: 2024-10-15

## 2024-10-15 NOTE — TELEPHONE ENCOUNTER
Called and reviewed warfarin hold plan with patient.  Lovenox Rx sent to pharmacy.  [employer] message sent with detailed instructions.

## 2024-10-15 NOTE — TELEPHONE ENCOUNTER
Hina Humphreys MD  Anticoag [place]15 hours ago (6:40 PM)     EM  Yes, thank you so much.  Hina Humphreys M.D.

## 2024-10-16 ENCOUNTER — OFFICE VISIT (OUTPATIENT)
Dept: SURGERY | Facility: CLINIC | Age: 69
End: 2024-10-16
Payer: COMMERCIAL

## 2024-10-16 VITALS
BODY MASS INDEX: 28.04 KG/M2 | WEIGHT: 185 LBS | HEART RATE: 60 BPM | OXYGEN SATURATION: 96 % | DIASTOLIC BLOOD PRESSURE: 70 MMHG | HEIGHT: 68 IN | SYSTOLIC BLOOD PRESSURE: 140 MMHG

## 2024-10-16 DIAGNOSIS — K43.9 SPIGELIAN HERNIA: Primary | ICD-10-CM

## 2024-10-16 DIAGNOSIS — K40.21 BILATERAL RECURRENT INGUINAL HERNIA WITHOUT OBSTRUCTION OR GANGRENE: ICD-10-CM

## 2024-10-16 PROCEDURE — 99213 OFFICE O/P EST LOW 20 MIN: CPT | Performed by: SURGERY

## 2024-10-16 NOTE — PROGRESS NOTES
Surgery Consultation, [facility], EDDIE De Luna MD    Jozef Saunders MRN# [id number]   YOB: 1955 Age: 69 year old       Jozef Saunders is a 69 year old male who I met in May.  He has a known recurrence of his hiatal hernia.  This was fixed thoracically by Dr. Dewey Roa who did a uncut Cheyenne Nissen.  He continues to have no symptoms from this.  He can eat and drink fine and his preoperative symptoms are gone.  He can tolerate meat fine.    His main complaint is his left-sided flank hernia.  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  We also identified bilateral inguinal hernias.  He had his inguinal hernias fixed when he was a child without mesh.    On exam I can feel the spigelian hernia.  He has small umbilical hernia is not palpable.    I discussed the plan which we are doing in a few weeks.  Given that I will open the peritoneal flap to fix the spigelian hernia extending it just a bit further will be in his space of Retzius and I can then fix his inguinal hernias as well.  Will be very difficult to fix his inguinal hernias down the road after the spigelian repair.  We discussed fixing the spigelian hernia and bilateral inguinal hernias.  His umbilical hernia is very small and will likely never give him difficulty.  The hiatal hernia could be fixed as it is on the other side of the abdomen and should not be impacted at all by the repair of his lower abdominal wall hernias.  It is currently giving him no symptoms and can be watched for now.    I discussed the preoperative, perioperative, and postoperative course of this procedure.  I discussed potential risks and complications which include, but are not limited to, bleeding, infection, recurrence, abdominal pain, injury to other structures upon entry or working on his hernias.  He agrees to proceed..    Will proceed with a robotic repair of his left spigelian hernia and bilateral inguinal hernias on October  29.    Robi De Luna M.D., FIRMA.C.[facility]  [place], [place]  [phone]

## 2024-10-16 NOTE — LETTER
October 17, 2024          Hina Humphreys MD  [address] [place] [address]      RE:   Jozef Saunders 1955      Dear Colleague,    Thank you for referring your patient, Jozef Saunders, to [facility]. Please see a copy of my visit note below.    Jozef Saunders is a 69 year old male who I met in May.  He has a known recurrence of his hiatal hernia.  This was fixed thoracically by Dr. Dewey Roa who did a uncut Cheyenne Nissen.  He continues to have no symptoms from this.  He can eat and drink fine and his preoperative symptoms are gone.  He can tolerate meat fine.     His main complaint is his left-sided flank hernia.  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  We also identified bilateral inguinal hernias.  He had his inguinal hernias fixed when he was a child without mesh.     On exam I can feel the spigelian hernia.  He has small umbilical hernia is not palpable.     I discussed the plan which we are doing in a few weeks.  Given that I will open the peritoneal flap to fix the spigelian hernia extending it just a bit further will be in his space of Retzius and I can then fix his inguinal hernias as well.  Will be very difficult to fix his inguinal hernias down the road after the spigelian repair.  We discussed fixing the spigelian hernia and bilateral inguinal hernias.  His umbilical hernia is very small and will likely never give him difficulty.  The hiatal hernia could be fixed as it is on the other side of the abdomen and should not be impacted at all by the repair of his lower abdominal wall hernias.  It is currently giving him no symptoms and can be watched for now.     I discussed the preoperative, perioperative, and postoperative course of this procedure.  I discussed potential risks and complications which include, but are not limited to, bleeding, infection, recurrence, abdominal pain, injury to other structures upon  entry or working on his hernias.  He agrees to proceed..     Will proceed with a robotic repair of his left spigelian hernia and bilateral inguinal hernias on October 29.    Again, thank you for allowing me to participate in the care of your patient.      Sincerely,      Robi De Luna MD

## 2024-10-23 ENCOUNTER — OFFICE VISIT (OUTPATIENT)
Dept: PEDIATRICS | Facility: CLINIC | Age: 69
End: 2024-10-23
Payer: COMMERCIAL

## 2024-10-23 VITALS
HEART RATE: 68 BPM | RESPIRATION RATE: 18 BRPM | BODY MASS INDEX: 27.7 KG/M2 | HEIGHT: 69 IN | OXYGEN SATURATION: 96 % | DIASTOLIC BLOOD PRESSURE: 89 MMHG | TEMPERATURE: 97.9 F | WEIGHT: 187 LBS | SYSTOLIC BLOOD PRESSURE: 135 MMHG

## 2024-10-23 DIAGNOSIS — Z86.711 HISTORY OF PULMONARY EMBOLISM: ICD-10-CM

## 2024-10-23 DIAGNOSIS — K43.9 ABDOMINAL WALL HERNIA: ICD-10-CM

## 2024-10-23 DIAGNOSIS — D69.6 THROMBOCYTOPENIA (H): ICD-10-CM

## 2024-10-23 DIAGNOSIS — I10 ESSENTIAL HYPERTENSION, BENIGN: ICD-10-CM

## 2024-10-23 DIAGNOSIS — K40.21 BILATERAL RECURRENT INGUINAL HERNIA WITHOUT OBSTRUCTION OR GANGRENE: ICD-10-CM

## 2024-10-23 DIAGNOSIS — Z79.01 LONG TERM CURRENT USE OF ANTICOAGULANT THERAPY: ICD-10-CM

## 2024-10-23 DIAGNOSIS — E78.00 HYPERCHOLESTEROLEMIA: ICD-10-CM

## 2024-10-23 DIAGNOSIS — Z01.818 PREOP GENERAL PHYSICAL EXAM: Primary | ICD-10-CM

## 2024-10-23 LAB
ERYTHROCYTE [DISTWIDTH] IN BLOOD BY AUTOMATED COUNT: 12.9 % (ref 10–15)
HCT VFR BLD AUTO: 47.9 % (ref 40–53)
HGB BLD-MCNC: 15.7 G/DL (ref 13.3–17.7)
MCH RBC QN AUTO: 30 PG (ref 26.5–33)
MCHC RBC AUTO-ENTMCNC: 32.8 G/DL (ref 31.5–36.5)
MCV RBC AUTO: 92 FL (ref 78–100)
PLATELET # BLD AUTO: 83 10E3/UL (ref 150–450)
RBC # BLD AUTO: 5.23 10E6/UL (ref 4.4–5.9)
WBC # BLD AUTO: 5.8 10E3/UL (ref 4–11)

## 2024-10-23 PROCEDURE — 85027 COMPLETE CBC AUTOMATED: CPT | Performed by: INTERNAL MEDICINE

## 2024-10-23 PROCEDURE — G2211 COMPLEX E/M VISIT ADD ON: HCPCS | Performed by: INTERNAL MEDICINE

## 2024-10-23 PROCEDURE — 99214 OFFICE O/P EST MOD 30 MIN: CPT | Performed by: INTERNAL MEDICINE

## 2024-10-23 PROCEDURE — 36415 COLL VENOUS BLD VENIPUNCTURE: CPT | Performed by: INTERNAL MEDICINE

## 2024-10-23 ASSESSMENT — PAIN SCALES - GENERAL: PAINLEVEL_OUTOF10: NO PAIN (0)

## 2024-10-23 NOTE — PATIENT INSTRUCTIONS
How to Take Your Medication Before Surgery  Preoperative Medication Instructions   Antiplatelet or Anticoagulation Medication Instructions   - warfarin: Bridging therapy will be coordinated by anticoagulation    10/24, Take last dose of warfarin  10/25, NO warfarin  10/26, NO warfarin  10/27, NO warfarin  10/28, NO warfarin,      10/29, DAY OF PROCEDURE, NO enoxaparin. Restart warfarin 10 mg (2 tablets) in the evening, if okay with provider doing the procedure.    Make sure to ask the provider doing your procedure if it is okay to restart your warfarin and enoxaparin as planned      10/30, Start enoxaparin 40 mg every 24 hours in AM, if okay with provider doing the procedure, and warfarin 10 mg (2 tablets) in the evening.   10/31, Enoxaparin every 24 hours in AM and warfarin 5 mg in the evening.  11/1, Enoxaparin every 24 hours in AM and warfarin 7.5 mg in the evening.  11/2, Enoxaparin every 24 hours in AM and warfarin 5 mg in the evening.  11/3, Enoxaparin every 24 hours in AM and warfarin 7.5 mg in the evening.  11/4, Enoxaparin every 24 hours in AM and warfarin 7.5 mg in the evening.  11/5, Enoxaparin in the AM. Recheck INR. Anticoagulation clinic to call with further dosing instructions      Additional Medication Instructions   - ACE/ARB: DO NOT TAKE on day of surgery (minimum 11 hours for general anesthesia).   - SSRIs, SNRIs, TCAs, Antipsychotics: Continue without modification.        Patient Education   Preparing for Your Surgery  For Adults  Getting started  In most cases, a nurse will call to review your health history and instructions. They will give you an arrival time based on your scheduled surgery time. Please be ready to share:  Your doctor's clinic name and phone number  Your medical, surgical, and anesthesia history  A list of allergies and sensitivities  A list of medicines, including herbal treatments and over-the-counter drugs  Whether the patient has a legal guardian (ask how to send us the  papers in advance)  Note: You may not receive a call if you were seen at our PAC (Preoperative Assessment Center).  Please tell us if you're pregnant--or if there's any chance you might be pregnant. Some surgeries may injure a fetus (unborn baby), so they require a pregnancy test. Surgeries that are safe for a fetus don't always need a test, and you can choose whether to have one.   Preparing for surgery  Within 10 to 30 days of surgery: Have a pre-op exam (sometimes called an H&P, or History and Physical). This can be done at a clinic or pre-operative center.  If you're having a , you may not need this exam. Talk to your care team.  At your pre-op exam, talk to your care team about all medicines you take. (This includes CBD oil and any drugs, such as THC, marijuana, and other forms of cannabis.) If you need to stop any medicine before surgery, ask when to start taking it again.  This is for your safety. Many medicines and drugs can make you bleed too much during surgery. Some change how well surgery (anesthesia) drugs work.  Call your insurance company to let them know you're having surgery. (If you don't have insurance, call [phone].)  Call your clinic if there's any change in your health. This includes a scrape or scratch near the surgery site, or any signs of a cold (sore throat, runny nose, cough, rash, fever).  Eating and drinking guidelines  For your safety: Unless your surgeon tells you otherwise, follow the guidelines below.  Eat and drink as normal until 8 hours before you arrive for surgery. After that, no food or milk. You can spit out gum when you arrive.  Drink clear liquids until 2 hours before you arrive. These are liquids you can see through, like water, Gatorade, and Propel Water. They also include plain black coffee and tea (no cream or milk).  No alcohol for 24 hours before you arrive. The night before surgery, stop any drinks that contain THC.  If your care team tells you to take  medicine on the morning of surgery, it's okay to take it with a sip of water. No other medicines or drugs are allowed (including CBD oil)--follow your care team's instructions.  If you have questions the day of surgery, call your hospital or surgery center.   Preventing infection  Shower or bathe the night before and the morning of surgery. Follow the instructions your clinic gave you. (If no instructions, use regular soap.)  Don't shave or clip hair near your surgery site. We'll remove the hair if needed.  Don't smoke or vape the morning of surgery. No chewing tobacco for 6 hours before you arrive. A nicotine patch is okay. You may spit out nicotine gum when you arrive.  For some surgeries, the surgeon will tell you to fully quit smoking and nicotine.  We will make every effort to keep you safe from infection. We will:  Clean our hands often with soap and water (or an alcohol-based hand rub).  Clean the skin at your surgery site with a special soap that kills germs.  Give you a special gown to keep you warm. (Cold raises the risk of infection.)  Wear hair covers, masks, gowns, and gloves during surgery.  Give antibiotic medicine, if prescribed. Not all surgeries need this medicine.  What to bring on the day of surgery  Photo ID and insurance card  Copy of your health care directive, if you have one  Glasses and hearing aids (bring cases)  You can't wear contacts during surgery  Inhaler and eye drops, if you use them (tell us about these when you arrive)  CPAP machine or breathing device, if you use them  A few personal items, if spending the night  If you have . . .  A pacemaker, ICD (cardiac defibrillator), or other implant: Bring the ID card.  An implanted stimulator: Bring the remote control.  A legal guardian: Bring a copy of the certified (court-stamped) guardianship papers.  Please remove any jewelry, including body piercings. Leave jewelry and other valuables at home.  If you're going home the day of  surgery  You must have a responsible adult drive you home. They should stay with you overnight as well.  If you don't have someone to stay with you, and you aren't safe to go home alone, we may keep you overnight. Insurance often won't pay for this.  After surgery  If it's hard to control your pain or you need more pain medicine, please call your surgeon's office.  Questions?   If you have any questions for your care team, list them here:   ____________________________________________________________________________________________________________________________________________________________________________________________________________________________________________________________  For informational purposes only. Not to replace the advice of your health care provider. Copyright   2003, 2019 [employer]. All rights reserved. Clinically reviewed by Lei Garibay MD. [employer] [id number] - REV 08/24.

## 2024-10-23 NOTE — PROGRESS NOTES
Preoperative Evaluation  [facility] [place]  [address]  [address]  [place] [address]  Phone: [phone]  Fax: [phone]  Primary Provider: Hina Humphreys MD  Pre-op Performing Provider: Hina Humphreys MD  Oct 23, 2024             10/23/2024   Surgical Information   What procedure is being done? hernia surgery    Facility or Hospital where procedure/surgery will be performed: [facility]    Who is doing the procedure / surgery? dr santana    Date of surgery / procedure: 10/29/24    Time of surgery / procedure: 7:00 am    Where do you plan to recover after surgery? at home with family        Patient-reported     Fax number for surgical facility: Note does not need to be faxed, will be available electronically in Epic.    Assessment & Plan     The proposed surgical procedure is considered INTERMEDIATE risk.    Preop general physical exam      Bilateral recurrent inguinal hernia without obstruction or gangrene  Proceed with proposed surgery    Abdominal wall hernia      History of pulmonary embolism  Unprovoked. On warfarin long term. Followed by anticoag clinic. Written instructions given. Will hold coumadin 4 days prior to procedure and bridge with enoxaparin starting day after procedure    Thrombocytopenia (H)  Has seen heme. Evaluation unremarkable. Planning to monitor plt count and eval further if plt <50-75. With current platelet counts around 100, would not consider this elevated risk for bleeding with procedure, if surgeon is in agreement.     Hypercholesterolemia  Tolerating statin    Essential hypertension, benign  Mildly elevated at preop, but in good control at home.       The longitudinal plan of care for the diagnosis(es)/condition(s) as documented were addressed during this visit. Due to the added complexity in care, I will continue to support Issac in the subsequent management and with ongoing continuity of care.     - No identified additional risk  factors other than previously addressed    Antiplatelet or Anticoagulation Medication Instructions   - warfarin: Thromboembolic risk is moderate (4-10%/year). DO NOT TAKE warfarin 5 days. Thromboembolic risk is moderate (4-10%/year). DO NOT TAKE warfarin 5 days.   - Bridging therapy ordered     Additional Medication Instructions   - ACE/ARB: DO NOT TAKE on day of surgery (minimum 11 hours for general anesthesia).   - SSRIs, SNRIs, TCAs, Antipsychotics: Continue without modification.     Recommendation  Approval given to proceed with proposed procedure, without further diagnostic evaluation.    10/24, Take last dose of warfarin  10/25, NO warfarin  10/26, NO warfarin  10/27, NO warfarin  10/28, NO warfarin,      10/29, DAY OF PROCEDURE, NO enoxaparin. Restart warfarin 10 mg (2 tablets) in the evening, if okay with provider doing the procedure.    Make sure to ask the provider doing your procedure if it is okay to restart your warfarin and enoxaparin as planned      10/30, Start enoxaparin 40 mg every 24 hours in AM, if okay with provider doing the procedure, and warfarin 10 mg (2 tablets) in the evening.   10/31, Enoxaparin every 24 hours in AM and warfarin 5 mg in the evening.  11/1, Enoxaparin every 24 hours in AM and warfarin 7.5 mg in the evening.  11/2, Enoxaparin every 24 hours in AM and warfarin 5 mg in the evening.  11/3, Enoxaparin every 24 hours in AM and warfarin 7.5 mg in the evening.  11/4, Enoxaparin every 24 hours in AM and warfarin 7.5 mg in the evening.  11/5, Enoxaparin in the AM. Recheck INR. Anticoagulation clinic to call with further dosing instructions    Maria Teresa Davenport is a 69 year old, presenting for the following:  Pre-Op Exam (10/29 @ [facility] / Bilateral recurrent inguinal hernia without obstruction or gangrene / Spigelian hernia//)          10/23/2024    11:08 AM   Additional Questions   Roomed by KM   Accompanied by self         10/23/2024    11:08 AM   Patient Reported Additional  Medications   Patient reports taking the following new medications None     HPI related to upcoming procedure: having some tenderness with abdominal wall hernia, also noted to have bilateral inguinal hernias.         10/23/2024   Pre-Op Questionnaire   Have you ever had a heart attack or stroke? No    Have you ever had surgery on your heart or blood vessels, such as a stent placement, a coronary artery bypass, or surgery on an artery in your head, neck, heart, or legs? No    Do you have chest pain with activity? No    Do you have a history of heart failure? No    Do you currently have a cold, bronchitis or symptoms of other infection? No    Do you have a cough, shortness of breath, or wheezing? No    Do you or anyone in your family have previous history of blood clots? (!) YES on coumadin    Do you or does anyone in your family have a serious bleeding problem such as prolonged bleeding following surgeries or cuts? No    Have you ever had problems with anemia or been told to take iron pills? (!) YES iron deficiency in past    Have you had any abnormal blood loss such as black, tarry or bloody stools? No    Have you ever had a blood transfusion? No    Are you willing to have a blood transfusion if it is medically needed before, during, or after your surgery? Yes    Have you or any of your relatives ever had problems with anesthesia? No    Do you have sleep apnea, excessive snoring or daytime drowsiness? No    Do you have any artifical heart valves or other implanted medical devices like a pacemaker, defibrillator, or continuous glucose monitor? No    Do you have artificial joints? No    Are you allergic to latex? No        Patient-reported     Health Care Directive  Patient does not have a Health Care Directive: Discussed advance care planning with patient; information given to patient to review.    Preoperative Review of    reviewed - no record of controlled substances prescribed.      Status of Chronic  "Conditions:  See problem list for active medical problems.  Problems all longstanding and stable, except as noted/documented.  See ROS for pertinent symptoms related to these conditions.    HYPERLIPIDEMIA - Patient has a long history of significant Hyperlipidemia requiring medication for treatment with recent good control. Patient reports no problems or side effects with the medication.     HYPERTENSION - Patient has longstanding history of HTN , currently denies any symptoms referable to elevated blood pressure. Specifically denies chest pain, palpitations, dyspnea, orthopnea, PND or peripheral edema. Blood pressure readings have been in normal range. Current medication regimen is as listed below. Patient denies any side effects of medication.     BP Readings from Last 3 Encounters:   10/23/24 135/89   10/16/24 (!) 140/70   08/12/24 124/76       THROMBOCYTOPENIA: stable in  range.     ANXIETY: stable on sertraline      Patient Active Problem List    Diagnosis Date Noted    Esophageal dysphagia 06/07/2021     Priority: Medium    Thrombocytopenia (H) 10/22/2019     Priority: Medium     Seen by heme. Workup unremarkable, but holding off on bone marrow biopsy unless plt count <50-75      Hemorrhoids 12/26/2018     Priority: Medium    Polyp of colon 12/26/2018     Priority: Medium    Alcohol dependence in remission (H) 10/11/2018     Priority: Medium    Long term current use of anticoagulant therapy 03/26/2015     Priority: Medium     Problem list name updated by automated process. Provider to review      History of recurrent deep vein thrombosis (DVT) 10/24/2014     Priority: Medium     On coumadin.      History of pulmonary embolism 10/24/2014     Priority: Medium     Unprovoked, 2010 first episode, then had recurrence after that, continuing on indefinitely.      s/p nissen for large hiatal hernia 04/05/2011     Priority: Medium     Present on EGD in 2021, after Nissen procedure, which was noted to be \"partially " "disrupted\" on EGD report.     >>OVERVIEW FOR S/P OZZIE NISSEN GASTROPLASTY WRITTEN ON 8/1/2022  4:11 PM BY JUAN J HARTMANN MD    Due to large paraesophageal hernia      History of iron deficiency anemia 11/16/2010     Priority: Medium     Probably due to warfarin and hiatal hernia resulting in occult GI bleed      Hypercholesterolemia 03/18/2009     Priority: Medium     Cardiac Risk Factors: male over 45, HTN, family h/o CAD; goal LDL < 130;   10-year CV risk (8/27/2014) based on ACC/AHA risk calculator = 15.4%       Generalized anxiety disorder 12/22/2004     Priority: Medium    Essential hypertension, benign 12/09/2004     Priority: Medium      Past Medical History:   Diagnosis Date    Deep vein thrombosis (DVT) of proximal lower extremity, unspecified chronicity, unspecified laterality (H) 01/11/2022    Iron deficiency anemia 11/16/2010    Probably due to warfarin and hiatal hernia resulting in occult GI bleed    Pulmonary embolism (H) 09/25/2010    Recovering Alcoholic     Recurrent periodic urticaria 01/11/2012    Idiopathic at this point.  Has seen Dr. Riojas at [facility]      Thrush 07/15/2018     Past Surgical History:   Procedure Laterality Date    HC ARTHROTOMY/EXPLORE/TREAT KNEE JOINT      torn ligaments    HC REPAIR ING HERNIA,5+Y/O,REDUCIBL      age 8 - right    HC REPAIR ING HERNIA,6MO-5YR,REDUC      infancy - left    HERNIORRHAPHY HIATAL N/A 11/12/2019    Procedure: HIATAL HERNIA REPAIR WITH UNCUT COLIS NISSEN GASTROPLASTY;  Surgeon: Dewey Roa MD;  Location:  OR    THORACOTOMY N/A 11/12/2019    Procedure: LEFT THORACOTOMY;  Surgeon: Dewey Roa MD;  Location:  OR     Current Outpatient Medications   Medication Sig Dispense Refill    carvedilol (COREG) 12.5 MG tablet Take 1 tablet (12.5 mg) by mouth 2 times daily (with meals) 180 tablet 3    famotidine (PEPCID) 20 MG tablet TAKE 1 TABLET BY MOUTH TWICE A  tablet 4    fluticasone (FLONASE) 50 " MCG/ACT nasal spray INSTILL 2 SPRAYS INTO BOTH NOSTRILS DAILY. 48 mL 9    lisinopril (ZESTRIL) 20 MG tablet Take 1 tablet (20 mg) by mouth daily 90 tablet 4    rosuvastatin (CRESTOR) 5 MG tablet Take 1 tablet (5 mg) by mouth daily. 90 tablet 4    sertraline (ZOLOFT) 50 MG tablet TAKE 1 TABLET BY MOUTH EVERY DAY 90 tablet 4    warfarin ANTICOAGULANT (COUMADIN) 5 MG tablet TAKE 7.5 MG ON MON,THUR & SAT / TAKE 5 MG ALL OTHER DAYS OR AS DIRECTED BY  tablet 1    enoxaparin ANTICOAGULANT (LOVENOX) 40 MG/0.4ML syringe Inject 0.4 mLs (40 mg) subcutaneously every 24 hours. As directed by anticoagulation clinic (Patient not taking: Reported on 10/23/2024) 5.6 mL 1       Allergies   Allergen Reactions    Clindamycin Hcl Hives    Heparin      Heparin-induced thrombocytopenia    Omeprazole Diarrhea        Social History     Tobacco Use    Smoking status: Former     Current packs/day: 0.00     Types: Cigarettes     Quit date: 1989     Years since quittin.9     Passive exposure: Never    Smokeless tobacco: Never   Substance Use Topics    Alcohol use: No     Comment: sober x26y     Family History   Problem Relation Age of Onset    Cancer Mother         liver;  at age 47    Hypertension Father     C.A.D. Father         CABG; age of onset over age 60    Hypertension Brother         age of onset under 50    Prostate Cancer Brother         diagnosed at age 58    Diabetes No family hx of      History   Drug Use No             Review of Systems  CONSTITUTIONAL: NEGATIVE for fever, chills, change in weight  INTEGUMENTARY/SKIN: NEGATIVE for worrisome rashes, moles or lesions  EYES: NEGATIVE for vision changes or irritation  ENT/MOUTH: NEGATIVE for ear, mouth and throat problems  RESP: NEGATIVE for significant cough or SOB  CV: NEGATIVE for chest pain, palpitations or peripheral edema  GI: NEGATIVE for nausea, abdominal pain, heartburn, or change in bowel habits  : NEGATIVE for frequency, dysuria, or  "hematuria  MUSCULOSKELETAL: NEGATIVE for significant arthralgias or myalgia  NEURO: NEGATIVE for weakness, dizziness or paresthesias  ENDOCRINE: NEGATIVE for temperature intolerance, skin/hair changes  PSYCHIATRIC: NEGATIVE for changes in mood or affect    Objective    /89 (BP Location: Right arm, Patient Position: Sitting, Cuff Size: Adult Regular)   Pulse 68   Temp 97.9  F (36.6  C) (Tympanic)   Resp 18   Ht 1.74 m (5' 8.5\")   Wt 84.8 kg (187 lb)   SpO2 96%   BMI 28.02 kg/m     Estimated body mass index is 28.02 kg/m  as calculated from the following:    Height as of this encounter: 1.74 m (5' 8.5\").    Weight as of this encounter: 84.8 kg (187 lb).  Physical Exam  GENERAL: alert and no distress  EYES: Eyes grossly normal to inspection, PERRL and conjunctivae and sclerae normal  NECK: no adenopathy, no asymmetry, masses, or scars  RESP: lungs clear to auscultation - no rales, rhonchi or wheezes  CV: regular rate and rhythm, normal S1 S2, no S3 or S4, no murmur, click or rub, no peripheral edema  ABDOMEN: soft, nontender, no hepatosplenomegaly, no masses and bowel sounds normal  MS: no gross musculoskeletal defects noted, no edema    Recent Labs   Lab Test 10/04/24  0946 08/23/24  0959 07/19/24  0944 06/21/24  1447 05/24/24  0951 05/06/24  1209   HGB  --   --   --  16.3  --  15.7   PLT  --   --   --  104*  --  87*   INR 2.7* 2.7*   < >  --    < >  --    NA  --   --   --  143  --  141   POTASSIUM  --   --   --  4.3  --  3.9   CR  --   --   --  0.87  --  0.79    < > = values in this interval not displayed.        Diagnostics  Labs pending at this time.  Results will be reviewed when available.   No EKG required, no history of coronary heart disease, significant arrhythmia, peripheral arterial disease or other structural heart disease.    Revised Cardiac Risk Index (RCRI)  The patient has the following serious cardiovascular risks for perioperative complications:   - No serious cardiac risks = 0 points "     RCRI Interpretation: 0 points: Class I (very low risk - 0.4% complication rate)         Signed Electronically by: Hina Humphreys MD  A copy of this evaluation report is provided to the requesting physician.

## 2024-10-28 ENCOUNTER — ANESTHESIA EVENT (OUTPATIENT)
Dept: SURGERY | Facility: CLINIC | Age: 69
End: 2024-10-28
Payer: COMMERCIAL

## 2024-10-29 ENCOUNTER — DOCUMENTATION ONLY (OUTPATIENT)
Dept: ANTICOAGULATION | Facility: CLINIC | Age: 69
End: 2024-10-29

## 2024-10-29 ENCOUNTER — ANESTHESIA (OUTPATIENT)
Dept: SURGERY | Facility: CLINIC | Age: 69
End: 2024-10-29
Payer: COMMERCIAL

## 2024-10-29 ENCOUNTER — HOSPITAL ENCOUNTER (OUTPATIENT)
Facility: CLINIC | Age: 69
Discharge: HOME | End: 2024-10-29
Attending: SURGERY | Admitting: SURGERY
Payer: COMMERCIAL

## 2024-10-29 ENCOUNTER — APPOINTMENT (OUTPATIENT)
Dept: SURGERY | Facility: PHYSICIAN GROUP | Age: 69
End: 2024-10-29
Payer: COMMERCIAL

## 2024-10-29 VITALS
OXYGEN SATURATION: 94 % | DIASTOLIC BLOOD PRESSURE: 76 MMHG | HEIGHT: 69 IN | BODY MASS INDEX: 27.88 KG/M2 | TEMPERATURE: 97 F | WEIGHT: 188.2 LBS | HEART RATE: 61 BPM | RESPIRATION RATE: 16 BRPM | SYSTOLIC BLOOD PRESSURE: 122 MMHG

## 2024-10-29 DIAGNOSIS — G89.18 POSTOPERATIVE PAIN: Primary | ICD-10-CM

## 2024-10-29 DIAGNOSIS — K40.21 BILATERAL RECURRENT INGUINAL HERNIA WITHOUT OBSTRUCTION OR GANGRENE: ICD-10-CM

## 2024-10-29 DIAGNOSIS — K43.9 SPIGELIAN HERNIA: ICD-10-CM

## 2024-10-29 LAB — INR PPP: 1.14 (ref 0.85–1.15)

## 2024-10-29 PROCEDURE — 999N000141 HC STATISTIC PRE-PROCEDURE NURSING ASSESSMENT: Performed by: SURGERY

## 2024-10-29 PROCEDURE — 360N000080 HC SURGERY LEVEL 7, PER MIN: Performed by: SURGERY

## 2024-10-29 PROCEDURE — 250N000011 HC RX IP 250 OP 636: Performed by: STUDENT IN AN ORGANIZED HEALTH CARE EDUCATION/TRAINING PROGRAM

## 2024-10-29 PROCEDURE — 250N000011 HC RX IP 250 OP 636: Performed by: SURGERY

## 2024-10-29 PROCEDURE — 49651 LAP ING HERNIA REPAIR RECUR: CPT | Mod: 50 | Performed by: SURGERY

## 2024-10-29 PROCEDURE — 370N000017 HC ANESTHESIA TECHNICAL FEE, PER MIN: Performed by: SURGERY

## 2024-10-29 PROCEDURE — 258N000003 HC RX IP 258 OP 636

## 2024-10-29 PROCEDURE — S2900 ROBOTIC SURGICAL SYSTEM: HCPCS | Performed by: SURGERY

## 2024-10-29 PROCEDURE — 49650 LAP ING HERNIA REPAIR INIT: CPT | Mod: AS | Performed by: PHYSICIAN ASSISTANT

## 2024-10-29 PROCEDURE — 250N000013 HC RX MED GY IP 250 OP 250 PS 637: Performed by: PHYSICIAN ASSISTANT

## 2024-10-29 PROCEDURE — 250N000025 HC SEVOFLURANE, PER MIN: Performed by: SURGERY

## 2024-10-29 PROCEDURE — 85610 PROTHROMBIN TIME: CPT | Performed by: STUDENT IN AN ORGANIZED HEALTH CARE EDUCATION/TRAINING PROGRAM

## 2024-10-29 PROCEDURE — 272N000001 HC OR GENERAL SUPPLY STERILE: Performed by: SURGERY

## 2024-10-29 PROCEDURE — 49651 LAP ING HERNIA REPAIR RECUR: CPT | Performed by: STUDENT IN AN ORGANIZED HEALTH CARE EDUCATION/TRAINING PROGRAM

## 2024-10-29 PROCEDURE — 250N000011 HC RX IP 250 OP 636

## 2024-10-29 PROCEDURE — 710N000012 HC RECOVERY PHASE 2, PER MINUTE: Performed by: SURGERY

## 2024-10-29 PROCEDURE — 710N000009 HC RECOVERY PHASE 1, LEVEL 1, PER MIN: Performed by: SURGERY

## 2024-10-29 PROCEDURE — 250N000009 HC RX 250: Performed by: STUDENT IN AN ORGANIZED HEALTH CARE EDUCATION/TRAINING PROGRAM

## 2024-10-29 PROCEDURE — 49651 LAP ING HERNIA REPAIR RECUR: CPT | Performed by: NURSE ANESTHETIST, CERTIFIED REGISTERED

## 2024-10-29 PROCEDURE — 36415 COLL VENOUS BLD VENIPUNCTURE: CPT | Performed by: STUDENT IN AN ORGANIZED HEALTH CARE EDUCATION/TRAINING PROGRAM

## 2024-10-29 PROCEDURE — 250N000009 HC RX 250

## 2024-10-29 PROCEDURE — 250N000009 HC RX 250: Performed by: SURGERY

## 2024-10-29 PROCEDURE — C1781 MESH (IMPLANTABLE): HCPCS | Performed by: SURGERY

## 2024-10-29 DEVICE — LAPAROSCOPIC SELF-FIXATING MESH POLYESTER WITH POLYLACTIC ACID GRIPS AND COLLAGEN FILM
Type: IMPLANTABLE DEVICE | Site: ABDOMEN | Status: FUNCTIONAL
Brand: PROGRIP

## 2024-10-29 RX ORDER — HYDROCODONE BITARTRATE AND ACETAMINOPHEN 5; 325 MG/1; MG/1
1-2 TABLET ORAL
Status: COMPLETED | OUTPATIENT
Start: 2024-10-29 | End: 2024-10-29

## 2024-10-29 RX ORDER — ONDANSETRON 2 MG/ML
INJECTION INTRAMUSCULAR; INTRAVENOUS PRN
Status: DISCONTINUED | OUTPATIENT
Start: 2024-10-29 | End: 2024-10-29

## 2024-10-29 RX ORDER — AMOXICILLIN 250 MG
1-2 CAPSULE ORAL 2 TIMES DAILY PRN
Qty: 15 TABLET | Refills: 0 | Status: SHIPPED | OUTPATIENT
Start: 2024-10-29

## 2024-10-29 RX ORDER — SODIUM CHLORIDE, SODIUM LACTATE, POTASSIUM CHLORIDE, CALCIUM CHLORIDE 600; 310; 30; 20 MG/100ML; MG/100ML; MG/100ML; MG/100ML
INJECTION, SOLUTION INTRAVENOUS CONTINUOUS PRN
Status: DISCONTINUED | OUTPATIENT
Start: 2024-10-29 | End: 2024-10-29

## 2024-10-29 RX ORDER — FENTANYL CITRATE 50 UG/ML
INJECTION, SOLUTION INTRAMUSCULAR; INTRAVENOUS PRN
Status: DISCONTINUED | OUTPATIENT
Start: 2024-10-29 | End: 2024-10-29

## 2024-10-29 RX ORDER — ONDANSETRON 2 MG/ML
4 INJECTION INTRAMUSCULAR; INTRAVENOUS EVERY 30 MIN PRN
Status: DISCONTINUED | OUTPATIENT
Start: 2024-10-29 | End: 2024-10-29 | Stop reason: HOSPADM

## 2024-10-29 RX ORDER — CEFAZOLIN SODIUM/WATER 2 G/20 ML
2 SYRINGE (ML) INTRAVENOUS
Status: COMPLETED | OUTPATIENT
Start: 2024-10-29 | End: 2024-10-29

## 2024-10-29 RX ORDER — LIDOCAINE HYDROCHLORIDE 20 MG/ML
INJECTION, SOLUTION INFILTRATION; PERINEURAL PRN
Status: DISCONTINUED | OUTPATIENT
Start: 2024-10-29 | End: 2024-10-29

## 2024-10-29 RX ORDER — FENTANYL CITRATE 0.05 MG/ML
25 INJECTION, SOLUTION INTRAMUSCULAR; INTRAVENOUS EVERY 5 MIN PRN
Status: DISCONTINUED | OUTPATIENT
Start: 2024-10-29 | End: 2024-10-29 | Stop reason: HOSPADM

## 2024-10-29 RX ORDER — EPHEDRINE SULFATE 50 MG/ML
INJECTION, SOLUTION INTRAMUSCULAR; INTRAVENOUS; SUBCUTANEOUS PRN
Status: DISCONTINUED | OUTPATIENT
Start: 2024-10-29 | End: 2024-10-29

## 2024-10-29 RX ORDER — ONDANSETRON 4 MG/1
4 TABLET, ORALLY DISINTEGRATING ORAL EVERY 30 MIN PRN
Status: DISCONTINUED | OUTPATIENT
Start: 2024-10-29 | End: 2024-10-29 | Stop reason: HOSPADM

## 2024-10-29 RX ORDER — DEXAMETHASONE SODIUM PHOSPHATE 4 MG/ML
4 INJECTION, SOLUTION INTRA-ARTICULAR; INTRALESIONAL; INTRAMUSCULAR; INTRAVENOUS; SOFT TISSUE
Status: DISCONTINUED | OUTPATIENT
Start: 2024-10-29 | End: 2024-10-29 | Stop reason: HOSPADM

## 2024-10-29 RX ORDER — VASOPRESSIN IN 0.9 % NACL 2 UNIT/2ML
SYRINGE (ML) INTRAVENOUS PRN
Status: DISCONTINUED | OUTPATIENT
Start: 2024-10-29 | End: 2024-10-29

## 2024-10-29 RX ORDER — DEXAMETHASONE SODIUM PHOSPHATE 4 MG/ML
INJECTION, SOLUTION INTRA-ARTICULAR; INTRALESIONAL; INTRAMUSCULAR; INTRAVENOUS; SOFT TISSUE PRN
Status: DISCONTINUED | OUTPATIENT
Start: 2024-10-29 | End: 2024-10-29

## 2024-10-29 RX ORDER — HYDROMORPHONE HCL IN WATER/PF 6 MG/30 ML
0.4 PATIENT CONTROLLED ANALGESIA SYRINGE INTRAVENOUS EVERY 5 MIN PRN
Status: DISCONTINUED | OUTPATIENT
Start: 2024-10-29 | End: 2024-10-29 | Stop reason: HOSPADM

## 2024-10-29 RX ORDER — HYDROMORPHONE HCL IN WATER/PF 6 MG/30 ML
0.2 PATIENT CONTROLLED ANALGESIA SYRINGE INTRAVENOUS EVERY 5 MIN PRN
Status: DISCONTINUED | OUTPATIENT
Start: 2024-10-29 | End: 2024-10-29 | Stop reason: HOSPADM

## 2024-10-29 RX ORDER — GLYCOPYRROLATE 0.2 MG/ML
INJECTION, SOLUTION INTRAMUSCULAR; INTRAVENOUS PRN
Status: DISCONTINUED | OUTPATIENT
Start: 2024-10-29 | End: 2024-10-29

## 2024-10-29 RX ORDER — FENTANYL CITRATE 0.05 MG/ML
50 INJECTION, SOLUTION INTRAMUSCULAR; INTRAVENOUS EVERY 5 MIN PRN
Status: DISCONTINUED | OUTPATIENT
Start: 2024-10-29 | End: 2024-10-29 | Stop reason: HOSPADM

## 2024-10-29 RX ORDER — SODIUM CHLORIDE, SODIUM LACTATE, POTASSIUM CHLORIDE, CALCIUM CHLORIDE 600; 310; 30; 20 MG/100ML; MG/100ML; MG/100ML; MG/100ML
INJECTION, SOLUTION INTRAVENOUS CONTINUOUS
Status: DISCONTINUED | OUTPATIENT
Start: 2024-10-29 | End: 2024-10-29 | Stop reason: HOSPADM

## 2024-10-29 RX ORDER — KETAMINE HYDROCHLORIDE 10 MG/ML
INJECTION INTRAMUSCULAR; INTRAVENOUS PRN
Status: DISCONTINUED | OUTPATIENT
Start: 2024-10-29 | End: 2024-10-29

## 2024-10-29 RX ORDER — BUPIVACAINE HYDROCHLORIDE AND EPINEPHRINE 2.5; 5 MG/ML; UG/ML
INJECTION, SOLUTION INFILTRATION; PERINEURAL PRN
Status: DISCONTINUED | OUTPATIENT
Start: 2024-10-29 | End: 2024-10-29 | Stop reason: HOSPADM

## 2024-10-29 RX ORDER — NALOXONE HYDROCHLORIDE 0.4 MG/ML
0.1 INJECTION, SOLUTION INTRAMUSCULAR; INTRAVENOUS; SUBCUTANEOUS
Status: DISCONTINUED | OUTPATIENT
Start: 2024-10-29 | End: 2024-10-29 | Stop reason: HOSPADM

## 2024-10-29 RX ORDER — CEFAZOLIN SODIUM/WATER 2 G/20 ML
2 SYRINGE (ML) INTRAVENOUS SEE ADMIN INSTRUCTIONS
Status: DISCONTINUED | OUTPATIENT
Start: 2024-10-29 | End: 2024-10-29 | Stop reason: HOSPADM

## 2024-10-29 RX ORDER — DEXMEDETOMIDINE HYDROCHLORIDE 4 UG/ML
INJECTION, SOLUTION INTRAVENOUS PRN
Status: DISCONTINUED | OUTPATIENT
Start: 2024-10-29 | End: 2024-10-29

## 2024-10-29 RX ORDER — HYDROCODONE BITARTRATE AND ACETAMINOPHEN 5; 325 MG/1; MG/1
1-2 TABLET ORAL EVERY 4 HOURS PRN
Qty: 10 TABLET | Refills: 0 | Status: SHIPPED | OUTPATIENT
Start: 2024-10-29 | End: 2024-10-31

## 2024-10-29 RX ORDER — PROPOFOL 10 MG/ML
INJECTION, EMULSION INTRAVENOUS PRN
Status: DISCONTINUED | OUTPATIENT
Start: 2024-10-29 | End: 2024-10-29

## 2024-10-29 RX ORDER — MAGNESIUM HYDROXIDE 1200 MG/15ML
LIQUID ORAL PRN
Status: DISCONTINUED | OUTPATIENT
Start: 2024-10-29 | End: 2024-10-29 | Stop reason: HOSPADM

## 2024-10-29 RX ADMIN — Medication 5 MG: at 08:57

## 2024-10-29 RX ADMIN — DEXAMETHASONE SODIUM PHOSPHATE 4 MG: 4 INJECTION, SOLUTION INTRA-ARTICULAR; INTRALESIONAL; INTRAMUSCULAR; INTRAVENOUS; SOFT TISSUE at 08:10

## 2024-10-29 RX ADMIN — Medication 1 UNITS: at 08:03

## 2024-10-29 RX ADMIN — ROCURONIUM BROMIDE 50 MG: 50 INJECTION, SOLUTION INTRAVENOUS at 07:57

## 2024-10-29 RX ADMIN — GLYCOPYRROLATE 0.1 MG: 0.2 INJECTION, SOLUTION INTRAMUSCULAR; INTRAVENOUS at 07:50

## 2024-10-29 RX ADMIN — ROCURONIUM BROMIDE 10 MG: 50 INJECTION, SOLUTION INTRAVENOUS at 09:08

## 2024-10-29 RX ADMIN — PROPOFOL 40 MCG/KG/MIN: 10 INJECTION, EMULSION INTRAVENOUS at 07:43

## 2024-10-29 RX ADMIN — DEXMEDETOMIDINE HYDROCHLORIDE 20 MCG: 200 INJECTION INTRAVENOUS at 07:39

## 2024-10-29 RX ADMIN — Medication 5 MG: at 08:21

## 2024-10-29 RX ADMIN — FENTANYL CITRATE 50 MCG: 50 INJECTION, SOLUTION INTRAMUSCULAR; INTRAVENOUS at 10:02

## 2024-10-29 RX ADMIN — ONDANSETRON 4 MG: 2 INJECTION INTRAMUSCULAR; INTRAVENOUS at 09:38

## 2024-10-29 RX ADMIN — Medication 20 MG: at 09:16

## 2024-10-29 RX ADMIN — PROPOFOL 200 MG: 10 INJECTION, EMULSION INTRAVENOUS at 07:35

## 2024-10-29 RX ADMIN — MIDAZOLAM 1 MG: 1 INJECTION INTRAMUSCULAR; INTRAVENOUS at 07:54

## 2024-10-29 RX ADMIN — HYDROCODONE BITARTRATE AND ACETAMINOPHEN 1 TABLET: 5; 325 TABLET ORAL at 10:25

## 2024-10-29 RX ADMIN — Medication 30 MG: at 07:55

## 2024-10-29 RX ADMIN — LIDOCAINE HYDROCHLORIDE 100 MG: 20 INJECTION, SOLUTION INFILTRATION; PERINEURAL at 07:35

## 2024-10-29 RX ADMIN — Medication 5 MG: at 09:02

## 2024-10-29 RX ADMIN — Medication 1 UNITS: at 07:48

## 2024-10-29 RX ADMIN — Medication 200 MG: at 09:29

## 2024-10-29 RX ADMIN — Medication 2 G: at 07:34

## 2024-10-29 RX ADMIN — ROCURONIUM BROMIDE 50 MG: 50 INJECTION, SOLUTION INTRAVENOUS at 07:36

## 2024-10-29 RX ADMIN — SODIUM CHLORIDE, POTASSIUM CHLORIDE, SODIUM LACTATE AND CALCIUM CHLORIDE: 600; 310; 30; 20 INJECTION, SOLUTION INTRAVENOUS at 07:31

## 2024-10-29 RX ADMIN — FENTANYL CITRATE 100 MCG: 50 INJECTION INTRAMUSCULAR; INTRAVENOUS at 07:35

## 2024-10-29 ASSESSMENT — ACTIVITIES OF DAILY LIVING (ADL)
ADLS_ACUITY_SCORE: 0

## 2024-10-29 NOTE — ANESTHESIA PROCEDURE NOTES
Airway       Patient location during procedure: OR       Procedure Start/Stop Times: 10/29/2024 7:37 AM  Staff -        Anesthesiologist:  Arnold Ruiz MD       CRNA: Keena Schulte APRN CRNA       Performed By: ROCIO and with CRNAs       Procedure performed by resident/fellow/CRNA in presence of a teaching physician.    Consent for Airway        Urgency: elective  Indications and Patient Condition       Indications for airway management: baldev-procedural       Induction type:intravenous       Mask difficulty assessment: 2 - vent by mask + OA or adjuvant +/- NMBA    Final Airway Details       Final airway type: endotracheal airway       Successful airway: ETT - single  Endotracheal Airway Details        ETT size (mm): 8.0       Cuffed: yes       Successful intubation technique: direct laryngoscopy       DL Blade Type: MAC 3       Grade View of Cords: 1       Adjucts: stylet       Position: Right       Measured from: gums/teeth       Secured at (cm): 24       Bite block used: Oral Airway    Post intubation assessment        Placement verified by: capnometry, equal breath sounds and chest rise        Number of attempts at approach: 1       Number of other approaches attempted: 0       Secured with: tape       Ease of procedure: easy       Dentition: Intact and Unchanged    Medication(s) Administered   Medication Administration Time: 10/29/2024 7:37 AM

## 2024-10-29 NOTE — PROGRESS NOTES
ANTICOAGULATION  MANAGEMENT: Discharge Review    Jozef Davepriya chart reviewed for anticoagulation continuity of care    Outpatient surgery/procedure on 10/29/24 for hernia repair.    Discharge disposition: Home    Results:    Recent labs: (last 7 days)     10/29/24  0616   INR 1.14     Anticoagulation inpatient management:     not applicable     Anticoagulation discharge instructions:     Warfarin dosing: home regimen continued   Bridging: bridging with enoxaparin (Lovenox)   INR goal change: No      Medication changes affecting anticoagulation: No    Additional factors affecting anticoagulation: No     PLAN     No adjustment to anticoagulation plan needed    Recommended follow up is scheduled  Patient not contacted.  Post procedure was previously discussed with patient.    No adjustment to Anticoagulation Calendar was required    Tiki Castañeda RN  10/29/2024  Anticoagulation Clinic  [facility] for routing messages: mayra ALBERTO  [facility] patient phone line: [phone]

## 2024-10-29 NOTE — ANESTHESIA PREPROCEDURE EVALUATION
Anesthesia Pre-Procedure Evaluation    Patient: Jozef Saunders   MRN: [id number] : 1955        Procedure : Procedure(s):  Robotic bilateral inguinal hernia repair  Robotic spegallian hernia repair          Past Medical History:   Diagnosis Date    Esophageal dysphagia     Essential hypertension     MARIANNE (generalized anxiety disorder)     Hemorrhoids     History of deep venous thrombosis 2022    History of pulmonary embolism 2010    Hypercholesterolemia     Iron deficiency anemia 2010    Probably due to warfarin and hiatal hernia resulting in occult GI bleed    Polyp of colon     Recovering Alcoholic     Recurrent periodic urticaria 2012    Idiopathic at this point.  Has seen Dr. Riojas at [facility]      Thrombocytopenia (H)     Thrush 07/15/2018      Past Surgical History:   Procedure Laterality Date    HC ARTHROTOMY/EXPLORE/TREAT KNEE JOINT      torn ligaments    HC REPAIR ING HERNIA,5+Y/O,REDUCIBL      age 8 - right    HC REPAIR ING HERNIA,6MO-5YR,REDUC      infancy - left    HERNIORRHAPHY HIATAL N/A 2019    Procedure: HIATAL HERNIA REPAIR WITH UNCUT COLIS NISSEN GASTROPLASTY;  Surgeon: Dewey Roa MD;  Location: [facility] OR    NISSEN PROCEDURE      THORACOTOMY N/A 2019    Procedure: LEFT THORACOTOMY;  Surgeon: Dewey Roa MD;  Location: [facility] OR      Allergies   Allergen Reactions    Clindamycin Hcl Hives    Heparin      Heparin-induced thrombocytopenia    Omeprazole Diarrhea      Social History     Tobacco Use    Smoking status: Former     Current packs/day: 0.00     Types: Cigarettes     Quit date: 1989     Years since quittin.9     Passive exposure: Never    Smokeless tobacco: Never   Substance Use Topics    Alcohol use: No     Comment: sober x 40yr      Wt Readings from Last 1 Encounters:   10/29/24 85.4 kg (188 lb 3.2 oz)        Anesthesia Evaluation            ROS/MED HX  ENT/Pulmonary:    (-) sleep apnea   Neurologic:     "(-) no CVA and no TIA   Cardiovascular:     (+)  hypertension- -   -  - -   Taking blood thinners                                   METS/Exercise Tolerance: 3 - Able to walk 1-2 blocks without stopping    Hematologic:     (+) History of blood clots,     anemia,          Musculoskeletal:       GI/Hepatic: Comment: Hx of nissen    (+)   esophageal disease,   hiatal hernia,              Renal/Genitourinary:       Endo:       Psychiatric/Substance Use:     (+)   alcohol abuse      Infectious Disease:       Malignancy:       Other:            Physical Exam    Airway        Mallampati: II   TM distance: > 3 FB   Neck ROM: full   Mouth opening: > 3 cm    Respiratory Devices and Support         Dental       (+) Modest Abnormalities - crowns, retainers, 1 or 2 missing teeth      Cardiovascular   cardiovascular exam normal          Pulmonary   pulmonary exam normal                OUTSIDE LABS:  CBC:   Lab Results   Component Value Date    WBC 5.8 10/23/2024    WBC 4.8 06/21/2024    HGB 15.7 10/23/2024    HGB 16.3 06/21/2024    HCT 47.9 10/23/2024    HCT 48.0 06/21/2024    PLT 83 (L) 10/23/2024     (L) 06/21/2024     BMP:   Lab Results   Component Value Date     06/21/2024     05/06/2024    POTASSIUM 4.3 06/21/2024    POTASSIUM 3.9 05/06/2024    CHLORIDE 108 (H) 06/21/2024    CHLORIDE 107 05/06/2024    CO2 29 06/21/2024    CO2 25 05/06/2024    BUN 20.3 06/21/2024    BUN 15.7 05/06/2024    CR 0.87 06/21/2024    CR 0.79 05/06/2024    GLC 80 06/21/2024    GLC 95 05/06/2024     COAGS:   Lab Results   Component Value Date    PTT 30 10/29/2014    INR 2.7 (H) 10/04/2024     POC: No results found for: \"BGM\", \"HCG\", \"HCGS\"  HEPATIC:   Lab Results   Component Value Date    ALBUMIN 4.1 06/21/2024    PROTTOTAL 6.7 06/21/2024    ALT 25 06/21/2024    AST 27 06/21/2024    ALKPHOS 79 06/21/2024    BILITOTAL 0.3 06/21/2024     OTHER:   Lab Results   Component Value Date    A1C 5.6 02/15/2019    NANDO 9.3 06/21/2024    MAG 2.2 " "08/29/2017    LIPASE 73 09/25/2010    TSH 0.72 02/19/2020    CRP <5.0 12/28/2011       Anesthesia Plan    ASA Status:  3    NPO Status:  NPO Appropriate    Anesthesia Type: General.     - Airway: ETT   Induction: Propofol.   Maintenance: Balanced.   Techniques and Equipment:     - Lines/Monitors: 2nd IV     Consents    Anesthesia Plan(s) and associated risks, benefits, and realistic alternatives discussed. Questions answered and patient/representative(s) expressed understanding.     - Discussed:     - Discussed with:  Patient            Postoperative Care    Pain management: IV analgesics, Oral pain medications, Multi-modal analgesia.   PONV prophylaxis: Ondansetron (or other 5HT-3), Dexamethasone or Solumedrol, Background Propofol Infusion     Comments:               Arnold Ruiz MD    I have reviewed the pertinent notes and labs in the chart from the past 30 days and (re)examined the patient.  Any updates or changes from those notes are reflected in this note.            # Drug Induced Coagulation Defect: home medication list includes an anticoagulant medication    # Hypertension: Noted on problem list         # Overweight: Estimated body mass index is 27.79 kg/m  as calculated from the following:    Height as of this encounter: 1.753 m (5' 9\").    Weight as of this encounter: 85.4 kg (188 lb 3.2 oz).             "

## 2024-10-29 NOTE — DISCHARGE INSTRUCTIONS
Start your Lovenox injections tomorrow 10/30/24.   Restart your Coumadin/Warfarin in 2 days on 10/31/24.  Follow-up with your INR clinic for monitoring.     ---------------------------------------------------------------------------------------------------------------------------------------------------------------------------------------------    [facility]  Discharge Instructions: Post-Operative Inguinal Hernia Repair    ACTIVITY  Take frequent, short walks and increase your activity gradually.    Avoid strenuous physical activity or heavy lifting greater than 15 lbs. for 2-3 weeks.  You may climb stairs.  You may drive without restrictions when you are not using any prescription pain medication and feel comfortable in a car.  You may return to work/school when you are comfortable without any prescription pain medication.    WOUND CARE  You may remove your outer dressing or Band-Aids and shower 48 hours after the surgery.  Pat your incisions dry and leave them open to air.  Re-apply dressing (Band-Aids or gauze/tape) as needed for comfort or drainage.  You may have steri-strips (looks like white tape) on your incision.  You may peel off the steri-strips 2 weeks after your surgery if they have not peeled off on their own.  If you have skin glue, it will peel up and fall off on its own.  Do not soak your incisions in a tub or pool for 2 weeks.   Do not apply any lotions, creams, or ointments to your incisions.  A ridge under your incisions is normal and will gradually resolve.    DIET  Start with liquids, then gradually resume your regular diet as tolerated.   Drink plenty of fluids to stay hydrated.    PAIN  Expect some tenderness and discomfort at the incision site(s).  Use the prescribed pain medication at your discretion.  Expect gradual resolution of your pain over several days.  You may take ibuprofen with food (unless you have been told not to) or acetaminophen/Tylenol instead of  or in addition to your prescribed pain medication.  However, if you are taking Norco, do not take any additional acetaminophen/Tylenol.  Do not drink alcohol or drive while you are taking pain medications.  You may apply ice to your incisions in 20 minute intervals as needed for the next 48 hours.  After that time, consider switching to heat if you prefer.    EXPECTATIONS  You may notice air in your scrotum and/or penis after the surgery.  This is due to the gas used during the surgery.  You can expect some swelling and bruising involving the scrotum and/or penis as well as numbness.  These symptoms are expected and should gradually resolve in the next several weeks. You may use ice to help with the swelling and try placing a rolled hand towel below your scrotum to help alleviate scrotal discomfort.  If you develop significant testicular or penile pain, please call our office and speak with a nurse. If you have severe bruising and/or swelling of your scrotum, please call our office to discuss. You are at higher risk of bleeding due to your blood thinning medications, so we would want to monitor this closely.  Pain medications can cause constipation.  Limit use when possible.  Take an over the counter or prescribed stool softener/stimulant, such as Senna-Docusate, 1-2 times a day with plenty of water.  You may take a mild over the counter laxative, such as Miralax or a Dulcolax suppository, as needed.  You may take 1 oz. (2 tablespoons) Milk of Magnesia the evening following surgery to encourage bowel movement.  You may discontinue these medications once you are having regular bowel movements and/or are no longer taking your narcotic pain medication.   You may have shoulder or upper back discomfort due to the gas used in surgery.  This is temporary and should resolve in 48-72 hours.  Short, frequent walks may help with this.  If you are unable to urinate for 8 hours or feel as though you are not emptying your bladder  adequately, we recommend you seek care at an ER or Urgent Care facility for possible catheter placement.     FOLLOW UP  Our office will contact you in approximately 2-3 weeks to check on your progress and answer any questions you may have.  If you are doing well, you will not need to return for a follow up appointment.  If any concerns are identified over the phone, we will help you make an appointment to see a provider.   If you have not received a phone call, have any questions or concerns, or would like to be seen, please call us at [phone] and ask to speak with our nurse.  We are located at [address] [place] [address].    CALL OUR OFFICE -[phone] IF YOU HAVE:   Chills or fever above 101 F.  Increased redness, warmth, or drainage at your incisions.  Significant bleeding.  Pain not relieved by your pain medication or rest.  Increasing pain after the first 48 hours.  Any other concerns or questions.                Same Day Surgery Discharge Instructions for  Sedation and General Anesthesia     It's not unusual to feel dizzy, light-headed or faint for up to 24 hours after surgery or while taking pain medication.  If you have these symptoms: sit for a few minutes before standing and have someone assist you when you get up to walk or use the bathroom.    You should rest and relax for the next 24 hours. We recommend you make arrangements to have an adult stay with you for at least 24 hours after your discharge.  Avoid hazardous and strenuous activity.    DO NOT DRIVE any vehicle or operate mechanical equipment for 24 hours following the end of your surgery.  Even though you may feel normal, your reactions may be affected by the medication you have received.    Do not drink alcoholic beverages for 24 hours following surgery.     Slowly progress to your regular diet as you feel able. It's not unusual to feel nauseated and/or vomit after receiving anesthesia.  If you develop these  symptoms, drink clear liquids (apple juice, ginger ale, broth, 7-up, etc. ) until you feel better.  If your nausea and vomiting persists for 24 hours, please notify your surgeon.      All narcotic pain medications, along with inactivity and anesthesia, can cause constipation. Drinking plenty of liquids and increasing fiber intake will help.    For any questions of a medical nature, call your surgeon.    Do not make important decisions for 24 hours.    If you had general anesthesia, you may have a sore throat for a couple of days related to the breathing tube used during surgery.  You may use Cepacol lozenges to help with this discomfort.  If it worsens or if you develop a fever, contact your surgeon.     If you feel your pain is not well managed with the pain medications prescribed by your surgeon, please contact your surgeon's office to let them know so they can address your concerns.      Start Lovenox after surgery--- start October 30, 2024.   Reasons to contact your surgeon:    Signs of possible infection: Check your incision daily for redness, swelling, warmth, red streaks or foul drainage.   Elevated temperature.  Pain not controlled with pain medication and/or rest.   Uncontrolled nausea or vomiting.  Any questions or concerns.

## 2024-10-29 NOTE — OR NURSING
Patient alert and oriented x 4.  VSS. Patient voided prior to discharge.  Discharge instructions read through with Patient and Family member (Nina-spouse).  Medications given to family member.  IV removed.  Patient escorted to door in a wheelchair with staff.

## 2024-10-29 NOTE — OP NOTE
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    SURGEON:  Robi De Luna MD    ASSISTANT:  Melvi Martin PA-C.  Physician assistant first assistant was necessary during the performance of this procedure for expertise in patient positioning, prepping, draping, trocar placement, robot docking, instrument exchanges, suture passing, mesh placement, camera management, retraction and exposure, and suctioning.    INDICATIONS:  Symptomatic left sided spigelian hernia.  Inguinal hernia recurrences seen on CT and given how I was planning to repair the spigelian waiting until they become symptomatic and fixing them later would be difficult.  He also has an asymptomatic umbilical hernia and an asymptomatic hiatal hernia.  Please see clinic notes for the patient and I's discussion around this.     PROCEDURE:  The patient was taken to the operating suite and uneventfully endotracheally intubated.  The abdomen was prepped and draped in a sterile fashion.  Surgeon initiated timeout was acknowledged.  I entered the abdomen in the left upper quadrant using a 8 mm robotic port and a 5 mm camera.  Two 8 mm reusable trochars were placed across the upper abdominal midline.   The middle port was placed off midline away from the larger hernia.  The robot was docked and targeted toward the symphysis pubis.  A grasper and monopolar scissors were inserted into the abdomen and the abdomen was insufflated.    The patient had an obvious left  inguinal hernia.  The other side did have another hernia.  We could see the orifice of the spegalian hernia on the left.       I created a flap  across his lower abdomen.  This was done higher than I usually go for inguinal hernias so we could be well above the spegalian hernia..  The flap was opened laterally to fully dissect out the spegalian hernia and dissection was done down below the pubis and iris's ligaments.  Care was taken not to injure the inferior epigastric vessels.  Hernia sacs from the bilateral inguinal hernias, the spegalian hernia and an incidentally found right femoral hernia were all reduced.  He had a small defect in the left femoral space as well.  I carefully began reducing the hernia sac and  it from the vas deferens and the gonadal vessels.  Once this was reduced we continued opening up the space until we had appropriate room for our mesh.    Two pieces of 12x16 cm Progrip meshes were placed into the abdominal cavity.  These did completely cover the inguinal hernias and the femoral hernias.  The left mesh's lateral side was just a bit beyond the spegalian so I introduced a third 10x15 cm, cut in half so it was 10x7.5 rounded cm Progrip and augmented the repair over the spegalian.  There was well more than 5 cm of mesh coverage on all hernias.      I then set about closing our preperitoneal flap.  This was accomplished with a running 2-0 Stratafix suture.  A small gap was left at the end, all peritoneal defects were repaired with a 2-0 vicryl.  A suction tip was placed into the flap through the gap and the stratafix was tightened.  This removed all of the air from under the flap.  The flap was checked for defects.  This also showed that the mesh was sitting down flat, especially at the bottom.  The suction cannula was removed and the suture was finished and run back on itself, securing it in place.     At the completion of the mesh positioning and flap closure, all suture was removed, robotic instruments were removed and the trochars were removed from the abdomen under laparoscopic visualization.  The robot was undocked.   The skin edges were reapproximated with 4-0 Vicryl and Steri-Strips.  The patient was uneventfully extubated, awakened and taken to the PACU in stable condition.  At the conclusion of the case, all lap and needle counts were correct.      ESTIMATED BLOOD LOSS:  * No blood loss amount entered *    INTRAOPERATIVE FINDINGS:  Bilateral recurrent inguinal hernias.  Left spegalian hernia.  Bilateral femoral hernias.      Robi De Luna MD

## 2024-10-29 NOTE — ANESTHESIA POSTPROCEDURE EVALUATION
Patient: Jozef Saunders    Procedure: Procedure(s):  Robotic bilateral inguinal hernias repair, right femoral hernia  Robotic left spegallian hernia repair       Anesthesia Type:  General    Note:  Disposition: Outpatient   Postop Pain Control: Uneventful            Sign Out: Well controlled pain   PONV: No   Neuro/Psych: Uneventful            Sign Out: Acceptable/Baseline neuro status   Airway/Respiratory: Uneventful            Sign Out: Acceptable/Baseline resp. status   CV/Hemodynamics: Uneventful            Sign Out: Acceptable CV status   Other NRE: NONE   DID A NON-ROUTINE EVENT OCCUR?            Last vitals:  Vitals Value Taken Time   /89 10/29/24 1030   Temp 36.1  C (97  F) 10/29/24 1030   Pulse 53 10/29/24 1035   Resp 14 10/29/24 1035   SpO2 92 % 10/29/24 1036   Vitals shown include unfiled device data.    Electronically Signed By: Arnold Ruiz MD  October 29, 2024  12:29 PM

## 2024-10-29 NOTE — ANESTHESIA CARE TRANSFER NOTE
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    Anesthesia Type:   General     Note:    Oropharynx: spontaneously breathing  Level of Consciousness: awake  Oxygen Supplementation: face mask  Level of Supplemental Oxygen (L/min / FiO2): 6  Independent Airway: airway patency satisfactory and stable  Dentition: dentition unchanged  Vital Signs Stable: post-procedure vital signs reviewed and stable  Report to RN Given: handoff report given  Patient transferred to: PACU  Comments: Neuromuscular blockade reversed with sugammadex, spontaneous respirations, adequate tidal volumes, followed commands to voice, oropharynx suctioned with soft flexible catheter, extubated atraumatically, extubated with suction, airway patent after extubation.  Oxygen via facemask at 8 liters per minute to PACU. Oxygen tubing connected to wall O2 in PACU, SpO2, NiBP, and EKG monitors and alarms on and functioning, Edmar Hugger warmer connected to patient gown, report on patient's clinical status given to PACU RN, RN questions answered.         Handoff Report: Identifed the Patient, Identified the Reponsible Provider, Reviewed the pertinent medical history, Discussed the surgical course, Reviewed Intra-OP anesthesia mangement and issues during anesthesia, Set expectations for post-procedure period and Allowed opportunity for questions and acknowledgement of understanding      Vitals:  Vitals Value Taken Time   BP     Temp     Pulse     Resp 44 10/29/24 0941   SpO2     Vitals shown include unfiled device data.    Electronically Signed By: JUAN Barrow CRNA  October 29, 2024  9:43 AM

## 2024-10-29 NOTE — BRIEF OP NOTE
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 Item Serial No.  Lot No. LRB No. Used Action   MESH PROGRIP LAPAROSCOPIC FLAT SHEET [id number] - [id number] Mesh MESH PROGRIP LAPAROSCOPIC FLAT SHEET [id number]  COVID[employer]EN [id number] Left 1 Implanted   MESH PROGRIP LAPAROSCOPIC FLAT SHEET [id number] - [id number] Mesh MESH PROGRIP LAPAROSCOPIC FLAT SHEET [id number]  [employer]OVID[employer]EN [id number] Right 1 Implanted   MESH PROGRIP LAPAROSCOPIC 5.9X3.9\" PARIETEX SELF-FIX [id number] - [id number] Mesh MESH PROGRIP LAPAROSCOPIC 5.9X3.9\" PARIETEX SELF-FIX [id number]  COVIDIEN [id number] Left 1 Implanted       Findings: As above. Preperitoneal repair performed. 2 full-sized pieces of [id number] progrip mesh and 1 piece of 10 x 7.5cm progrip mesh ([id number] trimmed) placed. No immediate complications. See operative report for full details.        Melvi Martin PA-C  [facility]  [phone]        "

## 2024-10-31 ENCOUNTER — TELEPHONE (OUTPATIENT)
Dept: SURGERY | Facility: CLINIC | Age: 69
End: 2024-10-31
Payer: COMMERCIAL

## 2024-10-31 DIAGNOSIS — G89.18 POSTOPERATIVE PAIN: ICD-10-CM

## 2024-10-31 RX ORDER — HYDROCODONE BITARTRATE AND ACETAMINOPHEN 5; 325 MG/1; MG/1
1-2 TABLET ORAL EVERY 6 HOURS PRN
Qty: 10 TABLET | Refills: 0 | Status: SHIPPED | OUTPATIENT
Start: 2024-10-31

## 2024-10-31 NOTE — TELEPHONE ENCOUNTER
Procedure(s):  Robotic bilateral inguinal hernia repair  Right robotic femoral hernia repair  Robotic left spigelian hernia repair    Date: 10/29/24    Surgeon: Annamarie    Patient requesting refill of norco. He thinks he may need a few more days. He is unable to take ibuprofen due to rx blood thinners.    He does not have any other questions or concerns at this time    RX sent to his pharmacy and he was encouraged to call PRN    He agreed    Chuyita Torres, RN-BSN

## 2024-10-31 NOTE — TELEPHONE ENCOUNTER
Name of caller: Patient    Reason for Call:  symptoms/medication refill  Patient still experiencing tenderness and pain. Requesting refill of pain meds:    HYDROcodone-acetaminophen (NORCO) 5-325 MG tablet     Surgeon:  Robi De Luna MD    Recent Surgery:  Yes.    If yes, when & what type:  10/29/24    Robotic bilateral inguinal hernias repair   right femoral hernia            Best phone number to reach pt at is: [phone]    Ok to leave a message with medical info? Yes.    Pharmacy preferred (if calling for a refill):    [facility]Y [id number] - [place], [place] - [address] AT [facility]   No

## 2024-11-06 ENCOUNTER — ANTICOAGULATION THERAPY VISIT (OUTPATIENT)
Dept: ANTICOAGULATION | Facility: CLINIC | Age: 69
End: 2024-11-06

## 2024-11-06 ENCOUNTER — LAB (OUTPATIENT)
Dept: LAB | Facility: CLINIC | Age: 69
End: 2024-11-06
Payer: COMMERCIAL

## 2024-11-06 DIAGNOSIS — Z86.718 HISTORY OF RECURRENT DEEP VEIN THROMBOSIS (DVT): ICD-10-CM

## 2024-11-06 DIAGNOSIS — Z79.01 LONG TERM CURRENT USE OF ANTICOAGULANT THERAPY: ICD-10-CM

## 2024-11-06 DIAGNOSIS — Z86.711 HISTORY OF PULMONARY EMBOLISM: ICD-10-CM

## 2024-11-06 DIAGNOSIS — Z86.718 HISTORY OF RECURRENT DEEP VEIN THROMBOSIS (DVT): Primary | ICD-10-CM

## 2024-11-06 LAB — INR BLD: 1.6 (ref 0.9–1.1)

## 2024-11-06 PROCEDURE — 85610 PROTHROMBIN TIME: CPT

## 2024-11-06 PROCEDURE — 36416 COLLJ CAPILLARY BLOOD SPEC: CPT

## 2024-11-06 NOTE — PROGRESS NOTES
ANTICOAGULATION MANAGEMENT     Jozef Saunders 69 year old male is on warfarin with subtherapeutic INR result. (Goal INR 2.0-3.0)    Recent labs: (last 7 days)     11/06/24  1351   INR 1.6*       ASSESSMENT     Source(s): Chart Review and Patient/Caregiver Call     Warfarin doses taken: Booster dose(s) recently taken which may be affecting INR  Diet: No new diet changes identified  Medication/supplement changes: None noted  New illness, injury, or hospitalization: Recent hernia repair  Signs or symptoms of bleeding or clotting: No  Previous result: Subtherapeutic  Additional findings: None and Bridging with Enoxaparin until INR >= 2.0       PLAN     Recommended plan for temporary change(s) affecting INR     Dosing Instructions: booster dose then continue your current warfarin dose with next INR in 2 days  - continue Lovenox    Summary  As of 11/6/2024      Full warfarin instructions:  11/6: 10 mg; Otherwise 5 mg every Tue, Thu, Sat; 7.5 mg all other days   Next INR check:  11/8/2024               Telephone call with Issac who verbalizes understanding and agrees to plan and who agrees to plan and repeated back plan correctly    Lab visit scheduled    Education provided: Goal range and lab monitoring: goal range and significance of current result, Importance of therapeutic range, and Importance of following up at instructed interval  Contact [phone] with any changes, questions or concerns.     Plan made per [facility] anticoagulation protocol    Jose Alejandro Daigle RN  11/6/2024  Anticoagulation Clinic  [facility] for routing messages: mayra ALBERTO  [facility] patient phone line: [phone]        _______________________________________________________________________     Anticoagulation Episode Summary       Current INR goal:  2.0-3.0   TTR:  75.6% (1 y)   Target end date:  Indefinite   Send INR reminders to:  LEANDRA ALBERTO    Indications    History of recurrent deep vein thrombosis (DVT) [Z86.718]  Long term current use  of anticoagulant therapy [Z79.01]  History of pulmonary embolism [Z86.711]  Deep vein thrombosis (DVT) of proximal lower extremity  unspecified chronicity  unspecified laterality (H) (Resolved) [I82.4Y9]             Comments:  3/19/21 Does not need lovenox bridge per Dr. Humphreys             Anticoagulation Care Providers       Provider Role Specialty Phone number    Hina Humphreys MD Referring Internal Medicine [phone]    Rojas Chun MD  Internal Medicine - Pediatrics [phone]

## 2024-11-08 ENCOUNTER — LAB (OUTPATIENT)
Dept: LAB | Facility: CLINIC | Age: 69
End: 2024-11-08
Payer: COMMERCIAL

## 2024-11-08 ENCOUNTER — ANTICOAGULATION THERAPY VISIT (OUTPATIENT)
Dept: ANTICOAGULATION | Facility: CLINIC | Age: 69
End: 2024-11-08

## 2024-11-08 DIAGNOSIS — Z86.718 HISTORY OF RECURRENT DEEP VEIN THROMBOSIS (DVT): ICD-10-CM

## 2024-11-08 DIAGNOSIS — Z79.01 LONG TERM CURRENT USE OF ANTICOAGULANT THERAPY: ICD-10-CM

## 2024-11-08 DIAGNOSIS — Z86.711 HISTORY OF PULMONARY EMBOLISM: ICD-10-CM

## 2024-11-08 DIAGNOSIS — Z86.718 HISTORY OF RECURRENT DEEP VEIN THROMBOSIS (DVT): Primary | ICD-10-CM

## 2024-11-08 LAB — INR BLD: 1.9 (ref 0.9–1.1)

## 2024-11-08 PROCEDURE — 36416 COLLJ CAPILLARY BLOOD SPEC: CPT

## 2024-11-08 PROCEDURE — 85610 PROTHROMBIN TIME: CPT

## 2024-11-08 NOTE — PROGRESS NOTES
ANTICOAGULATION MANAGEMENT     Jozef Saunders 69 year old male is on warfarin with subtherapeutic INR result. (Goal INR 2.0-3.0)    Recent labs: (last 7 days)     11/08/24  0803   INR 1.9*       ASSESSMENT     Source(s): Chart Review and Patient/Caregiver Call     Warfarin doses taken: Warfarin taken as instructed. Continued Lovenox bridging as recommended.  Diet: No new diet changes identified  Medication/supplement changes: None noted  New illness, injury, or hospitalization: No.  Continues to heal from hernia repair on 10/29.  Patient reports he has had less pain the last couple days.  Signs or symptoms of bleeding or clotting: No  Previous result: Subtherapeutic  Additional findings: None       PLAN     Recommended plan for temporary change(s) affecting INR     Dosing Instructions: booster dose then continue your current warfarin dose.  Lovenox for 1 more dose, then discontinue. Next INR in 1 week       Summary  As of 11/8/2024      Full warfarin instructions:  11/8: 10 mg; Otherwise 5 mg every Tue, Thu, Sat; 7.5 mg all other days   Next INR check:  11/14/2024               Telephone call with Issac who agrees to plan and repeated back plan correctly    Lab visit scheduled    Education provided: Please call back if any changes to your diet, medications or how you've been taking warfarin  Goal range and lab monitoring: goal range and significance of current result    Plan made with [facility] Pharmacist Chandrika Castañeda RN  11/8/2024  Anticoagulation Clinic  [facility] for routing messages: mayra ALBERTO  [facility] patient phone line: [phone]        _______________________________________________________________________     Anticoagulation Episode Summary       Current INR goal:  2.0-3.0   TTR:  75.1% (1 y)   Target end date:  Indefinite   Send INR reminders to:  LEANDRA ALBERTO    Indications    History of recurrent deep vein thrombosis (DVT) [Z86.718]  Long term current use of anticoagulant therapy  [Z79.01]  History of pulmonary embolism [Z86.711]  Deep vein thrombosis (DVT) of proximal lower extremity  unspecified chronicity  unspecified laterality (H) (Resolved) [I82.4Y9]             Comments:  3/19/21 Does not need lovenox bridge per Dr. Humphreys             Anticoagulation Care Providers       Provider Role Specialty Phone number    Hina Humphreys MD [facility] Internal Medicine [phone]    Rojas Chun MD  Internal Medicine - Pediatrics [phone]

## 2024-11-14 ENCOUNTER — ANTICOAGULATION THERAPY VISIT (OUTPATIENT)
Dept: ANTICOAGULATION | Facility: CLINIC | Age: 69
End: 2024-11-14

## 2024-11-14 ENCOUNTER — LAB (OUTPATIENT)
Dept: LAB | Facility: CLINIC | Age: 69
End: 2024-11-14
Payer: COMMERCIAL

## 2024-11-14 DIAGNOSIS — Z86.718 HISTORY OF RECURRENT DEEP VEIN THROMBOSIS (DVT): ICD-10-CM

## 2024-11-14 DIAGNOSIS — Z79.01 LONG TERM CURRENT USE OF ANTICOAGULANT THERAPY: ICD-10-CM

## 2024-11-14 DIAGNOSIS — Z86.711 HISTORY OF PULMONARY EMBOLISM: ICD-10-CM

## 2024-11-14 DIAGNOSIS — Z86.718 HISTORY OF RECURRENT DEEP VEIN THROMBOSIS (DVT): Primary | ICD-10-CM

## 2024-11-14 LAB — INR BLD: 2.3 (ref 0.9–1.1)

## 2024-11-14 PROCEDURE — 85610 PROTHROMBIN TIME: CPT

## 2024-11-14 PROCEDURE — 36416 COLLJ CAPILLARY BLOOD SPEC: CPT

## 2024-11-14 NOTE — PROGRESS NOTES
ANTICOAGULATION MANAGEMENT     Jozef Saunders 69 year old male is on warfarin with therapeutic INR result. (Goal INR 2.0-3.0)    Recent labs: (last 7 days)     11/14/24  1321   INR 2.3*       ASSESSMENT     Source(s): Chart Review and Patient/Caregiver Call     Warfarin doses taken: Warfarin taken as instructed  Diet: Decreased appetite.    Medication/supplement changes: Taking an occasional Tylenol for pain relief   New illness, injury, or hospitalization: Yes: s/p hernia surgery 10/29/24. Has also had intermittent diarrhea.  Signs or symptoms of bleeding or clotting: No  Previous result: Subtherapeutic  Additional findings: None       PLAN     Recommended plan for temporary change(s) affecting INR     Dosing Instructions: Continue your current warfarin dose with next INR in 2 weeks       Summary  As of 11/14/2024      Full warfarin instructions:  5 mg every Tue, Thu, Sat; 7.5 mg all other days   Next INR check:  11/25/2024               Telephone call with Issac who agrees to plan and repeated back plan correctly    Lab visit scheduled    Education provided: Please call back if any changes to your diet, medications or how you've been taking warfarin    Plan made per [facility] anticoagulation protocol    Tiki Castañeda RN  11/14/2024  Anticoagulation Clinic  [employer] for routing messages: mayra ALBERTO  [facility] patient phone line: [phone]        _______________________________________________________________________     Anticoagulation Episode Summary       Current INR goal:  2.0-3.0   TTR:  74.7% (1 y)   Target end date:  Indefinite   Send INR reminders to:  LEANDRA ALBERTO    Indications    History of recurrent deep vein thrombosis (DVT) [Z86.648]  Long term current use of anticoagulant therapy [Z79.01]  History of pulmonary embolism [Z86.711]  Deep vein thrombosis (DVT) of proximal lower extremity  unspecified chronicity  unspecified laterality (H) (Resolved) [I82.4Y9]             Comments:  3/19/21 Does not need  marie bowman per Dr. Humphreys             Anticoagulation Care Providers       Provider Role Specialty Phone number    Hina Humphreys MD Referring Internal Medicine [phone]    Rojas Chun MD  Internal Medicine - Pediatrics [phone]

## 2024-11-15 ENCOUNTER — TELEPHONE (OUTPATIENT)
Dept: SURGERY | Facility: CLINIC | Age: 69
End: 2024-11-15
Payer: COMMERCIAL

## 2024-11-15 DIAGNOSIS — Z86.718 HISTORY OF RECURRENT DEEP VEIN THROMBOSIS (DVT): Primary | ICD-10-CM

## 2024-11-15 DIAGNOSIS — Z79.01 LONG TERM CURRENT USE OF ANTICOAGULANT THERAPY: ICD-10-CM

## 2024-11-15 DIAGNOSIS — Z86.711 HISTORY OF PULMONARY EMBOLISM: ICD-10-CM

## 2024-11-15 DIAGNOSIS — I82.4Y9 DEEP VEIN THROMBOSIS (DVT) OF PROXIMAL LOWER EXTREMITY, UNSPECIFIED CHRONICITY, UNSPECIFIED LATERALITY (H): ICD-10-CM

## 2024-11-15 RX ORDER — WARFARIN SODIUM 5 MG/1
TABLET ORAL
Qty: 120 TABLET | Refills: 1 | Status: SHIPPED | OUTPATIENT
Start: 2024-11-15

## 2024-11-15 NOTE — TELEPHONE ENCOUNTER
ANTICOAGULATION MANAGEMENT:  Medication Refill    Anticoagulation Summary  As of 11/14/2024      Warfarin maintenance plan:  5 mg (5 mg x 1) every Tue, Thu, Sat; 7.5 mg (5 mg x 1.5) all other days   Next INR check:  11/25/2024   Target end date:  Indefinite    Indications    History of recurrent deep vein thrombosis (DVT) [Z86.718]  Long term current use of anticoagulant therapy [Z79.01]  History of pulmonary embolism [Z86.711]  Deep vein thrombosis (DVT) of proximal lower extremity  unspecified chronicity  unspecified laterality (H) (Resolved) [I82.4Y9]                 Anticoagulation Care Providers       Provider Role Specialty Phone number    Hina Humphreys MD Referring Internal Medicine [phone]    Rojas Chun MD  Internal Medicine - Pediatrics [phone]            Refill Criteria    Visit with referring provider/group: Meets criteria: visit within referring provider group in the last 15 months on 10/23/24    [facility] referral last signed: 07/22/2024; within last year: Yes    Lab monitoring not exceeding 2 weeks overdue: Yes    Jozef meets all criteria for refill. Rx instructions and quantity supplied updated to match patient's current dosing plan. Warfarin 90 day supply with 1 refill granted per [facility] protocol     Alison Guidry RN  Anticoagulation Clinic

## 2024-11-15 NOTE — TELEPHONE ENCOUNTER
Patient Name: Jozef Saunders  Today's Date: 11/15/24    Issac was called today for routine postop check. He underwent robotic repair of bilateral inguinal hernias, right femoral hernia, and left spigelian hernia with Dr. De Luna on 10/29/24. Today Issac tells me that he is overall doing fairly well. He is not having a lot of pain anymore, not needing any pain medication. He has been able to increase his walking distance, taking breaks when he needs. Pain has much improved from the first 10 days after surgery. He is now able to stand up straighter and feels more comfortable. He has been eating a bland diet after surgery, but now feels that his appetite is improving. He is starting to add in some of his regular foods like salad. He reports some occasional diarrhea, not excessive but at times will be a significant amount. No obvious triggers. He is otherwise feeling well.     We reviewed continued postop expectations and instructions going forward. Regarding his appetite and occasional diarrhea, this is improving and Issac recalls similar symptoms after other prior abdominal surgeries. I recommended that he continue to take in adequate hydration with water and other fluids, keep adding fiber into his diet and get back to his typical diet as his appetite continues to improve. I expect that his stools will normalize over the next couple weeks as he resumes his regular diet. If he continues to have any trouble with his bowels or if his appetite does not continue to return to normal, I recommended that Issac call our office for follow-up in a couple weeks. All of his questions were answered today, and he agreed to follow-up with us as needed. Otherwise he will see his PCP for routine medical needs. Of note, he is off Lovenox and his INR has been good and in range on his coumadin.       Melvi Martin PA-C  [facility]  [phone]

## 2024-11-25 ENCOUNTER — LAB (OUTPATIENT)
Dept: LAB | Facility: CLINIC | Age: 69
End: 2024-11-25
Payer: COMMERCIAL

## 2024-11-25 ENCOUNTER — ANTICOAGULATION THERAPY VISIT (OUTPATIENT)
Dept: ANTICOAGULATION | Facility: CLINIC | Age: 69
End: 2024-11-25

## 2024-11-25 DIAGNOSIS — Z86.718 HISTORY OF RECURRENT DEEP VEIN THROMBOSIS (DVT): Primary | ICD-10-CM

## 2024-11-25 DIAGNOSIS — Z86.718 HISTORY OF RECURRENT DEEP VEIN THROMBOSIS (DVT): ICD-10-CM

## 2024-11-25 DIAGNOSIS — Z86.711 HISTORY OF PULMONARY EMBOLISM: ICD-10-CM

## 2024-11-25 DIAGNOSIS — Z79.01 LONG TERM CURRENT USE OF ANTICOAGULANT THERAPY: ICD-10-CM

## 2024-11-25 LAB — INR BLD: 2.6 (ref 0.9–1.1)

## 2024-11-25 PROCEDURE — 85610 PROTHROMBIN TIME: CPT

## 2024-11-25 PROCEDURE — 36416 COLLJ CAPILLARY BLOOD SPEC: CPT

## 2024-11-25 NOTE — PROGRESS NOTES
ANTICOAGULATION MANAGEMENT     Jozef Saunders 69 year old male is on warfarin with therapeutic INR result. (Goal INR 2.0-3.0)    Recent labs: (last 7 days)     11/25/24  1023   INR 2.6*       ASSESSMENT     Source(s): Chart Review  Previous INR was Therapeutic last visit; previously outside of goal range  Medication, diet, health changes since last INR chart reviewed; none identified         PLAN     Recommended plan for no diet, medication or health factor changes affecting INR     Dosing Instructions: Continue your current warfarin dose with next INR in 3 weeks       Summary  As of 11/25/2024      Full warfarin instructions:  5 mg every Tue, Thu, Sat; 7.5 mg all other days   Next INR check:  12/16/2024               Detailed voice message left for Issac with dosing instructions and follow up date.   Sent [employer] message with dosing and follow up instructions    Contact [phone] to schedule and with any changes, questions or concerns.     Education provided: Please call back if any changes to your diet, medications or how you've been taking warfarin    Plan made per [facility] anticoagulation protocol    Alison Guidry RN  11/25/2024  Anticoagulation Clinic  [facility] for routing messages: mayra ALBERTO  [facility] patient phone line: [phone]        _______________________________________________________________________     Anticoagulation Episode Summary       Current INR goal:  2.0-3.0   TTR:  74.7% (1 y)   Target end date:  Indefinite   Send INR reminders to:  LEANDRA ALBERTO    Indications    History of recurrent deep vein thrombosis (DVT) [Z86.718]  Long term current use of anticoagulant therapy [Z79.01]  History of pulmonary embolism [Z86.711]  Deep vein thrombosis (DVT) of proximal lower extremity  unspecified chronicity  unspecified laterality (H) (Resolved) [I82.4Y9]             Comments:  3/19/21 Does not need lovenox bridge per Dr. Humphreys             Anticoagulation Care Providers        Provider Role Specialty Phone number    Hina Humphreys MD Referring Internal Medicine [phone]    Rojas Chun MD  Internal Medicine - Pediatrics [phone]

## 2024-12-16 ENCOUNTER — LAB (OUTPATIENT)
Dept: LAB | Facility: CLINIC | Age: 69
End: 2024-12-16
Payer: COMMERCIAL

## 2024-12-16 ENCOUNTER — ANTICOAGULATION THERAPY VISIT (OUTPATIENT)
Dept: ANTICOAGULATION | Facility: CLINIC | Age: 69
End: 2024-12-16

## 2024-12-16 DIAGNOSIS — Z86.718 HISTORY OF RECURRENT DEEP VEIN THROMBOSIS (DVT): ICD-10-CM

## 2024-12-16 DIAGNOSIS — Z86.711 HISTORY OF PULMONARY EMBOLISM: ICD-10-CM

## 2024-12-16 DIAGNOSIS — Z79.01 LONG TERM CURRENT USE OF ANTICOAGULANT THERAPY: ICD-10-CM

## 2024-12-16 DIAGNOSIS — Z86.718 HISTORY OF RECURRENT DEEP VEIN THROMBOSIS (DVT): Primary | ICD-10-CM

## 2024-12-16 LAB — INR BLD: 2.9 (ref 0.9–1.1)

## 2024-12-16 PROCEDURE — 85610 PROTHROMBIN TIME: CPT

## 2024-12-16 PROCEDURE — 36416 COLLJ CAPILLARY BLOOD SPEC: CPT

## 2024-12-16 NOTE — PROGRESS NOTES
ANTICOAGULATION MANAGEMENT     Jozef Saunders 69 year old male is on warfarin with therapeutic INR result. (Goal INR 2.0-3.0)    Recent labs: (last 7 days)     12/16/24  1030   INR 2.9*       ASSESSMENT     Source(s): Chart Review and Patient/Caregiver Call     Warfarin doses taken: Warfarin taken as instructed  Diet: No new diet changes identified  Medication/supplement changes: None noted  New illness, injury, or hospitalization: No  Signs or symptoms of bleeding or clotting: No  Previous result: Therapeutic last 2(+) visits  Additional findings: Leaving for [place], [place] on 12/29 and will be gone ~3 months. Previously used [facility] through [facility] (fax [phone]). Per chart review, patient had issues with order last year so requesting PCP sign order in clinic and patient will /bring with him to [place]. Also gave him the [facility] fax and reminded to have lab staff fax the result.     ANTICOAGULATION  MANAGEMENT- Travel planning    Jozef Saunders reports upcoming travel plans to [place], [place].    Departure date: 12/29/24  Anticipated return date: March/April 2025  Alternate contact information (if applicable): N/A      INR monitoring plan:     [facility] to monitor and dose while traveling. INR standing order to be signed by PCP and patient will  in clinic. Faxed results to be routed to [facility]     Anticoagulation calendar updated with date of next INR     Instructed to call the Anticoauglation Clinic for any changes, questions or concerns [phone]  ?   Alison Guidry RN           PLAN     Recommended plan for no diet, medication or health factor changes affecting INR     Dosing Instructions: Continue your current warfarin dose with next INR in 4 weeks       Summary  As of 12/16/2024      Full warfarin instructions:  5 mg every Tue, Thu, Sat; 7.5 mg all other days   Next INR check:  1/13/2025               Telephone call with Issac who verbalizes  understanding and agrees to plan    Patient using outside facility for labs    Education provided: Please call back if any changes to your diet, medications or how you've been taking warfarin  Written instructions provided    Plan made per [facility] anticoagulation protocol    Alison Guidry RN  12/16/2024  Anticoagulation Clinic  [facility] for routing messages: mayra ALBERTO  [facility] patient phone line: [phone]        _______________________________________________________________________     Anticoagulation Episode Summary       Current INR goal:  2.0-3.0   TTR:  74.7% (1 y)   Target end date:  Indefinite   Send INR reminders to:  LEANDRA ALBERTO    Indications    History of recurrent deep vein thrombosis (DVT) [Z86.718]  Long term current use of anticoagulant therapy [Z79.01]  History of pulmonary embolism [Z86.711]  Deep vein thrombosis (DVT) of proximal lower extremity  unspecified chronicity  unspecified laterality (H) (Resolved) [I82.4Y9]             Comments:  3/19/21 Does not need lovenox bridge per Dr. Humphreys             Anticoagulation Care Providers       Provider Role Specialty Phone number    Hina Humphreys MD Referring Internal Medicine [phone]    Rojas Chun MD  Internal Medicine - Pediatrics [phone]

## 2024-12-19 ENCOUNTER — TELEPHONE (OUTPATIENT)
Dept: PEDIATRICS | Facility: CLINIC | Age: 69
End: 2024-12-19
Payer: COMMERCIAL

## 2024-12-19 NOTE — TELEPHONE ENCOUNTER
Called Pt LVM to give us instructions on how he needs these INR orders sent or picked up he will be traveling to [place].  Put orders in envelope in Juliann's out Basket  on the wall

## 2025-01-02 DIAGNOSIS — J31.0 CHRONIC RHINITIS: ICD-10-CM

## 2025-01-02 RX ORDER — FLUTICASONE PROPIONATE 50 MCG
SPRAY, SUSPENSION (ML) NASAL
Qty: 48 ML | Refills: 2 | Status: SHIPPED | OUTPATIENT
Start: 2025-01-02

## 2025-01-16 ENCOUNTER — TRANSFERRED RECORDS (OUTPATIENT)
Dept: HEALTH INFORMATION MANAGEMENT | Facility: CLINIC | Age: 70
End: 2025-01-16
Payer: COMMERCIAL

## 2025-01-16 LAB — INR (EXTERNAL): 2.3 (ref 0.9–1.1)

## 2025-01-20 ENCOUNTER — TELEPHONE (OUTPATIENT)
Dept: ANTICOAGULATION | Facility: CLINIC | Age: 70
End: 2025-01-20
Payer: COMMERCIAL

## 2025-01-20 ENCOUNTER — ANTICOAGULATION THERAPY VISIT (OUTPATIENT)
Dept: ANTICOAGULATION | Facility: CLINIC | Age: 70
End: 2025-01-20
Payer: COMMERCIAL

## 2025-01-20 DIAGNOSIS — Z86.718 HISTORY OF RECURRENT DEEP VEIN THROMBOSIS (DVT): Primary | ICD-10-CM

## 2025-01-20 DIAGNOSIS — Z86.711 HISTORY OF PULMONARY EMBOLISM: ICD-10-CM

## 2025-01-20 DIAGNOSIS — Z79.01 LONG TERM CURRENT USE OF ANTICOAGULANT THERAPY: ICD-10-CM

## 2025-01-20 NOTE — PROGRESS NOTES
ANTICOAGULATION MANAGEMENT     Jozef Saunders 69 year old male is on warfarin with therapeutic INR result. (Goal INR 2.0-3.0)    Recent labs: (last 7 days)     01/16/25  1154   INR 2.3*       ASSESSMENT     Source(s): Chart Review and Patient/Caregiver Call     Warfarin doses taken: Warfarin taken as instructed  Diet: No new diet changes identified  Medication/supplement changes: None noted  New illness, injury, or hospitalization: No  Signs or symptoms of bleeding or clotting: No  Previous result: Therapeutic last 2(+) visits  Additional findings:  Called patient with reminder call, refreshed Care Everywhere and result from 1/16/25 populated. Patient also needs new lab INR order, as the order he picked up in clinic prior to leaving for [place] was a 1 time only order, not a standing order. Lab order to be faxed to [facility], [phone] Issac will call a couple hours after having next INR so Care Everywhere can be refreshed.       PLAN     Recommended plan for no diet, medication or health factor changes affecting INR     Dosing Instructions: Continue your current warfarin dose with next INR in 5 weeks       Summary  As of 1/20/2025      Full warfarin instructions:  5 mg every Tue, Thu, Sat; 7.5 mg all other days   Next INR check:  2/24/2025               Telephone call with Issac who verbalizes understanding and agrees to plan    Patient using outside facility for labs    Education provided: Please call back if any changes to your diet, medications or how you've been taking warfarin  Contact [phone] with any changes, questions or concerns.     Plan made per [facility] anticoagulation protocol    Parvin Guidry RN  1/20/2025  Anticoagulation Clinic  [employer] for routing messages: mayra ALBERTO  [facility] patient phone line: [phone]        _______________________________________________________________________     Anticoagulation Episode Summary       Current INR goal:  2.0-3.0    TTR:  77.6% (1 y)   Target end date:  Indefinite   Send INR reminders to:  [facility]    Indications    History of recurrent deep vein thrombosis (DVT) [Z86.718]  Long term current use of anticoagulant therapy [Z79.01]  History of pulmonary embolism [Z86.711]  Deep vein thrombosis (DVT) of proximal lower extremity  unspecified chronicity  unspecified laterality (H) (Resolved) [I82.4Y9]             Comments:  3/19/21 Does not need lovenox bridge per Dr. Humphreys             Anticoagulation Care Providers       Provider Role Specialty Phone number    Hina Humphreys MD Referring Internal Medicine [phone]    Rojas Chun MD  Internal Medicine - Pediatrics [phone]

## 2025-01-20 NOTE — TELEPHONE ENCOUNTER
ANTICOAGULATION     Jozef FELIPE Nicky is overdue for an INR check. Patient was due for INR 1/13/25. Per 12/16/25 Anticoagulation note, lab order was to be signed by provider and picked up by the patient to take to [place] with him, [facility] to manage while patient is in [place]. Patient left for [place] 12/29/25 and should return March/April time frame.     Called patient, he reports he had his INR checked on 1/16/25. Refreshed Care Everywhere and result populated in chart. Will fax new order to [facility] [phone] as the order patient brought with him was not a standing order, 1 time only and did not have instruction to fax results.     Parvin Guidry, RN  1/20/2025  Anticoagulation Clinic  [facility] for routing messages: mayra ALBERTO  [facility] patient phone line: [phone]

## 2025-01-21 NOTE — PROGRESS NOTES
Standing lab order faxed to [facility], [phone]. Patient advised orders were faxed.  Parvin Guidry RN, BSN  Anticoagulation Clinic

## 2025-01-25 ENCOUNTER — HEALTH MAINTENANCE LETTER (OUTPATIENT)
Age: 70
End: 2025-01-25

## 2025-01-28 ENCOUNTER — TELEPHONE (OUTPATIENT)
Dept: SURGERY | Facility: CLINIC | Age: 70
End: 2025-01-28
Payer: COMMERCIAL

## 2025-01-28 NOTE — TELEPHONE ENCOUNTER
"Procedure:        1) Robotic bilateral inguinal hernias repair  2) right robotic femoral hernia repair  3) Robotic left spegalian hernia repair    Date: 10/29/24    Surgeon: Annamarie    Patient calling to discuss abdominal symptoms.  He reports that below the three incisions, from above the navel to below the navel and across his abdomen, it feels like a hard bump.      When he looks in the mirror, he can almost see a \"rectangle\" around his belly button. This rectangle protrudes out and feels hard    He reports that he has started working out again and is doing increased core/abdominal work outs and it is not helping.  No pain or discomfort. But, he reports that he feels bloated/abdominal distention.    Some discomfort after eating, primarily bloated feeling.     Having bowel movements.    He is currently in [place] and will be back beginning of April. He is wondering if this could be related to the mesh/surgery, or if it is maybe just due to aging?    Informed him that it is a good sign that it is not causing him any discomfort.  However, it is hard to tell what is going on without being evaluated in person.     Recommend that he continue to monitor and if symptoms persist, he call and schedule visit with Dr. De Luna when he returns from [place]. If symptoms get worse, or he begins experiencing discomfort, he should be evaluated locally.  He agreed    Also informed him that I will discuss with Dr. De Luna for any input he may have prior to being evaluated in person    He is in agreement with this plan    Chuyita Torres, RN-BSN        "

## 2025-01-28 NOTE — TELEPHONE ENCOUNTER
Name of caller: Patient    Reason for Call:  Call back  Issac has a few questions to discuss with nurse about recovery.    Surgeon:  Robi De Luna MD    Recent Surgery:  Yes.    If yes, when & what type:  10/29/2024    Robotic bilateral inguinal hernias repair,   right femoral hernia   Robotic left spegallian hernia repair     Best phone number to reach pt at is: [phone]    Ok to leave a message with medical info? Yes.

## 2025-01-30 NOTE — TELEPHONE ENCOUNTER
Alanis message sent to patient with Dr. De Luna's input:    As long as he isn't having pain, he can keep doing what he is doing.  He should see me when he comes back.      Chuyita Torres, RN-BSN

## 2025-02-26 ENCOUNTER — ANTICOAGULATION THERAPY VISIT (OUTPATIENT)
Dept: ANTICOAGULATION | Facility: CLINIC | Age: 70
End: 2025-02-26
Payer: COMMERCIAL

## 2025-02-26 DIAGNOSIS — Z79.01 LONG TERM CURRENT USE OF ANTICOAGULANT THERAPY: ICD-10-CM

## 2025-02-26 DIAGNOSIS — Z86.718 HISTORY OF RECURRENT DEEP VEIN THROMBOSIS (DVT): Primary | ICD-10-CM

## 2025-02-26 DIAGNOSIS — Z86.711 HISTORY OF PULMONARY EMBOLISM: ICD-10-CM

## 2025-02-26 LAB — INR (EXTERNAL): 2.36 (ref 0.9–1.1)

## 2025-02-26 NOTE — PROGRESS NOTES
ANTICOAGULATION MANAGEMENT     Jozef Saunders 69 year old male is on warfarin with therapeutic INR result. (Goal INR 2.0-3.0)    Recent labs: (last 7 days)     02/26/25  0000   INR 2.36*       ASSESSMENT     Source(s): Chart Review and Patient/Caregiver Call     Warfarin doses taken: Warfarin taken as instructed  Diet: No new diet changes identified  Medication/supplement changes: None noted  New illness, injury, or hospitalization: No  Signs or symptoms of bleeding or clotting: No  Previous result: Therapeutic last 2(+) visits  Additional findings: patient reports he will be back from [place] on or near 4/7/25, next [facility]R will be [facility].       PLAN     Recommended plan for no diet, medication or health factor changes affecting INR     Dosing Instructions: Continue your current warfarin dose with next INR in 6 weeks       Summary  As of 2/26/2025      Full warfarin instructions:  5 mg every Tue, Thu, Sat; 7.5 mg all other days   Next INR check:  4/9/2025               Telephone call with Issac who verbalizes understanding and agrees to plan    Lab visit scheduled    Education provided: Taking warfarin: Importance of taking warfarin as instructed    Plan made per [facility] anticoagulation protocol    Lovely Ashby, RN  2/26/2025  Anticoagulation Clinic  [employer] for routing messages: mayra ALBERTO  [facility] patient phone line: [phone]        _______________________________________________________________________     Anticoagulation Episode Summary       Current INR goal:  2.0-3.0   TTR:  86.8% (1 y)   Target end date:  Indefinite   Send INR reminders to:  LEANDRA ALBERTO    Indications    History of recurrent deep vein thrombosis (DVT) [Z86.718]  Long term current use of anticoagulant therapy [Z79.01]  History of pulmonary embolism [Z86.711]  Deep vein thrombosis (DVT) of proximal lower extremity  unspecified chronicity  unspecified laterality (H) (Resolved) [I82.4Y9]             Comments:  3/19/21 Does not need  marie bowman per Dr. Humphreys             Anticoagulation Care Providers       Provider Role Specialty Phone number    Hina Humphreys MD Referring Internal Medicine [phone]    Rojas Chun MD  Internal Medicine - Pediatrics [phone]

## 2025-02-26 NOTE — PROGRESS NOTES
Incoming fax from  [facility]    Date of result 2/25/25    INR result 2.36    Route result to: mayra ALBERTO

## 2025-04-08 ENCOUNTER — TRANSFERRED RECORDS (OUTPATIENT)
Dept: HEALTH INFORMATION MANAGEMENT | Facility: CLINIC | Age: 70
End: 2025-04-08
Payer: COMMERCIAL

## 2025-04-09 ENCOUNTER — ANTICOAGULATION THERAPY VISIT (OUTPATIENT)
Dept: ANTICOAGULATION | Facility: CLINIC | Age: 70
End: 2025-04-09

## 2025-04-09 ENCOUNTER — LAB (OUTPATIENT)
Dept: LAB | Facility: CLINIC | Age: 70
End: 2025-04-09
Payer: COMMERCIAL

## 2025-04-09 ENCOUNTER — TELEPHONE (OUTPATIENT)
Dept: ANTICOAGULATION | Facility: CLINIC | Age: 70
End: 2025-04-09

## 2025-04-09 DIAGNOSIS — Z86.711 HISTORY OF PULMONARY EMBOLISM: ICD-10-CM

## 2025-04-09 DIAGNOSIS — Z86.718 HISTORY OF RECURRENT DEEP VEIN THROMBOSIS (DVT): ICD-10-CM

## 2025-04-09 DIAGNOSIS — Z79.01 LONG TERM CURRENT USE OF ANTICOAGULANT THERAPY: ICD-10-CM

## 2025-04-09 DIAGNOSIS — Z86.718 HISTORY OF RECURRENT DEEP VEIN THROMBOSIS (DVT): Primary | ICD-10-CM

## 2025-04-09 LAB — INR BLD: 3.1 (ref 0.9–1.1)

## 2025-04-09 PROCEDURE — 85610 PROTHROMBIN TIME: CPT

## 2025-04-09 PROCEDURE — 36416 COLLJ CAPILLARY BLOOD SPEC: CPT

## 2025-04-09 NOTE — PROGRESS NOTES
ANTICOAGULATION MANAGEMENT     Jozef Saunders 69 year old male is on warfarin with supratherapeutic INR result. (Goal INR 2.0-3.0)    Recent labs: (last 7 days)     04/09/25  0945   INR 3.1*       ASSESSMENT     Source(s): Chart Review     Warfarin doses taken: Warfarin taken as instructed  Diet: No new diet changes identified but possibly less of the darker greens while traveling back to [place] from [place]  Medication/supplement changes: None noted  New illness, injury, or hospitalization: No  Signs or symptoms of bleeding or clotting: No  Previous result: Therapeutic last 2(+) visits  Additional findings: Upcoming surgery/procedure Patient will have a bone spur removed from right foot 5/1/25, TE sent to [facility] 4/9/25       PLAN     Recommended plan for no diet, medication or health factor changes and previous 2 INR results within goal affecting INR     Dosing Instructions: Continue your current warfarin dose with next INR in 2 weeks       Summary  As of 4/9/2025      Full warfarin instructions:  5 mg every Tue, Thu, Sat; 7.5 mg all other days   Next INR check:  4/23/2025               Telephone call with Issac who verbalizes understanding and agrees to plan    Lab visit scheduled    Education provided: Please call back if any changes to your diet, medications or how you've been taking warfarin  Contact [phone] with any changes, questions or concerns.     Plan made per [facility] anticoagulation protocol    Parvin Guidry RN  4/9/2025  Anticoagulation Clinic  [facility] for routing messages: mayra ALBERTO  [facility] patient phone line: [phone]        _______________________________________________________________________     Anticoagulation Episode Summary       Current INR goal:  2.0-3.0   TTR:  86.5% (1 y)   Target end date:  Indefinite   Send INR reminders to:  LEANDRA ALBERTO    Indications    History of recurrent deep vein thrombosis (DVT) [Z86.608]  Long term current use of anticoagulant therapy [Z79.01]  History of  pulmonary embolism [Z86.711]  Deep vein thrombosis (DVT) of proximal lower extremity  unspecified chronicity  unspecified laterality (H) (Resolved) [I82.4Y9]             Comments:  3/19/21 Does not need lovenox bridge per Dr. Humphreys             Anticoagulation Care Providers       Provider Role Specialty Phone number    Hina Humphreys MD Referring Internal Medicine [phone]    Rojas Chun MD  Internal Medicine - Pediatrics [phone]

## 2025-04-09 NOTE — TELEPHONE ENCOUNTER
[facility]-PROCEDURAL ANTICOAGULATION  MANAGEMENT    Jozef requesting pre-procedure hold orders for warfarin and review for bridging      Procedure date: 5/1/25       Procedure:  burn spur on right foot      Procedure location and phone number (if external):  [facility]     Number of warfarin hold days requested and/or target INR: unknown    Pre-op date: not yet scheduled      Routing to Anticoagulation Pharmacist for review.     [facility] pool: mayra Guidry RN

## 2025-04-10 ENCOUNTER — OFFICE VISIT (OUTPATIENT)
Dept: INTERNAL MEDICINE | Facility: CLINIC | Age: 70
End: 2025-04-10
Payer: COMMERCIAL

## 2025-04-10 VITALS
DIASTOLIC BLOOD PRESSURE: 92 MMHG | HEART RATE: 66 BPM | OXYGEN SATURATION: 94 % | SYSTOLIC BLOOD PRESSURE: 142 MMHG | WEIGHT: 190 LBS | BODY MASS INDEX: 28.06 KG/M2 | TEMPERATURE: 98.1 F | RESPIRATION RATE: 16 BRPM

## 2025-04-10 DIAGNOSIS — F41.1 GENERALIZED ANXIETY DISORDER: ICD-10-CM

## 2025-04-10 DIAGNOSIS — Z01.818 PREOP GENERAL PHYSICAL EXAM: Primary | ICD-10-CM

## 2025-04-10 DIAGNOSIS — M77.51 BONE SPUR OF RIGHT FOOT: ICD-10-CM

## 2025-04-10 DIAGNOSIS — Z79.01 LONG TERM CURRENT USE OF ANTICOAGULANT THERAPY: ICD-10-CM

## 2025-04-10 DIAGNOSIS — E78.00 HYPERCHOLESTEROLEMIA: ICD-10-CM

## 2025-04-10 DIAGNOSIS — Z86.718 HISTORY OF RECURRENT DEEP VEIN THROMBOSIS (DVT): ICD-10-CM

## 2025-04-10 DIAGNOSIS — Z86.711 HISTORY OF PULMONARY EMBOLISM: ICD-10-CM

## 2025-04-10 DIAGNOSIS — I10 ESSENTIAL HYPERTENSION, BENIGN: ICD-10-CM

## 2025-04-10 LAB
ERYTHROCYTE [DISTWIDTH] IN BLOOD BY AUTOMATED COUNT: 12.2 % (ref 10–15)
HCT VFR BLD AUTO: 45.6 % (ref 40–53)
HGB BLD-MCNC: 15.8 G/DL (ref 13.3–17.7)
MCH RBC QN AUTO: 30.6 PG (ref 26.5–33)
MCHC RBC AUTO-ENTMCNC: 34.6 G/DL (ref 31.5–36.5)
MCV RBC AUTO: 88 FL (ref 78–100)
PLATELET # BLD AUTO: 115 10E3/UL (ref 150–450)
RBC # BLD AUTO: 5.16 10E6/UL (ref 4.4–5.9)
WBC # BLD AUTO: 6.3 10E3/UL (ref 4–11)

## 2025-04-10 RX ORDER — HYDROMORPHONE HYDROCHLORIDE 2 MG/1
TABLET ORAL
COMMUNITY

## 2025-04-10 ASSESSMENT — PAIN SCALES - GENERAL: PAINLEVEL_OUTOF10: NO PAIN (0)

## 2025-04-10 NOTE — PROGRESS NOTES
Preoperative Evaluation  [place] [address]  [address]  [facility] [address]  Phone: [phone]  Primary Provider: Hina Humphreys MD  Pre-op Performing Provider: JUAN Pennington CNP  Apr 10, 2025               4/10/2025   Surgical Information   What procedure is being done? preop   Facility or Hospital where procedure/surgery will be performed: [facility]   Who is doing the procedure / surgery? dr santana   Date of surgery / procedure: 5/1/25   Time of surgery / procedure: morning   Where do you plan to recover after surgery? at home with family     Fax number for surgical facility: [phone]    Assessment & Plan     The proposed surgical procedure is considered INTERMEDIATE risk.    Preop general physical exam  Bone spur of right foot  - EKG 12-lead complete w/read - Clinics  - CBC with platelets; Future  - CBC with platelets    Long term current use of anticoagulant therapy  Follow anticoagulation clinic's instruction on abstinence from warfarin for 5 days prior to surgery    History of recurrent deep vein thrombosis (DVT)  History of pulmonary embolism  Pt is on chronic use of warfarin for pe lactitolrsistent hx of DVT and PE    Generalized anxiety disorder  Okay to take sertraline the morning of surgery with a sip of water    Essential hypertension, benign  Instructed pt to take lisinopril the night prior to surgery instead of the morning of surgery if his BP is running especially high, to not take 11 hours prior to surgery    Hypercholesterolemia  Continue Crestor regimen without interruption             - No identified additional risk factors other than previously addressed    Preoperative Medication Instructions  Antiplatelet or Anticoagulation Medication Instructions   - warfarin: Thromboembolic risk is moderate (4-10%/year). DO NOT TAKE warfarin 5 days. Thromboembolic risk is moderate (4-10%/year). DO NOT TAKE warfarin 5 days.   -  Bridging therapy ordered     Additional Medication Instructions   - Herbal medications and vitamins: DO NOT TAKE 14 days prior to surgery.   - ACE/ARB/ARNI (lisinopril, enalapril, losartan, valsartan, olmesartan, sacubritril/valsartan) : DO NOT TAKE on day of surgery (minimum 11 hours for general anesthesia).   - Beta Blockers (atenolol, metoprolol, propranolol) : Continue taking on the day of surgery.   - Statins (atorvastatin, simvastatin, pravastatin) : Continue taking on the day of surgery.    - diclofenac (Voltaren): DO NOT TAKE for 3 days for high bleeding risk or PRN use.   - ibuprofen (Advil, Motrin): DO NOT TAKE 1 day before surgery.    - naproxen (Aleve, Naprosyn): DO NOT TAKE 4 days before surgery.    - SSRIs, SNRIs, TCAs, Antipsychotics: Continue without modification.     Recommendation  Approval given to proceed with proposed procedure, without further diagnostic evaluation.    Maria Teresa Davenport is a 69 year old, presenting for the following:  Pre-Op Exam (For surgery on right foot on 5/1 at [facility].)        HPI: Jozef has had his right hip and knee evaluated by reports ongoing pain for the past month or so.  He has experienced several trochanteric bursal injections. Pt has significant osteoarthritis. Pt reports a bone spur on the dorsal aspect of the 1st tarsal that is painful and irritating and rubs against his shoes. Pt denies any fevers, night sweats or chills. Denies paresthesia, no change in health status since last visit with surgeon. Pt states he has discussed his anticoagulation with the clinic who is providing day by day instructions for his warfarin instructions.        Reviewed the instructions with pt to be prepared to share his doctor's clinic name and phone number, his medical surgical and anesthesia history, a list of allergies and sensitivities, a list of medicines including herbal treatments and over-the-counter medications.  Advised patient to inform insurance company  that he is having surgery and to call the clinic if there is any change in her health such as a scratch or scrape near the surgical site or any signs of a cold such as sore throat, runny nose, cough, rash, fever.     Provided patient with the eating and drinking guidelines and instructed patient to eat and drink as normal until 8 hours before he arrives for surgery and after that no food or milk.  Instructed patient that he can drink clear water liquids until 2 hours before he arrives.  Explained that clear liquids are those you can see through like water Gatorade and propel and includes black coffee and tea as long as there is no cream or milk in it.  Instructed patient to not drink alcohol for 24 hours before he arrives which includes drinks that contain THC.  Instructed patient to shower/bathe the night before and the morning of surgery and to not smoke or vape the morning of surgery.       On the day of surgery instructed patient to bring photo ID and insurance card and a copy of his healthcare directive if he has 1 and to bring cases for glasses and hearing aids because he cannot wear contacts during surgery.  Instructed patient to remove any jewelry including body piercings and leave jewelry and valuables at home.            4/10/2025   Pre-Op Questionnaire   Have you ever had a heart attack or stroke? No   Have you ever had surgery on your heart or blood vessels, such as a stent placement, a coronary artery bypass, or surgery on an artery in your head, neck, heart, or legs? No   Do you have chest pain with activity? No   Do you have a history of heart failure? No   Do you currently have a cold, bronchitis or symptoms of other infection? No   Do you have a cough, shortness of breath, or wheezing? No   Do you or anyone in your family have previous history of blood clots? (!) YES DVT and PE personally   Do you or does anyone in your family have a serious bleeding problem such as prolonged bleeding following  surgeries or cuts? No   Have you ever had problems with anemia or been told to take iron pills? (!) YES has gone on ferrous sulfate on and off   Have you had any abnormal blood loss such as black, tarry or bloody stools? No   Have you ever had a blood transfusion? No   Are you willing to have a blood transfusion if it is medically needed before, during, or after your surgery? Yes   Have you or any of your relatives ever had problems with anesthesia? No   Do you have sleep apnea, excessive snoring or daytime drowsiness? No   Do you have any artifical heart valves or other implanted medical devices like a pacemaker, defibrillator, or continuous glucose monitor? No   Do you have artificial joints? No   Are you allergic to latex? No     Health Care Directive  Patient does not have a Health Care Directive: Discussed advance care planning with patient; however, patient declined at this time.    Preoperative Review of    reviewed - no record of controlled substances prescribed.          Patient Active Problem List    Diagnosis Date Noted    Esophageal dysphagia 06/07/2021     Priority: Medium    Thrombocytopenia 10/22/2019     Priority: Medium     Seen by heme. Workup unremarkable, but holding off on bone marrow biopsy unless plt count <50-75      Hemorrhoids 12/26/2018     Priority: Medium    Polyp of colon 12/26/2018     Priority: Medium    Alcohol dependence in remission (H) 10/11/2018     Priority: Medium    Long term current use of anticoagulant therapy 03/26/2015     Priority: Medium     Problem list name updated by automated process. Provider to review      History of recurrent deep vein thrombosis (DVT) 10/24/2014     Priority: Medium     On coumadin.      History of pulmonary embolism 10/24/2014     Priority: Medium     Unprovoked, 2010 first episode, then had recurrence after that, continuing on indefinitely.      s/p nissen for large hiatal hernia 04/05/2011     Priority: Medium     Present on EGD in 2021,  "after Nissen procedure, which was noted to be \"partially disrupted\" on EGD report.     >>OVERVIEW FOR S/P OZZIE NISSEN GASTROPLASTY WRITTEN ON 8/1/2022  4:11 PM BY JUAN J HARTMANN MD    Due to large paraesophageal hernia      History of iron deficiency anemia 11/16/2010     Priority: Medium     Probably due to warfarin and hiatal hernia resulting in occult GI bleed      Hypercholesterolemia 03/18/2009     Priority: Medium     Cardiac Risk Factors: male over 45, HTN, family h/o CAD; goal LDL < 130;   10-year CV risk (8/27/2014) based on ACC/AHA risk calculator = 15.4%       Generalized anxiety disorder 12/22/2004     Priority: Medium    Essential hypertension, benign 12/09/2004     Priority: Medium      Past Medical History:   Diagnosis Date    Esophageal dysphagia     Essential hypertension     MARIANNE (generalized anxiety disorder)     Hemorrhoids     History of deep venous thrombosis 01/11/2022    History of pulmonary embolism 09/25/2010    Hypercholesterolemia     Iron deficiency anemia 11/16/2010    Probably due to warfarin and hiatal hernia resulting in occult GI bleed    Polyp of colon     Recovering Alcoholic     Recurrent periodic urticaria 01/11/2012    Idiopathic at this point.  Has seen Dr. Riojas at [facility]      Thrombocytopenia     Thrush 07/15/2018     Past Surgical History:   Procedure Laterality Date    DAVINCI XI HERNIORRHAPHY INGUINAL Bilateral 10/29/2024    Procedure: Robotic bilateral inguinal hernias repair,;  Surgeon: Robi De Luna MD;  Location:  OR    DAVINCI XI HERNIORRHAPHY VENTRAL Left 10/29/2024    Procedure: Robotic left spegallian hernia repair;  Surgeon: Robi De Luna MD;  Location:  OR     ARTHROTOMY/EXPLORE/TREAT KNEE JOINT      torn ligaments    HC REPAIR ING HERNIA,5+Y/O,REDUCIBL      age 8 - right    HC REPAIR ING HERNIA,6MO-5YR,REDUC      infancy - left    HERNIORRHAPHY FEMORAL  10/29/2024    Procedure: right femoral hernia;  Surgeon: Robi De Luna MD;  " Location:  OR    HERNIORRHAPHY HIATAL N/A 11/12/2019    Procedure: HIATAL HERNIA REPAIR WITH UNCUT COLIS NISSEN GASTROPLASTY;  Surgeon: Dewey Roa MD;  Location: [facility] OR    NISSEN PROCEDURE      THORACOTOMY N/A 11/12/2019    Procedure: LEFT THORACOTOMY;  Surgeon: Dewey Roa MD;  Location:  OR     Current Outpatient Medications   Medication Sig Dispense Refill    carvedilol (COREG) 12.5 MG tablet Take 1 tablet (12.5 mg) by mouth 2 times daily (with meals) 180 tablet 3    fluticasone (FLONASE) 50 MCG/ACT nasal spray INSTILL 2 SPRAYS INTO BOTH NOSTRILS DAILY 48 mL 2    lisinopril (ZESTRIL) 20 MG tablet Take 1 tablet (20 mg) by mouth daily 90 tablet 4    rosuvastatin (CRESTOR) 5 MG tablet Take 1 tablet (5 mg) by mouth daily. 90 tablet 4    sertraline (ZOLOFT) 50 MG tablet TAKE 1 TABLET BY MOUTH EVERY DAY 90 tablet 4    warfarin ANTICOAGULANT (COUMADIN) 5 MG tablet Take 1 tablet (5 mg) Tu, Th, Sat and take 1.5 tablets (7.5 mg) all other days or as directed by anticoagulation clinic 111 tablet 1    enoxaparin ANTICOAGULANT (LOVENOX) 40 MG/0.4ML syringe Inject 0.4 mLs (40 mg) subcutaneously every 24 hours. As directed by anticoagulation clinic (Patient not taking: Reported on 4/10/2025) 5.6 mL 1    famotidine (PEPCID) 20 MG tablet TAKE 1 TABLET BY MOUTH TWICE A DAY (Patient not taking: Reported on 4/10/2025) 180 tablet 4    HYDROcodone-acetaminophen (NORCO) 5-325 MG tablet Take 1-2 tablets by mouth every 6 hours as needed for moderate to severe pain. (Patient not taking: Reported on 4/10/2025) 10 tablet 0    HYDROmorphone (DILAUDID) 2 MG tablet  (Patient not taking: Reported on 4/10/2025)      senna-docusate (SENOKOT-S/PERICOLACE) 8.6-50 MG tablet Take 1-2 tablets by mouth 2 times daily as needed for constipation. (Patient not taking: Reported on 4/10/2025) 15 tablet 0       Allergies   Allergen Reactions    Clindamycin Hcl Hives    Heparin      Heparin-induced thrombocytopenia    Omeprazole  "Diarrhea        Social History     Tobacco Use    Smoking status: Former     Current packs/day: 0.00     Types: Cigarettes     Quit date: 1989     Years since quittin.4     Passive exposure: Never    Smokeless tobacco: Never   Substance Use Topics    Alcohol use: No     Comment: sober x 40yr     Family History   Problem Relation Age of Onset    Cancer Mother         liver;  at age 47    Hypertension Father     C.A.D. Father         CABG; age of onset over age 60    Hypertension Brother         age of onset under 50    Prostate Cancer Brother         diagnosed at age 58    Diabetes No family hx of      History   Drug Use No             Review of Systems  Constitutional, neuro, ENT, endocrine, pulmonary, cardiac, gastrointestinal, genitourinary, musculoskeletal, integument and psychiatric systems are negative, except as otherwise noted.    Objective    BP (!) 142/92 (BP Location: Right arm, Patient Position: Sitting, Cuff Size: Adult Regular)   Pulse 66   Temp 98.1  F (36.7  C) (Oral)   Resp 16   Wt 86.2 kg (190 lb)   SpO2 94%   BMI 28.06 kg/m     Estimated body mass index is 28.06 kg/m  as calculated from the following:    Height as of 10/29/24: 1.753 m (5' 9\").    Weight as of this encounter: 86.2 kg (190 lb).  Physical Exam  GENERAL: alert and no distress  NECK: no adenopathy, no asymmetry, masses, or scars  RESP: lungs clear to auscultation - no rales, rhonchi or wheezes  CV: regular rate and rhythm, normal S1 S2, no S3 or S4, no murmur, click or rub, no peripheral edema  ABDOMEN: soft, nontender, no hepatosplenomegaly, no masses and bowel sounds normal  MS: no gross musculoskeletal defects noted, no edema  SKIN: no suspicious lesions or rashes and marble sized hard immovable lump noted on the dorsal aspect of his 1st tarsal at the PIJ. Skin is D&I and not warm to the touch, no drainage, no edema, no erythema  NEURO: Normal strength and tone, mentation intact and speech normal  PSYCH: " mentation appears normal, affect normal/bright    Recent Labs   Lab Test 04/09/25  0945 02/26/25  0000 10/29/24  0616 10/23/24  1212 07/19/24  0944 06/21/24  1447 05/24/24  0951 05/06/24  1209   HGB  --   --   --  15.7  --  16.3  --  15.7   PLT  --   --   --  83*  --  104*  --  87*   INR 3.1* 2.36*   < >  --    < >  --    < >  --    NA  --   --   --   --   --  143  --  141   POTASSIUM  --   --   --   --   --  4.3  --  3.9   CR  --   --   --   --   --  0.87  --  0.79    < > = values in this interval not displayed.        Diagnostics  CBC   - EKG: NSR, Left axis -anterior fascicular block.    Revised Cardiac Risk Index (RCRI)  The patient has the following serious cardiovascular risks for perioperative complications:   - No serious cardiac risks = 0 points     RCRI Interpretation: 0 points: Class I (very low risk - 0.4% complication rate)         Signed Electronically by: JUAN Pennington CNP  A copy of this evaluation report is provided to the requesting physician.

## 2025-04-10 NOTE — PATIENT INSTRUCTIONS
How to Take Your Medication Before Surgery  Preoperative Medication Instructions   Antiplatelet or Anticoagulation Medication Instructions   - warfarin: Thromboembolic risk is moderate (4-10%/year). DO NOT TAKE warfarin 5 days. Thromboembolic risk is moderate (4-10%/year). DO NOT TAKE warfarin 5 days.   - Bridging therapy ordered     Additional Medication Instructions   - Herbal medications and vitamins: DO NOT TAKE 14 days prior to surgery.   - ACE/ARB/ARNI (lisinopril, enalapril, losartan, valsartan, olmesartan, sacubritril/valsartan) : DO NOT TAKE on day of surgery (minimum 11 hours for general anesthesia).   - Beta Blockers (atenolol, metoprolol, propranolol) : Continue taking on the day of surgery.   - Statins (atorvastatin, simvastatin, pravastatin) : Continue taking on the day of surgery.    - diclofenac (Voltaren): DO NOT TAKE for 3 days for high bleeding risk or PRN use.   - ibuprofen (Advil, Motrin): DO NOT TAKE 1 day before surgery.    - naproxen (Aleve, Naprosyn): DO NOT TAKE 4 days before surgery.    - SSRIs, SNRIs, TCAs, Antipsychotics: Continue without modification.    Advised of taking lisinopril the night before  Take famotidine morning of surgery with a sip of water    Patient Education   Preparing for Your Surgery  For Adults  Getting started  In most cases, a nurse will call to review your health history and instructions. They will give you an arrival time based on your scheduled surgery time. Please be ready to share:  Your doctor's clinic name and phone number  Your medical, surgical, and anesthesia history  A list of allergies and sensitivities  A list of medicines, including herbal treatments and over-the-counter drugs  Whether the patient has a legal guardian (ask how to send us the papers in advance)  Note: You may not receive a call if you were seen at our PAC (Preoperative Assessment Center).  Please tell us if you're pregnant--or if there's any chance you might be pregnant. Some  surgeries may injure a fetus (unborn baby), so they require a pregnancy test. Surgeries that are safe for a fetus don't always need a test, and you can choose whether to have one.   Preparing for surgery  Within 10 to 30 days of surgery: Have a pre-op exam (sometimes called an H&P, or History and Physical). This can be done at a clinic or pre-operative center.  If you're having a , you may not need this exam. Talk to your care team.  At your pre-op exam, talk to your care team about all medicines you take. (This includes CBD oil and any drugs, such as THC, marijuana, and other forms of cannabis.) If you need to stop any medicine before surgery, ask when to start taking it again.  This is for your safety. Many medicines and drugs can make you bleed too much during surgery. Some change how well surgery (anesthesia) drugs work.  Call your insurance company to let them know you're having surgery. (If you don't have insurance, call [phone].)  Call your clinic if there's any change in your health. This includes a scrape or scratch near the surgery site, or any signs of a cold (sore throat, runny nose, cough, rash, fever).  Eating and drinking guidelines  For your safety: Unless your surgeon tells you otherwise, follow the guidelines below.  Eat and drink as normal until 8 hours before you arrive for surgery. After that, no food or milk. You can spit out gum when you arrive.  Drink clear liquids until 2 hours before you arrive. These are liquids you can see through, like water, Gatorade, and Propel Water. They also include plain black coffee and tea (no cream or milk).  No alcohol for 24 hours before you arrive. The night before surgery, stop any drinks that contain THC.  If your care team tells you to take medicine on the morning of surgery, it's okay to take it with a sip of water. No other medicines or drugs are allowed (including CBD oil)--follow your care team's instructions.  If you have questions the  day of surgery, call your hospital or surgery center.   Preventing infection  Shower or bathe the night before and the morning of surgery. Follow the instructions your clinic gave you. (If no instructions, use regular soap.)  Don't shave or clip hair near your surgery site. We'll remove the hair if needed.  Don't smoke or vape the morning of surgery. No chewing tobacco for 6 hours before you arrive. A nicotine patch is okay. You may spit out nicotine gum when you arrive.  For some surgeries, the surgeon will tell you to fully quit smoking and nicotine.  We will make every effort to keep you safe from infection. We will:  Clean our hands often with soap and water (or an alcohol-based hand rub).  Clean the skin at your surgery site with a special soap that kills germs.  Give you a special gown to keep you warm. (Cold raises the risk of infection.)  Wear hair covers, masks, gowns, and gloves during surgery.  Give antibiotic medicine, if prescribed. Not all surgeries need this medicine.  What to bring on the day of surgery  Photo ID and insurance card  Copy of your health care directive, if you have one  Glasses and hearing aids (bring cases)  You can't wear contacts during surgery  Inhaler and eye drops, if you use them (tell us about these when you arrive)  CPAP machine or breathing device, if you use them  A few personal items, if spending the night  If you have . . .  A pacemaker, ICD (cardiac defibrillator), or other implant: Bring the ID card.  An implanted stimulator: Bring the remote control.  A legal guardian: Bring a copy of the certified (court-stamped) guardianship papers.  Please remove any jewelry, including body piercings. Leave jewelry and other valuables at home.  If you're going home the day of surgery  You must have a responsible adult drive you home. They should stay with you overnight as well.  If you don't have someone to stay with you, and you aren't safe to go home alone, we may keep you  overnight. Insurance often won't pay for this.  After surgery  If it's hard to control your pain or you need more pain medicine, please call your surgeon's office.  Questions?   If you have any questions for your care team, list them here:   ____________________________________________________________________________________________________________________________________________________________________________________________________________________________________________________________  For informational purposes only. Not to replace the advice of your health care provider. Copyright   2003, 2019 [facility]. All rights reserved. Clinically reviewed by Lei Garibay MD. [employer] [id number] - REV 08/24.

## 2025-04-15 NOTE — TELEPHONE ENCOUNTER
KATIE-PROCEDURAL ANTICOAGULATION  MANAGEMENT    ASSESSMENT     Warfarin interruption plan for bone spur, right foot on 5/1/25.    Indication for Anticoagulation: Recurrent VTE     History of recurrent unprovoked DVT and PE in 2010, and 2014.  Hypercoagulable work up negative    Past procedure management  10/2024 robotic bilateral inguinal hernia repair: 4 day warfarin hold with post procedure prophylactic enoxaparin  4/2021 - upper GI: 4 day warfarin hold with no enoxaparin bridge  11/2019 - hiatal hernia repair with Nissen gastroplasty: 5 day warfarin hold with prophylactic enoxaparin pre and post procedure  12/2018 - colonoscopy: 5 day warfarin hold with prophylactic enoxaparin bridge    Katie-Procedure Risk stratification for thromboembolism: moderate (2022 Chest guidelines)    VTE: 2022 CHEST Perioperative Management guidelines note, suggesting against bridging with a therapeutic-dose regimen does not preclude the use of a low-dose heparin regimen, typically started within 24 hours after surgery and continued for up to 5 days while VKA therapy is resumed, to decrease the risk for postoperative VTE.    RECOMMENDATION     Pre-Procedure:  Hold warfarin for 5 days, until after procedure starting: Saturday, April 26   No pre-procedure bridge    Post-Procedure:  Resume warfarin dose if okay with provider doing procedure on night of procedure, Thursday, May 1 PM: take 10 mg x 2, then resume home dose  Start enoxaparin (Lovenox) 40 mg subq Q 24 hrs (prophylaxis dose) ~ 24 hours post procedure when okay with provider doing procedure. Continue until INR >= 2.0  Recheck INR 5-7 days after resuming warfarin     Plan routed to referring provider for approval  ?   Chandrika Maxwell [facility]    SUBJECTIVE/OBJECTIVE     Jozef Saunders, a 69 year old male    Goal INR Range: 2.0-3.0     Wt Readings from Last 3 Encounters:   04/10/25 86.2 kg (190 lb)   10/29/24 85.4 kg (188 lb 3.2 oz)   10/23/24 84.8 kg (187 lb)      Ideal body weight:  "70.7 kg (155 lb 13.8 oz)  Adjusted ideal body weight: 76.9 kg (169 lb 8.3 oz)     Estimated body mass index is 28.06 kg/m  as calculated from the following:    Height as of 10/29/24: 1.753 m (5' 9\").    Weight as of 4/10/25: 86.2 kg (190 lb).    Lab Results   Component Value Date    INR 3.1 (H) 04/09/2025    INR 2.36 (A) 02/26/2025    INR 2.3 (A) 01/16/2025     Lab Results   Component Value Date    HGB 15.8 04/10/2025    HCT 45.6 04/10/2025     (L) 04/10/2025     Lab Results   Component Value Date    CR 0.87 06/21/2024    CR 0.79 05/06/2024    CR 0.77 08/02/2023     Estimated Creatinine Clearance: 85.9 mL/min (based on SCr of 0.87 mg/dL).    "

## 2025-04-16 NOTE — TELEPHONE ENCOUNTER
Hina Humphreys MD  You16 hours ago (8:44 PM)     EM  I agree with plan as recommended.  Hina Humphreys M.D.

## 2025-04-23 ENCOUNTER — ANTICOAGULATION THERAPY VISIT (OUTPATIENT)
Dept: ANTICOAGULATION | Facility: CLINIC | Age: 70
End: 2025-04-23

## 2025-04-23 ENCOUNTER — LAB (OUTPATIENT)
Dept: LAB | Facility: CLINIC | Age: 70
End: 2025-04-23
Payer: COMMERCIAL

## 2025-04-23 DIAGNOSIS — Z79.01 LONG TERM CURRENT USE OF ANTICOAGULANT THERAPY: ICD-10-CM

## 2025-04-23 DIAGNOSIS — Z86.711 HISTORY OF PULMONARY EMBOLISM: ICD-10-CM

## 2025-04-23 DIAGNOSIS — Z86.718 HISTORY OF RECURRENT DEEP VEIN THROMBOSIS (DVT): ICD-10-CM

## 2025-04-23 DIAGNOSIS — Z86.718 HISTORY OF RECURRENT DEEP VEIN THROMBOSIS (DVT): Primary | ICD-10-CM

## 2025-04-23 LAB — INR BLD: 3.6 (ref 0.9–1.1)

## 2025-04-23 PROCEDURE — 36416 COLLJ CAPILLARY BLOOD SPEC: CPT

## 2025-04-23 PROCEDURE — 85610 PROTHROMBIN TIME: CPT

## 2025-04-23 RX ORDER — ENOXAPARIN SODIUM 100 MG/ML
40 INJECTION SUBCUTANEOUS EVERY 24 HOURS
Qty: 5.6 ML | Refills: 1 | Status: SHIPPED | OUTPATIENT
Start: 2025-04-23

## 2025-04-23 NOTE — PROGRESS NOTES
ANTICOAGULATION MANAGEMENT     Jozef Saunders 69 year old male is on warfarin with supratherapeutic INR result. (Goal INR 2.0-3.0)    Recent labs: (last 7 days)     04/23/25  1023   INR 3.6*       ASSESSMENT     Source(s): Chart Review and Patient/Caregiver Call     Warfarin doses taken: Warfarin taken as instructed  Diet: Decreased greens/vitamin K in diet; plans to resume previous intake  Medication/supplement changes: None noted  New illness, injury, or hospitalization: No  Signs or symptoms of bleeding or clotting: No  Previous result: Supratherapeutic  Additional findings: Upcoming surgery/procedure foot surgery scheduled for 5/1/25       PLAN     Recommended plan for temporary change(s) affecting INR     Dosing Instructions: partial hold then continue your current warfarin dose then begin warfarin hold plan below 4/26/25 with next INR in 2 weeks which is 1 week after procedure      Pre-Procedure:  Hold warfarin for 5 days, until after procedure starting: Saturday, April 26   No pre-procedure bridge     Post-Procedure:  Resume warfarin dose if okay with provider doing procedure on night of procedure, Thursday, May 1 PM: take 10 mg x 2, then resume home dose  Start enoxaparin (Lovenox) 40 mg subq Q 24 hrs (prophylaxis dose) ~ 24 hours post procedure when okay with provider doing procedure. Continue until INR >= 2.0  Recheck INR 5-7 days after resuming warfarin   Summary  As of 4/23/2025      Full warfarin instructions:  5 mg every Tue, Thu, Sat; 7.5 mg all other days   Next INR check:  5/8/2025               Telephone call with Issac who verbalizes understanding and agrees to plan and [employer] message sent    Lab visit scheduled    Education provided: Please call back if any changes to your diet, medications or how you've been taking warfarin  Contact [phone] with any changes, questions or concerns.     Plan made per [facility] anticoagulation protocol    Parvin Guidry RN  4/23/2025  Anticoagulation Clinic  [employer] for routing messages: mayra ALBERTO  [facility] patient phone line: [phone]        _______________________________________________________________________     Anticoagulation Episode Summary       Current INR goal:  2.0-3.0   TTR:  86.5% (1 y)   Target end date:  Indefinite   Send INR reminders to:  LEANDRA ALBERTO    Indications    History of recurrent deep vein thrombosis (DVT) [Z86.718]  Long term current use of anticoagulant therapy [Z79.01]  History of pulmonary embolism [Z86.711]  Deep vein thrombosis (DVT) of proximal lower extremity  unspecified chronicity  unspecified laterality (H) (Resolved) [I82.4Y9]             Comments:  3/19/21 Does not need lovenox bridge per Dr. Humphreys             Anticoagulation Care Providers       Provider Role Specialty Phone number    Hina Humphreys MD Referring Internal Medicine [phone]    Rojas Chun MD  Internal Medicine - Pediatrics [phone]

## 2025-04-23 NOTE — TELEPHONE ENCOUNTER
The below warfarin hold plan was discussed with the patient. See 4/23/2025 Anticoagulation Encounter for details.  Parvin Guidry RN, BSN  Anticoagulation Clinic

## 2025-04-27 DIAGNOSIS — I10 ESSENTIAL HYPERTENSION, BENIGN: ICD-10-CM

## 2025-04-28 RX ORDER — CARVEDILOL 12.5 MG/1
12.5 TABLET ORAL 2 TIMES DAILY WITH MEALS
Qty: 180 TABLET | Refills: 3 | OUTPATIENT
Start: 2025-04-28

## 2025-05-08 ENCOUNTER — ANTICOAGULATION THERAPY VISIT (OUTPATIENT)
Dept: ANTICOAGULATION | Facility: CLINIC | Age: 70
End: 2025-05-08

## 2025-05-08 ENCOUNTER — LAB (OUTPATIENT)
Dept: LAB | Facility: CLINIC | Age: 70
End: 2025-05-08
Payer: COMMERCIAL

## 2025-05-08 ENCOUNTER — RESULTS FOLLOW-UP (OUTPATIENT)
Dept: ANTICOAGULATION | Facility: CLINIC | Age: 70
End: 2025-05-08

## 2025-05-08 DIAGNOSIS — Z86.718 HISTORY OF RECURRENT DEEP VEIN THROMBOSIS (DVT): Primary | ICD-10-CM

## 2025-05-08 DIAGNOSIS — Z86.711 HISTORY OF PULMONARY EMBOLISM: ICD-10-CM

## 2025-05-08 DIAGNOSIS — Z86.718 HISTORY OF RECURRENT DEEP VEIN THROMBOSIS (DVT): ICD-10-CM

## 2025-05-08 DIAGNOSIS — Z79.01 LONG TERM CURRENT USE OF ANTICOAGULANT THERAPY: ICD-10-CM

## 2025-05-08 LAB — INR BLD: 2.2 (ref 0.9–1.1)

## 2025-05-08 NOTE — PROGRESS NOTES
ANTICOAGULATION MANAGEMENT     Jozef Saunders 69 year old male is on warfarin with therapeutic INR result. (Goal INR 2.0-3.0)    Recent labs: (last 7 days)     05/08/25  0959   INR 2.2*       ASSESSMENT     Source(s): Chart Review and Patient/Caregiver Call     Warfarin doses taken: Booster dose(s) recently taken which may be affecting INR and Held for 5  recently which may be affecting INR  Diet: No new diet changes identified  Medication/supplement changes: None noted  New illness, injury, or hospitalization: Yes: recovering from right foot surgery, patient reports recovery is going well, denies pain  Signs or symptoms of bleeding or clotting: No  Previous result: Supratherapeutic  Additional findings: Bridging with Enoxaparin until INR >= 2.0--patient informed OK to discontinue injections, he reports he did not inject this morning.       PLAN     Recommended plan for temporary change(s) affecting INR     Dosing Instructions: Continue your current warfarin dose with next INR in 11 days       Summary  As of 5/8/2025      Full warfarin instructions:  5 mg every Tue, Thu, Sat; 7.5 mg all other days   Next INR check:  5/19/2025               Telephone call with Issac who verbalizes understanding and agrees to plan and who agrees to plan and repeated back plan correctly    Lab visit scheduled    Education provided: Taking warfarin: Importance of taking warfarin as instructed    Plan made per [facility] anticoagulation protocol    Lovely Ashby RN  5/8/2025  Anticoagulation Clinic  [employer] for routing messages: mayra ALBERTO  [facility] patient phone line: [phone]        _______________________________________________________________________     Anticoagulation Episode Summary       Current INR goal:  2.0-3.0   TTR:  85.9% (1 y)   Target end date:  Indefinite   Send INR reminders to:  LEANDRA ALBERTO    Indications    History of recurrent deep vein thrombosis (DVT) [Z86.718]  Long term current use of anticoagulant therapy  [Z79.01]  History of pulmonary embolism [Z86.711]  Deep vein thrombosis (DVT) of proximal lower extremity  unspecified chronicity  unspecified laterality (H) (Resolved) [I82.4Y9]             Comments:  3/19/21 Does not need lovenox bridge per Dr. Humphreys             Anticoagulation Care Providers       Provider Role Specialty Phone number    Hina Humphreys MD [facility] Internal Medicine [phone]    Rojas Chun MD  Internal Medicine - Pediatrics [phone]

## 2025-05-19 ENCOUNTER — TRANSFERRED RECORDS (OUTPATIENT)
Dept: HEALTH INFORMATION MANAGEMENT | Facility: CLINIC | Age: 70
End: 2025-05-19

## 2025-05-19 ENCOUNTER — RESULTS FOLLOW-UP (OUTPATIENT)
Dept: ANTICOAGULATION | Facility: CLINIC | Age: 70
End: 2025-05-19

## 2025-05-19 ENCOUNTER — LAB (OUTPATIENT)
Dept: LAB | Facility: CLINIC | Age: 70
End: 2025-05-19
Payer: COMMERCIAL

## 2025-05-19 ENCOUNTER — ANTICOAGULATION THERAPY VISIT (OUTPATIENT)
Dept: ANTICOAGULATION | Facility: CLINIC | Age: 70
End: 2025-05-19

## 2025-05-19 DIAGNOSIS — Z86.718 HISTORY OF RECURRENT DEEP VEIN THROMBOSIS (DVT): ICD-10-CM

## 2025-05-19 DIAGNOSIS — Z86.711 HISTORY OF PULMONARY EMBOLISM: ICD-10-CM

## 2025-05-19 DIAGNOSIS — Z79.01 LONG TERM CURRENT USE OF ANTICOAGULANT THERAPY: ICD-10-CM

## 2025-05-19 DIAGNOSIS — Z86.718 HISTORY OF RECURRENT DEEP VEIN THROMBOSIS (DVT): Primary | ICD-10-CM

## 2025-05-19 LAB — INR BLD: 2.8 (ref 0.9–1.1)

## 2025-05-19 PROCEDURE — 36416 COLLJ CAPILLARY BLOOD SPEC: CPT

## 2025-05-19 PROCEDURE — 85610 PROTHROMBIN TIME: CPT

## 2025-05-19 NOTE — PROGRESS NOTES
ANTICOAGULATION MANAGEMENT     Jozef Saunders 69 year old male is on warfarin with therapeutic INR result. (Goal INR 2.0-3.0)    Recent labs: (last 7 days)     05/19/25  1059   INR 2.8*       ASSESSMENT     Source(s): Chart Review and Patient/Caregiver Call     Warfarin doses taken: Warfarin taken as instructed  Diet: No new diet changes identified  Medication/supplement changes: None noted  New illness, injury, or hospitalization: Recovering well from right foot surgery 5/1/25 ~ has follow-up today with [facility] to remove stitches  Signs or symptoms of bleeding or clotting: No  Previous result: Therapeutic last visit; previously outside of goal range  Additional findings: None       PLAN     Recommended plan for ongoing change(s) affecting INR     Dosing Instructions: Continue your current warfarin dose with next INR in 2-3 weeks       Summary  As of 5/19/2025      Full warfarin instructions:  5 mg every Tue, Thu, Sat; 7.5 mg all other days   Next INR check:  6/6/2025               Telephone call with Issac who verbalizes understanding and agrees to plan    Lab visit scheduled    Education provided: Please call back if any changes to your diet, medications or how you've been taking warfarin  Symptom monitoring: monitoring for bleeding signs and symptoms and monitoring for clotting signs and symptoms    Plan made per [facility] anticoagulation protocol    No Mullen RN  5/19/2025  Anticoagulation Clinic  [email or web address] [place] for routing messages: mayra ALBERTO  [facility] patient phone line: [phone]        _______________________________________________________________________     Anticoagulation Episode Summary       Current INR goal:  2.0-3.0   TTR:  85.9% (1 y)   Target end date:  Indefinite   Send INR reminders to:  LEANDRA ALBERTO    Indications    History of recurrent deep vein thrombosis (DVT) [Z86.718]  Long term current use of anticoagulant therapy [Z79.01]  History of pulmonary embolism [Z86.711]  Deep vein  thrombosis (DVT) of proximal lower extremity  unspecified chronicity  unspecified laterality (H) (Resolved) [I82.4Y9]             Comments:  3/19/21 Does not need lovenox bridge per Dr. Humphreys             Anticoagulation Care Providers       Provider Role Specialty Phone number    Hina Humphreys MD Referring Internal Medicine [phone]    Rojas Chun MD  Internal Medicine - Pediatrics [phone]

## 2025-06-03 ENCOUNTER — TELEPHONE (OUTPATIENT)
Dept: PEDIATRICS | Facility: CLINIC | Age: 70
End: 2025-06-03
Payer: COMMERCIAL

## 2025-06-03 NOTE — TELEPHONE ENCOUNTER
Patient Quality Outreach    Patient is due for the following:   Hypertension -  BP check    Action(s) Taken:   Patient has upcoming appointment, these items will be addressed at that time.    Type of outreach:         Questions for provider review:    None         Marce Egan LPN  Chart routed to None.

## 2025-06-06 ENCOUNTER — DOCUMENTATION ONLY (OUTPATIENT)
Dept: ANTICOAGULATION | Facility: CLINIC | Age: 70
End: 2025-06-06

## 2025-06-06 DIAGNOSIS — Z86.711 HISTORY OF PULMONARY EMBOLISM: ICD-10-CM

## 2025-06-06 DIAGNOSIS — Z86.718 HISTORY OF RECURRENT DEEP VEIN THROMBOSIS (DVT): Primary | ICD-10-CM

## 2025-06-06 NOTE — PROGRESS NOTES
ANTICOAGULATION CLINIC REFERRAL RENEWAL REQUEST       An annual renewal order is required for all patients referred to [facility] Anticoagulation Clinic.?  Please review and sign the pended referral order for Jozef Saunders.       ANTICOAGULATION SUMMARY      Warfarin indication(s)   DVT and PE    Mechanical heart valve present?  NO       Current goal range   INR: 2.0-3.0     Goal appropriate for indication? Goal INR 2-3, standard for indication(s) above     Time in Therapeutic Range (TTR)  (Goal > 60%) 84.3%       Office visit with referring provider's group within last year yes on 10/23/24       Parvin Guidry RN  [facility] Anticoagulation Clinic

## 2025-06-17 ENCOUNTER — TELEPHONE (OUTPATIENT)
Dept: PEDIATRICS | Facility: CLINIC | Age: 70
End: 2025-06-17
Payer: COMMERCIAL

## 2025-06-17 NOTE — TELEPHONE ENCOUNTER
Patient Quality Outreach    Patient is due for the following:   Physical Preventive Adult Physical    Action(s) Taken:   Patient has upcoming appointment, these items will be addressed at that time.    Type of outreach:    Chart review performed, no outreach needed.    Questions for provider review:    None         Marce Egan LPN  Chart routed to None.

## 2025-07-02 ENCOUNTER — ANTICOAGULATION THERAPY VISIT (OUTPATIENT)
Dept: ANTICOAGULATION | Facility: CLINIC | Age: 70
End: 2025-07-02

## 2025-07-02 ENCOUNTER — RESULTS FOLLOW-UP (OUTPATIENT)
Dept: ANTICOAGULATION | Facility: CLINIC | Age: 70
End: 2025-07-02

## 2025-07-02 ENCOUNTER — LAB (OUTPATIENT)
Dept: LAB | Facility: CLINIC | Age: 70
End: 2025-07-02
Payer: COMMERCIAL

## 2025-07-02 DIAGNOSIS — Z86.711 HISTORY OF PULMONARY EMBOLISM: ICD-10-CM

## 2025-07-02 DIAGNOSIS — Z86.718 HISTORY OF RECURRENT DEEP VEIN THROMBOSIS (DVT): Primary | ICD-10-CM

## 2025-07-02 DIAGNOSIS — Z79.01 LONG TERM CURRENT USE OF ANTICOAGULANT THERAPY: ICD-10-CM

## 2025-07-02 DIAGNOSIS — Z86.718 HISTORY OF RECURRENT DEEP VEIN THROMBOSIS (DVT): ICD-10-CM

## 2025-07-02 LAB — INR BLD: 2.2 (ref 0.9–1.1)

## 2025-07-02 PROCEDURE — 85610 PROTHROMBIN TIME: CPT

## 2025-07-02 PROCEDURE — 36416 COLLJ CAPILLARY BLOOD SPEC: CPT

## 2025-07-02 NOTE — PROGRESS NOTES
ANTICOAGULATION MANAGEMENT     Jozef Saunders 69 year old male is on warfarin with therapeutic INR result. (Goal INR 2.0-3.0)    Recent labs: (last 7 days)     07/02/25  1319   INR 2.2*       ASSESSMENT     Source(s): Chart Review and Patient/Caregiver Call     Warfarin doses taken: Warfarin taken as instructed  Diet: No new diet changes identified  Medication/supplement changes: None noted  New illness, injury, or hospitalization: No  Signs or symptoms of bleeding or clotting: No  Previous result: Supratherapeutic  Additional findings: None       PLAN     Recommended plan for no diet, medication or health factor changes affecting INR     Dosing Instructions: Continue your current warfarin dose with next INR in 3 weeks       Summary  As of 7/2/2025      Full warfarin instructions:  7.5 mg every Tue, Thu, Sat; 5 mg all other days   Next INR check:  7/23/2025               Telephone call with Issac who verbalizes understanding and agrees to plan    Lab visit scheduled    Education provided: Please call back if any changes to your diet, medications or how you've been taking warfarin  Contact [phone] with any changes, questions or concerns.     Plan made per [facility] anticoagulation protocol    Parvin Guidry RN  7/2/2025  Anticoagulation Clinic  [employer] for routing messages: mayra ALBERTO  [facility] patient phone line: [phone]        _______________________________________________________________________     Anticoagulation Episode Summary       Current INR goal:  2.0-3.0   TTR:  80.1% (1 y)   Target end date:  Indefinite   Send INR reminders to:  LEANDRA ALBERTO    Indications    History of recurrent deep vein thrombosis (DVT) [Z86.718]  Long term current use of anticoagulant therapy [Z79.01]  History of pulmonary embolism [Z86.711]  Deep vein thrombosis (DVT) of proximal lower extremity  unspecified chronicity  unspecified laterality (H) (Resolved) [I82.4Y9]             Comments:  3/19/21 Does not need lovenox  bridge per Dr. Humphreys             Anticoagulation Care Providers       Provider Role Specialty Phone number    Hina Humphreys MD Referring Internal Medicine [phone]    Rojas Chun MD  Internal Medicine - Pediatrics [phone]

## 2025-07-09 DIAGNOSIS — I10 ESSENTIAL HYPERTENSION, BENIGN: ICD-10-CM

## 2025-07-10 ENCOUNTER — MYC REFILL (OUTPATIENT)
Dept: PEDIATRICS | Facility: CLINIC | Age: 70
End: 2025-07-10
Payer: COMMERCIAL

## 2025-07-10 DIAGNOSIS — I10 ESSENTIAL HYPERTENSION, BENIGN: ICD-10-CM

## 2025-07-10 RX ORDER — CARVEDILOL 12.5 MG/1
12.5 TABLET ORAL 2 TIMES DAILY WITH MEALS
Qty: 180 TABLET | Refills: 3 | OUTPATIENT
Start: 2025-07-10

## 2025-07-10 RX ORDER — CARVEDILOL 12.5 MG/1
12.5 TABLET ORAL 2 TIMES DAILY WITH MEALS
Qty: 180 TABLET | Refills: 3 | Status: CANCELLED | OUTPATIENT
Start: 2025-07-10

## 2025-07-14 ENCOUNTER — MYC REFILL (OUTPATIENT)
Dept: PEDIATRICS | Facility: CLINIC | Age: 70
End: 2025-07-14
Payer: COMMERCIAL

## 2025-07-14 DIAGNOSIS — I10 ESSENTIAL HYPERTENSION, BENIGN: ICD-10-CM

## 2025-07-14 RX ORDER — CARVEDILOL 12.5 MG/1
12.5 TABLET ORAL 2 TIMES DAILY WITH MEALS
Qty: 90 TABLET | Refills: 0 | Status: SHIPPED | OUTPATIENT
Start: 2025-07-14 | End: 2025-07-14

## 2025-07-14 RX ORDER — CARVEDILOL 12.5 MG/1
12.5 TABLET ORAL 2 TIMES DAILY WITH MEALS
Qty: 180 TABLET | Refills: 3 | Status: SHIPPED | OUTPATIENT
Start: 2025-07-14

## 2025-07-15 ENCOUNTER — TELEPHONE (OUTPATIENT)
Dept: PEDIATRICS | Facility: CLINIC | Age: 70
End: 2025-07-15
Payer: COMMERCIAL

## 2025-07-15 DIAGNOSIS — I10 ESSENTIAL HYPERTENSION, BENIGN: ICD-10-CM

## 2025-07-15 RX ORDER — CARVEDILOL 12.5 MG/1
12.5 TABLET ORAL 2 TIMES DAILY WITH MEALS
Qty: 180 TABLET | Refills: 3 | OUTPATIENT
Start: 2025-07-15

## 2025-07-15 NOTE — TELEPHONE ENCOUNTER
Received a call from patient's spouse. [facility] on file. She was calling regarding the Carvedilol 12.5 mg tablet. She states that they went to the pharmacy today and were told they did not have new prescription for this medication. Medication was e-prescribed yesterday:    Disp Refills Start End TY    carvedilol (COREG) 12.5 MG tablet 180 tablet 3 7/14/2025 -- No   Sig - Route: TAKE 1 TABLET BY MOUTH TWICE A DAY WITH MEALS - Oral   Sent to pharmacy as: Carvedilol 12.5 MG Oral Tablet (COREG)     Called [facility]: [phone]. Spoke to pharmacist. They state they did not receive the carvedilol prescription yesterday. Gave a verbal to pharmacist. They will let patient know once the prescription is ready.    Joanie SEO RN, BSN  Clinic RN  [facility]

## 2025-07-23 ENCOUNTER — LAB (OUTPATIENT)
Dept: LAB | Facility: CLINIC | Age: 70
End: 2025-07-23
Payer: COMMERCIAL

## 2025-07-23 ENCOUNTER — ANTICOAGULATION THERAPY VISIT (OUTPATIENT)
Dept: ANTICOAGULATION | Facility: CLINIC | Age: 70
End: 2025-07-23

## 2025-07-23 ENCOUNTER — RESULTS FOLLOW-UP (OUTPATIENT)
Dept: ANTICOAGULATION | Facility: CLINIC | Age: 70
End: 2025-07-23

## 2025-07-23 DIAGNOSIS — Z12.5 SCREENING FOR PROSTATE CANCER: Primary | ICD-10-CM

## 2025-07-23 DIAGNOSIS — Z79.01 LONG TERM CURRENT USE OF ANTICOAGULANT THERAPY: ICD-10-CM

## 2025-07-23 DIAGNOSIS — Z86.718 HISTORY OF RECURRENT DEEP VEIN THROMBOSIS (DVT): ICD-10-CM

## 2025-07-23 DIAGNOSIS — Z86.711 HISTORY OF PULMONARY EMBOLISM: ICD-10-CM

## 2025-07-23 DIAGNOSIS — Z86.718 HISTORY OF RECURRENT DEEP VEIN THROMBOSIS (DVT): Primary | ICD-10-CM

## 2025-07-23 LAB — INR BLD: 2.1 (ref 0.9–1.1)

## 2025-07-23 PROCEDURE — 36416 COLLJ CAPILLARY BLOOD SPEC: CPT

## 2025-07-23 PROCEDURE — 85610 PROTHROMBIN TIME: CPT

## 2025-07-23 NOTE — PROGRESS NOTES
ANTICOAGULATION MANAGEMENT     Jozef Saunders 69 year old male is on warfarin with therapeutic INR result. (Goal INR 2.0-3.0)    Recent labs: (last 7 days)     07/23/25  0918   INR 2.1*       ASSESSMENT     Source(s): Chart Review and Patient/Caregiver Call     Warfarin doses taken: Warfarin taken as instructed  Diet: No new diet changes identified  Medication/supplement changes: None noted  New illness, injury, or hospitalization: No  Signs or symptoms of bleeding or clotting: No  Previous result: Therapeutic last visit; previously outside of goal range  Additional findings: None       PLAN     Recommended plan for no diet, medication or health factor changes affecting INR     Dosing Instructions: Continue your current warfarin dose with next INR in 4 weeks       Summary  As of 7/23/2025      Full warfarin instructions:  7.5 mg every Tue, Thu, Sat; 5 mg all other days   Next INR check:  8/18/2025               Telephone call with Issac who verbalizes understanding and agrees to plan    Check at provider office visit 8/18/25    Education provided: Please call back if any changes to your diet, medications or how you've been taking warfarin  Contact [phone] with any changes, questions or concerns.     Plan made per [facility] anticoagulation protocol    Parvin Guidry RN  7/23/2025  Anticoagulation Clinic  [employer] for routing messages: mayra ALBERTO  [facility] patient phone line: [phone]        _______________________________________________________________________     Anticoagulation Episode Summary       Current INR goal:  2.0-3.0   TTR:  80.1% (1 y)   Target end date:  Indefinite   Send INR reminders to:  LEANDRA ALBERTO    Indications    History of recurrent deep vein thrombosis (DVT) [Z86.718]  Long term current use of anticoagulant therapy [Z79.01]  History of pulmonary embolism [Z86.711]  Deep vein thrombosis (DVT) of proximal lower extremity  unspecified chronicity  unspecified laterality (H) (Resolved)  [I82.4Y9]             Comments:  3/19/21 Does not need lovenox bridge per Dr. Humphreys             Anticoagulation Care Providers       Provider Role Specialty Phone number    Hina Humphreys MD Referring Internal Medicine [phone]    Rojas Chun MD  Internal Medicine - Pediatrics [phone]

## 2025-07-30 DIAGNOSIS — I82.4Y9 DEEP VEIN THROMBOSIS (DVT) OF PROXIMAL LOWER EXTREMITY, UNSPECIFIED CHRONICITY, UNSPECIFIED LATERALITY (H): ICD-10-CM

## 2025-07-30 DIAGNOSIS — Z86.711 HISTORY OF PULMONARY EMBOLISM: ICD-10-CM

## 2025-07-30 RX ORDER — WARFARIN SODIUM 5 MG/1
TABLET ORAL
Qty: 135 TABLET | Refills: 1 | Status: SHIPPED | OUTPATIENT
Start: 2025-07-30

## 2025-07-30 NOTE — TELEPHONE ENCOUNTER
ANTICOAGULATION MANAGEMENT:  Medication Refill    Anticoagulation Summary  As of 7/23/2025      Warfarin maintenance plan:  7.5 mg (5 mg x 1.5) every Tue, Thu, Sat; 5 mg (5 mg x 1) all other days   Next INR check:  8/18/2025   Target end date:  Indefinite    Indications    History of recurrent deep vein thrombosis (DVT) [Z86.718]  Long term current use of anticoagulant therapy [Z79.01]  History of pulmonary embolism [Z86.711]  Deep vein thrombosis (DVT) of proximal lower extremity  unspecified chronicity  unspecified laterality (H) (Resolved) [I82.4Y9]                 Anticoagulation Care Providers       Provider Role Specialty Phone number    Hina Humphreys MD Referring Internal Medicine [phone]    Rojas Chun MD  Internal Medicine - Pediatrics [phone]            Refill Criteria    Visit with referring provider/group: Meets criteria: visit within referring provider group in the last 15 months on 4/10/25    [facility] referral last signed: 06/09/2025; within last year:  Yes    Lab monitoring is up to date (not exceeding 2 weeks overdue): Yes    Jozef meets all criteria for refill. Rx instructions and quantity supplied updated to match patient's current dosing plan. Warfarin 90 day supply with 1 refill granted per [facility] protocol     Buddy Valdez RN  Anticoagulation Clinic

## 2025-08-15 SDOH — HEALTH STABILITY: PHYSICAL HEALTH: ON AVERAGE, HOW MANY MINUTES DO YOU ENGAGE IN EXERCISE AT THIS LEVEL?: 60 MIN

## 2025-08-15 SDOH — HEALTH STABILITY: PHYSICAL HEALTH: ON AVERAGE, HOW MANY DAYS PER WEEK DO YOU ENGAGE IN MODERATE TO STRENUOUS EXERCISE (LIKE A BRISK WALK)?: 6 DAYS

## 2025-08-15 ASSESSMENT — SOCIAL DETERMINANTS OF HEALTH (SDOH): HOW OFTEN DO YOU GET TOGETHER WITH FRIENDS OR RELATIVES?: TWICE A WEEK

## 2025-08-18 ENCOUNTER — OFFICE VISIT (OUTPATIENT)
Dept: PEDIATRICS | Facility: CLINIC | Age: 70
End: 2025-08-18
Payer: COMMERCIAL

## 2025-08-18 ENCOUNTER — ANTICOAGULATION THERAPY VISIT (OUTPATIENT)
Dept: ANTICOAGULATION | Facility: CLINIC | Age: 70
End: 2025-08-18

## 2025-08-18 VITALS
SYSTOLIC BLOOD PRESSURE: 147 MMHG | RESPIRATION RATE: 14 BRPM | OXYGEN SATURATION: 96 % | WEIGHT: 188.1 LBS | BODY MASS INDEX: 27.86 KG/M2 | TEMPERATURE: 98.2 F | HEIGHT: 69 IN | DIASTOLIC BLOOD PRESSURE: 98 MMHG | HEART RATE: 70 BPM

## 2025-08-18 DIAGNOSIS — Z12.5 SCREENING FOR PROSTATE CANCER: ICD-10-CM

## 2025-08-18 DIAGNOSIS — Z79.01 LONG TERM CURRENT USE OF ANTICOAGULANT THERAPY: ICD-10-CM

## 2025-08-18 DIAGNOSIS — Z86.711 HISTORY OF PULMONARY EMBOLISM: ICD-10-CM

## 2025-08-18 DIAGNOSIS — Z00.00 ENCOUNTER FOR MEDICARE ANNUAL WELLNESS EXAM: Primary | ICD-10-CM

## 2025-08-18 DIAGNOSIS — I10 ESSENTIAL HYPERTENSION, BENIGN: ICD-10-CM

## 2025-08-18 DIAGNOSIS — R14.0 ABDOMINAL BLOATING: ICD-10-CM

## 2025-08-18 DIAGNOSIS — E78.00 HYPERCHOLESTEROLEMIA: ICD-10-CM

## 2025-08-18 DIAGNOSIS — Z86.718 HISTORY OF RECURRENT DEEP VEIN THROMBOSIS (DVT): ICD-10-CM

## 2025-08-18 DIAGNOSIS — F10.21 ALCOHOL DEPENDENCE IN REMISSION (H): ICD-10-CM

## 2025-08-18 DIAGNOSIS — F41.1 GENERALIZED ANXIETY DISORDER: ICD-10-CM

## 2025-08-18 DIAGNOSIS — Z86.718 HISTORY OF RECURRENT DEEP VEIN THROMBOSIS (DVT): Primary | ICD-10-CM

## 2025-08-18 LAB
ANION GAP SERPL CALCULATED.3IONS-SCNC: 11 MMOL/L (ref 7–15)
BUN SERPL-MCNC: 17.9 MG/DL (ref 8–23)
CALCIUM SERPL-MCNC: 9.2 MG/DL (ref 8.8–10.4)
CHLORIDE SERPL-SCNC: 107 MMOL/L (ref 98–107)
CHOLEST SERPL-MCNC: 161 MG/DL
CREAT SERPL-MCNC: 0.8 MG/DL (ref 0.67–1.17)
EGFRCR SERPLBLD CKD-EPI 2021: >90 ML/MIN/1.73M2
FASTING STATUS PATIENT QL REPORTED: YES
FASTING STATUS PATIENT QL REPORTED: YES
GLUCOSE SERPL-MCNC: 100 MG/DL (ref 70–99)
HCO3 SERPL-SCNC: 24 MMOL/L (ref 22–29)
HDLC SERPL-MCNC: 47 MG/DL
INR BLD: 2.5 (ref 0.9–1.1)
LDLC SERPL CALC-MCNC: 85 MG/DL
NONHDLC SERPL-MCNC: 114 MG/DL
POTASSIUM SERPL-SCNC: 4.2 MMOL/L (ref 3.4–5.3)
PSA SERPL DL<=0.01 NG/ML-MCNC: 0.76 NG/ML (ref 0–6.5)
SODIUM SERPL-SCNC: 142 MMOL/L (ref 135–145)
TRIGL SERPL-MCNC: 146 MG/DL

## 2025-08-18 PROCEDURE — G0103 PSA SCREENING: HCPCS | Performed by: INTERNAL MEDICINE

## 2025-08-18 PROCEDURE — G2211 COMPLEX E/M VISIT ADD ON: HCPCS | Performed by: INTERNAL MEDICINE

## 2025-08-18 PROCEDURE — 36415 COLL VENOUS BLD VENIPUNCTURE: CPT | Performed by: INTERNAL MEDICINE

## 2025-08-18 PROCEDURE — 80061 LIPID PANEL: CPT | Performed by: INTERNAL MEDICINE

## 2025-08-18 PROCEDURE — G0439 PPPS, SUBSEQ VISIT: HCPCS | Performed by: INTERNAL MEDICINE

## 2025-08-18 PROCEDURE — 80048 BASIC METABOLIC PNL TOTAL CA: CPT | Performed by: INTERNAL MEDICINE

## 2025-08-18 PROCEDURE — 85610 PROTHROMBIN TIME: CPT | Performed by: INTERNAL MEDICINE

## 2025-08-18 PROCEDURE — 91320 SARSCV2 VAC 30MCG TRS-SUC IM: CPT | Performed by: INTERNAL MEDICINE

## 2025-08-18 PROCEDURE — 90480 ADMN SARSCOV2 VAC 1/ONLY CMP: CPT | Performed by: INTERNAL MEDICINE

## 2025-08-18 PROCEDURE — 3077F SYST BP >= 140 MM HG: CPT | Performed by: INTERNAL MEDICINE

## 2025-08-18 PROCEDURE — 3080F DIAST BP >= 90 MM HG: CPT | Performed by: INTERNAL MEDICINE

## 2025-08-18 PROCEDURE — 99214 OFFICE O/P EST MOD 30 MIN: CPT | Mod: 25 | Performed by: INTERNAL MEDICINE

## 2025-08-18 PROCEDURE — 3048F LDL-C <100 MG/DL: CPT | Performed by: INTERNAL MEDICINE

## 2025-08-18 RX ORDER — ROSUVASTATIN CALCIUM 5 MG/1
5 TABLET, COATED ORAL DAILY
Qty: 90 TABLET | Refills: 4 | Status: SHIPPED | OUTPATIENT
Start: 2025-08-18

## 2025-08-18 RX ORDER — CARVEDILOL 12.5 MG/1
12.5 TABLET ORAL 2 TIMES DAILY WITH MEALS
Qty: 180 TABLET | Refills: 4 | Status: SHIPPED | OUTPATIENT
Start: 2025-08-18

## 2025-08-18 RX ORDER — LISINOPRIL 20 MG/1
20 TABLET ORAL DAILY
Qty: 90 TABLET | Refills: 4 | Status: SHIPPED | OUTPATIENT
Start: 2025-08-18

## (undated) DEVICE — ESU PENCIL W/HOLSTER E2350H

## (undated) DEVICE — SUCTION CANISTER MEDIVAC LINER 3000ML W/LID 65651-530

## (undated) DEVICE — ESU GROUND PAD UNIVERSAL W/O CORD

## (undated) DEVICE — SYR BULB IRRIG 50ML LATEX FREE 0035280

## (undated) DEVICE — GOWN IMPERVIOUS ZONED LG

## (undated) DEVICE — SU VICRYL 2-0 CP-1 27" UND J266H

## (undated) DEVICE — SU STRATAFIX PDS PLUS 2-0 SPIRAL SH 23CM SXPP1B433

## (undated) DEVICE — SU VICRYL 2-0 CT-1 27" J339H

## (undated) DEVICE — SU SILK 2-0 TIE 24" SA75H

## (undated) DEVICE — DRAPE SHEET REV FOLD 3/4 9349

## (undated) DEVICE — DRAIN PENROSE 0.50"X18" LATEX FREE GR203

## (undated) DEVICE — ESU HOLDER LAP INST DISP PURPLE LONG 330MM H-PRO-330

## (undated) DEVICE — Device

## (undated) DEVICE — GLOVE PROTEXIS W/NEU-THERA 7.5  2D73TE75

## (undated) DEVICE — DAVINCI HOT SHEARS TIP COVER  400180

## (undated) DEVICE — SU SILK 3-0 SH 30" K832H

## (undated) DEVICE — PREP CHLORAPREP 26ML TINTED ORANGE  260815

## (undated) DEVICE — STPL SKIN SUBCUTICULAR INSORB  2030

## (undated) DEVICE — SU SILK 2-0 SH 30" K833H

## (undated) DEVICE — DRAPE IOBAN INCISE 23X17" 6650EZ

## (undated) DEVICE — ESU ELEC BLADE 6" COATED E1450-6

## (undated) DEVICE — PACK MINOR SBA15MIFSE

## (undated) DEVICE — GOWN IMPERVIOUS SPECIALTY XLG/XLONG 32474

## (undated) DEVICE — SUCTION MINISQUAIR SMOKE EVAC CAPTURE DEVICE SQ20012-01

## (undated) DEVICE — SU SILK 2 REEL 60" SA8H

## (undated) DEVICE — LIGHT HANDLE X2

## (undated) DEVICE — DRSG STERI STRIP 1/2X4" R1547

## (undated) DEVICE — PREP CHLORAPREP 26ML TINTED HI-LITE ORANGE 930815

## (undated) DEVICE — SPONGE LAP 18X18" X8435

## (undated) DEVICE — CLIP APPLIER 11" MED LIGACLIP MCM20

## (undated) DEVICE — GLOVE BIOGEL PI MICRO SZ 8.0 48580

## (undated) DEVICE — SOL WATER IRRIG 1000ML BOTTLE 2F7114

## (undated) DEVICE — DRAIN PENROSE 0.75"X18" LATEX FREE GR205

## (undated) DEVICE — ESU ELEC BLADE 6" COATED/INSULATED E1455-6

## (undated) DEVICE — TUBE SMOKE EVAC 7/8"X10 (STERILE)

## (undated) DEVICE — DRAPE POUCH INSTRUMENT 1018

## (undated) DEVICE — SU MONOCRYL 4-0 PS-2 18" UND Y496G

## (undated) DEVICE — ANTIFOG SOLUTION SEE SHARP 150M TROCAR SWABS 30978 (COI)

## (undated) DEVICE — LINEN TOWEL PACK X5 5464

## (undated) DEVICE — SUCTION DRY CHEST DRAIN OASIS 3600-100

## (undated) DEVICE — DRSG KERLIX 4 1/2"X4YDS ROLL 6715

## (undated) DEVICE — SU VICRYL 1 CT 36" J959H

## (undated) DEVICE — TUBING SUCTION MEDI-VAC SOFT 3/16"X20' N520A

## (undated) DEVICE — DAVINCI XI OBTURATOR BLADELESS 8MM 470359

## (undated) DEVICE — DRAPE BREAST/CHEST 29420

## (undated) DEVICE — SU VICRYL 1 CTX CR 8X18" J765D

## (undated) DEVICE — SU PROLENE 3-0 V-7DA 36" 8976H

## (undated) DEVICE — PACK LAP CHOLE SLC15LCFSD

## (undated) DEVICE — SPONGE RAY-TEC 4X8" 7318

## (undated) DEVICE — SU VICRYL 4-0 PS-2 18" UND J496H

## (undated) DEVICE — GLOVE PROTEXIS W/NEU-THERA 6.5  2D73TE65

## (undated) DEVICE — EVAC SYSTEM CLEAR FLOW SC082500

## (undated) DEVICE — GLOVE BIOGEL PI MICRO INDICATOR UNDERGLOVE SZ 8.0 48980

## (undated) DEVICE — SOL NACL 0.9% IRRIG 1000ML BOTTLE 2F7124

## (undated) DEVICE — SU PROLENE 1 CTX 30" 8435H

## (undated) DEVICE — CATH THORACIC 24FR STR SLICONE 14024

## (undated) DEVICE — DAVINCI XI SEAL UNIVERSAL 5-12MM 470500

## (undated) DEVICE — CLEANER INST PRE-KLENZ SOAK SHIELD TUBE 6 ML MEDIUM 2D66J4

## (undated) DEVICE — LUBRICANT INST ELECTROLUBE EL101

## (undated) DEVICE — SU SILK 3-0 SH CR 8X18" C013D

## (undated) DEVICE — SU STRATAFIX SYMMETRIC PDS PLUS #0 36CM CT-1 SXPP1A425

## (undated) DEVICE — DAVINCI XI DRAPE ARM 470015

## (undated) DEVICE — BLADE KNIFE SURG 10 371110

## (undated) DEVICE — TAPE DRSG UNIVERSAL CLOTH 3" WHITE LATEX 881-3

## (undated) DEVICE — SU NDL CUT REV MED 3/8 209014

## (undated) DEVICE — CATH ON-Q PAIN SILVER SKR 2.5" PM010-A

## (undated) DEVICE — DAVINCI XI DRAPE COLUMN 470341

## (undated) DEVICE — SU STRATAFIX PDS PLUS 2-0 SPIRAL SH 30CM SXPP1B416

## (undated) DEVICE — DECANTER BAG 2002S

## (undated) DEVICE — BLADE CLIPPER 4406

## (undated) DEVICE — DRSG GAUZE 4X4" 3033

## (undated) DEVICE — SU VICRYL 2-0 CT-2 27" UND J269H

## (undated) DEVICE — NDL 22GA 1.5"

## (undated) DEVICE — COVER TABLE POLY 65X90" 8186

## (undated) RX ORDER — DEXAMETHASONE SODIUM PHOSPHATE 4 MG/ML
INJECTION, SOLUTION INTRA-ARTICULAR; INTRALESIONAL; INTRAMUSCULAR; INTRAVENOUS; SOFT TISSUE
Status: DISPENSED
Start: 2019-11-12

## (undated) RX ORDER — CEFAZOLIN SODIUM/WATER 2 G/20 ML
SYRINGE (ML) INTRAVENOUS
Status: DISPENSED
Start: 2024-10-29

## (undated) RX ORDER — BUPIVACAINE HYDROCHLORIDE AND EPINEPHRINE 2.5; 5 MG/ML; UG/ML
INJECTION, SOLUTION EPIDURAL; INFILTRATION; INTRACAUDAL; PERINEURAL
Status: DISPENSED
Start: 2024-10-29

## (undated) RX ORDER — PROPOFOL 10 MG/ML
INJECTION, EMULSION INTRAVENOUS
Status: DISPENSED
Start: 2019-11-12

## (undated) RX ORDER — NEOSTIGMINE METHYLSULFATE 1 MG/ML
VIAL (ML) INJECTION
Status: DISPENSED
Start: 2019-11-12

## (undated) RX ORDER — FENTANYL CITRATE 0.05 MG/ML
INJECTION, SOLUTION INTRAMUSCULAR; INTRAVENOUS
Status: DISPENSED
Start: 2024-10-29

## (undated) RX ORDER — FENTANYL CITRATE 50 UG/ML
INJECTION, SOLUTION INTRAMUSCULAR; INTRAVENOUS
Status: DISPENSED
Start: 2024-10-29

## (undated) RX ORDER — HYDROMORPHONE HYDROCHLORIDE 1 MG/ML
INJECTION, SOLUTION INTRAMUSCULAR; INTRAVENOUS; SUBCUTANEOUS
Status: DISPENSED
Start: 2019-11-12

## (undated) RX ORDER — FENTANYL CITRATE 50 UG/ML
INJECTION, SOLUTION INTRAMUSCULAR; INTRAVENOUS
Status: DISPENSED
Start: 2019-11-12

## (undated) RX ORDER — HYDROCODONE BITARTRATE AND ACETAMINOPHEN 5; 325 MG/1; MG/1
TABLET ORAL
Status: DISPENSED
Start: 2024-10-29

## (undated) RX ORDER — BUPIVACAINE HYDROCHLORIDE 5 MG/ML
INJECTION, SOLUTION EPIDURAL; INTRACAUDAL
Status: DISPENSED
Start: 2019-11-12

## (undated) RX ORDER — LIDOCAINE HYDROCHLORIDE 20 MG/ML
INJECTION, SOLUTION EPIDURAL; INFILTRATION; INTRACAUDAL; PERINEURAL
Status: DISPENSED
Start: 2019-11-12

## (undated) RX ORDER — EPHEDRINE SULFATE 50 MG/ML
INJECTION, SOLUTION INTRAMUSCULAR; INTRAVENOUS; SUBCUTANEOUS
Status: DISPENSED
Start: 2024-10-29

## (undated) RX ORDER — GLYCOPYRROLATE 0.2 MG/ML
INJECTION, SOLUTION INTRAMUSCULAR; INTRAVENOUS
Status: DISPENSED
Start: 2024-10-29

## (undated) RX ORDER — CEFAZOLIN SODIUM 1 G/3ML
INJECTION, POWDER, FOR SOLUTION INTRAMUSCULAR; INTRAVENOUS
Status: DISPENSED
Start: 2019-11-12

## (undated) RX ORDER — CEFAZOLIN SODIUM 2 G/100ML
INJECTION, SOLUTION INTRAVENOUS
Status: DISPENSED
Start: 2019-11-12

## (undated) RX ORDER — ONDANSETRON 2 MG/ML
INJECTION INTRAMUSCULAR; INTRAVENOUS
Status: DISPENSED
Start: 2019-11-12

## (undated) RX ORDER — GLYCOPYRROLATE 0.2 MG/ML
INJECTION, SOLUTION INTRAMUSCULAR; INTRAVENOUS
Status: DISPENSED
Start: 2019-11-12